# Patient Record
Sex: FEMALE | Race: WHITE | NOT HISPANIC OR LATINO | Employment: OTHER | URBAN - METROPOLITAN AREA
[De-identification: names, ages, dates, MRNs, and addresses within clinical notes are randomized per-mention and may not be internally consistent; named-entity substitution may affect disease eponyms.]

---

## 2023-09-28 LAB
ALBUMIN SERPL-MCNC: 4.1 GM/DL (ref 3.5–5)
ALBUMIN/GLOB SERPL: 1.5 RATIO (ref 1.5–3)
ALP BLD-CCNC: 72 U/L (ref 35–125)
ALT SERPL-CCNC: 9 U/L (ref 5–40)
ANION GAP SERPL CALCULATED.3IONS-SCNC: 8 MMOL/L (ref 0–19)
AST SERPL-CCNC: 12 U/L (ref 5–40)
BASOPHILS # BLD AUTO: 0.03 K/UL (ref 0–0.22)
BASOPHILS NFR BLD AUTO: 0.4 % (ref 0–1)
BILIRUB SERPL-MCNC: 0.2 MG/DL (ref 0.1–1.2)
BUN SERPL-MCNC: 12 MG/DL (ref 8–25)
BUN/CREAT SERPL: 15 RATIO (ref 8–21)
CALCIUM SERPL-MCNC: 9.3 MG/DL (ref 8.5–10.4)
CHLORIDE SERPL-SCNC: 101 MMOL/L (ref 97–107)
CO2 SERPL-SCNC: 31 MMOL/L (ref 24–31)
CREAT SERPL-MCNC: 0.8 MG/DL (ref 0.4–1.6)
DEPRECATED RDW RBC AUTO: 49.6 FL (ref 37–54)
DIFFERENTIAL METHOD BLD: ABNORMAL
EOSINOPHIL # BLD AUTO: 0.12 K/UL (ref 0–0.45)
EOSINOPHIL NFR BLD: 1.8 % (ref 0–3)
ERYTHROCYTE [DISTWIDTH] IN BLOOD BY AUTOMATED COUNT: 14.2 % (ref 11.7–15)
GFR SERPL CREATININE-BSD FRML MDRD: 73 ML/MIN/1.73 M2
GLOBULIN SER-MCNC: 2.7 G/DL (ref 1.9–3.7)
GLUCOSE SERPL-MCNC: 98 MG/DL (ref 65–99)
HCT VFR BLD AUTO: 40.5 % (ref 36–44)
HGB BLD-MCNC: 13.6 GM/DL (ref 12–15)
HS TROPONIN T DELTA: 1 (ref 0–4)
HS TROPONIN T DELTA: NORMAL (ref 0–4)
IMM GRANULOCYTES # BLD AUTO: 0.03 K/UL (ref 0–0.1)
LYMPHOCYTES # BLD AUTO: 1.65 K/UL (ref 1.2–3.2)
LYMPHOCYTES NFR BLD MANUAL: 24.6 % (ref 20–40)
MCH RBC QN AUTO: 31.7 PG (ref 26–34)
MCHC RBC AUTO-ENTMCNC: 33.6 % (ref 31–37)
MCV RBC AUTO: 94.4 FL (ref 80–100)
MONOCYTES # BLD AUTO: 0.57 K/UL (ref 0–0.8)
MONOCYTES NFR BLD MANUAL: 8.5 % (ref 0–8)
NEUTROPHILS # BLD AUTO: 4.32 K/UL
NEUTROPHILS # BLD AUTO: 4.32 K/UL (ref 1.8–7.7)
NEUTROPHILS.IMMATURE NFR BLD: 0.4 % (ref 0–1)
NEUTS SEG NFR BLD: 64.3 % (ref 50–70)
NRBC BLD-RTO: 0 /100 WBC
NT-PROBNP SERPL-MCNC: 1067 PG/ML (ref 0–624)
PLATELET # BLD AUTO: 271 K/UL (ref 150–450)
PMV BLD AUTO: 10.1 CU (ref 7–12.6)
POTASSIUM SERPL-SCNC: 3.8 MMOL/L (ref 3.4–5.1)
PROT SERPL-MCNC: 6.8 G/DL (ref 5.9–7.9)
RBC # BLD AUTO: 4.29 M/UL (ref 4–4.9)
SODIUM SERPL-SCNC: 140 MMOL/L (ref 133–145)
TROPONIN T SERPL-MCNC: 12 NG/L
TROPONIN T SERPL-MCNC: 13 NG/L
WBC # BLD AUTO: 6.7 K/UL (ref 4.5–11)

## 2023-09-28 PROCEDURE — 96376 TX/PRO/DX INJ SAME DRUG ADON: CPT

## 2023-09-28 PROCEDURE — 85025 COMPLETE CBC W/AUTO DIFF WBC: CPT

## 2023-09-28 PROCEDURE — 84484 ASSAY OF TROPONIN QUANT: CPT

## 2023-09-28 PROCEDURE — 9990 CHARGE CONVERSION: Mod: ME

## 2023-09-28 PROCEDURE — 96375 TX/PRO/DX INJ NEW DRUG ADDON: CPT

## 2023-09-28 PROCEDURE — 96372 THER/PROPH/DIAG INJ SC/IM: CPT | Mod: XU

## 2023-09-28 PROCEDURE — 83880 ASSAY OF NATRIURETIC PEPTIDE: CPT

## 2023-09-28 PROCEDURE — 96366 THER/PROPH/DIAG IV INF ADDON: CPT

## 2023-09-28 PROCEDURE — 71275 CT ANGIOGRAPHY CHEST: CPT | Mod: ME

## 2023-09-28 PROCEDURE — 96365 THER/PROPH/DIAG IV INF INIT: CPT

## 2023-09-28 PROCEDURE — G1004 CDSM NDSC: HCPCS

## 2023-09-28 PROCEDURE — 36415 COLL VENOUS BLD VENIPUNCTURE: CPT

## 2023-09-28 PROCEDURE — 80053 COMPREHEN METABOLIC PANEL: CPT

## 2023-09-28 PROCEDURE — 99285 EMERGENCY DEPT VISIT HI MDM: CPT | Mod: 25

## 2023-09-28 PROCEDURE — 93005 ELECTROCARDIOGRAM TRACING: CPT

## 2023-09-29 ENCOUNTER — HOSPITAL ENCOUNTER (OUTPATIENT)
Dept: DATA CONVERSION | Facility: HOSPITAL | Age: 83
Setting detail: OBSERVATION
Discharge: HOME | DRG: 291 | End: 2023-10-02
Attending: INTERNAL MEDICINE | Admitting: INTERNAL MEDICINE
Payer: MEDICARE

## 2023-09-29 DIAGNOSIS — G89.4 CHRONIC PAIN SYNDROME: ICD-10-CM

## 2023-09-29 DIAGNOSIS — J90 PLEURAL EFFUSION: Primary | ICD-10-CM

## 2023-09-29 LAB
ALBUMIN FLD-MCNC: NORMAL G/DL
ANION GAP SERPL CALCULATED.3IONS-SCNC: 13 MMOL/L (ref 0–19)
ANTICOAGULANT: NORMAL
APPEARANCE FLD: NORMAL
APTT PPP: 28 SEC (ref 22–32.5)
BACTERIA SPEC CULT: NORMAL
BASOPHILS # BLD AUTO: 0.04 K/UL (ref 0–0.22)
BASOPHILS NFR BLD AUTO: 0.6 % (ref 0–1)
BASOPHILS NFR FLD MANUAL: 0 %
BODY FLD TYPE: NORMAL
BUN SERPL-MCNC: 13 MG/DL (ref 8–25)
BUN/CREAT SERPL: 16.3 RATIO (ref 8–21)
CALCIUM SERPL-MCNC: 9.2 MG/DL (ref 8.5–10.4)
CHLORIDE SERPL-SCNC: 103 MMOL/L (ref 97–107)
CO2 SERPL-SCNC: 25 MMOL/L (ref 24–31)
CREAT SERPL-MCNC: 0.8 MG/DL (ref 0.4–1.6)
DEPRECATED RDW RBC AUTO: 47.9 FL (ref 37–54)
DIFFERENTIAL METHOD BLD: ABNORMAL
EOSINOPHIL # BLD AUTO: 0.13 K/UL (ref 0–0.45)
EOSINOPHIL NFR BLD: 1.9 % (ref 0–3)
EOSINOPHIL NFR FLD MANUAL: 0 %
ERYTHROCYTE [DISTWIDTH] IN BLOOD BY AUTOMATED COUNT: 14.1 % (ref 11.7–15)
GFR SERPL CREATININE-BSD FRML MDRD: 73 ML/MIN/1.73 M2
GLUCOSE SERPL-MCNC: 98 MG/DL (ref 65–99)
GRAM STN SPEC: NORMAL
HCT VFR BLD AUTO: 36.8 % (ref 36–44)
HGB BLD-MCNC: 12.1 GM/DL (ref 12–15)
IMM GRANULOCYTES # BLD AUTO: 0.03 K/UL (ref 0–0.1)
INR PPP: 1 (ref 0.86–1.16)
LDH FLD-CCNC: 165 U/L
LYMPHOCYTES # BLD AUTO: 1.78 K/UL (ref 1.2–3.2)
LYMPHOCYTES NFR BLD MANUAL: 25.4 % (ref 20–40)
LYMPHOCYTES NFR FLD MANUAL: 53 %
Lab: NORMAL
MCH RBC QN AUTO: 30.9 PG (ref 26–34)
MCHC RBC AUTO-ENTMCNC: 32.9 % (ref 31–37)
MCV RBC AUTO: 94.1 FL (ref 80–100)
MONOCYTES # BLD AUTO: 0.57 K/UL (ref 0–0.8)
MONOCYTES NFR BLD MANUAL: 8.1 % (ref 0–8)
MONOCYTES NFR FLD MANUAL: 4 %
NEUTROPHILS # BLD AUTO: 4.46 K/UL
NEUTROPHILS # BLD AUTO: 4.46 K/UL (ref 1.8–7.7)
NEUTROPHILS NFR FLD MANUAL: 15 %
NEUTROPHILS.IMMATURE NFR BLD: 0.4 % (ref 0–1)
NEUTS SEG NFR BLD: 63.6 % (ref 50–70)
NRBC BLD-RTO: 0 /100 WBC
PH FLD: 6.8 UNITS
PLATELET # BLD AUTO: 225 K/UL (ref 150–450)
PLATELET BLD QL SMEAR: ABNORMAL
PMV BLD AUTO: 11.8 CU (ref 7–12.6)
POTASSIUM SERPL-SCNC: 4.1 MMOL/L (ref 3.4–5.1)
PROTHROMBIN TIME: 10.3 SEC (ref 9.3–12.7)
RBC # BLD AUTO: 3.91 M/UL (ref 4–4.9)
RBC # FLD: NORMAL /UL
RBC MORPH BLD: ABNORMAL
REPORT STATUS -LH SQ DATA CONVERSION: NORMAL
SERVICE CMNT-IMP: NORMAL
SODIUM SERPL-SCNC: 141 MMOL/L (ref 133–145)
SPECIMEN SOURCE: NORMAL
WBC # BLD AUTO: 7 K/UL (ref 4.5–11)
WBC # FLD: 3034 /UL
WBC MORPH BLD: ABNORMAL
WBC OTHER NFR FLD MANUAL: NORMAL %

## 2023-09-29 PROCEDURE — 32555 ASPIRATE PLEURA W/ IMAGING: CPT

## 2023-09-29 PROCEDURE — REL CHARGE CONVERSION

## 2023-09-29 PROCEDURE — 87070 CULTURE OTHR SPECIMN AEROBIC: CPT

## 2023-09-29 PROCEDURE — 97166 OT EVAL MOD COMPLEX 45 MIN: CPT | Mod: GO

## 2023-09-29 PROCEDURE — 36415 COLL VENOUS BLD VENIPUNCTURE: CPT

## 2023-09-29 PROCEDURE — 85730 THROMBOPLASTIN TIME PARTIAL: CPT

## 2023-09-29 PROCEDURE — 71045 X-RAY EXAM CHEST 1 VIEW: CPT

## 2023-09-29 PROCEDURE — 88305 TISSUE EXAM BY PATHOLOGIST: CPT

## 2023-09-29 PROCEDURE — 93307 TTE W/O DOPPLER COMPLETE: CPT

## 2023-09-29 PROCEDURE — 83986 ASSAY PH BODY FLUID NOS: CPT

## 2023-09-29 PROCEDURE — 83615 LACTATE (LD) (LDH) ENZYME: CPT

## 2023-09-29 PROCEDURE — 82040 ASSAY OF SERUM ALBUMIN: CPT

## 2023-09-29 PROCEDURE — 88112 CYTOPATH CELL ENHANCE TECH: CPT

## 2023-09-29 PROCEDURE — 85610 PROTHROMBIN TIME: CPT

## 2023-09-29 PROCEDURE — 9990 CHARGE CONVERSION

## 2023-09-29 PROCEDURE — HOLD CHARGE CONVERSION

## 2023-09-29 PROCEDURE — 94664 DEMO&/EVAL PT USE INHALER: CPT

## 2023-09-29 PROCEDURE — 94799 UNLISTED PULMONARY SVC/PX: CPT

## 2023-09-29 PROCEDURE — 97161 PT EVAL LOW COMPLEX 20 MIN: CPT | Mod: GP

## 2023-09-29 PROCEDURE — 80053 COMPREHEN METABOLIC PANEL: CPT

## 2023-09-29 PROCEDURE — 94640 AIRWAY INHALATION TREATMENT: CPT | Mod: 76

## 2023-09-29 PROCEDURE — 80048 BASIC METABOLIC PNL TOTAL CA: CPT

## 2023-09-29 PROCEDURE — 87205 SMEAR GRAM STAIN: CPT

## 2023-09-29 PROCEDURE — 89051 BODY FLUID CELL COUNT: CPT

## 2023-09-29 PROCEDURE — 83880 ASSAY OF NATRIURETIC PEPTIDE: CPT

## 2023-09-29 PROCEDURE — 87075 CULTR BACTERIA EXCEPT BLOOD: CPT

## 2023-09-29 PROCEDURE — 84484 ASSAY OF TROPONIN QUANT: CPT

## 2023-09-29 PROCEDURE — 85025 COMPLETE CBC W/AUTO DIFF WBC: CPT

## 2023-09-29 RX ORDER — POLYETHYLENE GLYCOL 3350 17 G/17G
17 POWDER, FOR SOLUTION ORAL DAILY PRN
Status: DISCONTINUED | OUTPATIENT
Start: 2023-09-30 | End: 2023-10-02 | Stop reason: HOSPADM

## 2023-09-29 RX ORDER — DEXTROSE 50 % IN WATER (D50W) INTRAVENOUS SYRINGE
25
Status: DISCONTINUED | OUTPATIENT
Start: 2023-09-30 | End: 2023-10-02 | Stop reason: HOSPADM

## 2023-09-29 RX ORDER — ACETAMINOPHEN 325 MG/1
650 TABLET ORAL EVERY 6 HOURS PRN
Status: DISCONTINUED | OUTPATIENT
Start: 2023-09-30 | End: 2023-10-02 | Stop reason: HOSPADM

## 2023-09-29 RX ORDER — ALUMINUM HYDROXIDE, MAGNESIUM HYDROXIDE, AND SIMETHICONE 1200; 120; 1200 MG/30ML; MG/30ML; MG/30ML
30 SUSPENSION ORAL EVERY 4 HOURS PRN
Status: DISCONTINUED | OUTPATIENT
Start: 2023-09-30 | End: 2023-10-02 | Stop reason: HOSPADM

## 2023-09-29 RX ORDER — DEXTROSE 50 % IN WATER (D50W) INTRAVENOUS SYRINGE
12.5
Status: DISCONTINUED | OUTPATIENT
Start: 2023-09-30 | End: 2023-10-02 | Stop reason: HOSPADM

## 2023-09-29 RX ORDER — HEPARIN SODIUM 5000 [USP'U]/ML
5000 INJECTION, SOLUTION INTRAVENOUS; SUBCUTANEOUS EVERY 12 HOURS
Status: DISCONTINUED | OUTPATIENT
Start: 2023-09-30 | End: 2023-10-02 | Stop reason: HOSPADM

## 2023-09-29 RX ORDER — NAPROXEN SODIUM 220 MG/1
81 TABLET, FILM COATED ORAL DAILY
Status: DISCONTINUED | OUTPATIENT
Start: 2023-09-30 | End: 2023-10-02 | Stop reason: HOSPADM

## 2023-09-29 RX ORDER — SIMVASTATIN 40 MG/1
80 TABLET, FILM COATED ORAL NIGHTLY
Status: DISCONTINUED | OUTPATIENT
Start: 2023-09-30 | End: 2023-10-02 | Stop reason: HOSPADM

## 2023-09-29 RX ORDER — BUDESONIDE 0.5 MG/2ML
0.5 INHALANT ORAL
Status: DISCONTINUED | OUTPATIENT
Start: 2023-09-30 | End: 2023-10-02 | Stop reason: HOSPADM

## 2023-09-29 RX ORDER — CITALOPRAM 20 MG/1
20 TABLET, FILM COATED ORAL DAILY
Status: DISCONTINUED | OUTPATIENT
Start: 2023-09-30 | End: 2023-10-02 | Stop reason: HOSPADM

## 2023-09-29 RX ORDER — GUAIFENESIN 100 MG/5ML
100 SOLUTION ORAL EVERY 4 HOURS PRN
Status: DISCONTINUED | OUTPATIENT
Start: 2023-09-30 | End: 2023-10-02 | Stop reason: HOSPADM

## 2023-09-29 RX ORDER — ALBUTEROL SULFATE 0.83 MG/ML
2.5 SOLUTION RESPIRATORY (INHALATION)
Status: DISCONTINUED | OUTPATIENT
Start: 2023-09-30 | End: 2023-09-30

## 2023-09-29 RX ORDER — PRIMIDONE 50 MG/1
50 TABLET ORAL 3 TIMES DAILY
Status: DISCONTINUED | OUTPATIENT
Start: 2023-09-30 | End: 2023-10-02 | Stop reason: HOSPADM

## 2023-09-29 RX ORDER — OXYBUTYNIN CHLORIDE 5 MG/1
2.5 TABLET ORAL 2 TIMES DAILY
Status: DISCONTINUED | OUTPATIENT
Start: 2023-09-30 | End: 2023-10-02 | Stop reason: HOSPADM

## 2023-09-29 RX ORDER — HYDRALAZINE HYDROCHLORIDE 20 MG/ML
5 INJECTION INTRAMUSCULAR; INTRAVENOUS EVERY 4 HOURS PRN
Status: DISCONTINUED | OUTPATIENT
Start: 2023-09-30 | End: 2023-10-02 | Stop reason: HOSPADM

## 2023-09-29 RX ORDER — ALBUTEROL SULFATE 0.83 MG/ML
2.5 SOLUTION RESPIRATORY (INHALATION) EVERY 2 HOUR PRN
Status: DISCONTINUED | OUTPATIENT
Start: 2023-09-30 | End: 2023-10-02 | Stop reason: HOSPADM

## 2023-09-29 RX ORDER — ONDANSETRON HYDROCHLORIDE 2 MG/ML
4 INJECTION, SOLUTION INTRAVENOUS EVERY 4 HOURS PRN
Status: DISCONTINUED | OUTPATIENT
Start: 2023-09-30 | End: 2023-09-30

## 2023-09-29 RX ORDER — GABAPENTIN 600 MG/1
300 TABLET ORAL 3 TIMES DAILY
Status: DISCONTINUED | OUTPATIENT
Start: 2023-09-30 | End: 2023-10-02 | Stop reason: HOSPADM

## 2023-09-29 RX ORDER — TORSEMIDE 20 MG/1
20 TABLET ORAL DAILY
Status: DISCONTINUED | OUTPATIENT
Start: 2023-09-30 | End: 2023-10-02 | Stop reason: HOSPADM

## 2023-09-29 RX ORDER — CALCIUM CARBONATE 200(500)MG
500 TABLET,CHEWABLE ORAL EVERY 6 HOURS PRN
Status: DISCONTINUED | OUTPATIENT
Start: 2023-09-30 | End: 2023-10-02 | Stop reason: HOSPADM

## 2023-09-29 RX ORDER — LISINOPRIL 20 MG/1
20 TABLET ORAL DAILY
Status: DISCONTINUED | OUTPATIENT
Start: 2023-09-30 | End: 2023-10-02 | Stop reason: HOSPADM

## 2023-09-29 RX ORDER — PANTOPRAZOLE SODIUM 40 MG/1
40 TABLET, DELAYED RELEASE ORAL DAILY
Status: DISCONTINUED | OUTPATIENT
Start: 2023-09-30 | End: 2023-10-02 | Stop reason: HOSPADM

## 2023-09-29 RX ORDER — DEXTROSE MONOHYDRATE 100 MG/ML
0.3 INJECTION, SOLUTION INTRAVENOUS ONCE AS NEEDED
Status: DISCONTINUED | OUTPATIENT
Start: 2023-09-30 | End: 2023-10-02 | Stop reason: HOSPADM

## 2023-09-29 RX ORDER — TEMAZEPAM 15 MG/1
15 CAPSULE ORAL NIGHTLY PRN
Status: DISCONTINUED | OUTPATIENT
Start: 2023-09-30 | End: 2023-10-02 | Stop reason: HOSPADM

## 2023-09-30 PROBLEM — R91.8 LUNG MASS: Status: ACTIVE | Noted: 2023-09-30

## 2023-09-30 PROBLEM — G89.29 CHRONIC PAIN: Status: ACTIVE | Noted: 2023-09-30

## 2023-09-30 PROBLEM — J90 PLEURAL EFFUSION: Status: ACTIVE | Noted: 2023-09-30

## 2023-09-30 LAB
ALBUMIN SERPL-MCNC: 3.9 G/DL (ref 3.5–5)
ALP BLD-CCNC: 59 U/L (ref 35–125)
ALT SERPL-CCNC: 6 U/L (ref 5–40)
ANION GAP SERPL CALC-SCNC: 9 MMOL/L
AST SERPL-CCNC: 10 U/L (ref 5–40)
BILIRUB SERPL-MCNC: 0.2 MG/DL (ref 0.1–1.2)
BUN SERPL-MCNC: 16 MG/DL (ref 8–25)
CALCIUM SERPL-MCNC: 9.2 MG/DL (ref 8.5–10.4)
CHLORIDE SERPL-SCNC: 102 MMOL/L (ref 97–107)
CO2 SERPL-SCNC: 32 MMOL/L (ref 24–31)
CREAT SERPL-MCNC: 1 MG/DL (ref 0.4–1.6)
GFR SERPL CREATININE-BSD FRML MDRD: 56 ML/MIN/1.73M*2
GLUCOSE SERPL-MCNC: 82 MG/DL (ref 65–99)
MAGNESIUM SERPL-MCNC: 1.8 MG/DL (ref 1.6–3.1)
PHOSPHATE SERPL-MCNC: 4.9 MG/DL (ref 2.5–4.5)
POTASSIUM SERPL-SCNC: 4.3 MMOL/L (ref 3.4–5.1)
PROT SERPL-MCNC: 6.3 G/DL (ref 5.9–7.9)
SODIUM SERPL-SCNC: 143 MMOL/L (ref 133–145)

## 2023-09-30 PROCEDURE — 83615 LACTATE (LD) (LDH) ENZYME: CPT

## 2023-09-30 PROCEDURE — 80053 COMPREHEN METABOLIC PANEL: CPT | Performed by: INTERNAL MEDICINE

## 2023-09-30 PROCEDURE — 94640 AIRWAY INHALATION TREATMENT: CPT

## 2023-09-30 PROCEDURE — 83735 ASSAY OF MAGNESIUM: CPT | Performed by: INTERNAL MEDICINE

## 2023-09-30 PROCEDURE — 83986 ASSAY PH BODY FLUID NOS: CPT

## 2023-09-30 PROCEDURE — G0378 HOSPITAL OBSERVATION PER HR: HCPCS

## 2023-09-30 PROCEDURE — 85610 PROTHROMBIN TIME: CPT

## 2023-09-30 PROCEDURE — 9990 CHARGE CONVERSION

## 2023-09-30 PROCEDURE — 2500000001 HC RX 250 WO HCPCS SELF ADMINISTERED DRUGS (ALT 637 FOR MEDICARE OP): Performed by: INTERNAL MEDICINE

## 2023-09-30 PROCEDURE — 2500000004 HC RX 250 GENERAL PHARMACY W/ HCPCS (ALT 636 FOR OP/ED): Performed by: INTERNAL MEDICINE

## 2023-09-30 PROCEDURE — 1100000001 HC PRIVATE ROOM DAILY

## 2023-09-30 PROCEDURE — 80048 BASIC METABOLIC PNL TOTAL CA: CPT

## 2023-09-30 PROCEDURE — 85730 THROMBOPLASTIN TIME PARTIAL: CPT

## 2023-09-30 PROCEDURE — HOLD CHARGE CONVERSION

## 2023-09-30 PROCEDURE — 89051 BODY FLUID CELL COUNT: CPT

## 2023-09-30 PROCEDURE — 2500000002 HC RX 250 W HCPCS SELF ADMINISTERED DRUGS (ALT 637 FOR MEDICARE OP, ALT 636 FOR OP/ED): Performed by: INTERNAL MEDICINE

## 2023-09-30 PROCEDURE — 84100 ASSAY OF PHOSPHORUS: CPT | Performed by: INTERNAL MEDICINE

## 2023-09-30 PROCEDURE — 36415 COLL VENOUS BLD VENIPUNCTURE: CPT | Performed by: INTERNAL MEDICINE

## 2023-09-30 PROCEDURE — 85025 COMPLETE CBC W/AUTO DIFF WBC: CPT

## 2023-09-30 RX ORDER — ALBUTEROL SULFATE 0.83 MG/ML
2.5 SOLUTION RESPIRATORY (INHALATION)
Status: DISCONTINUED | OUTPATIENT
Start: 2023-09-30 | End: 2023-10-02 | Stop reason: HOSPADM

## 2023-09-30 RX ORDER — OXYCODONE HYDROCHLORIDE 5 MG/1
5 TABLET ORAL EVERY 6 HOURS PRN
Status: DISCONTINUED | OUTPATIENT
Start: 2023-09-30 | End: 2023-10-02 | Stop reason: HOSPADM

## 2023-09-30 RX ORDER — ALBUTEROL SULFATE 0.83 MG/ML
SOLUTION RESPIRATORY (INHALATION)
Status: DISPENSED
Start: 2023-09-30 | End: 2023-10-01

## 2023-09-30 RX ORDER — ONDANSETRON HYDROCHLORIDE 2 MG/ML
4 INJECTION, SOLUTION INTRAVENOUS EVERY 6 HOURS PRN
Status: DISCONTINUED | OUTPATIENT
Start: 2023-09-30 | End: 2023-10-02 | Stop reason: HOSPADM

## 2023-09-30 RX ADMIN — HEPARIN SODIUM 5000 UNITS: 5000 INJECTION, SOLUTION INTRAVENOUS; SUBCUTANEOUS at 10:57

## 2023-09-30 RX ADMIN — CITALOPRAM HYDROBROMIDE 20 MG: 20 TABLET ORAL at 10:57

## 2023-09-30 RX ADMIN — PANTOPRAZOLE SODIUM 40 MG: 40 TABLET, DELAYED RELEASE ORAL at 10:57

## 2023-09-30 RX ADMIN — TORSEMIDE 20 MG: 20 TABLET ORAL at 10:57

## 2023-09-30 RX ADMIN — OXYBUTYNIN CHLORIDE 2.5 MG: 5 TABLET ORAL at 20:45

## 2023-09-30 RX ADMIN — HEPARIN SODIUM 5000 UNITS: 5000 INJECTION, SOLUTION INTRAVENOUS; SUBCUTANEOUS at 20:44

## 2023-09-30 RX ADMIN — ALBUTEROL SULFATE 2.5 MG: 2.5 SOLUTION RESPIRATORY (INHALATION) at 19:00

## 2023-09-30 RX ADMIN — OXYBUTYNIN CHLORIDE 2.5 MG: 5 TABLET ORAL at 10:57

## 2023-09-30 RX ADMIN — GABAPENTIN 300 MG: 600 TABLET, FILM COATED ORAL at 11:04

## 2023-09-30 RX ADMIN — ASPIRIN 81 MG CHEWABLE TABLET 81 MG: 81 TABLET CHEWABLE at 10:56

## 2023-09-30 RX ADMIN — ACETAMINOPHEN 650 MG: 325 TABLET, FILM COATED ORAL at 15:44

## 2023-09-30 RX ADMIN — ALBUTEROL SULFATE 2.5 MG: 2.5 SOLUTION RESPIRATORY (INHALATION) at 13:20

## 2023-09-30 RX ADMIN — PRIMIDONE 50 MG: 50 TABLET ORAL at 20:45

## 2023-09-30 RX ADMIN — GABAPENTIN 300 MG: 600 TABLET, FILM COATED ORAL at 15:39

## 2023-09-30 RX ADMIN — PRIMIDONE 50 MG: 50 TABLET ORAL at 15:39

## 2023-09-30 RX ADMIN — SIMVASTATIN 80 MG: 40 TABLET, FILM COATED ORAL at 20:45

## 2023-09-30 RX ADMIN — PRIMIDONE 50 MG: 50 TABLET ORAL at 10:57

## 2023-09-30 RX ADMIN — GABAPENTIN 300 MG: 600 TABLET, FILM COATED ORAL at 20:45

## 2023-09-30 RX ADMIN — BUDESONIDE 0.5 MG: 0.5 INHALANT RESPIRATORY (INHALATION) at 08:41

## 2023-09-30 RX ADMIN — BUDESONIDE 0.5 MG: 0.5 INHALANT RESPIRATORY (INHALATION) at 19:42

## 2023-09-30 RX ADMIN — OXYCODONE HYDROCHLORIDE 5 MG: 5 TABLET ORAL at 18:00

## 2023-09-30 RX ADMIN — OXYCODONE HYDROCHLORIDE 5 MG: 5 TABLET ORAL at 10:53

## 2023-09-30 RX ADMIN — TEMAZEPAM 15 MG: 15 CAPSULE ORAL at 20:49

## 2023-09-30 ASSESSMENT — PAIN - FUNCTIONAL ASSESSMENT
PAIN_FUNCTIONAL_ASSESSMENT: 0-10

## 2023-09-30 ASSESSMENT — PAIN SCALES - GENERAL
PAINLEVEL_OUTOF10: 8
PAINLEVEL_OUTOF10: 7
PAINLEVEL_OUTOF10: 7
PAINLEVEL_OUTOF10: 0 - NO PAIN
PAINLEVEL_OUTOF10: 6

## 2023-09-30 NOTE — CONSULTS
"Consults  History Of Present Illness:    Juli Del Castillo is a 82 y.o. female presenting with history significant for history of right lung cancer for which she underwent right upper lobe lobectomy many years ago does not usually follow with oncologist, history of hypertension for which she follows with me, history of chronic low back pain for which she sees pain management Dr. Emilio Jones and has been receiving intraspinal injections once every 6 months now presents with symptoms of retrosternal chest discomfort pain shooting down towards her intrascapular area.  She was quite concerned she did call her daughter who is a nurse and then turned patient was advised to go to the emergency room.  Patient admits to me she is feeling much better.  She was noted to have bilateral pleural effusions and underwent left-sided thoracentesis where 200 cc of hemorrhagic fluid was removed yesterday by interventional radiology.  Patient admits to me that she is feeling much better.  She denies any symptoms of chest discomfort at time of my evaluation.  She has history of smoking and quit smoking few years ago.  She lives in [place] with her daughter..     Last Recorded Vitals:  Vitals:    09/29/23 1013 09/30/23 0717 09/30/23 1114 09/30/23 1531   BP:  (!) 117/49 126/83 127/56   BP Location:  Right arm Right arm Right arm   Patient Position:  Lying Sitting Lying   Pulse:  77 106 69   Resp:  17 17 18   Temp:  36.7 °C (98.1 °F) 36.9 °C (98.4 °F) 36.9 °C (98.4 °F)   TempSrc:  Oral Oral Oral   SpO2:  100% 98% 99%   Weight: 77 kg (169 lb 12.1 oz)      Height: 1.65 m (5' 4.96\")          Last Labs:  CBC - 9/28/2023:  7:47 PM  6.7 13.6 271    40.5      CMP - 9/28/2023:  7:47 PM  9.3 6.8 12 --- 0.2   _ 4.1 9 72      PTT - No results in last year.  _   _ _     Hemoglobin A1C   Date/Time Value Ref Range Status   06/01/2022 06:01 AM 6.0 4.0 - 6.0 % Final     Comment:     Hemoglobin A1C levels are related to mean blood glucose during the   " "preceding 2-3 months. The relationship table below may be used as a   general guide. Each 1% increase in HGB A1C is a reflection of an   increase in mean glucose of approximately 30 mg/dl.   Reference: [employer], volume 29, supplement 1 Jan. 2006                        HGB A1C ................. Approx. Mean Glucose   _______________________________________________   6%   ...............................  120 mg/dl   7%   ...............................  150 mg/dl   8%   ...............................  180 mg/dl   9%   ...............................  210 mg/dl   10%  ...............................  240 mg/dl  Performed at [address] [address]       LDL Calculated   Date/Time Value Ref Range Status   04/13/2023 11:58  65 - 130 MG/DL Final   06/02/2022 06:05 AM 96 65 - 130 MG/DL Final   03/07/2022 11:32  65 - 130 MG/DL Final      Last I/O:  No intake/output data recorded.    Past Cardiology Tests (Last 3 Years):  EKG: Sinus rhythm with no acute ST segment changes.  Echo:  No results found for this or any previous visit from the past 1095 days.    Ejection Fractions:  No results found for: \"EF\"  Cath:  No results found for this or any previous visit from the past 1095 days.    Stress Test:  No results found for this or any previous visit from the past 1095 days.    Cardiac Imaging:  No results found for this or any previous visit from the past 1095 days.      Past Medical History:  History of smoking, history of right upper lobe lobectomy secondary to violent neoplasm of the right lung, history of essential tremors for which she sees Dr. Zee Del Cid, history of gait imbalance for which she follows with Dr. Zee Del Cid, history of COPD for which she follows with Dr. Kobi Walker, history of depression for which he remains on Celexa follows with Mrs. Sukhjinder Reed, low back pain for which she sees pain management Dr. Ludwig Stinson and hypertension and chest discomfort " for which she she follows with our office in [place] once every 6 months    Past Surgical History:  She has a past surgical history that includes MR angio neck wo IV contrast (10/31/2015); MR angio head wo IV contrast (10/31/2015); and MR angio head wo IV contrast (2/15/2019).      Social History:  She has no history on file for tobacco use, alcohol use, and drug use.    Family History:  No family history on file.     Allergies:  Cortisone, Other, and Codeine    Inpatient Medications:  Scheduled medications   Medication Dose Route Frequency    albuterol        albuterol  2.5 mg nebulization q6h while awake    aspirin  81 mg oral Daily    budesonide  0.5 mg nebulization BID    citalopram  20 mg oral Daily    gabapentin  300 mg oral TID    heparin (porcine)  5,000 Units subcutaneous q12h    lisinopril  20 mg oral Daily    oxybutynin  2.5 mg oral BID    pantoprazole  40 mg oral Daily    primidone  50 mg oral TID    simvastatin  80 mg oral Nightly    torsemide  20 mg oral Daily     PRN medications   Medication    albuterol    acetaminophen    albuterol    alum-mag hydroxide-simeth    calcium carbonate    dextrose    dextrose    dextrose    dextrose    glucagon    guaiFENesin    hydrALAZINE    ondansetron    oxyCODONE    polyethylene glycol    temazepam     Continuous Medications   Medication Dose Last Rate     Outpatient Medications:  No current outpatient medications    Physical Exam:  HEENT PRL neck is supple lungs decreased air entry in the left lower base.  Bronchial breath sounds in the right upper lobe.  Heart S1-S2 present with no murmurs abdomen soft and nontender extremity shows no evidence of pedal edema neuro is grossly nonfocal she ambulates with a walker     Assessment/Plan   1.  Left-sided pleural effusion for which patient underwent diagnostic and therapeutic thoracentesis by interventional etiology and 200 cc of hemorrhagic fluid was removed.  This fluid was sent for cytology which evaluated in the  next 2 to 4 days.  Patient notes she is feeling much better.  We will observe her and repeat a chest x-ray prior to discharge.  2.  Chest pain.  There is no evidence of acute coronary syndrome and troponins were normal.  EKG shows no acute rhythm changes.  I have reassured the patient and counseled that I will see her in follow-up in 2 weeks post discharge  3.  Low back pain for which she follows with Dr. Ludwig Stinson  4.  She will tremors for which she remains on primidone under supervision of Dr. Zee Del Cid and she does have gait imbalance and walks help of walker.  5.  No other active cardiac issues I will see her only on a as needed basis but should he have any questions please give me a call we will be happy to see her    Peripheral IV 09/29/23 20 G Proximal;Right;Anterior Forearm (Active)   Site Assessment Clean;Dry;Intact 09/30/23 1555   Dressing Status Clean;Dry 09/30/23 1555   Number of days: 1       Code Status:  No Order    I spent 35 minutes in the professional and overall care of this patient.        Glenn Jacobson MD

## 2023-09-30 NOTE — PROGRESS NOTES
"Subjective   Patient seen in follow-up for pleural effusion, acute on chronic respiratory failure.  On 3 L nasal cannula oxygen; O2 sats 98%.  No respiratory complaints.  Endorses nonproductive cough with pleuritic pain.  Afebrile.       Objective     Physical Exam  Constitutional:       Appearance: Normal appearance.   HENT:      Head: Normocephalic.      Nose: Nose normal.   Eyes:      Pupils: Pupils are equal, round, and reactive to light.   Cardiovascular:      Rate and Rhythm: Normal rate and regular rhythm.      Pulses: Normal pulses.      Heart sounds: Normal heart sounds.   Pulmonary:      Effort: Pulmonary effort is normal.      Comments: Lungs diminished but clear.  Abdominal:      General: Abdomen is flat. Bowel sounds are normal.      Palpations: Abdomen is soft.   Musculoskeletal:      Cervical back: Normal range of motion.   Skin:     General: Skin is warm and dry.      Capillary Refill: Capillary refill takes less than 2 seconds.   Neurological:      General: No focal deficit present.      Mental Status: She is alert and oriented to person, place, and time.   Psychiatric:         Mood and Affect: Mood normal.         Behavior: Behavior normal.         Last Recorded Vitals  Blood pressure 126/83, pulse 106, temperature 36.9 °C (98.4 °F), temperature source Oral, resp. rate 17, height 1.65 m (5' 4.96\"), weight 77 kg (169 lb 12.1 oz), SpO2 98 %.  Intake/Output last 3 Shifts:  No intake/output data recorded.    Relevant Results              Current Facility-Administered Medications:     acetaminophen (Tylenol) tablet 650 mg, 650 mg, oral, q6h PRN, Seth Monge MD    albuterol 2.5 mg /3 mL (0.083 %) nebulizer solution 2.5 mg, 2.5 mg, nebulization, q2h PRN, Seth Monge MD    albuterol 2.5 mg /3 mL (0.083 %) nebulizer solution 2.5 mg, 2.5 mg, nebulization, q6h while awake, Rafael Bullard DO, 2.5 mg at 09/30/23 1320    alum-mag hydroxide-simeth (Mylanta) 200-200-20 mg/5 mL oral suspension 30 mL, " 30 mL, oral, q4h PRN, Seth Monge MD    aspirin chewable tablet 81 mg, 81 mg, oral, Daily, Seth Monge MD, 81 mg at 09/30/23 1056    budesonide (Pulmicort) 0.5 mg/2 mL nebulizer solution 0.5 mg, 0.5 mg, nebulization, BID, Seth Monge MD, 0.5 mg at 09/30/23 0841    calcium carbonate (Tums) chewable tablet 500 mg, 500 mg, oral, q6h PRN, Seth Monge MD    citalopram (CeleXA) tablet 20 mg, 20 mg, oral, Daily, Seth Monge MD, 20 mg at 09/30/23 1057    dextrose 10 % infusion, 0.3 g/kg/hr, intravenous, Once PRN, Seth Monge MD    dextrose 50 % injection 12.5 g, 12.5 g, intravenous, q15 min PRN, Seth Monge MD    dextrose 50 % injection 25 g, 25 g, intravenous, q15 min PRN, Seth Monge MD    dextrose 50 % injection 25 g, 25 g, intravenous, q15 min PRN, Seth Monge MD    gabapentin (Neurontin) tablet 300 mg, 300 mg, oral, TID, Seth Monge MD, 300 mg at 09/30/23 1104    glucagon (Glucagen) injection 1 mg, 1 mg, intramuscular, q15 min PRN, Seth Monge MD    guaiFENesin (Robitussin) 100 mg/5 mL syrup 100 mg, 100 mg, oral, q4h PRN, Seth Monge MD    heparin (porcine) injection 5,000 Units, 5,000 Units, subcutaneous, q12h, Seth Monge MD, 5,000 Units at 09/30/23 1057    hydrALAZINE (Apresoline) injection 5 mg, 5 mg, intravenous, q4h PRN, Seth Monge MD    lisinopril tablet 20 mg, 20 mg, oral, Daily, Seth Monge MD    ondansetron (Zofran) injection 4 mg, 4 mg, intravenous, q6h PRN, Homero Menolasino, DO    oxybutynin (Ditropan) tablet 2.5 mg, 2.5 mg, oral, BID, Seth Monge MD, 2.5 mg at 09/30/23 1057    oxyCODONE (Roxicodone) immediate release tablet 5 mg, 5 mg, oral, q6h PRN, Delonte Velasco MD, 5 mg at 09/30/23 1053    pantoprazole (ProtoNix) EC tablet 40 mg, 40 mg, oral, Daily, Seth Monge MD, 40 mg at 09/30/23 1057    polyethylene glycol (Glycolax, Miralax) packet 17 g, 17 g, oral, Daily PRN, Seth Monge MD    primidone (Mysoline) tablet 50 mg, 50 mg,  oral, TID, Seth Monge MD, 50 mg at 09/30/23 1057    simvastatin (Zocor) tablet 80 mg, 80 mg, oral, Nightly, Seth Monge MD    temazepam (Restoril) capsule 15 mg, 15 mg, oral, Nightly PRN, Seth Monge MD    torsemide (Demadex) tablet 20 mg, 20 mg, oral, Daily, Seth Monge MD, 20 mg at 09/30/23 1057            No results found for this or any previous visit (from the past 24 hour(s)).     Echocardiogram    Result Date: 9/29/2023  PROCEDURE:         ECHO 2-D WO CONTRAST - IEC  0010 REASON FOR EXAM: mitral regurg RESULT: [facility] [address], [place] [id number] Phone [phone] TRANSTHORACIC ECHOCARDIOGRAM REPORT Patient Name:     JOSH Renae Physician:   [id number] Jacobson MD Study Date:       9/29/2023           Referring Physician: [id number] Bullard DO MRN/PID:          [id number]            PCP: Accession/Order#: [id number]          Department Location: YOB: 1940          Fellow: Gender:           F                   Nurse: Admit Date:       9/29/2023           Sonographer:         Colleen MEEHAN Admission Status: Inpatient - Routine Additional Staff: Height:           165.10 cm           CC Report to:        [id number] Jacobson MD Weight:           77.00 kg            Study Type:          ECHO 2D WITH CONTRAST BSA:              1.84 m2 Blood Pressure: 119 /41 mmHg Diagnosis/ICD: I34.0-Nonrheumatic mitral (valve) insufficiency Indication:    Mitral Regurg Procedure/CPT: Echo Complete w Full Doppler-[id number] Patient History: Pertinent History: Chronic Obstructive Pulmonary Disease Pulmonary HTN Obstructive sleep apnea Peripheral Vascular Diasese Cerebrovascular accident chronic neck pain CAD CHF with preserved EF [place] Cancer Anxiety Carotid Stenosis Migraine Drepession Hyperlipidema Gastroesophageal reflux disease HTn Lung Cancer. Study Detail: The following Echo studies were performed: 2D, M-Mode, Doppler, color flow and  Strain. PHYSICIAN INTERPRETATION: Left Ventricle: Left ventricular systolic function is normal, with an estimated ejection fraction of 55-60%. There are no regional wall motion abnormalities. The left ventricular cavity size is normal. Left Ventricular Global Longitudinal Strain - -11.6 %. Spectral Doppler shows a normal pattern of left ventricular diastolic filling. Left Atrium: The left atrium is upper limits of normal in size. Right Ventricle: The right ventricle is normal in size. There is normal right ventricular global systolic function. Right Atrium: The right atrium is normal in size. Aortic Valve: The aortic valve is trileaflet. There is mild aortic valve regurgitation. The peak instantaneous gradient of the aortic valve is 16.2 mmHg. Mitral Valve: The mitral valve is normal in structure. There is trace to mild mitral valve regurgitation. Tricuspid Valve: The tricuspid valve is structurally normal. There is trace tricuspid regurgitation. The Doppler estimated RVSP is within normal limits at 27.4 mmHg. Pulmonic Valve: The pulmonic valve is not well visualized. There is no indication of pulmonic valve regurgitation. Pericardium: There is no pericardial effusion noted. Aorta: The aortic root is normal. CONCLUSIONS: 1. Left ventricular systolic function is normal with a 55-60% estimated ejection fraction. 2. Trace to mild mitral valve regurgitation. 3. RVSP within normal limits. 4. Trace tricuspid regurgitation is visualized. 5. Mild aortic valve regurgitation. QUANTITATIVE DATA SUMMARY: 2D MEASUREMENTS: Normal Ranges: LAs:           3.50 cm   (2.7-4.0cm) IVSd:          0.55 cm   (0.6-1.1cm) LVPWd:         0.83 cm   (0.6-1.1cm) LVIDd:         3.39 cm   (3.9-5.9cm) LVIDs:         2.38 cm LV Mass Index: 31.7 g/m2 LV % FS        29.8 % LA VOLUME: Normal Ranges: LA Vol A2C:        59.2 ml LA Vol Index A2C:  32.1 ml/m2 LA Area A2C:       19.9 cm2 LA Major Axis A2C: 5.7 cm LA Volume Index:   31.8 ml/m2 LA Vol A2C:         57.2 ml RA VOLUME BY A/L METHOD: Normal Ranges: RA Vol A4C:        25.9 ml    (8.3-19.5ml) RA Vol Index A4C:  14.0 ml/m2 RA Area A4C:       11.8 cm2 RA Major Axis A4C: 4.6 cm M-MODE MEASUREMENTS: Normal Ranges: Ao Root: 2.30 cm (2.0-3.7cm) AoV Exc: 1.00 cm (1.5-2.5cm) LAs:     3.10 cm (2.7-4.0cm) AORTA MEASUREMENTS: Normal Ranges: AoV Exc: 1.00 cm (1.5-2.5cm) LV SYSTOLIC FUNCTION BY 2D PLANIMETRY (MOD): Normal Ranges: EF-A4C View:                      57.9 %  (>=55%) EF-A2C View:                      59.9 % EF-Biplane:                       58.1 % Global Longitudinal Strain (GLS): -11.6 % LV DIASTOLIC FUNCTION: Normal Ranges: MV Peak E:      1.44 m/s   (0.7-1.2 m/s) MV Peak A:      0.83 m/s   (0.42-0.7 m/s) E/A Ratio:      1.73       (1.0-2.2) MV e'           0.06 m/s   (>8.0) MV lateral e'   0.06 m/s MV medial e'    0.05 m/s E/e' Ratio:     24.74      (<8.0) PulmV Sys Ulises:  63.60 cm/s PulmV Pittman Ulises: 56.60 cm/s PulmV S/D Ulises:  1.10 MITRAL VALVE: Normal Ranges: MV DT: 215 msec (150-240msec) AORTIC VALVE: Normal Ranges: AoV Vmax:      2.01 m/s  (<=1.7m/s) AoV Peak P.2 mmHg (<20mmHg) LVOT Max Ulises:  1.25 m/s  (<=1.1m/s) LVOT VTI:      32.80 cm LVOT Diameter: 1.50 cm   (1.8-2.4cm) AoV Area,Vmax: 1.10 cm2  (2.5-4.5cm2) AORTIC INSUFFICIENCY: AI Vmax:       3.01 m/s AI Half-time:  490 msec AI Decel Rate: 227.00 cm/s2 RIGHT VENTRICLE: RV Basal 2.95 cm RV Mid   2.53 cm RV Major 5.5 cm TAPSE:   14.7 mm RV s'    0.10 m/s TRICUSPID VALVE/RVSP: Normal Ranges: Peak TR Velocity: 2.47 m/s RV Syst Pressure: 27.4 mmHg (< 30mmHg) PULMONIC VALVE: Normal Ranges: PV Accel Time: 69 msec  (>120ms) PV Max Ulises:    0.9 m/s  (0.6-0.9m/s) PV Max PG:     3.5 mmHg Pulmonary Veins: PulmV Pittman Ulises: 56.60 cm/s PulmV S/D Ulises:  1.10 PulmV Sys Ulises:  63.60 cm/s [id number] Karime Jacobson MD Electronically signed on 2023 at 7:26:23 PM  Final  Original Interpreting Physician:   KARIME JACOBSON MD Original Transcribed by/Date: [facility] Sep 29  2023 12:44P Original Electronically Signed by/Date: KARIME ARANDA MD Sep 29 2023 7:26P Addendum Interpreting Physician: Addendum Transcribed by/Date: NO ADDENDUM Addendum Electronically Signed by/Date:     XR chest 1 view    Result Date: 9/29/2023  PROCEDURE:         CHEST 1 VIEW - IXR  0019 REASON FOR EXAM: Post left thoracentesis; 200 mL removed RESULT: MRN: [id number] Patient Name: JOSH PALMER STUDY: CHEST 1 VIEW; 9/29/2023 3:28 pm INDICATION: Post left thoracentesis; 200 mL removed. COMPARISON: CT angiogram of the chest dated September 28, 2023 ACCESSION NUMBER(S): [id number] ORDERING CLINICIAN: CYNDEE PICKETT FINDINGS: RESULT: Midline sternotomy wires and mediastinal surgical clips are noted. The cardiac silhouette is within normal limits for size. The aorta is tortuous and calcified. There is minimal blunting of the right costophrenic angle. No evidence for pneumothorax is identified following left-sided thoracentesis. The osseous structures are unremarkable. IMPRESSION: No evidence for pneumothorax following left-sided thoracentesis Dictation workstation:   [id number] Original Interpreting Physician:   HELEN SANCHEZ MD Original Transcribed by/Date: MMODAL Sep 29 2023  3:09P Original Electronically Signed by/Date: HELEN SANCHEZ MD Sep 29 2023 3:38P Addendum Interpreting Physician: Addendum Transcribed by/Date: NO ADDENDUM Addendum Electronically Signed by/Date:     US thoracentesis    Result Date: 9/29/2023  PROCEDURE:         THORACENTESIS - IUS  2517 REASON FOR EXAM: PLEURAL EFFUSION RESULT: MRN: [id number] Patient Name: JOSH PALMER STUDY: THORACENTESIS 9/29/2023 3:09 pm INDICATION: PLEURAL EFFUSION left-sided pleural effusion COMPARISON: CTA chest 09/28/2023 ACCESSION NUMBER(S): [id number] ORDERING CLINICIAN: Dr. Bullard TECHNIQUE: Ultrasound-guided diagnostic and therapeutic left thoracentesis. FINDINGS: Limited grayscale ultrasound imaging of the left hemithorax demonstrated a small amount of  pleural fluid. A suitable target in the deepest pocket in the left posterior mid scapular line was selected for thoracentesis, taking special precaution to avoid any intercostal vessels. PROCEDURE: Site: Left posterior chest. The procedure, risks, complications, and alternatives were discussed with the patient. Verbal and written consent were obtained. A Time-Out was observed to confirm the correct patient, correct procedure, and correct site. The skin was cleaned and draped in the usual sterile fashion. Subcutaneous tissues were anesthetized with Lidocaine 1%. Catheter: Under direct ultrasound guidance, a 5 [place] OurVinyl catheter was advanced into the largest fluid pocket in the left posterior mid scapular line. Aspirated fluid: 200 mL of red colored fluid. Complications: None. The patient tolerated the procedure well without immediate complication. Procedure performed by: Dr. Pickett Findings of the procedure: Small left pleural effusion Any estimated blood loss (mL): Minimal Any specimen(s) removed: 200 mL of pleural fluid The postoperative diagnosis: Same. IMPRESSION: Successful ultrasound guided diagnostic and therapeutic left sided thoracentesis, yielding 200 mL of fluid. Samples were sent to the lab for analysis, per the ordering clinician's request. MACRO: None. Dictation workstation:   [id number] Original Interpreting Physician:   CYNDEE PICKETT MD Original Transcribed by/Date: MMODAL Sep 29 2023  9:04A Original Electronically Signed by/Date: CYNDEE PICKETT MD Sep 29 2023  3:12P Addendum Interpreting Physician: Addendum Transcribed by/Date: NO ADDENDUM Addendum Electronically Signed by/Date:     CT angio chest w and wo IV contrast    Result Date: 9/28/2023  PROCEDURE:         ANGIO CHEST - ICT  3081 REASON FOR EXAM: Aortic dissection suspected RESULT: MRN: [id number] Patient Name: JOSH PALMER STUDY: ANGIO CHEST; 9/28/2023 9:02 pm INDICATION: Aortic dissection suspected. /sharp chest pain/shortness of  breath/history of colon cancer COMPARISON: Multiple/latest 06/01/2022 ACCESSION NUMBER(S): [id number] ORDERING CLINICIAN: KELLY TAFOYA TECHNIQUE: Contiguous axial images of the thorax were obtained from the level of the thoracic inlet through the lung bases. Bolus timing was optimized for the aorta. 3-D MIPS were created, processed and reviewed on a separate workstation. 75 ml of Omnipaque 350 was utilized. PATIENT RADIATION EXPOSURE DATA: CTDI: DLP: FINDINGS: Thyroid gland appears enlarged. Multiple tiny subcentimeter thyroid nodules can be seen. Cardiac chambers are enlarged. CABG changes are noted. No pericardial effusion is identified. Ectasia of the ascending aorta can be seen measuring 4.0 cm. The caliber of the arch and diaphragmatic hiatus descending thoracic aorta measures 2.7 and 2.2 cm respectively. Calcific atherosclerosis of the thoracic aorta can be seen. No intimal flap can be identified to suggest dissection. No intramural hematoma seen. The pulmonary artery is enlarged. Retropharyngeal courses of the ICAs can be partially seen. The visualized great vessels are well opacified. However, there is chronic severe stenosis calcific atherosclerosis of the left subclavian artery. The left subclavian artery is well opacified and is likely supplied retrograde from the ipsilateral left vertebral artery. There are no pathologically enlarged mediastinal, hilar, or axillary lymph nodes are seen. The trachea and mainstem bronchi are patent. Spiculated 7 mm lung nodule seen on axial image 126 with the superior segment of the left upper lobe. Moderate left pleural effusion is noted. Linear scarring seen in the lung bases. Centrilobular emphysematous changes are identified. Compressive atelectasis is seen within the left lower lobe. Trace right pleural effusion is noted. The visualized osseous structures are intact. Diffuse idiopathic skeletal hyperostosis is noted. Chronic compression deformity is suggested at  L1. Upper thoracic laminectomy changes are seen. Limited images through the upper abdomen reveal severe pancreatic atrophy. 2.4 cm cyst is seen at the midpole left kidney. Small hiatal hernia is noted. Esophageal dysmotility is identified as there are air-fluid levels seen within the esophagus. IMPRESSION: 1. 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 No intimal flap to suggest aortic dissection or intramural hematoma. 2. 7 mm spiculated nodule within the superior segment left lower lobe. Follow-up CT thorax in 3 months is suggested. This would be concerning for metastatic disease in a patient with history of malignancy. 3. Moderate left pleural effusion associated compressive atelectasis. 4. Mild hiatal hernia with evidence of esophageal dysmotility. 5. Chronic severe left subclavian artery stenosis at the origin. Correlate for left subclavian steal syndrome/phenomena. 6. Emphysema. 7. Trace right pleural effusion is noted. 8. Pulmonary hypertension. Dictation workstation:   [id number] This exam is available in DICOM format to non-affiliated healthcare facilities on a secure media free searchable basis with prior patient authorization.  The patient exposure is reported to a radiation dose index registry.  All CT examinations are performed with one or more of the following dose reduction techniques: Automated Exposure Control, Adjustment of mA and/or KV according to patient size, or use of iterative reconstruction techniques. Original Interpreting Physician:   LYLE VALENZUELA MD Original Transcribed by/Date: ESTRADA Sep 28 2023  8:00P Original Electronically Signed by/Date: LYLE VALENZUELA MD Sep 28 2023  9:54P Addendum Interpreting Physician: Addendum Transcribed by/Date: NO ADDENDUM Addendum Electronically Signed by/Date:     Assessment/Plan   Active Problems:    Lung mass    Pleural effusion    Chronic pain  Oxygen at baseline  Continue IBD/ICS  Incentive spirometry/pulmonary hygiene  Analgesia  Follow-up  pleural fluid cultures  Torsemide  PT/OT/out of bed       I spent 15 minutes in the professional and overall care of this patient.      Glory Ballesteros, BRYANNA-CNP

## 2023-09-30 NOTE — PROGRESS NOTES
Juli Del Castillo is a 82 y.o. female on day 1 of admission presenting with No Principal Problem: There is no principal problem currently on the Problem List. Please update the Problem List and refresh..    1) Chest Pain,   Present On Admit Yes,   Clinical Status Stable,   Plan Patient has a known history of coronary artery disease mitral regurgitation has been stable on statin and aspirin therapy cardiology consultation and repeat echocardiogram. Clinically her chest pain is 100% reproducible and is not coronary related. Initiate low-dose torsemide therapy for mild volume overload;   2) Moderate Left Pleural Effusion/Compressive Atelectasis,   Present On Admit Yes,   Clinical Status New,   Plan . 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 Initiate DuoNebs and steroids and continue home oxygen at 3 L she is not an ideal surgical candidate for primary resection given her advanced age and comorbidities. she is status post right upper lobe resection in 2006 which was positive for cance;   3) Solitary Nodule of Lung,   Present On Admit Yes,   Clinical Status New,   Plan  LLL Spiculated 7 mm Nodule   Patient has history of colon cancer and lung cancer  Arrange PET/CT in the outpatient setting will obtain hematology oncology consultation after clinical and pathologic diagnosis of cancer is actually made but we will arrange for follow-up;  4) Acute Congestive Heart Failure,   Present On Admit Yes,   Clinical Status Improving,   Plan Diastolic heart dysfunction on echocardiogram with a well-preserved ejection fraction and mitral regurgitation requiring initiation of diuretic therapy and repeat echocardiogram;   5) Chronic Obstructive Lung Disease,   Present On Admit Yes,   Clinical Status Stable,   Plan Bronchodilators and inhaled corticosteroids;   6) Chronic Respiratory Failure,   Present On Admit  Yes,   Clinical Status No Change,   Plan Chronic hypoxemic respiratory failure necessitating 3 L nasal cannula continuously;   7) Depression,   Present On Admit Yes,   Clinical Status Stable,   Plan Continue home SSRI;   8) Obstructive Sleep Apnea Syndrome,   Present On Admit Yes,   Clinical Status Stable,   Plan Will confirm patient wears CPAP nightly;   9) Urge Urinary Incontinence,   Present On Admit Yes,   Clinical Status Stable,   Plan Continue home oxybutynin dose;   10) Essential Tremor,   Present On Admit Yes,   Clinical Status Stable,   Plan Continue home primidone dose.      Subjective   C/O chronic back pain.  Usually take spercocet  L Thoracentisis yesterday       Objective     Last Recorded Vitals  BP (!) 117/49 (BP Location: Right arm, Patient Position: Lying)   Pulse 77   Temp 36.7 °C (98.1 °F) (Oral)   Resp 17   Wt 77 kg (169 lb 12.1 oz)   SpO2 100%   Intake/Output last 3 Shifts:  No intake or output data in the 24 hours ending 09/30/23 0901    Admission Weight  Weight: 77 kg (169 lb 12.1 oz) (09/29/23 1013)    Daily Weight  09/29/23 : 77 kg (169 lb 12.1 oz)    Image Results    No results found for this or any previous visit (from the past 24 hour(s)).      Assessment/Plan        Await Pulmonary Recommendations      Patient Active Problem List   Diagnosis    Lung mass    Pleural effusion    Chronic pain           LLL Mass  L pleural effusion  Chronic back pain              Delonte Velasco MD

## 2023-10-01 PROCEDURE — 87070 CULTURE OTHR SPECIMN AEROBIC: CPT

## 2023-10-01 PROCEDURE — REL CHARGE CONVERSION

## 2023-10-01 PROCEDURE — 1100000001 HC PRIVATE ROOM DAILY

## 2023-10-01 PROCEDURE — 2500000004 HC RX 250 GENERAL PHARMACY W/ HCPCS (ALT 636 FOR OP/ED): Performed by: INTERNAL MEDICINE

## 2023-10-01 PROCEDURE — 96374 THER/PROPH/DIAG INJ IV PUSH: CPT

## 2023-10-01 PROCEDURE — 2500000002 HC RX 250 W HCPCS SELF ADMINISTERED DRUGS (ALT 637 FOR MEDICARE OP, ALT 636 FOR OP/ED): Performed by: INTERNAL MEDICINE

## 2023-10-01 PROCEDURE — 2500000001 HC RX 250 WO HCPCS SELF ADMINISTERED DRUGS (ALT 637 FOR MEDICARE OP): Performed by: INTERNAL MEDICINE

## 2023-10-01 PROCEDURE — 87205 SMEAR GRAM STAIN: CPT

## 2023-10-01 PROCEDURE — 9990 CHARGE CONVERSION

## 2023-10-01 PROCEDURE — G0378 HOSPITAL OBSERVATION PER HR: HCPCS

## 2023-10-01 PROCEDURE — 94640 AIRWAY INHALATION TREATMENT: CPT

## 2023-10-01 PROCEDURE — 94760 N-INVAS EAR/PLS OXIMETRY 1: CPT

## 2023-10-01 PROCEDURE — 87075 CULTR BACTERIA EXCEPT BLOOD: CPT

## 2023-10-01 PROCEDURE — 82040 ASSAY OF SERUM ALBUMIN: CPT

## 2023-10-01 RX ORDER — LORAZEPAM 2 MG/ML
0.5 INJECTION INTRAMUSCULAR ONCE
Status: COMPLETED | OUTPATIENT
Start: 2023-10-01 | End: 2023-10-01

## 2023-10-01 RX ADMIN — ALBUTEROL SULFATE 2.5 MG: 2.5 SOLUTION RESPIRATORY (INHALATION) at 08:15

## 2023-10-01 RX ADMIN — CITALOPRAM HYDROBROMIDE 20 MG: 20 TABLET ORAL at 09:52

## 2023-10-01 RX ADMIN — BUDESONIDE 0.5 MG: 0.5 INHALANT RESPIRATORY (INHALATION) at 20:00

## 2023-10-01 RX ADMIN — ALBUTEROL SULFATE 2.5 MG: 2.5 SOLUTION RESPIRATORY (INHALATION) at 13:15

## 2023-10-01 RX ADMIN — HEPARIN SODIUM 5000 UNITS: 5000 INJECTION, SOLUTION INTRAVENOUS; SUBCUTANEOUS at 21:01

## 2023-10-01 RX ADMIN — HEPARIN SODIUM 5000 UNITS: 5000 INJECTION, SOLUTION INTRAVENOUS; SUBCUTANEOUS at 09:52

## 2023-10-01 RX ADMIN — LORAZEPAM 0.5 MG: 2 INJECTION INTRAMUSCULAR; INTRAVENOUS at 16:58

## 2023-10-01 RX ADMIN — OXYBUTYNIN CHLORIDE 2.5 MG: 5 TABLET ORAL at 09:51

## 2023-10-01 RX ADMIN — OXYBUTYNIN CHLORIDE 2.5 MG: 5 TABLET ORAL at 21:00

## 2023-10-01 RX ADMIN — ALBUTEROL SULFATE 2.5 MG: 2.5 SOLUTION RESPIRATORY (INHALATION) at 19:00

## 2023-10-01 RX ADMIN — TORSEMIDE 20 MG: 20 TABLET ORAL at 09:51

## 2023-10-01 RX ADMIN — OXYCODONE HYDROCHLORIDE 5 MG: 5 TABLET ORAL at 16:59

## 2023-10-01 RX ADMIN — BUDESONIDE 0.5 MG: 0.5 INHALANT RESPIRATORY (INHALATION) at 08:16

## 2023-10-01 RX ADMIN — SIMVASTATIN 80 MG: 40 TABLET, FILM COATED ORAL at 21:00

## 2023-10-01 RX ADMIN — PANTOPRAZOLE SODIUM 40 MG: 40 TABLET, DELAYED RELEASE ORAL at 09:51

## 2023-10-01 RX ADMIN — OXYCODONE HYDROCHLORIDE 5 MG: 5 TABLET ORAL at 02:01

## 2023-10-01 RX ADMIN — ACETAMINOPHEN 650 MG: 325 TABLET, FILM COATED ORAL at 11:08

## 2023-10-01 RX ADMIN — PRIMIDONE 50 MG: 50 TABLET ORAL at 21:00

## 2023-10-01 RX ADMIN — OXYCODONE HYDROCHLORIDE 5 MG: 5 TABLET ORAL at 09:54

## 2023-10-01 RX ADMIN — PRIMIDONE 50 MG: 50 TABLET ORAL at 14:55

## 2023-10-01 RX ADMIN — LISINOPRIL 20 MG: 20 TABLET ORAL at 09:51

## 2023-10-01 RX ADMIN — GABAPENTIN 300 MG: 600 TABLET, FILM COATED ORAL at 21:00

## 2023-10-01 RX ADMIN — GABAPENTIN 300 MG: 600 TABLET, FILM COATED ORAL at 14:54

## 2023-10-01 RX ADMIN — GABAPENTIN 300 MG: 600 TABLET, FILM COATED ORAL at 09:51

## 2023-10-01 RX ADMIN — PRIMIDONE 50 MG: 50 TABLET ORAL at 09:51

## 2023-10-01 RX ADMIN — ASPIRIN 81 MG CHEWABLE TABLET 81 MG: 81 TABLET CHEWABLE at 09:50

## 2023-10-01 RX ADMIN — TEMAZEPAM 15 MG: 15 CAPSULE ORAL at 20:58

## 2023-10-01 ASSESSMENT — PAIN - FUNCTIONAL ASSESSMENT
PAIN_FUNCTIONAL_ASSESSMENT: 0-10

## 2023-10-01 ASSESSMENT — PAIN SCALES - GENERAL
PAINLEVEL_OUTOF10: 8
PAINLEVEL_OUTOF10: 6
PAINLEVEL_OUTOF10: 0 - NO PAIN
PAINLEVEL_OUTOF10: 4
PAINLEVEL_OUTOF10: 8
PAINLEVEL_OUTOF10: 8

## 2023-10-01 ASSESSMENT — COGNITIVE AND FUNCTIONAL STATUS - GENERAL
WALKING IN HOSPITAL ROOM: A LITTLE
CLIMB 3 TO 5 STEPS WITH RAILING: A LITTLE
DRESSING REGULAR LOWER BODY CLOTHING: A LITTLE
DRESSING REGULAR UPPER BODY CLOTHING: A LITTLE
MOBILITY SCORE: 22
DAILY ACTIVITIY SCORE: 22

## 2023-10-01 NOTE — PROGRESS NOTES
Juli Del Castillo is a 82 y.o. female on day 2 of admission     1) Chest Pain,   Present On Admit Yes,   Clinical Status Stable,   Plan Patient has a known history of coronary artery disease mitral regurgitation has been stable on statin and aspirin therapy cardiology consultation and repeat echocardiogram. Clinically her chest pain is 100% reproducible and is not coronary related. Initiate low-dose torsemide therapy for mild volume overload;   2) Moderate Left Pleural Effusion/Compressive Atelectasis,   Present On Admit Yes,   Clinical Status New,   Plan . 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 Initiate DuoNebs and steroids and continue home oxygen at 3 L she is not an ideal surgical candidate for primary resection given her advanced age and comorbidities. she is status post right upper lobe resection in 2006 which was positive for cance;   3) Solitary Nodule of Lung,   Present On Admit Yes,   Clinical Status New,   Plan  LLL Spiculated 7 mm Nodule   Patient has history of colon cancer and lung cancer  Arrange PET/CT in the outpatient setting will obtain hematology oncology consultation after clinical and pathologic diagnosis of cancer is actually made but we will arrange for follow-up;  4) Acute Congestive Heart Failure,   Present On Admit Yes,   Clinical Status Improving,   Plan Diastolic heart dysfunction on echocardiogram with a well-preserved ejection fraction and mitral regurgitation requiring initiation of diuretic therapy and repeat echocardiogram;   5) Chronic Obstructive Lung Disease,   Present On Admit Yes,   Clinical Status Stable,   Plan Bronchodilators and inhaled corticosteroids;   6) Chronic Respiratory Failure,   Present On Admit Yes,   Clinical Status No Change,   Plan Chronic hypoxemic respiratory failure necessitating 3 L nasal cannula continuously;   7) Depression,    Present On Admit Yes,   Clinical Status Stable,   Plan Continue home SSRI;   8) Obstructive Sleep Apnea Syndrome,   Present On Admit Yes,   Clinical Status Stable,   Plan Will confirm patient wears CPAP nightly;   9) Urge Urinary Incontinence,   Present On Admit Yes,   Clinical Status Stable,   Plan Continue home oxybutynin dose;   10) Essential Tremor,   Present On Admit Yes,   Clinical Status Stable,   Plan Continue home primidone dose.      Subjective     More comfortable  Slept well       Objective     Last Recorded Vitals  BP (!) 137/48 (BP Location: Right arm, Patient Position: Lying)   Pulse 61   Temp 36.5 °C (97.7 °F) (Oral)   Resp 20   Wt 77 kg (169 lb 12.1 oz)   SpO2 99%   Intake/Output last 3 Shifts:    Intake/Output Summary (Last 24 hours) at 10/1/2023 0828  Last data filed at 9/30/2023 1700  Gross per 24 hour   Intake 740 ml   Output 500 ml   Net 240 ml       Admission Weight  Weight: 77 kg (169 lb 12.1 oz) (09/29/23 1013)    Daily Weight  09/29/23 : 77 kg (169 lb 12.1 oz)    Image Results    Results for orders placed or performed during the hospital encounter of 09/29/23 (from the past 24 hour(s))   Magnesium   Result Value Ref Range    Magnesium 1.80 1.60 - 3.10 mg/dL   Phosphorus   Result Value Ref Range    Phosphorus 4.9 (H) 2.5 - 4.5 mg/dL         Assessment/Plan        Await Pulmonary Recommendations      Patient Active Problem List   Diagnosis    Lung mass    Pleural effusion    Chronic pain         LLL Mass  L pleural effusion  Chronic back pain       10/1  Await thoracentisi results  Await Pulmonary Recommendations  Oxy po       Delonte Velasco MD

## 2023-10-01 NOTE — CARE PLAN
Problem: Pain  Goal: Takes deep breaths with improved pain control throughout the shift  Outcome: Progressing  Goal: Turns in bed with improved pain control throughout the shift  Outcome: Progressing  Goal: Walks with improved pain control throughout the shift  Outcome: Progressing  Goal: Performs ADL's with improved pain control throughout shift  Outcome: Progressing  Goal: Participates in PT with improved pain control throughout the shift  Outcome: Progressing  Goal: Free from opioid side effects throughout the shift  Outcome: Progressing  Goal: Free from acute confusion related to pain meds throughout the shift  Outcome: Progressing

## 2023-10-01 NOTE — NURSING NOTE
Patient was complaining of rt arm shaking a lot and had some chest and lung pain. Got our pca to do an EKG. Let dr. Velasco know who was in the hallway and he came to assess patient and put in orders for ativan. Gave to patient and seems be stopping and patient feels better

## 2023-10-01 NOTE — CARE PLAN
Physical Therapy Treatment    Patient Name: Juli Del Castillo  MRN: [id number]  Today's Date: 10/1/2023      Plan of Care Transcription    Assessment/Plan         PT Plan  Treatment/Interventions: Gait training, Stair training, Balance training, Strengthening, Endurance training, Range of motion, Therapeutic activity, Therapeutic exercise, Home exercise program (education)  PT Plan: Skilled PT  PT Frequency: 3 times per week  PT Discharge Recommendations: Low intensity level of continued care  PT - OK to Discharge: Yes      Encounter Problems       Encounter Problems (Active)       Balance       Standing Balance-Transcribed from [facility] (Progressing)       Start:  10/01/23    Expected End:  10/15/23       Improve standing balance to normal for improved safety during ambulation           Education-Transcribed from [facility] (Progressing)       Start:  10/01/23    Expected End:  10/15/23       Will verbalize and/or demonstrate understanding of teaching with independent assist for safe mobility              Mobility       Ambulation-Transcribed from [facility] (Progressing)       Start:  10/01/23    Expected End:  10/15/23       Ambulate 300' with modified independent assist and RW to facilitate safe mobility.           Stair Negotiation-Transcribed from [facility] (Progressing)       Start:  10/01/23    Expected End:  10/15/23       Negotiate 1 steps with 0 rails and Distant Supervision assist using Least Restrictive device to enter home.              Transfers       Supine to Sit-Transcribed from [facility] (Progressing)       Start:  10/01/23    Expected End:  10/15/23       Transfer Supine to Sit with modified independent assist to facilitate mobility           Sit to Stand-Transcribed from [facility] (Progressing)       Start:  10/01/23    Expected End:  10/15/23       Transfer Sit to Stand with modified independent assist to facilitate mobility

## 2023-10-02 ENCOUNTER — PHARMACY VISIT (OUTPATIENT)
Dept: PHARMACY | Facility: CLINIC | Age: 83
End: 2023-10-02
Payer: COMMERCIAL

## 2023-10-02 VITALS
SYSTOLIC BLOOD PRESSURE: 138 MMHG | TEMPERATURE: 97.9 F | DIASTOLIC BLOOD PRESSURE: 46 MMHG | WEIGHT: 169.75 LBS | HEIGHT: 65 IN | RESPIRATION RATE: 18 BRPM | OXYGEN SATURATION: 100 % | BODY MASS INDEX: 28.28 KG/M2 | HEART RATE: 72 BPM

## 2023-10-02 PROBLEM — F32.9 MAJOR DEPRESSIVE DISORDER, SINGLE EPISODE, UNSPECIFIED: Status: ACTIVE | Noted: 2019-02-18

## 2023-10-02 PROBLEM — Z95.1 HISTORY OF CORONARY ARTERY BYPASS GRAFT: Status: ACTIVE | Noted: 2023-10-02

## 2023-10-02 PROBLEM — Z86.73 HISTORY OF CEREBROVASCULAR ACCIDENT: Status: ACTIVE | Noted: 2023-10-02

## 2023-10-02 PROBLEM — I50.40 UNSPECIFIED COMBINED SYSTOLIC (CONGESTIVE) AND DIASTOLIC (CONGESTIVE) HEART FAILURE (MULTI): Status: ACTIVE | Noted: 2019-02-18

## 2023-10-02 PROBLEM — E78.5 HYPERLIPIDEMIA, UNSPECIFIED: Status: ACTIVE | Noted: 2019-02-18

## 2023-10-02 PROCEDURE — RXMED WILLOW AMBULATORY MEDICATION CHARGE

## 2023-10-02 PROCEDURE — 2500000001 HC RX 250 WO HCPCS SELF ADMINISTERED DRUGS (ALT 637 FOR MEDICARE OP): Performed by: INTERNAL MEDICINE

## 2023-10-02 PROCEDURE — 2500000004 HC RX 250 GENERAL PHARMACY W/ HCPCS (ALT 636 FOR OP/ED): Performed by: INTERNAL MEDICINE

## 2023-10-02 PROCEDURE — 2500000002 HC RX 250 W HCPCS SELF ADMINISTERED DRUGS (ALT 637 FOR MEDICARE OP, ALT 636 FOR OP/ED): Performed by: INTERNAL MEDICINE

## 2023-10-02 PROCEDURE — 94640 AIRWAY INHALATION TREATMENT: CPT

## 2023-10-02 PROCEDURE — G0378 HOSPITAL OBSERVATION PER HR: HCPCS

## 2023-10-02 RX ORDER — ALBUTEROL SULFATE 0.83 MG/ML
2.5 SOLUTION RESPIRATORY (INHALATION)
Qty: 180 ML | Refills: 2 | Status: SHIPPED | OUTPATIENT
Start: 2023-10-02

## 2023-10-02 RX ORDER — TORSEMIDE 20 MG/1
20 TABLET ORAL DAILY
Qty: 30 TABLET | Refills: 3 | Status: SHIPPED | OUTPATIENT
Start: 2023-10-02 | End: 2024-01-15 | Stop reason: HOSPADM

## 2023-10-02 RX ORDER — PANTOPRAZOLE SODIUM 40 MG/1
40 TABLET, DELAYED RELEASE ORAL DAILY
Qty: 30 TABLET | Refills: 2 | Status: SHIPPED | OUTPATIENT
Start: 2023-10-02 | End: 2023-12-12 | Stop reason: WASHOUT

## 2023-10-02 RX ORDER — NEBULIZER AND COMPRESSOR
EACH MISCELLANEOUS
Qty: 1 EACH | Refills: 0 | OUTPATIENT
Start: 2023-10-02

## 2023-10-02 RX ORDER — GABAPENTIN 600 MG/1
300 TABLET ORAL 3 TIMES DAILY
Qty: 90 TABLET | Refills: 3 | Status: SHIPPED | OUTPATIENT
Start: 2023-10-02 | End: 2023-12-12 | Stop reason: WASHOUT

## 2023-10-02 RX ORDER — CITALOPRAM 20 MG/1
20 TABLET, FILM COATED ORAL DAILY
Qty: 30 TABLET | Refills: 3 | Status: SHIPPED | OUTPATIENT
Start: 2023-10-02 | End: 2024-04-03

## 2023-10-02 RX ORDER — GUAIFENESIN 100 MG/5ML
100 SOLUTION ORAL EVERY 4 HOURS PRN
Qty: 120 ML | Refills: 0 | Status: SHIPPED | OUTPATIENT
Start: 2023-10-02 | End: 2023-12-12 | Stop reason: WASHOUT

## 2023-10-02 RX ORDER — ACETAMINOPHEN 325 MG/1
650 TABLET ORAL EVERY 6 HOURS PRN
Qty: 30 TABLET | Refills: 0 | Status: SHIPPED | OUTPATIENT
Start: 2023-10-02 | End: 2024-01-15 | Stop reason: HOSPADM

## 2023-10-02 RX ORDER — LISINOPRIL 20 MG/1
20 TABLET ORAL DAILY
Qty: 30 TABLET | Refills: 3 | Status: SHIPPED | OUTPATIENT
Start: 2023-10-02 | End: 2023-12-17 | Stop reason: HOSPADM

## 2023-10-02 RX ORDER — PRIMIDONE 50 MG/1
50 TABLET ORAL 3 TIMES DAILY
Qty: 90 TABLET | Refills: 3 | Status: ON HOLD | OUTPATIENT
Start: 2023-10-02 | End: 2023-12-17 | Stop reason: SDUPTHER

## 2023-10-02 RX ORDER — BUDESONIDE 0.5 MG/2ML
0.5 INHALANT ORAL
Qty: 120 ML | Refills: 3 | Status: SHIPPED | OUTPATIENT
Start: 2023-10-02

## 2023-10-02 RX ORDER — SIMVASTATIN 80 MG/1
80 TABLET, FILM COATED ORAL NIGHTLY
Qty: 30 TABLET | Refills: 3 | Status: SHIPPED | OUTPATIENT
Start: 2023-10-02

## 2023-10-02 RX ORDER — NAPROXEN SODIUM 220 MG/1
81 TABLET, FILM COATED ORAL DAILY
Qty: 30 TABLET | Refills: 3 | Status: SHIPPED | OUTPATIENT
Start: 2023-10-02

## 2023-10-02 RX ADMIN — PANTOPRAZOLE SODIUM 40 MG: 40 TABLET, DELAYED RELEASE ORAL at 10:22

## 2023-10-02 RX ADMIN — OXYCODONE HYDROCHLORIDE 5 MG: 5 TABLET ORAL at 06:43

## 2023-10-02 RX ADMIN — GABAPENTIN 300 MG: 600 TABLET, FILM COATED ORAL at 10:22

## 2023-10-02 RX ADMIN — CITALOPRAM HYDROBROMIDE 20 MG: 20 TABLET ORAL at 10:22

## 2023-10-02 RX ADMIN — PRIMIDONE 50 MG: 50 TABLET ORAL at 15:43

## 2023-10-02 RX ADMIN — ALBUTEROL SULFATE 2.5 MG: 2.5 SOLUTION RESPIRATORY (INHALATION) at 08:48

## 2023-10-02 RX ADMIN — TORSEMIDE 20 MG: 20 TABLET ORAL at 10:22

## 2023-10-02 RX ADMIN — PRIMIDONE 50 MG: 50 TABLET ORAL at 10:22

## 2023-10-02 RX ADMIN — OXYCODONE HYDROCHLORIDE 5 MG: 5 TABLET ORAL at 13:51

## 2023-10-02 RX ADMIN — GABAPENTIN 300 MG: 600 TABLET, FILM COATED ORAL at 15:43

## 2023-10-02 RX ADMIN — ALBUTEROL SULFATE 2.5 MG: 2.5 SOLUTION RESPIRATORY (INHALATION) at 12:02

## 2023-10-02 RX ADMIN — ASPIRIN 81 MG CHEWABLE TABLET 81 MG: 81 TABLET CHEWABLE at 10:22

## 2023-10-02 RX ADMIN — LISINOPRIL 20 MG: 20 TABLET ORAL at 10:22

## 2023-10-02 RX ADMIN — OXYBUTYNIN CHLORIDE 2.5 MG: 5 TABLET ORAL at 10:23

## 2023-10-02 RX ADMIN — HEPARIN SODIUM 5000 UNITS: 5000 INJECTION, SOLUTION INTRAVENOUS; SUBCUTANEOUS at 10:22

## 2023-10-02 RX ADMIN — BUDESONIDE 0.5 MG: 0.5 INHALANT RESPIRATORY (INHALATION) at 08:46

## 2023-10-02 ASSESSMENT — PAIN - FUNCTIONAL ASSESSMENT
PAIN_FUNCTIONAL_ASSESSMENT: 0-10
PAIN_FUNCTIONAL_ASSESSMENT: 0-10

## 2023-10-02 ASSESSMENT — COGNITIVE AND FUNCTIONAL STATUS - GENERAL
MOBILITY SCORE: 22
WALKING IN HOSPITAL ROOM: A LITTLE
CLIMB 3 TO 5 STEPS WITH RAILING: A LITTLE
CLIMB 3 TO 5 STEPS WITH RAILING: A LITTLE
WALKING IN HOSPITAL ROOM: A LITTLE
MOBILITY SCORE: 22
CLIMB 3 TO 5 STEPS WITH RAILING: A LITTLE
DAILY ACTIVITIY SCORE: 24
MOBILITY SCORE: 22
WALKING IN HOSPITAL ROOM: A LITTLE

## 2023-10-02 ASSESSMENT — ACTIVITIES OF DAILY LIVING (ADL)
HEARING - RIGHT EAR: FUNCTIONAL
HEARING - LEFT EAR: FUNCTIONAL
TOILETING: INDEPENDENT
JUDGMENT_ADEQUATE_SAFELY_COMPLETE_DAILY_ACTIVITIES: YES
ADEQUATE_TO_COMPLETE_ADL: YES
HEARING - LEFT EAR: FUNCTIONAL
HEARING - RIGHT EAR: FUNCTIONAL
FEEDING YOURSELF: INDEPENDENT
ADEQUATE_TO_COMPLETE_ADL: YES
BATHING: INDEPENDENT
BATHING: INDEPENDENT
ASSISTIVE_DEVICE: NONE;WALKER
PATIENT'S MEMORY ADEQUATE TO SAFELY COMPLETE DAILY ACTIVITIES?: YES
FEEDING YOURSELF: INDEPENDENT
WALKS IN HOME: INDEPENDENT
TOILETING: INDEPENDENT
JUDGMENT_ADEQUATE_SAFELY_COMPLETE_DAILY_ACTIVITIES: YES
PATIENT'S MEMORY ADEQUATE TO SAFELY COMPLETE DAILY ACTIVITIES?: YES
GROOMING: INDEPENDENT
GROOMING: INDEPENDENT
ASSISTIVE_DEVICE: WALKER
DRESSING YOURSELF: INDEPENDENT

## 2023-10-02 ASSESSMENT — PAIN SCALES - GENERAL
PAINLEVEL_OUTOF10: 7
PAINLEVEL_OUTOF10: 7
PAINLEVEL_OUTOF10: 4
PAINLEVEL_OUTOF10: 0 - NO PAIN

## 2023-10-02 NOTE — PROGRESS NOTES
"Juli Del Castillo is a 82 y.o. female on day 3 of admission presenting with Pleural effusion.    Subjective      TCSW engaged with pt this morning to discuss HHC post discharge. Pt is aware of PTOT recommendation of low intensity. [facility] is agreeable to [facility]   TCSW submitted a referral via [facility]. TCSW has not received any correspondence with [facility]. TCSW sent a new referral to [facility], awaiting a response.     Objective     Physical Exam    Last Recorded Vitals  Blood pressure 149/56, pulse 81, temperature 36.8 °C (98.2 °F), temperature source Oral, resp. rate 18, height 1.65 m (5' 4.96\"), weight 77 kg (169 lb 12.1 oz), SpO2 98 %.  Intake/Output last 3 Shifts:  No intake/output data recorded.    Relevant Results                      Assessment/Plan   Principal Problem:    Pleural effusion  Active Problems:    Lung mass    Chronic pain             Ying James, OWENW      "

## 2023-10-02 NOTE — CARE PLAN
The patient's goals for the shift include      The clinical goals for the shift include pain free    Over the shift, the patient did not make progress toward the following goals. Barriers to progression include . Recommendations to address these barriers include .

## 2023-10-02 NOTE — CARE PLAN
The patient's goals for the shift include      The clinical goals for the shift include no pain    Over the shift, the patient did not make progress toward the following goals. Barriers to progression include . Recommendations to address these barriers include .

## 2023-10-02 NOTE — NURSING NOTE
Assumed care of patient. BSSR. Patient in bed resting comfortably. No signs of distress. Call light and belongings within reach.

## 2023-10-02 NOTE — PROGRESS NOTES
Juli Del Castillo is a 82 y.o. female on day 3 of admission presenting with Pleural effusion.      Subjective   Seen and examined at baseline 3 L nasal cannula oxygen feeling well awaiting results of thoracentesis and cytology which will be available later this week the patient is ambulating in the hallway greater than 30 feet and is medically stable for discharge       Objective     Last Recorded Vitals  BP (!) 122/47 (BP Location: Right arm, Patient Position: Lying)   Pulse 62   Temp 36.5 °C (97.7 °F) (Oral)   Resp 19   Wt 77 kg (169 lb 12.1 oz)   SpO2 99%   Intake/Output last 3 Shifts:  No intake or output data in the 24 hours ending 10/02/23 0951    Admission Weight  Weight: 77 kg (169 lb 12.1 oz) (09/29/23 1013)    Daily Weight  09/29/23 : 77 kg (169 lb 12.1 oz)    Image Results      Physical Exam  Vitals and nursing note reviewed.   Constitutional:       Appearance: Normal appearance. She is normal weight.   HENT:      Head: Normocephalic and atraumatic.      Mouth/Throat:      Mouth: Mucous membranes are moist.   Eyes:      Extraocular Movements: Extraocular movements intact.      Pupils: Pupils are equal, round, and reactive to light.   Cardiovascular:      Rate and Rhythm: Normal rate.   Pulmonary:      Effort: Pulmonary effort is normal.      Breath sounds: Normal breath sounds.   Abdominal:      General: Abdomen is flat. Bowel sounds are normal.   Musculoskeletal:         General: Normal range of motion.      Cervical back: Normal range of motion and neck supple.   Skin:     General: Skin is warm and dry.      Capillary Refill: Capillary refill takes less than 2 seconds.   Neurological:      General: No focal deficit present.      Mental Status: She is alert and oriented to person, place, and time. Mental status is at baseline.   Psychiatric:         Mood and Affect: Mood normal.           Relevant Results    Scheduled medications  albuterol, 2.5 mg, nebulization, q6h while awake  aspirin, 81 mg, oral,  Daily  budesonide, 0.5 mg, nebulization, BID  citalopram, 20 mg, oral, Daily  gabapentin, 300 mg, oral, TID  heparin (porcine), 5,000 Units, subcutaneous, q12h  lisinopril, 20 mg, oral, Daily  oxybutynin, 2.5 mg, oral, BID  pantoprazole, 40 mg, oral, Daily  primidone, 50 mg, oral, TID  simvastatin, 80 mg, oral, Nightly  torsemide, 20 mg, oral, Daily      Continuous medications     PRN medications  PRN medications: acetaminophen, albuterol, alum-mag hydroxide-simeth, calcium carbonate, dextrose, dextrose, dextrose, dextrose, glucagon, guaiFENesin, hydrALAZINE, ondansetron, oxyCODONE, polyethylene glycol, temazepam                Assessment/Plan      Pleural effusion    Spiculated left lower lung mass    Chronic obstructive pulmonary disease oxygen dependent 3 L    Chronic hypoxemic respiratory failure-stable    Medically stable for discharge patient follow-up pulmonary and hematology oncology              Principal Problem:    Pleural effusion  Active Problems:    Lung mass    Chronic pain                  Rafael Bullard DO

## 2023-10-02 NOTE — DISCHARGE SUMMARY
Discharge Diagnosis  Pleural effusion    Issues Requiring Follow-Up  Results of thoracentesis cytology and fluid analysis to be reviewed at your outpatient appointment    Discharge Meds     Your medication list      as of September 29, 2023  2:03 AM     You have not been prescribed any medications.         Test Results Pending At Discharge  Pending Labs       Order Current Status    Extra Tubes In process    Extra Tubes In process    Green Top In process    Lavender Top In process            Hospital Course   82-year-old  female presents with left pleural effusion increased work of breathing and increasing oxygen requirements she was found to have also evidence of a spiculated mass in the left lower lobe concerning for possible malignancy with subsequent malignant effusion she underwent thoracentesis which she tolerated well her O2 requirements returned to baseline 3 L nasal cannula she was medically stable for discharge she was evaluated by pulmonary and hematology oncology results of thoracentesis analysis are still pending but the patient is medically stable for discharge    Pertinent Physical Exam At Time of Discharge  Physical Exam  Vitals reviewed.   Constitutional:       Appearance: Normal appearance.   HENT:      Mouth/Throat:      Mouth: Mucous membranes are moist.   Eyes:      Extraocular Movements: Extraocular movements intact.      Pupils: Pupils are equal, round, and reactive to light.   Cardiovascular:      Rate and Rhythm: Normal rate.   Pulmonary:      Effort: Pulmonary effort is normal.      Breath sounds: Normal breath sounds.   Abdominal:      General: Bowel sounds are normal.   Musculoskeletal:      Cervical back: Normal range of motion and neck supple.   Skin:     General: Skin is warm and dry.   Neurological:      General: No focal deficit present.      Mental Status: She is alert and oriented to person, place, and time. Mental status is at baseline.   Psychiatric:         Mood and  Affect: Mood normal.         Outpatient Follow-Up  Outpatient follow-up pulmonary medicine Dr. Walker 1 week  Outpatient follow-up allergy Dr. Rosen 2 weeks    Rafael Bullard DO

## 2023-10-03 PROCEDURE — 9990 CHARGE CONVERSION: Mod: XU

## 2023-10-03 PROCEDURE — 88112 CYTOPATH CELL ENHANCE TECH: CPT

## 2023-10-03 PROCEDURE — 96365 THER/PROPH/DIAG IV INF INIT: CPT

## 2023-10-03 PROCEDURE — 96376 TX/PRO/DX INJ SAME DRUG ADON: CPT

## 2023-10-03 PROCEDURE — 96366 THER/PROPH/DIAG IV INF ADDON: CPT

## 2023-10-03 PROCEDURE — 96375 TX/PRO/DX INJ NEW DRUG ADDON: CPT

## 2023-10-03 PROCEDURE — 96372 THER/PROPH/DIAG INJ SC/IM: CPT | Mod: XU

## 2023-10-03 PROCEDURE — 99285 EMERGENCY DEPT VISIT HI MDM: CPT | Mod: 25

## 2023-10-04 ENCOUNTER — TELEPHONE (OUTPATIENT)
Dept: HEMATOLOGY/ONCOLOGY | Facility: CLINIC | Age: 83
End: 2023-10-04
Payer: MEDICARE

## 2023-10-04 PROCEDURE — 9990 CHARGE CONVERSION

## 2023-10-04 PROCEDURE — 88305 TISSUE EXAM BY PATHOLOGIST: CPT

## 2023-10-06 NOTE — DOCUMENTATION CLARIFICATION NOTE
"    PATIENT:               [facility] PALMER  [id number]  MRN:                       [id number]  :                       1940  ADMIT DATE:       2023 8:41 AM  DISCH DATE:        10/2/2023 6:31 PM  RESPONDING PROVIDER #:        [id number]          PROVIDER RESPONSE TEXT:    Acute on Chronic Hypoxic Respiratory Failure    CDI QUERY TEXT:    [facility]_Conflicting Documentation        Instruction:    Based on your assessment of the patient and the clinical information, please provide the requested documentation by clicking on the appropriate radio button and enter any additional information if prompted.    Question: Based on your medical judgment, can you please clarify which of these conditions is the most clinically supported    When answering this query, please exercise your independent professional judgment. The fact that a question is being asked, does not imply that any particular answer is desired or expected.    The patient's clinical indicators include:  Clinical Information: There is conflicting documentation in the medical record which requires clarification.    -The diagnosis of Acute on Chronic Hypoxic Respiratory Failure was documented on  PN CNP Lesli    -The diagnosis of Chronic Hypoxic Respiratory Failure was documented on  PN Dr. Martel    Clinical Indicators: 82 Yr F arrives to the hospital with c/o retrosternal chest discomfort pain shooting down towards her intrascapular area.     PN Dr. Martel states, \" Moderate Left Pleural Effusion/Compressive Atelectasis; solitary nodule of lung; acute CHF plan on diastolic dysfunction Echo and initiation of diuretic.; COPD; chronic hypoxic respiratory failure; HARPREET; essential tremor.\"     PN CNP Lesli states, \"pleural effusion, acute on chronic respiratory failure. Oxygen at baseline.\"    10/2 Discharge summary Dr. Bullard states, \"female presents with left pleural effusion increased work of breathing and increasing oxygen " "requirements she was found to have also evidence of a spiculated mass in the left lower lobe concerning for possible malignancy with subsequent malignant effusion she underwent thoracentesis which she tolerated well her O2 requirements returned to baseline 3 L nasal cannula.\"    Treatment: Frequent vital signs and monitoring with baseline 3L O2 via NC supplementation.    Risk Factors: COPD on chronic home oxygen of 3L; acute diastolic CHF; h/o lung cancer with new lung nodule and pleural effusion  Options provided:  -- Acute on Chronic Hypoxic Respiratory Failure  -- Chronic Hypoxic Respiratory Failure  -- Other - I will add my own diagnosis  -- Refer to Clinical Documentation Reviewer    Query created by: Ruth Berrios on 10/3/2023 2:16 PM      Electronically signed by:  HELEN LUKE DO 10/4/2023 9:02 AM          "

## 2023-10-06 NOTE — DOCUMENTATION CLARIFICATION NOTE
"    PATIENT:               [facility] PAL[id number]  MRN:                       [id number]  :                       1940  ADMIT DATE:       2023 8:41 AM  DISCH DATE:        10/2/2023 6:31 PM  RESPONDING PROVIDER #:        [id number]          PROVIDER RESPONSE TEXT:    Pleural Effusion related to Acute Diastolic CHF    CDI QUERY TEXT:    UH_Etiology Linkage        Instruction:    Based on your assessment of the patient and the clinical information, please provide the requested documentation by clicking on the appropriate radio button and enter any additional information if prompted.    Question: Please clarify if a relationship exists between    When answering this query, please exercise your independent professional judgment. The fact that a question is being asked, does not imply that any particular answer is desired or expected.    The patient's clinical indicators include:  Clinical Information: 82 Yr F arrives to the hospital with c/o retrosternal chest discomfort pain shooting down towards her intrascapular area.    Clinical Indicators:   CT Chest: Moderate left pleural effusion associated compressive atelectasis.   Thoracentesis: Successful ultrasound guided diagnostic and therapeutic left sided  thoracentesis, yielding 200 mL of fluid.   Cytology results: Still pending with Gram Stain with no growth x's 3 days, no organisms seen and no WBC seen.   PN Dr. Velasco states,\" Moderate Left Pleural Effusion/Compressive Atelectasis; solitary nodule of lung; acute CHF plan on diastolic dysfunction Echo and initiation of diuretic.\"  10/1 PN Dr. Velasco states, \"CT shows a spiculated mass in the left lower lobe and a moderate size left-sided pleural effusion worrisome for malignant pleural effusion with recurrent lung cancer.\"    Treatment: CBC/BMP, cytology of fluid retrieved from thoracentesis, thoracentesis of left lung, PO demadex 20 mg tablet.    Risk Factors: Personal " history of lung cancer; COPD on chronic oxygen supplementation  Options provided:  -- Pleural Effusion related to Acute Diastolic CHF  -- Pleural Effusion related to possible malignancy  -- Other - I will add my own diagnosis  -- Refer to Clinical Documentation Reviewer    Query created by: Ruth Berrios on 10/3/2023 2:32 PM      Electronically signed by:  HELEN LUKE DO 10/4/2023 9:02 AM

## 2023-10-17 ENCOUNTER — ANCILLARY PROCEDURE (OUTPATIENT)
Dept: RADIOLOGY | Facility: CLINIC | Age: 83
End: 2023-10-17
Payer: COMMERCIAL

## 2023-10-17 DIAGNOSIS — J90 PLEURAL EFFUSION, NOT ELSEWHERE CLASSIFIED: ICD-10-CM

## 2023-10-17 DIAGNOSIS — J44.9 CHRONIC OBSTRUCTIVE PULMONARY DISEASE, UNSPECIFIED (MULTI): ICD-10-CM

## 2023-10-17 PROCEDURE — 71046 X-RAY EXAM CHEST 2 VIEWS: CPT

## 2023-10-23 ENCOUNTER — PHARMACY VISIT (OUTPATIENT)
Dept: PHARMACY | Facility: CLINIC | Age: 83
End: 2023-10-23
Payer: COMMERCIAL

## 2023-10-23 DIAGNOSIS — R91.8 LUNG MASS: ICD-10-CM

## 2023-10-23 RX ORDER — TRAMADOL HYDROCHLORIDE 50 MG/1
50 TABLET ORAL EVERY 6 HOURS PRN
Qty: 120 TABLET | Refills: 0 | Status: SHIPPED | OUTPATIENT
Start: 2023-10-23 | End: 2023-10-25 | Stop reason: SDUPTHER

## 2023-10-25 ENCOUNTER — TELEPHONE (OUTPATIENT)
Dept: PRIMARY CARE | Facility: CLINIC | Age: 83
End: 2023-10-25
Payer: MEDICARE

## 2023-10-25 DIAGNOSIS — R91.8 LUNG MASS: ICD-10-CM

## 2023-10-25 RX ORDER — TRAMADOL HYDROCHLORIDE 50 MG/1
50 TABLET ORAL EVERY 6 HOURS PRN
Qty: 120 TABLET | Refills: 0 | Status: SHIPPED | OUTPATIENT
Start: 2023-10-25 | End: 2023-11-24

## 2023-10-25 NOTE — TELEPHONE ENCOUNTER
Amanda called to request Tramadol 50 mg every 6 hours as needed to go to [facility] on [address]. Shows in med list but that there was an issue.

## 2023-11-22 PROBLEM — E55.9 VITAMIN D DEFICIENCY: Status: ACTIVE | Noted: 2023-11-22

## 2023-11-22 PROBLEM — R53.1 WEAKNESS: Status: ACTIVE | Noted: 2019-02-19

## 2023-11-22 PROBLEM — I50.32 CHRONIC DIASTOLIC HEART FAILURE (MULTI): Status: ACTIVE | Noted: 2023-11-22

## 2023-11-22 PROBLEM — Z86.79 HISTORY OF HEART FAILURE: Status: ACTIVE | Noted: 2023-11-22

## 2023-11-22 PROBLEM — R32 URINARY INCONTINENCE: Status: ACTIVE | Noted: 2023-11-22

## 2023-11-22 PROBLEM — S32.000A COMPRESSION FRACTURE OF LUMBAR VERTEBRA (MULTI): Status: ACTIVE | Noted: 2023-11-22

## 2023-11-22 PROBLEM — I21.3 ACUTE ST SEGMENT ELEVATION MYOCARDIAL INFARCTION (MULTI): Status: ACTIVE | Noted: 2023-11-22

## 2023-11-22 PROBLEM — S09.90XA INJURY OF HEAD: Status: ACTIVE | Noted: 2023-11-22

## 2023-11-22 PROBLEM — R42 DIZZINESS AND GIDDINESS: Status: ACTIVE | Noted: 2019-02-18

## 2023-11-22 PROBLEM — G25.0 ESSENTIAL TREMOR: Status: ACTIVE | Noted: 2023-11-22

## 2023-11-22 PROBLEM — M47.812 CERVICAL SPONDYLOSIS WITHOUT MYELOPATHY: Status: ACTIVE | Noted: 2023-11-22

## 2023-11-22 PROBLEM — M54.50 LOW BACK PAIN: Status: ACTIVE | Noted: 2023-11-22

## 2023-11-22 PROBLEM — Z78.9 MEDICAL HOME PATIENT: Status: ACTIVE | Noted: 2023-11-22

## 2023-11-22 PROBLEM — I65.29 OCCLUSION AND STENOSIS OF UNSPECIFIED CAROTID ARTERY: Status: ACTIVE | Noted: 2023-11-22

## 2023-11-22 PROBLEM — K59.00 CONSTIPATION: Status: ACTIVE | Noted: 2023-11-22

## 2023-11-22 PROBLEM — I50.9 ACUTE CONGESTIVE HEART FAILURE (MULTI): Status: ACTIVE | Noted: 2019-02-18

## 2023-11-22 PROBLEM — Z95.5 HISTORY OF CORONARY ARTERY STENT PLACEMENT: Status: ACTIVE | Noted: 2023-10-02

## 2023-11-22 PROBLEM — J44.1 ACUTE EXACERBATION OF CHRONIC OBSTRUCTIVE AIRWAYS DISEASE (MULTI): Status: ACTIVE | Noted: 2019-02-18

## 2023-11-22 PROBLEM — M48.061 SPINAL STENOSIS OF LUMBAR REGION: Status: ACTIVE | Noted: 2017-04-12

## 2023-11-22 PROBLEM — Z85.118 HISTORY OF LUNG CANCER: Status: ACTIVE | Noted: 2023-11-22

## 2023-11-22 PROBLEM — R09.02 HYPOXIA: Status: ACTIVE | Noted: 2023-11-22

## 2023-11-22 PROBLEM — H81.399 OTHER PERIPHERAL VERTIGO, UNSPECIFIED EAR: Status: ACTIVE | Noted: 2019-02-18

## 2023-11-22 PROBLEM — G47.00 INSOMNIA: Status: ACTIVE | Noted: 2019-02-21

## 2023-11-22 PROBLEM — F41.9 ANXIETY DISORDER, UNSPECIFIED: Status: ACTIVE | Noted: 2020-01-10

## 2023-11-22 PROBLEM — M70.61 TROCHANTERIC BURSITIS OF RIGHT HIP: Status: ACTIVE | Noted: 2023-03-23

## 2023-11-22 PROBLEM — M85.80 OSTEOPENIA: Status: ACTIVE | Noted: 2023-11-22

## 2023-11-22 PROBLEM — M46.1 INFLAMMATION OF SACROILIAC JOINT (CMS-HCC): Status: ACTIVE | Noted: 2023-11-22

## 2023-11-22 PROBLEM — M79.7 FIBROMYALGIA: Status: ACTIVE | Noted: 2023-09-30

## 2023-11-22 PROBLEM — S80.00XA CONTUSION OF KNEE: Status: ACTIVE | Noted: 2023-11-22

## 2023-11-22 PROBLEM — I69.998 OTHER SEQUELAE FOLLOWING UNSPECIFIED CEREBROVASCULAR DISEASE: Status: ACTIVE | Noted: 2019-02-18

## 2023-11-22 PROBLEM — R26.2 DIFFICULTY WALKING: Status: ACTIVE | Noted: 2019-02-18

## 2023-11-22 PROBLEM — M43.10 DEGENERATIVE SPONDYLOLISTHESIS: Status: ACTIVE | Noted: 2017-04-12

## 2023-11-22 PROBLEM — F17.210 CIGARETTE SMOKER: Status: ACTIVE | Noted: 2023-11-22

## 2023-11-22 PROBLEM — I10 DIASTOLIC HYPERTENSION: Status: ACTIVE | Noted: 2020-01-10

## 2023-11-22 PROBLEM — M25.559 HIP PAIN: Status: ACTIVE | Noted: 2023-11-22

## 2023-11-22 PROBLEM — I25.10 CORONARY ARTERY DISEASE: Status: ACTIVE | Noted: 2020-01-10

## 2023-11-22 PROBLEM — C18.9 CARCINOMA OF COLON (MULTI): Status: ACTIVE | Noted: 2023-11-22

## 2023-11-22 PROBLEM — G44.86 CERVICOGENIC HEADACHE: Status: ACTIVE | Noted: 2023-11-22

## 2023-11-22 PROBLEM — I77.9 CAROTID ARTERY DISEASE (CMS-HCC): Status: ACTIVE | Noted: 2023-11-22

## 2023-11-22 PROBLEM — N32.81 OVERACTIVE BLADDER: Status: ACTIVE | Noted: 2023-11-22

## 2023-11-22 PROBLEM — Z91.81 AT MODERATE RISK FOR FALL: Status: ACTIVE | Noted: 2023-11-22

## 2023-11-22 PROBLEM — K52.9 GASTROENTERITIS: Status: ACTIVE | Noted: 2023-11-22

## 2023-11-22 PROBLEM — Z85.038 HISTORY OF COLON CANCER: Status: ACTIVE | Noted: 2023-11-22

## 2023-11-22 PROBLEM — I21.9 MYOCARDIAL INFARCTION (MULTI): Status: ACTIVE | Noted: 2023-11-22

## 2023-11-22 PROBLEM — G57.00 PIRIFORMIS SYNDROME: Status: ACTIVE | Noted: 2023-11-22

## 2023-11-22 PROBLEM — K83.8 COMMON BILE DUCT DILATATION: Status: ACTIVE | Noted: 2023-11-22

## 2023-11-22 PROBLEM — S50.00XA CONTUSION OF ELBOW: Status: ACTIVE | Noted: 2023-11-22

## 2023-11-22 PROBLEM — J96.10 CHRONIC RESPIRATORY FAILURE (MULTI): Status: ACTIVE | Noted: 2023-11-22

## 2023-11-22 PROBLEM — G45.3 AMAUROSIS FUGAX: Status: ACTIVE | Noted: 2023-11-22
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
## 2023-11-22 RX ORDER — DOXYCYCLINE HYCLATE 100 MG
TABLET ORAL
COMMUNITY
End: 2023-12-12 | Stop reason: WASHOUT

## 2023-11-22 RX ORDER — METOPROLOL SUCCINATE 25 MG/1
25 TABLET, EXTENDED RELEASE ORAL
COMMUNITY
End: 2023-12-12 | Stop reason: WASHOUT

## 2023-11-22 RX ORDER — FUROSEMIDE 20 MG/1
20 TABLET ORAL
COMMUNITY
End: 2023-12-12 | Stop reason: WASHOUT

## 2023-11-22 RX ORDER — ALBUTEROL SULFATE 90 UG/1
AEROSOL, METERED RESPIRATORY (INHALATION)
COMMUNITY
Start: 2023-10-10 | End: 2024-01-15 | Stop reason: HOSPADM

## 2023-11-22 RX ORDER — FLUTICASONE FUROATE AND VILANTEROL 200; 25 UG/1; UG/1
POWDER RESPIRATORY (INHALATION)
COMMUNITY
End: 2024-01-15 | Stop reason: HOSPADM

## 2023-11-22 RX ORDER — NITROGLYCERIN 0.4 MG/1
TABLET SUBLINGUAL
COMMUNITY
End: 2024-01-15 | Stop reason: HOSPADM

## 2023-11-22 RX ORDER — FUROSEMIDE 40 MG/1
40 TABLET ORAL DAILY
COMMUNITY
Start: 2023-04-18 | End: 2023-07-17 | Stop reason: WASHOUT

## 2023-11-22 RX ORDER — ALPRAZOLAM 0.25 MG/1
TABLET ORAL
Status: ON HOLD | COMMUNITY
Start: 2023-02-27 | End: 2023-12-17 | Stop reason: SDUPTHER

## 2023-11-22 RX ORDER — FUROSEMIDE 20 MG/1
1 TABLET ORAL DAILY
COMMUNITY
Start: 2015-03-06 | End: 2023-12-12 | Stop reason: WASHOUT

## 2023-11-22 RX ORDER — OMEPRAZOLE 40 MG/1
1 CAPSULE, DELAYED RELEASE ORAL DAILY
COMMUNITY
End: 2024-02-22

## 2023-11-22 RX ORDER — GABAPENTIN 400 MG/1
400 CAPSULE ORAL 3 TIMES DAILY
COMMUNITY
Start: 2023-10-11 | End: 2024-01-15 | Stop reason: HOSPADM

## 2023-11-22 RX ORDER — HYDROCODONE BITARTRATE AND ACETAMINOPHEN 5; 325 MG/1; MG/1
1 TABLET ORAL EVERY 6 HOURS PRN
COMMUNITY
End: 2023-12-12 | Stop reason: WASHOUT

## 2023-11-22 RX ORDER — DULOXETIN HYDROCHLORIDE 30 MG/1
1 CAPSULE, DELAYED RELEASE ORAL DAILY
COMMUNITY
Start: 2022-06-24 | End: 2023-12-12 | Stop reason: SDUPTHER

## 2023-11-22 RX ORDER — OMEPRAZOLE 40 MG/1
40 CAPSULE, DELAYED RELEASE ORAL
COMMUNITY
End: 2023-12-12 | Stop reason: WASHOUT

## 2023-11-22 RX ORDER — PREDNISONE 20 MG/1
1 TABLET ORAL DAILY
COMMUNITY
End: 2023-12-12 | Stop reason: WASHOUT

## 2023-11-22 RX ORDER — ESCITALOPRAM OXALATE 10 MG/1
10 TABLET ORAL
COMMUNITY
End: 2023-12-12 | Stop reason: WASHOUT

## 2023-11-22 RX ORDER — MECLIZINE HCL 12.5 MG 12.5 MG/1
25 TABLET ORAL 2 TIMES DAILY
COMMUNITY
End: 2023-12-12 | Stop reason: WASHOUT

## 2023-11-22 RX ORDER — OXYBUTYNIN CHLORIDE 5 MG/1
5 TABLET, EXTENDED RELEASE ORAL DAILY
COMMUNITY
End: 2023-12-12 | Stop reason: SDUPTHER

## 2023-12-11 ENCOUNTER — TELEPHONE (OUTPATIENT)
Dept: PRIMARY CARE | Facility: CLINIC | Age: 83
End: 2023-12-11
Payer: MEDICARE

## 2023-12-11 DIAGNOSIS — M48.061 SPINAL STENOSIS OF LUMBAR REGION, UNSPECIFIED WHETHER NEUROGENIC CLAUDICATION PRESENT: Primary | ICD-10-CM

## 2023-12-11 RX ORDER — TRAMADOL HYDROCHLORIDE 50 MG/1
50 TABLET ORAL EVERY 6 HOURS PRN
Qty: 120 TABLET | Refills: 0 | Status: SHIPPED | OUTPATIENT
Start: 2023-12-11 | End: 2023-12-12 | Stop reason: SDUPTHER

## 2023-12-11 NOTE — TELEPHONE ENCOUNTER
Did you ever prescribe this for her before? I went to [facility] and it wasn't anywhere on the reconciled list

## 2023-12-11 NOTE — TELEPHONE ENCOUNTER
REQUEST FOR TRAMADOL 50 MG PLEASE SEND TO [facility] ON [address] [facility] 12/12  10/23

## 2023-12-12 ENCOUNTER — LAB (OUTPATIENT)
Dept: LAB | Facility: LAB | Age: 83
End: 2023-12-12
Payer: MEDICARE

## 2023-12-12 ENCOUNTER — OFFICE VISIT (OUTPATIENT)
Dept: PRIMARY CARE | Facility: CLINIC | Age: 83
End: 2023-12-12
Payer: MEDICARE

## 2023-12-12 ENCOUNTER — HOSPITAL ENCOUNTER (EMERGENCY)
Facility: HOSPITAL | Age: 83
Discharge: HOME | End: 2023-12-12
Payer: MEDICARE

## 2023-12-12 ENCOUNTER — APPOINTMENT (OUTPATIENT)
Dept: RADIOLOGY | Facility: HOSPITAL | Age: 83
DRG: 605 | End: 2023-12-12
Payer: MEDICARE

## 2023-12-12 ENCOUNTER — HOSPITAL ENCOUNTER (INPATIENT)
Facility: HOSPITAL | Age: 83
LOS: 4 days | Discharge: NURSING, REHAB OR LONG-TERM CARE | DRG: 605 | End: 2023-12-17
Attending: STUDENT IN AN ORGANIZED HEALTH CARE EDUCATION/TRAINING PROGRAM | Admitting: INTERNAL MEDICINE
Payer: MEDICARE

## 2023-12-12 VITALS
DIASTOLIC BLOOD PRESSURE: 62 MMHG | BODY MASS INDEX: 28.32 KG/M2 | SYSTOLIC BLOOD PRESSURE: 124 MMHG | HEART RATE: 68 BPM | WEIGHT: 170 LBS

## 2023-12-12 DIAGNOSIS — K21.9 GASTROESOPHAGEAL REFLUX DISEASE WITHOUT ESOPHAGITIS: ICD-10-CM

## 2023-12-12 DIAGNOSIS — M48.061 SPINAL STENOSIS OF LUMBAR REGION, UNSPECIFIED WHETHER NEUROGENIC CLAUDICATION PRESENT: ICD-10-CM

## 2023-12-12 DIAGNOSIS — N75.0 BARTHOLIN'S CYST: ICD-10-CM

## 2023-12-12 DIAGNOSIS — G25.0 ESSENTIAL TREMOR: ICD-10-CM

## 2023-12-12 DIAGNOSIS — J43.9 PULMONARY EMPHYSEMA, UNSPECIFIED EMPHYSEMA TYPE (MULTI): ICD-10-CM

## 2023-12-12 DIAGNOSIS — J90 PLEURAL EFFUSION: ICD-10-CM

## 2023-12-12 DIAGNOSIS — G47.00 INSOMNIA, UNSPECIFIED: ICD-10-CM

## 2023-12-12 DIAGNOSIS — R73.9 HYPERGLYCEMIA: ICD-10-CM

## 2023-12-12 DIAGNOSIS — E55.9 VITAMIN D DEFICIENCY: ICD-10-CM

## 2023-12-12 DIAGNOSIS — I10 PRIMARY HYPERTENSION: ICD-10-CM

## 2023-12-12 DIAGNOSIS — N32.81 OAB (OVERACTIVE BLADDER): ICD-10-CM

## 2023-12-12 DIAGNOSIS — Z01.89 ENCOUNTER FOR ROUTINE LABORATORY TESTING: ICD-10-CM

## 2023-12-12 DIAGNOSIS — E78.2 MIXED HYPERLIPIDEMIA: ICD-10-CM

## 2023-12-12 DIAGNOSIS — N39.0 URINARY TRACT INFECTION WITHOUT HEMATURIA, SITE UNSPECIFIED: ICD-10-CM

## 2023-12-12 DIAGNOSIS — F41.1 GENERALIZED ANXIETY DISORDER: ICD-10-CM

## 2023-12-12 DIAGNOSIS — F34.1 PERSISTENT DEPRESSIVE DISORDER: ICD-10-CM

## 2023-12-12 DIAGNOSIS — N17.9 ACUTE RENAL FAILURE, UNSPECIFIED ACUTE RENAL FAILURE TYPE (CMS-HCC): ICD-10-CM

## 2023-12-12 DIAGNOSIS — Z86.2 HISTORY OF ANEMIA: ICD-10-CM

## 2023-12-12 DIAGNOSIS — N18.32 STAGE 3B CHRONIC KIDNEY DISEASE (MULTI): Primary | ICD-10-CM

## 2023-12-12 DIAGNOSIS — Z23 ENCOUNTER FOR IMMUNIZATION: ICD-10-CM

## 2023-12-12 DIAGNOSIS — M25.551 PAIN OF RIGHT HIP: ICD-10-CM

## 2023-12-12 DIAGNOSIS — R26.2 INABILITY TO AMBULATE DUE TO HIP: ICD-10-CM

## 2023-12-12 DIAGNOSIS — M79.7 FIBROMYALGIA: ICD-10-CM

## 2023-12-12 DIAGNOSIS — Z00.00 ENCOUNTER FOR ANNUAL WELLNESS EXAM IN MEDICARE PATIENT: Primary | ICD-10-CM
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
## 2023-12-12 LAB
25(OH)D3 SERPL-MCNC: 29 NG/ML (ref 31–100)
ALBUMIN SERPL-MCNC: 4.3 G/DL (ref 3.5–5)
ALP BLD-CCNC: 80 U/L (ref 35–125)
ALT SERPL-CCNC: 6 U/L (ref 5–40)
ANION GAP SERPL CALC-SCNC: 12 MMOL/L
AST SERPL-CCNC: 9 U/L (ref 5–40)
BASOPHILS # BLD AUTO: 0.04 X10*3/UL (ref 0–0.1)
BASOPHILS NFR BLD AUTO: 0.5 %
BILIRUB SERPL-MCNC: <0.2 MG/DL (ref 0.1–1.2)
BUN SERPL-MCNC: 27 MG/DL (ref 8–25)
CALCIUM SERPL-MCNC: 9.5 MG/DL (ref 8.5–10.4)
CHLORIDE SERPL-SCNC: 101 MMOL/L (ref 97–107)
CHOLEST SERPL-MCNC: 197 MG/DL (ref 133–200)
CHOLEST/HDLC SERPL: 2.8 {RATIO}
CO2 SERPL-SCNC: 26 MMOL/L (ref 24–31)
CREAT SERPL-MCNC: 1.5 MG/DL (ref 0.4–1.6)
EOSINOPHIL # BLD AUTO: 0.15 X10*3/UL (ref 0–0.4)
EOSINOPHIL NFR BLD AUTO: 1.7 %
ERYTHROCYTE [DISTWIDTH] IN BLOOD BY AUTOMATED COUNT: 13.8 % (ref 11.5–14.5)
EST. AVERAGE GLUCOSE BLD GHB EST-MCNC: 105 MG/DL
GFR SERPL CREATININE-BSD FRML MDRD: 34 ML/MIN/1.73M*2
GLUCOSE SERPL-MCNC: 84 MG/DL (ref 65–99)
HBA1C MFR BLD: 5.3 %
HCT VFR BLD AUTO: 40.8 % (ref 36–46)
HDLC SERPL-MCNC: 70 MG/DL
HGB BLD-MCNC: 12.2 G/DL (ref 12–16)
IMM GRANULOCYTES # BLD AUTO: 0.04 X10*3/UL (ref 0–0.5)
IMM GRANULOCYTES NFR BLD AUTO: 0.5 % (ref 0–0.9)
LDLC SERPL CALC-MCNC: 106 MG/DL (ref 65–130)
LYMPHOCYTES # BLD AUTO: 1.06 X10*3/UL (ref 0.8–3)
LYMPHOCYTES NFR BLD AUTO: 12.1 %
MCH RBC QN AUTO: 30.9 PG (ref 26–34)
MCHC RBC AUTO-ENTMCNC: 29.9 G/DL (ref 32–36)
MCV RBC AUTO: 103 FL (ref 80–100)
MONOCYTES # BLD AUTO: 0.76 X10*3/UL (ref 0.05–0.8)
MONOCYTES NFR BLD AUTO: 8.7 %
NEUTROPHILS # BLD AUTO: 6.73 X10*3/UL (ref 1.6–5.5)
NEUTROPHILS NFR BLD AUTO: 76.5 %
NRBC BLD-RTO: 0 /100 WBCS (ref 0–0)
PLATELET # BLD AUTO: 325 X10*3/UL (ref 150–450)
POTASSIUM SERPL-SCNC: 5 MMOL/L (ref 3.4–5.1)
PROT SERPL-MCNC: 7.3 G/DL (ref 5.9–7.9)
RBC # BLD AUTO: 3.95 X10*6/UL (ref 4–5.2)
SODIUM SERPL-SCNC: 139 MMOL/L (ref 133–145)
TRIGL SERPL-MCNC: 104 MG/DL (ref 40–150)
WBC # BLD AUTO: 8.8 X10*3/UL (ref 4.4–11.3)

## 2023-12-12 PROCEDURE — 85025 COMPLETE CBC W/AUTO DIFF WBC: CPT

## 2023-12-12 PROCEDURE — 1036F TOBACCO NON-USER: CPT | Performed by: NURSE PRACTITIONER

## 2023-12-12 PROCEDURE — 71045 X-RAY EXAM CHEST 1 VIEW: CPT

## 2023-12-12 PROCEDURE — 4500999001 HC ED NO CHARGE

## 2023-12-12 PROCEDURE — 73502 X-RAY EXAM HIP UNI 2-3 VIEWS: CPT | Mod: RT

## 2023-12-12 PROCEDURE — 90662 IIV NO PRSV INCREASED AG IM: CPT | Performed by: NURSE PRACTITIONER

## 2023-12-12 PROCEDURE — 3078F DIAST BP <80 MM HG: CPT | Performed by: NURSE PRACTITIONER

## 2023-12-12 PROCEDURE — 82306 VITAMIN D 25 HYDROXY: CPT

## 2023-12-12 PROCEDURE — 70450 CT HEAD/BRAIN W/O DYE: CPT

## 2023-12-12 PROCEDURE — 1159F MED LIST DOCD IN RCRD: CPT | Performed by: NURSE PRACTITIONER

## 2023-12-12 PROCEDURE — 83036 HEMOGLOBIN GLYCOSYLATED A1C: CPT

## 2023-12-12 PROCEDURE — 99285 EMERGENCY DEPT VISIT HI MDM: CPT | Performed by: STUDENT IN AN ORGANIZED HEALTH CARE EDUCATION/TRAINING PROGRAM

## 2023-12-12 PROCEDURE — 36415 COLL VENOUS BLD VENIPUNCTURE: CPT

## 2023-12-12 PROCEDURE — G0439 PPPS, SUBSEQ VISIT: HCPCS | Performed by: NURSE PRACTITIONER

## 2023-12-12 PROCEDURE — 73030 X-RAY EXAM OF SHOULDER: CPT | Mod: RT

## 2023-12-12 PROCEDURE — 72125 CT NECK SPINE W/O DYE: CPT

## 2023-12-12 PROCEDURE — 3074F SYST BP LT 130 MM HG: CPT | Performed by: NURSE PRACTITIONER

## 2023-12-12 PROCEDURE — 73080 X-RAY EXAM OF ELBOW: CPT | Mod: RT

## 2023-12-12 PROCEDURE — 1126F AMNT PAIN NOTED NONE PRSNT: CPT | Performed by: NURSE PRACTITIONER

## 2023-12-12 PROCEDURE — 99283 EMERGENCY DEPT VISIT LOW MDM: CPT

## 2023-12-12 PROCEDURE — 80061 LIPID PANEL: CPT

## 2023-12-12 PROCEDURE — G0008 ADMIN INFLUENZA VIRUS VAC: HCPCS | Performed by: NURSE PRACTITIONER

## 2023-12-12 PROCEDURE — 80053 COMPREHEN METABOLIC PANEL: CPT

## 2023-12-12 RX ORDER — DULOXETIN HYDROCHLORIDE 30 MG/1
30 CAPSULE, DELAYED RELEASE ORAL DAILY
Qty: 90 CAPSULE | Refills: 3 | Status: SHIPPED | OUTPATIENT
Start: 2023-12-12 | End: 2024-12-11

## 2023-12-12 RX ORDER — OXYBUTYNIN CHLORIDE 5 MG/1
5 TABLET, EXTENDED RELEASE ORAL DAILY
Qty: 90 TABLET | Refills: 3 | Status: SHIPPED | OUTPATIENT
Start: 2023-12-12 | End: 2024-12-11

## 2023-12-12 RX ORDER — TRAMADOL HYDROCHLORIDE 50 MG/1
50 TABLET ORAL EVERY 6 HOURS PRN
Qty: 120 TABLET | Refills: 0 | Status: ON HOLD | OUTPATIENT
Start: 2023-12-12 | End: 2023-12-17 | Stop reason: SDUPTHER

## 2023-12-12 RX ORDER — ACETAMINOPHEN 325 MG/1
650 TABLET ORAL ONCE
Status: COMPLETED | OUTPATIENT
Start: 2023-12-12 | End: 2023-12-12

## 2023-12-12 RX ADMIN — ACETAMINOPHEN 650 MG: 325 TABLET ORAL at 23:10

## 2023-12-12 ASSESSMENT — ENCOUNTER SYMPTOMS
COUGH: 0
WOUND: 0
NECK PAIN: 0
PALPITATIONS: 0
AGITATION: 0
VOMITING: 0
FACIAL ASYMMETRY: 0
SPEECH DIFFICULTY: 0
ABDOMINAL PAIN: 0
CHEST TIGHTNESS: 0
ADENOPATHY: 0
DIAPHORESIS: 0
APPETITE CHANGE: 0
DYSURIA: 0
FEVER: 0
TREMORS: 1
BLOOD IN STOOL: 0
FLANK PAIN: 0
DIZZINESS: 0
DEPRESSION: 0
WHEEZING: 0
OCCASIONAL FEELINGS OF UNSTEADINESS: 1
FATIGUE: 0
CHILLS: 0
LOSS OF SENSATION IN FEET: 0
BRUISES/BLEEDS EASILY: 0
SHORTNESS OF BREATH: 1
HEADACHES: 0
SEIZURES: 0
HEMATURIA: 0
POLYPHAGIA: 0
CONFUSION: 0
POLYDIPSIA: 0
NAUSEA: 0
BACK PAIN: 0

## 2023-12-12 ASSESSMENT — COLUMBIA-SUICIDE SEVERITY RATING SCALE - C-SSRS
1. IN THE PAST MONTH, HAVE YOU WISHED YOU WERE DEAD OR WISHED YOU COULD GO TO SLEEP AND NOT WAKE UP?: NO
6. HAVE YOU EVER DONE ANYTHING, STARTED TO DO ANYTHING, OR PREPARED TO DO ANYTHING TO END YOUR LIFE?: NO
2. HAVE YOU ACTUALLY HAD ANY THOUGHTS OF KILLING YOURSELF?: NO

## 2023-12-12 ASSESSMENT — PAIN - FUNCTIONAL ASSESSMENT: PAIN_FUNCTIONAL_ASSESSMENT: 0-10

## 2023-12-12 ASSESSMENT — PATIENT HEALTH QUESTIONNAIRE - PHQ9
1. LITTLE INTEREST OR PLEASURE IN DOING THINGS: NOT AT ALL
2. FEELING DOWN, DEPRESSED OR HOPELESS: NOT AT ALL
SUM OF ALL RESPONSES TO PHQ9 QUESTIONS 1 AND 2: 0

## 2023-12-12 ASSESSMENT — PAIN SCALES - GENERAL
PAINLEVEL_OUTOF10: 5 - MODERATE PAIN
PAINLEVEL_OUTOF10: 5 - MODERATE PAIN
PAINLEVEL: 0-NO PAIN

## 2023-12-12 NOTE — RESULT ENCOUNTER NOTE
Please call patient and provide her with contact information for Nephrologist, Dr. Abdi. I placed the referral for CKD. Thank you.

## 2023-12-12 NOTE — PROGRESS NOTES
[facility]: [facility]  MEDICARE WELLNESS EXAM      Juli Del Castillo is a 83 y.o. female that is presenting today for Annual Exam.  She takes Temazepam prn for insomnia. Tramadol effective for chronic pain.  Primidone for Essential Tremors.     Assessment/Plan    Diagnoses and all orders for this visit:  Encounter for immunization  -     Flu vaccine, quadrivalent, high-dose, preservative free, age 65y+ (FLUZONE)  Mixed hyperlipidemia  -     Tolerating statin  -     Comprehensive Metabolic Panel; Future  -     Lipid Panel; Future  Essential tremor         -     Stable on Primidone  Generalized anxiety disorder         -     Stable on Celexa and Alprazolam  Primary hypertension  -     Good control on current medication regimen  -     CBC and Auto Differential; Future  -     Comprehensive Metabolic Panel; Future  Fibromyalgia  -     DULoxetine (Cymbalta) 30 mg DR capsule; Take 1 capsule (30 mg) by mouth once daily.  Gastroesophageal reflux disease without esophagitis        -      Stable on PPI  Pulmonary emphysema, unspecified emphysema type (CMS/HCC)        -       Continue established follow up with Pulmonology, Dr. Walker  Encounter for routine laboratory testing  -     CBC and Auto Differential; Future  -     Comprehensive Metabolic Panel; Future  -     Lipid Panel; Future  -     Hemoglobin A1C; Future  -     Vitamin D 25-Hydroxy,Total (for eval of Vitamin D levels); Future  Hyperglycemia  -     Hemoglobin A1C; Future  Vitamin D deficiency  -     Vitamin D 25-Hydroxy,Total (for eval of Vitamin D levels); Future  Encounter for annual wellness exam in Medicare patient  Bartholin's Cyst        -     Patient instructed to take Sitz baths several times daily for 3-4 days to help cyst rupture and drain on own. Follow up if symptoms persist or worsens.  Spinal stenosis of lumbar region, unspecified whether neurogenic claudication present  -     traMADol (Ultram) 50 mg tablet; Take 1 tablet (50 mg) by  mouth every 6 hours if needed for severe pain (7 - 10).  OAB (overactive bladder)  -     oxybutynin XL (Ditropan-XL) 5 mg 24 hr tablet; Take 1 tablet (5 mg) by mouth once daily.    ADVANCED CARE PLANNING  Advanced Care Planning was discussed with patient:  The patient has an active advanced care plan on file. The patient has an active surrogate decision-maker on file.  Encouraged the patient to confirm that Living Will and Healthcare Power of  (HCPoA) are accurate and up to date.  Encouraged the patient to confirm that our office be provided a copy of any documentation in the event that anything changes.    ACTIVITIES OF DAILY LIVING  Basic ADLs:  Bathing: Independent, Dressing: Independent, Toileting: Independent, Transferring: Independent, Continence: Independent, Feeding: Independent.    Instrumental ADLs:  Ability to use phone: Independent, Shopping: Requires Assistance, Cooking: Independent, House-keeping: Requires Assistance, Laundry: Requires Assistance, Transportation: Completely Dependent, Medication Management: Requires Assistance, Finance Management: Independent.    Subjective   Subjective  Juli BARLOW Magdalene is here for follow-up of her hypertension.  Home blood pressure readings: not doing. Salt intake and diet: salt not added to cooking.  Associated signs and symptoms: dyspnea and orthopnea. Patient denies: blurred vision, headache, neck aches, palpitations, and peripheral edema. Use of agents associated with hypertension: none. Medication compliance: taking as prescribed.    Objective  [unfilled]    Lab Review   not applicable    Assessment/Plan  Hypertension, normal blood pressure.     Medication: no change.  Dietary sodium restriction.  Regular aerobic exercise.  Follow up: 6 months and as needed.    Subjective  Juli Lópezey is here for follow up of dyslipidemia. Compliance with treatment has been good. The patient exercises never. Patient denies muscle pain associated with her  medications.    [unfilled]    Objective  [unfilled]    Lab Review  Lab Results       Component                Value               Date                       CHOL                     211 (H)             04/13/2023                 CHOL                     182                 06/02/2022                 CHOL                     191                 03/07/2022                 TRIG                     127                 04/13/2023                 TRIG                     155 (H)             06/02/2022                 TRIG                     90                  03/07/2022                 HDL                      73                  04/13/2023                 HDL                      55                  06/02/2022                 HDL                      68                  03/07/2022               Assessment/Plan  Dyslipidemia under good control.     1. Continue dietary measures.   2. Continue regular exercise.   3. Lipid-lowering medications: Simvastatin 80 mg daily    Luana Del Castillo is an 83 y.o. female who presents for evaluation of heartburn.  She denies cough, dysphagia, heartburn, and hoarseness. She denies dysphagia. She has not lost weight. She denies melena, hematochezia, hematemesis, and coffee ground emesis.    Objective  [unfilled]     Assessment/Plan  Gastroesophageal Reflux Disease, Stable  Nonpharmacologic treatments were discussed including: eating smaller meals, elevation of the head of bed at night, avoidance of caffeine, chocolate, nicotine and peppermint, and avoiding tight fitting clothing.  Will continue Omeprazole 40mg daily  Follow up in 6 months or sooner as needed.    Luana Del Castillo is a 83 y.o. female with known COPD who presents without exacerbation. She is followed by Pulmonology, Dr. Walker. Current symptoms include: none. Patient uses 1 pillows at night but uses a hospital bed which she does raise during sleep. Patient currently is on oxygen at 3L/min per nasal cannula  all day and night    Objective  [unfilled]    Assessment/Plan  Emphysema.  Discussed diagnosis, its evaluation, treatment and usual course.  Follow up with PCP in 6 months or as needed.    Subjective  Juli Del Castillo is a 83 y.o. female who presents for follow up of depression. Current symptoms include none. Symptoms have been stable. Patient denies depressed mood, difficulty concentrating, hopelessness, recurrent thoughts of death, suicidal attempt, suicidal thoughts with specific plan, and suicidal thoughts without plan. She complains of the following side effects from the treatment: none.    Objective  /62 (BP Location: Right arm, Patient Position: Sitting, BP Cuff Size: Adult)   Pulse 68   Wt 77.1 kg (170 lb)   BMI 28.32 kg/m²    General:  alert and oriented, in no acute distress  Affect & Behavior:  full facial expressions, good grooming, good insight, normal perception, normal reasoning, normal speech pattern and content, and normal thought patterns     Assessment/Plan  Depression, stable.  Medications: Alprazolam, Citalopram  Instructed patient to contact office or on-call physician promptly should condition worsen or any new symptoms appear and provided on-call telephone numbers. IF THE PATIENT HAS ANY SUICIDAL OR HOMICIDAL IDEATIONS, CALL THE OFFICE, DISCUSS WITH A SUPPORT MEMBER, OR GO TO THE ER IMMEDIATELY. Patient was agreeable with this plan.  Follow up: 6 month.    Patient reports lump to left side of labia, tender.  She reports dry vaginal cavity, denies vaginal discharge or bleeding.  Lump first noticed 2 weeks ago, no change in size.              Review of Systems   Constitutional:  Negative for appetite change, chills, diaphoresis, fatigue and fever.   HENT:  Negative for hearing loss and mouth sores.    Eyes:  Negative for visual disturbance.   Respiratory:  Positive for shortness of breath (using supplemental O2 at 3L nc). Negative for cough, chest tightness and wheezing.          Supplemental O2 3L nc   Cardiovascular:  Negative for chest pain, palpitations and leg swelling.   Gastrointestinal:  Negative for abdominal pain, blood in stool, nausea and vomiting.   Endocrine: Negative for cold intolerance, heat intolerance, polydipsia, polyphagia and polyuria.   Genitourinary:  Negative for dysuria, flank pain, hematuria, vaginal bleeding, vaginal discharge and vaginal pain.   Musculoskeletal:  Negative for back pain and neck pain.   Skin:  Negative for rash and wound.   Allergic/Immunologic: Negative for food allergies and immunocompromised state.   Neurological:  Positive for tremors. Negative for dizziness, seizures, syncope, facial asymmetry, speech difficulty and headaches.   Hematological:  Negative for adenopathy. Does not bruise/bleed easily.   Psychiatric/Behavioral:  Negative for agitation, behavioral problems and confusion.      Objective   Vitals:    12/12/23 0932   BP: 124/62   Pulse: 68      Body mass index is 28.32 kg/m².  Physical Exam  Vitals and nursing note reviewed.   Constitutional:       General: She is not in acute distress.     Appearance: Normal appearance. She is not ill-appearing.   HENT:      Head: Normocephalic and atraumatic.      Right Ear: Tympanic membrane, ear canal and external ear normal. There is no impacted cerumen.      Left Ear: Tympanic membrane, ear canal and external ear normal. There is no impacted cerumen.      Nose: Nose normal.      Mouth/Throat:      Mouth: Mucous membranes are moist.      Pharynx: Oropharynx is clear. No oropharyngeal exudate or posterior oropharyngeal erythema.   Eyes:      General: No scleral icterus.        Right eye: No discharge.         Left eye: No discharge.      Extraocular Movements: Extraocular movements intact.      Conjunctiva/sclera: Conjunctivae normal.      Pupils: Pupils are equal, round, and reactive to light.   Neck:      Vascular: No carotid bruit.   Cardiovascular:      Rate and Rhythm: Normal rate and  regular rhythm.      Pulses: Normal pulses.      Heart sounds: Normal heart sounds. No murmur heard.     No friction rub. No gallop.   Pulmonary:      Effort: Pulmonary effort is normal. No respiratory distress.      Comments: Diminished breath sounds  Abdominal:      General: Abdomen is flat. Bowel sounds are normal. There is no distension.      Palpations: Abdomen is soft.      Tenderness: There is no abdominal tenderness.      Hernia: No hernia is present. There is no hernia in the left inguinal area or right inguinal area.   Genitourinary:     Pubic Area: No rash.       Labia:         Right: No rash, tenderness, lesion or injury.         Left: Tenderness and lesion present. No rash or injury.           Comments: Bartholin's cyst noted to left vaginal opening, size of BB, tender, no erythema or signs of bacterial infection  Musculoskeletal:         General: No swelling or tenderness. Normal range of motion.   Lymphadenopathy:      Cervical: No cervical adenopathy.      Lower Body: No right inguinal adenopathy. No left inguinal adenopathy.   Skin:     General: Skin is warm and dry.      Capillary Refill: Capillary refill takes less than 2 seconds.      Coloration: Skin is not jaundiced.      Findings: No rash.   Neurological:      General: No focal deficit present.      Mental Status: She is alert and oriented to person, place, and time. Mental status is at baseline.   Psychiatric:         Mood and Affect: Mood normal.         Behavior: Behavior normal.       Diagnostic Results   Lab Results   Component Value Date    GLUCOSE 82 09/30/2023    CALCIUM 9.2 09/30/2023     09/30/2023    K 4.3 09/30/2023    CO2 32 (H) 09/30/2023     09/30/2023    BUN 16 09/30/2023    CREATININE 1.00 09/30/2023     Lab Results   Component Value Date    ALT 6 09/30/2023    AST 10 09/30/2023    ALKPHOS 59 09/30/2023    BILITOT 0.2 09/30/2023     Lab Results   Component Value Date    WBC 7.0 09/29/2023    HGB 12.1 09/29/2023     "HCT 36.8 09/29/2023    MCV 94.1 09/29/2023     09/29/2023     Lab Results   Component Value Date    CHOL 211 (H) 04/13/2023    CHOL 182 06/02/2022    CHOL 191 03/07/2022     Lab Results   Component Value Date    HDL 73 04/13/2023    HDL 55 06/02/2022    HDL 68 03/07/2022     Lab Results   Component Value Date    LDLCALC 113 04/13/2023    LDLCALC 96 06/02/2022    LDLCALC 105 03/07/2022     Lab Results   Component Value Date    TRIG 127 04/13/2023    TRIG 155 (H) 06/02/2022    TRIG 90 03/07/2022     No components found for: \"CHOLHDL\"  Lab Results   Component Value Date    HGBA1C 6.0 06/01/2022     Other labs not included in the list above reviewed either before or during this encounter.    History   History reviewed. No pertinent past medical history.  Past Surgical History:   Procedure Laterality Date    MR HEAD ANGIO WO IV CONTRAST  10/31/2015    MR HEAD ANGIO WO IV CONTRAST [facility]    MR HEAD ANGIO WO IV CONTRAST  2/15/2019    MR HEAD ANGIO WO IV CONTRAST [facility]    MR NECK ANGIO WO IV CONTRAST  10/31/2015    MR NECK ANGIO WO IV CONTRAST [facility]     No family history on file.  Social History     Socioeconomic History    Marital status:      Spouse name: Not on file    Number of children: Not on file    Years of education: Not on file    Highest education level: Not on file   Occupational History    Not on file   Tobacco Use    Smoking status: Former     Types: Cigarettes    Smokeless tobacco: Never   Vaping Use    Vaping Use: Never used   Substance and Sexual Activity    Alcohol use: Never    Drug use: Never    Sexual activity: Not on file   Other Topics Concern    Not on file   Social History Narrative    Not on file     Social Determinants of Health     Financial Resource Strain: Not on file   Food Insecurity: Not on file   Transportation Needs: Not on file   Physical Activity: Not on file   Stress: Not on file   Social Connections: Not on file   Intimate " Partner Violence: Not on file   Housing Stability: Not on file     Allergies   Allergen Reactions    Cortisone Swelling and Rash    Other Swelling and Rash     ACTH    Acth Hives    Haemoph B Poly Conj-Tet Tox-Pf Other    Codeine Nausea/vomiting, Nausea Only and Rash    Hydrochlorothiazide Rash and Swelling     Current Outpatient Medications on File Prior to Visit   Medication Sig Dispense Refill    acetaminophen (Tylenol) 325 mg tablet Take 2 tablets (650 mg) by mouth every 6 hours if needed for moderate pain (4 - 6). 30 tablet 0    albuterol 2.5 mg /3 mL (0.083 %) nebulizer solution Take 3 mL (2.5 mg) by nebulization every 6 hours while awake. 180 mL 2    albuterol 90 mcg/actuation inhaler INHALE 2 PUFFS INTO THE LUNGS EVERY 4 HOURS AS NEEDED FOR 30 DAYS      ALPRAZolam (Xanax) 0.25 mg tablet TAKE 1 TABLET BY MOUTH EVERY DAY AS NEEDED FOR 30 DAYS      aspirin 81 mg chewable tablet Chew 1 tablet (81 mg) once daily. 30 tablet 3    budesonide (Pulmicort) 0.5 mg/2 mL nebulizer solution Take 2 mL (0.5 mg) by nebulization 2 times a day. Rinse mouth with water after use to reduce aftertaste and incidence of candidiasis. Do not swallow. 120 mL 3    cholecalciferol, vitamin D3, (VITAMIN D3 ORAL) once every 24 hours.      citalopram (CeleXA) 20 mg tablet Take 1 tablet (20 mg) by mouth once daily. 30 tablet 3    DULoxetine (Cymbalta) 30 mg DR capsule Take 1 capsule (30 mg) by mouth once daily.      fluticasone furoate-vilanteroL (Breo Ellipta) 200-25 mcg/dose inhaler TAKE 1 PUFF ONCE A DAY FOR 30 DAYS      gabapentin (Neurontin) 400 mg capsule Take 1 capsule (400 mg) by mouth 3 times a day.      lisinopril 20 mg tablet Take 1 tablet (20 mg) by mouth once daily. 30 tablet 3    nebulizer and compressor device use as directed with nebulizer treatments 1 each 0    nitroglycerin (Nitrostat) 0.4 mg SL tablet AS DIRECTED SUBLINGUAL AS NEEDED 90 DAYS      omeprazole (PriLOSEC) 40 mg DR capsule Take 1 capsule (40 mg) by mouth once  daily.      oxybutynin XL (Ditropan-XL) 5 mg 24 hr tablet Take 1 tablet (5 mg) by mouth once daily.      oxygen (O2) gas therapy 3 l at night; 4 throughout the day nasal cannula Continuous      primidone (Mysoline) 50 mg tablet Take 1 tablet (50 mg) by mouth 3 times a day. 90 tablet 3    simvastatin (Zocor) 80 mg tablet Take 1 tablet (80 mg) by mouth once daily at bedtime. 30 tablet 3    temazepam (Restoril) 15 mg capsule TAKE 1 CAPSULE BY MOUTH AT BEDTIME ONLY AS NEEDED ONCE A DAY. NOT ROUTINE USE. MAX 5 OUT OF 7 DAYS 30 20 capsule 2    torsemide (Demadex) 20 mg tablet Take 1 tablet (20 mg) by mouth once daily. 30 tablet 3    traMADol (Ultram) 50 mg tablet Take 1 tablet (50 mg) by mouth every 6 hours if needed for severe pain (7 - 10). 120 tablet 0    albuterol sulfate (Proair Digihaler) 90 mcg/actuation aero powdr breath act w/sensor inhaler 2 puffs.      doxycycline (Vibra-Tabs) 100 mg tablet       escitalopram (Lexapro) 10 mg tablet Take 1 tablet (10 mg) by mouth once daily.      furosemide (Lasix) 20 mg tablet Take 1 tablet (20 mg) by mouth once daily.      furosemide (Lasix) 20 mg tablet Take 1 tablet (20 mg) by mouth once daily.      gabapentin (Neurontin) 600 mg tablet Take 0.5 tablets (300 mg) by mouth 3 times a day. (Patient not taking: Reported on 12/12/2023) 90 tablet 3    guaiFENesin (Robitussin) 100 mg/5 mL syrup Take 5 mL (100 mg) by mouth every 4 hours if needed for cough. (Patient not taking: Reported on 12/12/2023) 120 mL 0    HYDROcodone-acetaminophen (Norco) 5-325 mg tablet Take 1 tablet by mouth every 6 hours if needed.      meclizine (Antivert) 12.5 mg tablet Take 2 tablets (25 mg) by mouth twice a day.      metoprolol succinate XL (Toprol-XL) 25 mg 24 hr tablet Take 1 tablet (25 mg) by mouth once daily.      omeprazole (PriLOSEC) 40 mg DR capsule Take 1 capsule (40 mg) by mouth once daily.      pantoprazole (ProtoNix) 40 mg EC tablet Take 1 tablet (40 mg) by mouth once daily. Do not crush,  chew, or split. (Patient not taking: Reported on 12/12/2023) 30 tablet 2    predniSONE (Deltasone) 20 mg tablet Take 1 tablet (20 mg) by mouth once daily.       No current facility-administered medications on file prior to visit.     Immunization History   Administered Date(s) Administered    Flu vaccine, quadrivalent, high-dose, preservative free, age 65y+ (FLUZONE) 11/06/2020, 11/06/2020, 12/01/2022    Influenza, High Dose Seasonal, Preservative Free 10/12/2017, 10/02/2018, 12/03/2019    Influenza, seasonal, injectable 11/13/2010, 10/03/2011, 10/02/2012, 10/01/2013, 10/01/2014, 10/01/2015    Moderna COVID-19 vaccine, bivalent, blue cap/gray label *Check age/dose* 12/01/2022    Moderna SARS-CoV-2 Vaccination 01/28/2021, 02/25/2021, 10/29/2021    PPD Test 01/11/2019, 01/11/2020, 01/18/2020    Pneumococcal conjugate vaccine, 13-valent (PREVNAR 13) 12/21/2015    Pneumococcal polysaccharide vaccine, 23-valent, age 2 years and older (PNEUMOVAX 23) 10/13/2006, 10/22/2018    Td vaccine, age 7 years and older (TDVAX) 07/13/2001    Tdap vaccine, age 7 year and older (BOOSTRIX) 08/25/2015     Patient's medical history was reviewed and updated either before or during this encounter.     Jennifer Reed, APRN-CNP

## 2023-12-13 ENCOUNTER — APPOINTMENT (OUTPATIENT)
Dept: CARDIOLOGY | Facility: HOSPITAL | Age: 83
DRG: 605 | End: 2023-12-13
Payer: MEDICARE

## 2023-12-13 ENCOUNTER — APPOINTMENT (OUTPATIENT)
Dept: RADIOLOGY | Facility: HOSPITAL | Age: 83
DRG: 605 | End: 2023-12-13
Payer: MEDICARE
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
## 2023-12-13 LAB
ANION GAP SERPL CALC-SCNC: 12 MMOL/L
APPEARANCE UR: CLEAR
BASOPHILS # BLD AUTO: 0.02 X10*3/UL (ref 0–0.1)
BASOPHILS NFR BLD AUTO: 0.2 %
BILIRUB UR STRIP.AUTO-MCNC: NEGATIVE MG/DL
BUN SERPL-MCNC: 32 MG/DL (ref 8–25)
CALCIUM SERPL-MCNC: 9.2 MG/DL (ref 8.5–10.4)
CHLORIDE SERPL-SCNC: 100 MMOL/L (ref 97–107)
CO2 SERPL-SCNC: 26 MMOL/L (ref 24–31)
COLOR UR: COLORLESS
CREAT SERPL-MCNC: 1.4 MG/DL (ref 0.4–1.6)
EOSINOPHIL # BLD AUTO: 0.24 X10*3/UL (ref 0–0.4)
EOSINOPHIL NFR BLD AUTO: 2.6 %
ERYTHROCYTE [DISTWIDTH] IN BLOOD BY AUTOMATED COUNT: 13.8 % (ref 11.5–14.5)
GFR SERPL CREATININE-BSD FRML MDRD: 37 ML/MIN/1.73M*2
GLUCOSE SERPL-MCNC: 99 MG/DL (ref 65–99)
GLUCOSE UR STRIP.AUTO-MCNC: NORMAL MG/DL
HCT VFR BLD AUTO: 37.5 % (ref 36–46)
HGB BLD-MCNC: 12.2 G/DL (ref 12–16)
IMM GRANULOCYTES # BLD AUTO: 0.02 X10*3/UL (ref 0–0.5)
IMM GRANULOCYTES NFR BLD AUTO: 0.2 % (ref 0–0.9)
KETONES UR STRIP.AUTO-MCNC: NEGATIVE MG/DL
LEUKOCYTE ESTERASE UR QL STRIP.AUTO: ABNORMAL
LYMPHOCYTES # BLD AUTO: 1.24 X10*3/UL (ref 0.8–3)
LYMPHOCYTES NFR BLD AUTO: 13.3 %
MCH RBC QN AUTO: 31.5 PG (ref 26–34)
MCHC RBC AUTO-ENTMCNC: 32.5 G/DL (ref 32–36)
MCV RBC AUTO: 97 FL (ref 80–100)
MONOCYTES # BLD AUTO: 0.78 X10*3/UL (ref 0.05–0.8)
MONOCYTES NFR BLD AUTO: 8.4 %
NEUTROPHILS # BLD AUTO: 6.99 X10*3/UL (ref 1.6–5.5)
NEUTROPHILS NFR BLD AUTO: 75.3 %
NITRITE UR QL STRIP.AUTO: ABNORMAL
NRBC BLD-RTO: ABNORMAL /100{WBCS}
PH UR STRIP.AUTO: 5.5 [PH]
PLATELET # BLD AUTO: 286 X10*3/UL (ref 150–450)
POTASSIUM SERPL-SCNC: 4.5 MMOL/L (ref 3.4–5.1)
PROT UR STRIP.AUTO-MCNC: NEGATIVE MG/DL
RBC # BLD AUTO: 3.87 X10*6/UL (ref 4–5.2)
RBC # UR STRIP.AUTO: ABNORMAL /UL
RBC #/AREA URNS AUTO: ABNORMAL /HPF
SODIUM SERPL-SCNC: 138 MMOL/L (ref 133–145)
SP GR UR STRIP.AUTO: 1.01
UROBILINOGEN UR STRIP.AUTO-MCNC: NORMAL MG/DL
WBC # BLD AUTO: 9.3 X10*3/UL (ref 4.4–11.3)
WBC #/AREA URNS AUTO: ABNORMAL /HPF

## 2023-12-13 PROCEDURE — 87186 SC STD MICRODIL/AGAR DIL: CPT | Mod: TRILAB | Performed by: STUDENT IN AN ORGANIZED HEALTH CARE EDUCATION/TRAINING PROGRAM

## 2023-12-13 PROCEDURE — 2500000002 HC RX 250 W HCPCS SELF ADMINISTERED DRUGS (ALT 637 FOR MEDICARE OP, ALT 636 FOR OP/ED): Performed by: INTERNAL MEDICINE

## 2023-12-13 PROCEDURE — 9420000001 HC RT PATIENT EDUCATION 5 MIN

## 2023-12-13 PROCEDURE — 94760 N-INVAS EAR/PLS OXIMETRY 1: CPT

## 2023-12-13 PROCEDURE — 2500000001 HC RX 250 WO HCPCS SELF ADMINISTERED DRUGS (ALT 637 FOR MEDICARE OP): Performed by: INTERNAL MEDICINE

## 2023-12-13 PROCEDURE — 2500000004 HC RX 250 GENERAL PHARMACY W/ HCPCS (ALT 636 FOR OP/ED): Performed by: NURSE PRACTITIONER

## 2023-12-13 PROCEDURE — 2500000004 HC RX 250 GENERAL PHARMACY W/ HCPCS (ALT 636 FOR OP/ED): Performed by: STUDENT IN AN ORGANIZED HEALTH CARE EDUCATION/TRAINING PROGRAM

## 2023-12-13 PROCEDURE — 94640 AIRWAY INHALATION TREATMENT: CPT

## 2023-12-13 PROCEDURE — 94664 DEMO&/EVAL PT USE INHALER: CPT

## 2023-12-13 PROCEDURE — 73700 CT LOWER EXTREMITY W/O DYE: CPT | Mod: RT

## 2023-12-13 PROCEDURE — 81003 URINALYSIS AUTO W/O SCOPE: CPT | Performed by: STUDENT IN AN ORGANIZED HEALTH CARE EDUCATION/TRAINING PROGRAM

## 2023-12-13 PROCEDURE — 87086 URINE CULTURE/COLONY COUNT: CPT | Mod: TRILAB | Performed by: STUDENT IN AN ORGANIZED HEALTH CARE EDUCATION/TRAINING PROGRAM

## 2023-12-13 PROCEDURE — 2500000004 HC RX 250 GENERAL PHARMACY W/ HCPCS (ALT 636 FOR OP/ED): Performed by: INTERNAL MEDICINE

## 2023-12-13 PROCEDURE — 81001 URINALYSIS AUTO W/SCOPE: CPT | Performed by: STUDENT IN AN ORGANIZED HEALTH CARE EDUCATION/TRAINING PROGRAM

## 2023-12-13 PROCEDURE — 36415 COLL VENOUS BLD VENIPUNCTURE: CPT | Performed by: STUDENT IN AN ORGANIZED HEALTH CARE EDUCATION/TRAINING PROGRAM

## 2023-12-13 PROCEDURE — 80048 BASIC METABOLIC PNL TOTAL CA: CPT | Performed by: STUDENT IN AN ORGANIZED HEALTH CARE EDUCATION/TRAINING PROGRAM

## 2023-12-13 PROCEDURE — 93005 ELECTROCARDIOGRAM TRACING: CPT

## 2023-12-13 PROCEDURE — G0378 HOSPITAL OBSERVATION PER HR: HCPCS

## 2023-12-13 PROCEDURE — 1100000001 HC PRIVATE ROOM DAILY

## 2023-12-13 PROCEDURE — 85025 COMPLETE CBC W/AUTO DIFF WBC: CPT | Performed by: STUDENT IN AN ORGANIZED HEALTH CARE EDUCATION/TRAINING PROGRAM

## 2023-12-13 PROCEDURE — 96372 THER/PROPH/DIAG INJ SC/IM: CPT | Performed by: INTERNAL MEDICINE

## 2023-12-13 RX ORDER — ALPRAZOLAM 0.25 MG/1
0.25 TABLET ORAL 4 TIMES DAILY PRN
Status: DISCONTINUED | OUTPATIENT
Start: 2023-12-13 | End: 2023-12-17 | Stop reason: HOSPADM

## 2023-12-13 RX ORDER — GABAPENTIN 400 MG/1
400 CAPSULE ORAL 3 TIMES DAILY
Status: DISCONTINUED | OUTPATIENT
Start: 2023-12-13 | End: 2023-12-17 | Stop reason: HOSPADM

## 2023-12-13 RX ORDER — TORSEMIDE 20 MG/1
20 TABLET ORAL DAILY
Status: DISCONTINUED | OUTPATIENT
Start: 2023-12-13 | End: 2023-12-16

## 2023-12-13 RX ORDER — ALBUTEROL SULFATE 0.83 MG/ML
2.5 SOLUTION RESPIRATORY (INHALATION)
Status: DISCONTINUED | OUTPATIENT
Start: 2023-12-13 | End: 2023-12-13

## 2023-12-13 RX ORDER — ALBUTEROL SULFATE 0.83 MG/ML
2.5 SOLUTION RESPIRATORY (INHALATION)
Status: DISCONTINUED | OUTPATIENT
Start: 2023-12-13 | End: 2023-12-17 | Stop reason: HOSPADM

## 2023-12-13 RX ORDER — TEMAZEPAM 15 MG/1
15 CAPSULE ORAL NIGHTLY PRN
Status: DISCONTINUED | OUTPATIENT
Start: 2023-12-13 | End: 2023-12-17 | Stop reason: HOSPADM

## 2023-12-13 RX ORDER — PANTOPRAZOLE SODIUM 40 MG/1
40 TABLET, DELAYED RELEASE ORAL
Status: DISCONTINUED | OUTPATIENT
Start: 2023-12-14 | End: 2023-12-17 | Stop reason: HOSPADM

## 2023-12-13 RX ORDER — PRIMIDONE 50 MG/1
50 TABLET ORAL 3 TIMES DAILY
Status: DISCONTINUED | OUTPATIENT
Start: 2023-12-13 | End: 2023-12-17 | Stop reason: HOSPADM

## 2023-12-13 RX ORDER — NAPROXEN SODIUM 220 MG/1
81 TABLET, FILM COATED ORAL DAILY
Status: DISCONTINUED | OUTPATIENT
Start: 2023-12-13 | End: 2023-12-17 | Stop reason: HOSPADM

## 2023-12-13 RX ORDER — LISINOPRIL 20 MG/1
20 TABLET ORAL DAILY
Status: DISCONTINUED | OUTPATIENT
Start: 2023-12-13 | End: 2023-12-17 | Stop reason: HOSPADM

## 2023-12-13 RX ORDER — OXYCODONE HYDROCHLORIDE 5 MG/1
10 TABLET ORAL EVERY 6 HOURS PRN
Status: DISCONTINUED | OUTPATIENT
Start: 2023-12-13 | End: 2023-12-17 | Stop reason: HOSPADM

## 2023-12-13 RX ORDER — ALBUTEROL SULFATE 0.83 MG/ML
2.5 SOLUTION RESPIRATORY (INHALATION) 3 TIMES DAILY
Status: DISCONTINUED | OUTPATIENT
Start: 2023-12-13 | End: 2023-12-13

## 2023-12-13 RX ORDER — CITALOPRAM 20 MG/1
20 TABLET, FILM COATED ORAL DAILY
Status: DISCONTINUED | OUTPATIENT
Start: 2023-12-13 | End: 2023-12-17 | Stop reason: HOSPADM

## 2023-12-13 RX ORDER — CEFTRIAXONE 1 G/50ML
1 INJECTION, SOLUTION INTRAVENOUS EVERY 24 HOURS
Status: DISCONTINUED | OUTPATIENT
Start: 2023-12-13 | End: 2023-12-17 | Stop reason: HOSPADM

## 2023-12-13 RX ORDER — DULOXETIN HYDROCHLORIDE 30 MG/1
30 CAPSULE, DELAYED RELEASE ORAL DAILY
Status: DISCONTINUED | OUTPATIENT
Start: 2023-12-13 | End: 2023-12-17 | Stop reason: HOSPADM

## 2023-12-13 RX ORDER — OXYCODONE HYDROCHLORIDE 5 MG/1
5 TABLET ORAL EVERY 6 HOURS PRN
Status: DISCONTINUED | OUTPATIENT
Start: 2023-12-13 | End: 2023-12-17 | Stop reason: HOSPADM

## 2023-12-13 RX ORDER — SIMVASTATIN 40 MG/1
80 TABLET, FILM COATED ORAL NIGHTLY
Status: DISCONTINUED | OUTPATIENT
Start: 2023-12-13 | End: 2023-12-17 | Stop reason: HOSPADM

## 2023-12-13 RX ORDER — HEPARIN SODIUM 5000 [USP'U]/ML
5000 INJECTION, SOLUTION INTRAVENOUS; SUBCUTANEOUS EVERY 12 HOURS
Status: DISCONTINUED | OUTPATIENT
Start: 2023-12-13 | End: 2023-12-17 | Stop reason: HOSPADM

## 2023-12-13 RX ORDER — BUDESONIDE 0.5 MG/2ML
0.5 INHALANT ORAL
Status: DISCONTINUED | OUTPATIENT
Start: 2023-12-13 | End: 2023-12-17 | Stop reason: HOSPADM

## 2023-12-13 RX ORDER — ACETAMINOPHEN 325 MG/1
650 TABLET ORAL EVERY 6 HOURS PRN
Status: DISCONTINUED | OUTPATIENT
Start: 2023-12-13 | End: 2023-12-17 | Stop reason: HOSPADM

## 2023-12-13 RX ADMIN — BUDESONIDE INHALATION 0.5 MG: 0.5 SUSPENSION RESPIRATORY (INHALATION) at 19:13

## 2023-12-13 RX ADMIN — BUDESONIDE INHALATION 0.5 MG: 0.5 SUSPENSION RESPIRATORY (INHALATION) at 13:23

## 2023-12-13 RX ADMIN — TORSEMIDE 20 MG: 20 TABLET ORAL at 13:02

## 2023-12-13 RX ADMIN — TEMAZEPAM 15 MG: 15 CAPSULE ORAL at 22:48

## 2023-12-13 RX ADMIN — CITALOPRAM HYDROBROMIDE 20 MG: 20 TABLET ORAL at 13:02

## 2023-12-13 RX ADMIN — GABAPENTIN 400 MG: 400 CAPSULE ORAL at 13:07

## 2023-12-13 RX ADMIN — ALBUTEROL SULFATE 2.5 MG: 2.5 SOLUTION RESPIRATORY (INHALATION) at 19:15

## 2023-12-13 RX ADMIN — ALBUTEROL SULFATE 2.5 MG: 2.5 SOLUTION RESPIRATORY (INHALATION) at 13:23

## 2023-12-13 RX ADMIN — DULOXETINE HYDROCHLORIDE 30 MG: 30 CAPSULE, DELAYED RELEASE ORAL at 13:01

## 2023-12-13 RX ADMIN — CEFTRIAXONE SODIUM 1 G: 1 INJECTION, SOLUTION INTRAVENOUS at 13:57

## 2023-12-13 RX ADMIN — SODIUM CHLORIDE 500 ML: 9 INJECTION, SOLUTION INTRAVENOUS at 01:25

## 2023-12-13 RX ADMIN — LISINOPRIL 20 MG: 20 TABLET ORAL at 13:11

## 2023-12-13 RX ADMIN — HEPARIN SODIUM 5000 UNITS: 5000 INJECTION, SOLUTION INTRAVENOUS; SUBCUTANEOUS at 17:49

## 2023-12-13 RX ADMIN — HEPARIN SODIUM 5000 UNITS: 5000 INJECTION, SOLUTION INTRAVENOUS; SUBCUTANEOUS at 06:15

## 2023-12-13 RX ADMIN — OXYCODONE HYDROCHLORIDE 5 MG: 5 TABLET ORAL at 07:57

## 2023-12-13 RX ADMIN — ASPIRIN 81 MG CHEWABLE TABLET 81 MG: 81 TABLET CHEWABLE at 13:07

## 2023-12-13 RX ADMIN — PRIMIDONE 50 MG: 50 TABLET ORAL at 13:03

## 2023-12-13 RX ADMIN — SIMVASTATIN 80 MG: 40 TABLET, FILM COATED ORAL at 22:48

## 2023-12-13 RX ADMIN — GABAPENTIN 400 MG: 400 CAPSULE ORAL at 22:48

## 2023-12-13 RX ADMIN — PRIMIDONE 50 MG: 50 TABLET ORAL at 22:47

## 2023-12-13 RX ADMIN — OXYCODONE 10 MG: 5 TABLET ORAL at 17:49

## 2023-12-13 SDOH — ECONOMIC STABILITY: HOUSING INSECURITY: IN THE LAST 12 MONTHS, WAS THERE A TIME WHEN YOU WERE NOT ABLE TO PAY THE MORTGAGE OR RENT ON TIME?: NO

## 2023-12-13 SDOH — SOCIAL STABILITY: SOCIAL INSECURITY: WITHIN THE LAST YEAR, HAVE YOU BEEN AFRAID OF YOUR PARTNER OR EX-PARTNER?: NO

## 2023-12-13 SDOH — SOCIAL STABILITY: SOCIAL INSECURITY: DOES ANYONE TRY TO KEEP YOU FROM HAVING/CONTACTING OTHER FRIENDS OR DOING THINGS OUTSIDE YOUR HOME?: NO

## 2023-12-13 SDOH — ECONOMIC STABILITY: TRANSPORTATION INSECURITY: IN THE PAST 12 MONTHS, HAS LACK OF TRANSPORTATION KEPT YOU FROM MEDICAL APPOINTMENTS OR FROM GETTING MEDICATIONS?: NO

## 2023-12-13 SDOH — ECONOMIC STABILITY: INCOME INSECURITY: IN THE LAST 12 MONTHS, WAS THERE A TIME WHEN YOU WERE NOT ABLE TO PAY THE MORTGAGE OR RENT ON TIME?: NO

## 2023-12-13 SDOH — HEALTH STABILITY: MENTAL HEALTH: EXPERIENCED ANY OF THE FOLLOWING LIFE EVENTS: DEATH OF FAMILY/FRIEND

## 2023-12-13 SDOH — ECONOMIC STABILITY: FOOD INSECURITY: WITHIN THE PAST 12 MONTHS, YOU WORRIED THAT YOUR FOOD WOULD RUN OUT BEFORE YOU GOT MONEY TO BUY MORE.: NEVER TRUE

## 2023-12-13 SDOH — SOCIAL STABILITY: SOCIAL INSECURITY
WITHIN THE LAST YEAR, HAVE YOU BEEN RAPED OR FORCED TO HAVE ANY KIND OF SEXUAL ACTIVITY BY YOUR PARTNER OR EX-PARTNER?: NO

## 2023-12-13 SDOH — ECONOMIC STABILITY: FOOD INSECURITY: WITHIN THE PAST 12 MONTHS, YOU WORRIED THAT YOUR FOOD WOULD RUN OUT BEFORE YOU GOT THE MONEY TO BUY MORE.: NEVER TRUE

## 2023-12-13 SDOH — SOCIAL STABILITY: SOCIAL INSECURITY
WITHIN THE LAST YEAR, HAVE YOU BEEN KICKED, HIT, SLAPPED, OR OTHERWISE PHYSICALLY HURT BY YOUR PARTNER OR EX-PARTNER?: NO

## 2023-12-13 SDOH — ECONOMIC STABILITY: FOOD INSECURITY: WITHIN THE PAST 12 MONTHS, THE FOOD YOU BOUGHT JUST DIDN'T LAST AND YOU DIDN'T HAVE MONEY TO GET MORE.: NEVER TRUE

## 2023-12-13 SDOH — ECONOMIC STABILITY: INCOME INSECURITY: IN THE PAST 12 MONTHS HAS THE ELECTRIC, GAS, OIL, OR WATER COMPANY THREATENED TO SHUT OFF SERVICES IN YOUR HOME?: NO

## 2023-12-13 SDOH — SOCIAL STABILITY: SOCIAL INSECURITY: WITHIN THE LAST YEAR, HAVE YOU BEEN HUMILIATED OR EMOTIONALLY ABUSED IN OTHER WAYS BY YOUR PARTNER OR EX-PARTNER?: NO

## 2023-12-13 SDOH — SOCIAL STABILITY: SOCIAL INSECURITY: WERE YOU ABLE TO COMPLETE ALL THE BEHAVIORAL HEALTH SCREENINGS?: YES

## 2023-12-13 SDOH — SOCIAL STABILITY: SOCIAL INSECURITY
WITHIN THE LAST YEAR, HAVE TO BEEN RAPED OR FORCED TO HAVE ANY KIND OF SEXUAL ACTIVITY BY YOUR PARTNER OR EX-PARTNER?: NO

## 2023-12-13 SDOH — SOCIAL STABILITY: SOCIAL INSECURITY: ARE YOU OR HAVE YOU BEEN THREATENED OR ABUSED PHYSICALLY, EMOTIONALLY, OR SEXUALLY BY ANYONE?: NO

## 2023-12-13 SDOH — ECONOMIC STABILITY: INCOME INSECURITY: IN THE PAST 12 MONTHS, HAS THE ELECTRIC, GAS, OIL, OR WATER COMPANY THREATENED TO SHUT OFF SERVICE IN YOUR HOME?: NO

## 2023-12-13 SDOH — SOCIAL STABILITY: SOCIAL INSECURITY: ABUSE: ADULT

## 2023-12-13 SDOH — ECONOMIC STABILITY: HOUSING INSECURITY
IN THE LAST 12 MONTHS, WAS THERE A TIME WHEN YOU DID NOT HAVE A STEADY PLACE TO SLEEP OR SLEPT IN A SHELTER (INCLUDING NOW)?: NO

## 2023-12-13 SDOH — SOCIAL STABILITY: SOCIAL INSECURITY: DO YOU FEEL UNSAFE GOING BACK TO THE PLACE WHERE YOU ARE LIVING?: NO

## 2023-12-13 SDOH — SOCIAL STABILITY: SOCIAL INSECURITY: HAS ANYONE EVER THREATENED TO HURT YOUR FAMILY OR YOUR PETS?: NO

## 2023-12-13 SDOH — SOCIAL STABILITY: SOCIAL INSECURITY: HAVE YOU HAD THOUGHTS OF HARMING ANYONE ELSE?: NO

## 2023-12-13 SDOH — SOCIAL STABILITY: SOCIAL INSECURITY: ARE THERE ANY APPARENT SIGNS OF INJURIES/BEHAVIORS THAT COULD BE RELATED TO ABUSE/NEGLECT?: NO

## 2023-12-13 SDOH — SOCIAL STABILITY: SOCIAL INSECURITY: DO YOU FEEL ANYONE HAS EXPLOITED OR TAKEN ADVANTAGE OF YOU FINANCIALLY OR OF YOUR PERSONAL PROPERTY?: NO

## 2023-12-13 SDOH — ECONOMIC STABILITY: TRANSPORTATION INSECURITY
IN THE PAST 12 MONTHS, HAS THE LACK OF TRANSPORTATION KEPT YOU FROM MEDICAL APPOINTMENTS OR FROM GETTING MEDICATIONS?: NO

## 2023-12-13 SDOH — ECONOMIC STABILITY: TRANSPORTATION INSECURITY
IN THE PAST 12 MONTHS, HAS LACK OF TRANSPORTATION KEPT YOU FROM MEETINGS, WORK, OR FROM GETTING THINGS NEEDED FOR DAILY LIVING?: NO

## 2023-12-13 ASSESSMENT — ACTIVITIES OF DAILY LIVING (ADL)
JUDGMENT_ADEQUATE_SAFELY_COMPLETE_DAILY_ACTIVITIES: YES
FEEDING YOURSELF: INDEPENDENT
BATHING: NEEDS ASSISTANCE
GROOMING: NEEDS ASSISTANCE
TOILETING: NEEDS ASSISTANCE
ASSISTIVE_DEVICE: WALKER
HEARING - RIGHT EAR: FUNCTIONAL
LACK_OF_TRANSPORTATION: NO
HEARING - LEFT EAR: FUNCTIONAL
LACK_OF_TRANSPORTATION: NO
ADEQUATE_TO_COMPLETE_ADL: YES
DRESSING YOURSELF: DEPENDENT
WALKS IN HOME: INDEPENDENT
PATIENT'S MEMORY ADEQUATE TO SAFELY COMPLETE DAILY ACTIVITIES?: YES

## 2023-12-13 ASSESSMENT — LIFESTYLE VARIABLES
SUBSTANCE_ABUSE_PAST_12_MONTHS: NO
AUDIT-C TOTAL SCORE: 0
HOW MANY STANDARD DRINKS CONTAINING ALCOHOL DO YOU HAVE ON A TYPICAL DAY: PATIENT DOES NOT DRINK
AUDIT-C TOTAL SCORE: 0
HOW OFTEN DO YOU HAVE 6 OR MORE DRINKS ON ONE OCCASION: NEVER
HOW OFTEN DO YOU HAVE A DRINK CONTAINING ALCOHOL: NEVER
SKIP TO QUESTIONS 9-10: 1
PRESCIPTION_ABUSE_PAST_12_MONTHS: NO

## 2023-12-13 ASSESSMENT — PAIN - FUNCTIONAL ASSESSMENT: PAIN_FUNCTIONAL_ASSESSMENT: 0-10

## 2023-12-13 ASSESSMENT — PAIN SCALES - GENERAL
PAINLEVEL_OUTOF10: 6
PAINLEVEL_OUTOF10: 5 - MODERATE PAIN

## 2023-12-13 ASSESSMENT — PATIENT HEALTH QUESTIONNAIRE - PHQ9
1. LITTLE INTEREST OR PLEASURE IN DOING THINGS: NOT AT ALL
2. FEELING DOWN, DEPRESSED OR HOPELESS: NOT AT ALL
SUM OF ALL RESPONSES TO PHQ9 QUESTIONS 1 & 2: 0

## 2023-12-13 NOTE — PROGRESS NOTES
Juli Del Castillo is a 83 y.o. female on day 0 of admission presenting with Fall, initial encounter.      Subjective   Patient seen and examined. Resting in bed. States she still has significant R hip/thigh pain, unable to bear weight currently. Denies any increased SOB from her baseline. Afebrile.        Objective     Last Recorded Vitals  /60 (BP Location: Left arm, Patient Position: Sitting)   Pulse 70   Temp 36.8 °C (98.2 °F) (Oral)   Resp 18   Wt 77.1 kg (170 lb)   SpO2 100%   Intake/Output last 3 Shifts:    Intake/Output Summary (Last 24 hours) at 12/13/2023 1248  Last data filed at 12/13/2023 0628  Gross per 24 hour   Intake --   Output 600 ml   Net -600 ml       Admission Weight  Weight: 77.1 kg (170 lb) (12/12/23 2240)    Daily Weight  12/12/23 : 77.1 kg (170 lb)    Image Results    All imaging reviewed.     Physical Exam    General: Alert and oriented x3, pleasant.   Cardiac: Regular rate and rhythm, S1/S2 , no murmur.   Pulmonary: Clear to auscultation on 3L NC.   Abdomen: Soft, round, nontender. BS +x4.   Extremities: No edema.  Skin: No rashes or lesions. No ecchymosis seen.   : Emerson with clear, yellow urine.     Relevant Results    Scheduled medications  albuterol, 2.5 mg, nebulization, q6h while awake  aspirin, 81 mg, oral, Daily  budesonide, 0.5 mg, nebulization, BID  cefTRIAXone, 1 g, intravenous, q24h  citalopram, 20 mg, oral, Daily  DULoxetine, 30 mg, oral, Daily  gabapentin, 400 mg, oral, TID  heparin, 5,000 Units, subcutaneous, q12h  lisinopril, 20 mg, oral, Daily  [START ON 12/14/2023] pantoprazole, 40 mg, oral, Daily before breakfast  primidone, 50 mg, oral, TID  simvastatin, 80 mg, oral, Nightly  torsemide, 20 mg, oral, Daily      Continuous medications     PRN medications  PRN medications: acetaminophen, ALPRAZolam, oxyCODONE, oxyCODONE, temazepam     Results for orders placed or performed during the hospital encounter of 12/12/23 (from the past 24 hour(s))   CBC and Auto  Differential   Result Value Ref Range    WBC 9.3 4.4 - 11.3 x10*3/uL    nRBC      RBC 3.87 (L) 4.00 - 5.20 x10*6/uL    Hemoglobin 12.2 12.0 - 16.0 g/dL    Hematocrit 37.5 36.0 - 46.0 %    MCV 97 80 - 100 fL    MCH 31.5 26.0 - 34.0 pg    MCHC 32.5 32.0 - 36.0 g/dL    RDW 13.8 11.5 - 14.5 %    Platelets 286 150 - 450 x10*3/uL    Neutrophils % 75.3 40.0 - 80.0 %    Immature Granulocytes %, Automated 0.2 0.0 - 0.9 %    Lymphocytes % 13.3 13.0 - 44.0 %    Monocytes % 8.4 2.0 - 10.0 %    Eosinophils % 2.6 0.0 - 6.0 %    Basophils % 0.2 0.0 - 2.0 %    Neutrophils Absolute 6.99 (H) 1.60 - 5.50 x10*3/uL    Immature Granulocytes Absolute, Automated 0.02 0.00 - 0.50 x10*3/uL    Lymphocytes Absolute 1.24 0.80 - 3.00 x10*3/uL    Monocytes Absolute 0.78 0.05 - 0.80 x10*3/uL    Eosinophils Absolute 0.24 0.00 - 0.40 x10*3/uL    Basophils Absolute 0.02 0.00 - 0.10 x10*3/uL   Basic metabolic panel   Result Value Ref Range    Glucose 99 65 - 99 mg/dL    Sodium 138 133 - 145 mmol/L    Potassium 4.5 3.4 - 5.1 mmol/L    Chloride 100 97 - 107 mmol/L    Bicarbonate 26 24 - 31 mmol/L    Urea Nitrogen 32 (H) 8 - 25 mg/dL    Creatinine 1.40 0.40 - 1.60 mg/dL    eGFR 37 (L) >60 mL/min/1.73m*2    Calcium 9.2 8.5 - 10.4 mg/dL    Anion Gap 12 <=19 mmol/L   Urinalysis with Reflex Microscopic and Culture   Result Value Ref Range    Color, Urine Colorless (N) Light-Yellow, Yellow, Dark-Yellow    Appearance, Urine Clear Clear    Specific Gravity, Urine 1.007 1.005 - 1.035    pH, Urine 5.5 5.0, 5.5, 6.0, 6.5, 7.0, 7.5, 8.0    Protein, Urine NEGATIVE NEGATIVE, 10 (TRACE), 20 (TRACE) mg/dL    Glucose, Urine Normal Normal mg/dL    Blood, Urine 0.03 (TRACE) (A) NEGATIVE    Ketones, Urine NEGATIVE NEGATIVE mg/dL    Bilirubin, Urine NEGATIVE NEGATIVE    Urobilinogen, Urine Normal Normal mg/dL    Nitrite, Urine 2+ (A) NEGATIVE    Leukocyte Esterase, Urine 75 Jacques/µL (A) NEGATIVE   Microscopic Only, Urine   Result Value Ref Range    WBC, Urine 6-10 (A) 1-5,  NONE /HPF    RBC, Urine 1-2 NONE, 1-2, 3-5 /HPF   ECG 12 lead   Result Value Ref Range    Ventricular Rate 70 BPM    Atrial Rate 70 BPM    ID Interval 138 ms    QRS Duration 92 ms    QT Interval 438 ms    QTC Calculation(Bazett) 473 ms    P Axis 118 degrees    R Axis 69 degrees    T Axis 84 degrees    QRS Count 12 beats    Q Onset 212 ms    P Onset 143 ms    P Offset 174 ms    T Offset 431 ms    QTC Fredericia 461 ms           Assessment/Plan      Mechanical Fall with Right Hip Pain / Inability to Ambulate  -Accidental fall trying to reach for her slippers, landing on her R side.   -Has significant R hip and thigh pain, was unable to bear weight or ambulate.   -Hx of R ZBIGNIEW over 20 years ago.   -XR imaging of the hip shows no acute findings, no fracture or dislocation.   -CT cervical spine, head negative. XR imaging of the R shoulder and elbow negative.   -She states the pain actually feels a little worse this morning, will obtain CT R hip to ensure no occult fracture.   -Ortho to see.   -Will order PT/OT if CT negative. May need SNF.     Possible UTI  -UA with positive nit and leuks.   -Will start her on ceftriaxone for now.   -Urine culture pending.     COPD and Chronic Hypoxic Respiratory Failure  -Continuous 3L NC at baseline. Currently on her baseline O2.   -Denies any increased SOB from baseline.   -IBD/ICS.     HTN  -Continue home meds.   -BP stable at this time.     HLD  -Continue statin.     Essential Tremor  -Continue primidone.     Fibromyalgia  -Continue home meds.     DVT Risk  -Heparin subcutaneous, SCDs.      Plan  Ortho to see.   Will obtain CT R hip as her pain is significant and feels worse this morning, rule out any occult fracture.   PT/OT if CT negative.   Pain control.   Add ceftriaxone for possible UTI. Awaiting culture.   May need SNF, SS consult.         Camilla Null, BRYANNA-CNP

## 2023-12-13 NOTE — ED NOTES
Spoke to patient daughter in law about home medications. Patient does take the home medications pharmacy asked about.     Iliana Nova RN  12/13/23 2690

## 2023-12-13 NOTE — H&P
History Of Present Illness  Juli Del Castillo is a 83 y.o. female presenting with fall.  She tripped and fell at home and she is now having significant pain in the area of her right thigh and her right hip.  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  The pain is preventing her from walking.  X-rays been done and do not show any obvious fracture.     Past Medical History  Hyperlipidemia hypertension COPD GERD fibromyalgia anxiety  Surgical History  She has a past surgical history that includes MR angio neck wo IV contrast (10/31/2015); MR angio head wo IV contrast (10/31/2015); and MR angio head wo IV contrast (2/15/2019).     Social History  She reports that she has quit smoking. Her smoking use included cigarettes. She has never used smokeless tobacco. She reports that she does not drink alcohol and does not use drugs.    Family History  Negative for premature heart disease     Allergies  Cortisone, Other, Acth, Haemoph b poly conj-tet tox-pf, Codeine, and Hydrochlorothiazide    Review of Systems-see HPI for pertinent positives and negatives.     Physical Exam  She is alert oriented undistressed  Heart regular rate and rhythm  Lungs clear to auscultation  Abdomen soft nontender nondistended  Extremities normal range of motion shoulders elbows left hip left knee.  Right leg she does not want to bend it or lift it up off the bed due to pain mostly in the hip and the thigh.  With passive range of motion attempted by me she grimaces in pain and does not want me moving that right leg     Last Recorded Vitals  /56   Pulse 68   Temp 36.6 °C (97.9 °F) (Oral)   Resp 15   Wt 77.1 kg (170 lb)   SpO2 99%     Relevant Results    X-rays of right hip show a metal prosthetic hip that seems to be in proper position without any acute fracture     Assessment/Plan   Fall and now has debilitating pain in her right hip and thigh where she has a hip prosthesis.  X-rays were reviewed by myself and the ER doctor.  They have not been  read by radiologist yet.  I am consulting orthopedic surgery to have a look at her given the fact that she has a prosthetic hip and that that is the source of her pain.  I am ordering her subcu heparin for DVT prophylaxis oxycodone for pain.  No physical therapy until x-rays are cleared by radiology and perhaps until she is seen by orthopedics.    Oliver Decker MD

## 2023-12-13 NOTE — ACP (ADVANCE CARE PLANNING)
Confirming Previous Code Status: Yes    Discussion had with patient, she very clearly tells me she wishes for no chest compression, defibrillation or intubation. Changed code status to DNR CCA, DNI.

## 2023-12-13 NOTE — ED PROVIDER NOTES
HPI   Chief Complaint   Patient presents with    Fall     Patient had a mechanical fall that happened roughly an hour ago. Patient stated she went to grab something and fell and hit her head, right elbow with small abrasion, right hip and back pain. Patient is a&ox4 and nad.       HPI  See [facility]                  Shantel Coma Scale Score: 15                  Patient History   No past medical history on file.  Past Surgical History:   Procedure Laterality Date    MR HEAD ANGIO WO IV CONTRAST  10/31/2015    MR HEAD ANGIO WO IV CONTRAST [facility]    MR HEAD ANGIO WO IV CONTRAST  2/15/2019    MR HEAD ANGIO WO IV CONTRAST [facility]    MR NECK ANGIO WO IV CONTRAST  10/31/2015    MR NECK ANGIO WO IV CONTRAST [facility]     No family history on file.  Social History     Tobacco Use    Smoking status: Former     Types: Cigarettes    Smokeless tobacco: Never   Vaping Use    Vaping Use: Never used   Substance Use Topics    Alcohol use: Never    Drug use: Never       Physical Exam   ED Triage Vitals [12/12/23 2240]   Temp Heart Rate Resp BP   36.6 °C (97.9 °F) 68 15 144/56      SpO2 Temp Source Heart Rate Source Patient Position   99 % Oral -- --      BP Location FiO2 (%)     -- --       Physical Exam  See [facility]  ED Course & [facility]   ED Course as of 12/13/23 0252   Wed Dec 13, 2023   0123 EKG presented to me at 1:23 AM     EKG as interpreted by me shows normal sinus rhythm at a rate of 70 bpm, no ST changes to suggest ischemia, Q waves noted in the inferior leads, normal axis, normal intervals.   [DH]      ED Course User Index  [DH] Oliver Romero MD         Diagnoses as of 12/13/23 0252   Fall, initial encounter   Inability to ambulate due to hip       Medical Decision Making  83-year-old female with past medical history of of hypertension and hyperlipidemia presenting after fall.  Patient usually ambulates with a walker and was without her walker this evening and was reaching for something and fell onto  her right side.  Patient notes an abrasion to her right elbow as well as pain to her right hip.  Patient has not walked since and does note hitting her head and losing possibly consciousness.  Patient otherwise has been in good health recently with no complaints of fevers, chills, nausea, vomiting, chest pain, shortness of breath, abdominal pain, diarrhea, constipation.    Vital signs reviewed: Afebrile, 68 bpm, normotensive, 99% on room air    Exam     Constitutional: No acute distress. Resting comfortably.   Head: Normocephalic, atraumatic.   Eyes: Pupils equal bilaterally, EOM grossly intact, conjunctiva normal.  Mouth/Throat: Oropharynx is clear, moist mucus membranes.   Neck: Supple. No lymphadenopathy.  Cardiovascular: Regular rate and regular rhythm. Extremities are well-perfused.   Pulmonary/Chest: No respiratory distress, breathing comfortably on room air.    Abdominal: Soft, non-tender, non-distended. No rebound or guarding.   Musculoskeletal: No lower extremity edema.  Abrasion noted over the right elbow, full range of motion of right elbow and right shoulder.  No tenderness to palpation over the anterior or posterior chest wall.  Decreased range of motion of right lower extremity due to pain but full flexion and extension of right knee.  Intact distal pulses with appropriate sensation distally.  Skin: Warm, dry, and intact.   Neurological: Patient is oriented to person, place, time, and situation. Face symmetric, hearing intact to voice, speech normal. Moves all extremities.   Psych: Mood, affect, thought content, and judgment normal.    Differential includes but is not limited to:  Intracerebral versus cervical spine injury versus hip fracture versus dislocation    Amount and/or Complexity of Data Reviewed  External Data Reviewed: notes.      Labs: Considered but not indicated.    Radiology: ordered and independent interpretation performed.  CT head is interpreted by me shows no large intracranial  hemorrhage at this time    ECG/medicine tests: ordered and independent interpretation performed.  EKG as interpreted by me as detailed in ED course.    Lab work as interpreted by me shows no significant CBC abnormality such as anemia or leukocytosis, basic metabolic panel shows no significant electrolyte disturbance.  Imaging at this time is unremarkable including CT head and CT cervical spine.  Patient with intact hardware in the right hip with no obvious dislocation or fracture at this time but continues to have issues with ambulation despite pain medication and assistance.  Patient otherwise lives at home alone and will require admission at this time for placement to rehab facility for ultimately strengthening of her injured and suspected bruised muscles.    Discussion of management or test interpretation with external provider(s):  Patient presentation discussed with admitting physician at this time and will admit at this time to the hospital for further treatment and evaluation.  Procedure  Procedures     Oliver Romero MD  12/13/23 0254

## 2023-12-13 NOTE — PROGRESS NOTES
"   12/13/23 0929   Housing Stability   In the last 12 months, was there a time when you were not able to pay the mortgage or rent on time? N   In the last 12 months, was there a time when you did not have a steady place to sleep or slept in a shelter (including now)? N   Transportation Needs   In the past 12 months, has lack of transportation kept you from medical appointments or from getting medications? no   In the past 12 months, has lack of transportation kept you from meetings, work, or from getting things needed for daily living? No   Food Insecurity   Within the past 12 months, you worried that your food would run out before you got the money to buy more. Never true   Within the past 12 months, the food you bought just didn't last and you didn't have money to get more. Never true   Intimate Partner Violence   Within the last year, have you been afraid of your partner or ex-partner? No   Within the last year, have you been humiliated or emotionally abused in other ways by your partner or ex-partner? No   Within the last year, have you been kicked, hit, slapped, or otherwise physically hurt by your partner or ex-partner? No   Within the last year, have you been raped or forced to have any kind of sexual activity by your partner or ex-partner? No   Utilities   In the past 12 months has the electric, gas, oil, or water company threatened to shut off services in your home? No         Juli Del Castillo is a 83 y.o. female on day 0 of admission presenting with Fall, initial encounter.    Subjective          Objective     Physical Exam    Last Recorded Vitals  Blood pressure 138/54, pulse 67, temperature 36.8 °C (98.2 °F), temperature source Oral, resp. rate 16, height 1.626 m (5' 4\"), weight 77.1 kg (170 lb), SpO2 100 %.  Intake/Output last 3 Shifts:  I/O last 3 completed shifts:  In: - (0 mL/kg)   Out: 600 (7.8 mL/kg) [Urine:600 (0.2 mL/kg/hr)]  Weight: 77.1 kg     Relevant Results                       "       Assessment/Plan   Principal Problem:    Fall, initial encounter               SHANE Zapata

## 2023-12-13 NOTE — PROGRESS NOTES
TCSW met [facility] with pt.  Pt reports that she resides home alone and utilizes a walker.  Pt states she is independent with her ADL's and her son assists her with her IADL's.  TCSW discussed with pt tentative post discharge recommendations.  Pt states that she would consider SNF if recommended.  TCSW provided pt with SNF list for review.  TC will continue to follow.  Pt pending PT/OT eval.

## 2023-12-13 NOTE — ED NOTES
Attempting to give patient her home medications, but was never completed by previous nurse. Pharmacy is unsure if patient takes some of these home medications and will not verify until confirmed.      Iliana Nova RN  12/13/23 1355

## 2023-12-13 NOTE — PROGRESS NOTES
"   12/13/23 0933   Discharge Planning   Living Arrangements Alone   Support Systems Children   Type of Residence Private residence   Number of Stairs to Enter Residence 2   Number of Stairs Within Residence 0   Patient expects to be discharged to: TBD   Housing Stability   In the last 12 months, was there a time when you were not able to pay the mortgage or rent on time? N   In the last 12 months, was there a time when you did not have a steady place to sleep or slept in a shelter (including now)? N         Juli Del Castillo is a 83 y.o. female on day 0 of admission presenting with Fall, initial encounter.    Subjective          Objective     Physical Exam    Last Recorded Vitals  Blood pressure 138/54, pulse 67, temperature 36.8 °C (98.2 °F), temperature source Oral, resp. rate 16, height 1.626 m (5' 4\"), weight 77.1 kg (170 lb), SpO2 100 %.  Intake/Output last 3 Shifts:  I/O last 3 completed shifts:  In: - (0 mL/kg)   Out: 600 (7.8 mL/kg) [Urine:600 (0.2 mL/kg/hr)]  Weight: 77.1 kg     Relevant Results                             Assessment/Plan   Principal Problem:    Fall, initial encounter               SHANE Zapata      "

## 2023-12-14 ENCOUNTER — APPOINTMENT (OUTPATIENT)
Dept: RADIOLOGY | Facility: HOSPITAL | Age: 83
DRG: 605 | End: 2023-12-14
Payer: MEDICARE

## 2023-12-14 LAB
ANION GAP SERPL CALC-SCNC: 11 MMOL/L
BUN SERPL-MCNC: 32 MG/DL (ref 8–25)
CALCIUM SERPL-MCNC: 8.5 MG/DL (ref 8.5–10.4)
CHLORIDE SERPL-SCNC: 102 MMOL/L (ref 97–107)
CK SERPL-CCNC: 40 U/L (ref 24–195)
CO2 SERPL-SCNC: 24 MMOL/L (ref 24–31)
CREAT SERPL-MCNC: 1.9 MG/DL (ref 0.4–1.6)
ERYTHROCYTE [DISTWIDTH] IN BLOOD BY AUTOMATED COUNT: 13.9 % (ref 11.5–14.5)
GFR SERPL CREATININE-BSD FRML MDRD: 26 ML/MIN/1.73M*2
GLUCOSE SERPL-MCNC: 98 MG/DL (ref 65–99)
HCT VFR BLD AUTO: 33.9 % (ref 36–46)
HGB BLD-MCNC: 10.5 G/DL (ref 12–16)
MCH RBC QN AUTO: 31 PG (ref 26–34)
MCHC RBC AUTO-ENTMCNC: 31 G/DL (ref 32–36)
MCV RBC AUTO: 100 FL (ref 80–100)
NRBC BLD-RTO: 0 /100 WBCS (ref 0–0)
PLATELET # BLD AUTO: 246 X10*3/UL (ref 150–450)
POTASSIUM SERPL-SCNC: 4.4 MMOL/L (ref 3.4–5.1)
RBC # BLD AUTO: 3.39 X10*6/UL (ref 4–5.2)
SODIUM SERPL-SCNC: 137 MMOL/L (ref 133–145)
WBC # BLD AUTO: 6.9 X10*3/UL (ref 4.4–11.3)

## 2023-12-14 PROCEDURE — 2500000004 HC RX 250 GENERAL PHARMACY W/ HCPCS (ALT 636 FOR OP/ED): Performed by: INTERNAL MEDICINE

## 2023-12-14 PROCEDURE — 76770 US EXAM ABDO BACK WALL COMP: CPT

## 2023-12-14 PROCEDURE — 94760 N-INVAS EAR/PLS OXIMETRY 1: CPT

## 2023-12-14 PROCEDURE — 97530 THERAPEUTIC ACTIVITIES: CPT | Mod: GO

## 2023-12-14 PROCEDURE — 80048 BASIC METABOLIC PNL TOTAL CA: CPT | Performed by: NURSE PRACTITIONER

## 2023-12-14 PROCEDURE — 2500000001 HC RX 250 WO HCPCS SELF ADMINISTERED DRUGS (ALT 637 FOR MEDICARE OP): Performed by: INTERNAL MEDICINE

## 2023-12-14 PROCEDURE — G0378 HOSPITAL OBSERVATION PER HR: HCPCS

## 2023-12-14 PROCEDURE — 82550 ASSAY OF CK (CPK): CPT | Performed by: NURSE PRACTITIONER

## 2023-12-14 PROCEDURE — 82553 CREATINE MB FRACTION: CPT | Mod: TRILAB | Performed by: NURSE PRACTITIONER

## 2023-12-14 PROCEDURE — 9420000001 HC RT PATIENT EDUCATION 5 MIN

## 2023-12-14 PROCEDURE — 97162 PT EVAL MOD COMPLEX 30 MIN: CPT | Mod: GP

## 2023-12-14 PROCEDURE — 2500000002 HC RX 250 W HCPCS SELF ADMINISTERED DRUGS (ALT 637 FOR MEDICARE OP, ALT 636 FOR OP/ED): Performed by: INTERNAL MEDICINE

## 2023-12-14 PROCEDURE — 96372 THER/PROPH/DIAG INJ SC/IM: CPT | Performed by: INTERNAL MEDICINE

## 2023-12-14 PROCEDURE — 97166 OT EVAL MOD COMPLEX 45 MIN: CPT | Mod: GO

## 2023-12-14 PROCEDURE — 36415 COLL VENOUS BLD VENIPUNCTURE: CPT | Performed by: NURSE PRACTITIONER

## 2023-12-14 PROCEDURE — 85027 COMPLETE CBC AUTOMATED: CPT | Performed by: NURSE PRACTITIONER

## 2023-12-14 PROCEDURE — 2500000004 HC RX 250 GENERAL PHARMACY W/ HCPCS (ALT 636 FOR OP/ED): Performed by: NURSE PRACTITIONER

## 2023-12-14 PROCEDURE — 94640 AIRWAY INHALATION TREATMENT: CPT

## 2023-12-14 PROCEDURE — 97116 GAIT TRAINING THERAPY: CPT | Mod: GP

## 2023-12-14 PROCEDURE — 1100000001 HC PRIVATE ROOM DAILY

## 2023-12-14 RX ORDER — DEXTROSE MONOHYDRATE AND SODIUM CHLORIDE 5; .9 G/100ML; G/100ML
70 INJECTION, SOLUTION INTRAVENOUS CONTINUOUS
Status: DISCONTINUED | OUTPATIENT
Start: 2023-12-14 | End: 2023-12-15

## 2023-12-14 RX ORDER — DEXTROSE MONOHYDRATE AND SODIUM CHLORIDE 5; .9 G/100ML; G/100ML
70 INJECTION, SOLUTION INTRAVENOUS CONTINUOUS
Status: DISCONTINUED | OUTPATIENT
Start: 2023-12-14 | End: 2023-12-14

## 2023-12-14 RX ADMIN — GABAPENTIN 400 MG: 400 CAPSULE ORAL at 08:53

## 2023-12-14 RX ADMIN — SIMVASTATIN 80 MG: 40 TABLET, FILM COATED ORAL at 21:16

## 2023-12-14 RX ADMIN — PANTOPRAZOLE SODIUM 40 MG: 40 TABLET, DELAYED RELEASE ORAL at 06:13

## 2023-12-14 RX ADMIN — OXYCODONE 10 MG: 5 TABLET ORAL at 23:23

## 2023-12-14 RX ADMIN — ACETAMINOPHEN 650 MG: 325 TABLET ORAL at 08:53

## 2023-12-14 RX ADMIN — GABAPENTIN 400 MG: 400 CAPSULE ORAL at 21:16

## 2023-12-14 RX ADMIN — HEPARIN SODIUM 5000 UNITS: 5000 INJECTION, SOLUTION INTRAVENOUS; SUBCUTANEOUS at 06:13

## 2023-12-14 RX ADMIN — ALPRAZOLAM 0.25 MG: 0.25 TABLET ORAL at 17:44

## 2023-12-14 RX ADMIN — PRIMIDONE 50 MG: 50 TABLET ORAL at 21:16

## 2023-12-14 RX ADMIN — ASPIRIN 81 MG CHEWABLE TABLET 81 MG: 81 TABLET CHEWABLE at 08:54

## 2023-12-14 RX ADMIN — BUDESONIDE INHALATION 0.5 MG: 0.5 SUSPENSION RESPIRATORY (INHALATION) at 07:09

## 2023-12-14 RX ADMIN — TEMAZEPAM 15 MG: 15 CAPSULE ORAL at 21:18

## 2023-12-14 RX ADMIN — OXYCODONE 10 MG: 5 TABLET ORAL at 10:36

## 2023-12-14 RX ADMIN — ALBUTEROL SULFATE 2.5 MG: 2.5 SOLUTION RESPIRATORY (INHALATION) at 20:23

## 2023-12-14 RX ADMIN — PRIMIDONE 50 MG: 50 TABLET ORAL at 16:48

## 2023-12-14 RX ADMIN — PRIMIDONE 50 MG: 50 TABLET ORAL at 08:54

## 2023-12-14 RX ADMIN — BUDESONIDE INHALATION 0.5 MG: 0.5 SUSPENSION RESPIRATORY (INHALATION) at 20:22

## 2023-12-14 RX ADMIN — DEXTROSE MONOHYDRATE AND SODIUM CHLORIDE 70 ML/HR: 5; .9 INJECTION, SOLUTION INTRAVENOUS at 16:50

## 2023-12-14 RX ADMIN — GABAPENTIN 400 MG: 400 CAPSULE ORAL at 16:48

## 2023-12-14 RX ADMIN — ALBUTEROL SULFATE 2.5 MG: 2.5 SOLUTION RESPIRATORY (INHALATION) at 07:09

## 2023-12-14 RX ADMIN — CITALOPRAM HYDROBROMIDE 20 MG: 20 TABLET ORAL at 08:53

## 2023-12-14 RX ADMIN — CEFTRIAXONE SODIUM 1 G: 1 INJECTION, SOLUTION INTRAVENOUS at 11:56

## 2023-12-14 RX ADMIN — OXYCODONE HYDROCHLORIDE 5 MG: 5 TABLET ORAL at 00:14

## 2023-12-14 RX ADMIN — OXYCODONE 10 MG: 5 TABLET ORAL at 16:48

## 2023-12-14 RX ADMIN — DULOXETINE HYDROCHLORIDE 30 MG: 30 CAPSULE, DELAYED RELEASE ORAL at 08:53

## 2023-12-14 ASSESSMENT — COGNITIVE AND FUNCTIONAL STATUS - GENERAL
MOBILITY SCORE: 8
DAILY ACTIVITIY SCORE: 18
DRESSING REGULAR LOWER BODY CLOTHING: A LITTLE
MOVING TO AND FROM BED TO CHAIR: TOTAL
CLIMB 3 TO 5 STEPS WITH RAILING: TOTAL
MOBILITY SCORE: 8
TOILETING: A LOT
STANDING UP FROM CHAIR USING ARMS: TOTAL
DRESSING REGULAR UPPER BODY CLOTHING: A LITTLE
STANDING UP FROM CHAIR USING ARMS: A LITTLE
PERSONAL GROOMING: A LITTLE
DRESSING REGULAR UPPER BODY CLOTHING: A LITTLE
WALKING IN HOSPITAL ROOM: TOTAL
TURNING FROM BACK TO SIDE WHILE IN FLAT BAD: A LOT
MOVING TO AND FROM BED TO CHAIR: TOTAL
DAILY ACTIVITIY SCORE: 16
MOVING TO AND FROM BED TO CHAIR: A LITTLE
STANDING UP FROM CHAIR USING ARMS: TOTAL
WALKING IN HOSPITAL ROOM: TOTAL
TURNING FROM BACK TO SIDE WHILE IN FLAT BAD: A LOT
MOBILITY SCORE: 8
MOVING FROM LYING ON BACK TO SITTING ON SIDE OF FLAT BED WITH BEDRAILS: A LOT
HELP NEEDED FOR BATHING: A LITTLE
TOILETING: A LOT
DRESSING REGULAR LOWER BODY CLOTHING: A LITTLE
HELP NEEDED FOR BATHING: A LOT
WALKING IN HOSPITAL ROOM: TOTAL
CLIMB 3 TO 5 STEPS WITH RAILING: TOTAL
MOBILITY SCORE: 12
TOILETING: A LOT
CLIMB 3 TO 5 STEPS WITH RAILING: TOTAL
TOILETING: A LOT
PATIENT BASELINE BEDBOUND: NO
STANDING UP FROM CHAIR USING ARMS: TOTAL
HELP NEEDED FOR BATHING: A LOT
WALKING IN HOSPITAL ROOM: TOTAL
TURNING FROM BACK TO SIDE WHILE IN FLAT BAD: A LOT
MOVING FROM LYING ON BACK TO SITTING ON SIDE OF FLAT BED WITH BEDRAILS: A LOT
MOVING FROM LYING ON BACK TO SITTING ON SIDE OF FLAT BED WITH BEDRAILS: A LOT
HELP NEEDED FOR BATHING: A LOT
MOVING TO AND FROM BED TO CHAIR: TOTAL
DRESSING REGULAR LOWER BODY CLOTHING: A LOT
EATING MEALS: A LITTLE
DRESSING REGULAR UPPER BODY CLOTHING: A LITTLE
DRESSING REGULAR UPPER BODY CLOTHING: A LITTLE
MOVING FROM LYING ON BACK TO SITTING ON SIDE OF FLAT BED WITH BEDRAILS: A LOT
TURNING FROM BACK TO SIDE WHILE IN FLAT BAD: A LOT
CLIMB 3 TO 5 STEPS WITH RAILING: TOTAL
DRESSING REGULAR LOWER BODY CLOTHING: A LITTLE

## 2023-12-14 ASSESSMENT — PAIN SCALES - WONG BAKER: WONGBAKER_NUMERICALRESPONSE: HURTS LITTLE BIT

## 2023-12-14 ASSESSMENT — PAIN SCALES - GENERAL
PAINLEVEL_OUTOF10: 5 - MODERATE PAIN
PAINLEVEL_OUTOF10: 3
PAINLEVEL_OUTOF10: 9
PAINLEVEL_OUTOF10: 4
PAINLEVEL_OUTOF10: 3
PAINLEVEL_OUTOF10: 8
PAINLEVEL_OUTOF10: 9
PAINLEVEL_OUTOF10: 9
PAINLEVEL_OUTOF10: 8

## 2023-12-14 ASSESSMENT — PAIN - FUNCTIONAL ASSESSMENT
PAIN_FUNCTIONAL_ASSESSMENT: 0-10

## 2023-12-14 ASSESSMENT — PAIN DESCRIPTION - ORIENTATION: ORIENTATION: RIGHT

## 2023-12-14 ASSESSMENT — PAIN DESCRIPTION - LOCATION
LOCATION: LEG
LOCATION: HIP
LOCATION: HIP

## 2023-12-14 ASSESSMENT — ACTIVITIES OF DAILY LIVING (ADL)
ADL_ASSISTANCE: INDEPENDENT
BATHING_ASSISTANCE: MINIMAL
ADL_ASSISTANCE: INDEPENDENT

## 2023-12-14 NOTE — PROGRESS NOTES
Occupational Therapy    Evaluation/Treatment    Patient Name: Juli Del Castillo  MRN: [id number]  : 1940  Today's Date: 23  Time Calculation  Start Time: 1003  Stop Time: 1026  Time Calculation (min): 23 min       Assessment:  OT Assessment: Patient is an 83 year old female admitted s/p fall. She is presenting with a decline in strength, balance, activity tolerance, and function, resulting in an increased need for assistance with ADL/IADL tasks. Skilled OT to address the above deficits in order to increase patient's safety and independence with daily tasks.  Prognosis: Good  Evaluation/Treatment Tolerance: Patient limited by pain  End of Session Communication: Bedside nurse  End of Session Patient Position: Up in chair  OT Assessment Results: Decreased ADL status, Decreased upper extremity strength, Decreased safe judgment during ADL, Decreased endurance, Decreased sensation, Decreased gross motor control, Decreased functional mobility, Decreased IADLs  Prognosis: Good  Evaluation/Treatment Tolerance: Patient limited by pain  Strengths: Ability to acquire knowledge  Plan:  Treatment Interventions: ADL retraining, Functional transfer training, UE strengthening/ROM, Endurance training, Patient/family training, Neuromuscular reeducation, Compensatory technique education  OT Frequency: 4 times per week  OT Discharge Recommendations: Moderate intensity level of continued care  OT Recommended Transfer Status: Minimal assist, Moderate assist, Assist of 1  OT - OK to Discharge: Yes  Treatment Interventions: ADL retraining, Functional transfer training, UE strengthening/ROM, Endurance training, Patient/family training, Neuromuscular reeducation, Compensatory technique education    Subjective   Current Problem:  1. Fall, initial encounter        2. Inability to ambulate due to hip          General:   OT Received On: 23  General  Reason for Referral: decline in ADLs  Referred By: Dr. Del Castillo  Past Medical History  Relevant to Rehab: Patient is an 83 year old female who presented s/p fall. CT of the R hip negative for fx or loosening ZBIGNIEW, likely contusion. PMH: PE, diastolic HTN, acute CHF  Prior to Session Communication: Bedside nurse  Patient Position Received: Bed, 3 rail up  Preferred Learning Style: verbal  General Comment: Patient cleared by nursing for therapy. Patient in bed upon arrival and agreeable to participate  Precautions:  LE Weight Bearing Status: Weight Bearing as Tolerated  Medical Precautions: Fall precautions  Pain:  Pain Assessment  Pain Assessment: 0-10  Pain Score: 8  Pain Type: Acute pain  Pain Location: Hip  Pain Orientation: Right  Pain Interventions: Repositioned, Ambulation/increased activity    Objective   Cognition:  Overall Cognitive Status: Within Functional Limits     Home Living:  Type of Home: House  Lives With: Alone  Home Adaptive Equipment: Walker rolling or standard, Cane, Reacher, Hospital bed  Home Layout: One level  Home Access: Stairs to enter without rails  Entrance Stairs-Rails: None  Entrance Stairs-Number of Steps: 2  Bathroom Shower/Tub: Walk-in shower  Bathroom Toilet: Standard  Bathroom Equipment: Grab bars in shower, Shower chair with back  Home Living Comments: patient's son and DIL live 2 houses down, her DIL does not work  Prior Function:  Level of [place]: Independent with ADLs and functional transfers, Needs assistance with homemaking  Receives Help From: Family (daughter and ALECIA)  ADL Assistance: Independent  Homemaking Assistance: Needs assistance  Homemaking Assistance Comments: patient reports that she does her own laundry and cooking. her DIL does shopping and cleaning  Ambulatory Assistance: Independent (with rollator)  IADL History:  Homemaking Responsibilities: Yes  IADL Comments: patient does her own laundry and cooking  ADL:  Eating Assistance: Stand by  Eating Deficit: Setup (seated with items within reach)  Grooming Assistance: Minimal  Grooming Deficit:  Steadying, Verbal cueing, Supervision/safety, Increased time to complete, Standing with assistive device, Setup (per clinical judgement)  Bathing Assistance: Minimal  Bathing Deficit: Left lower leg including foot, Right lower leg including foot, Buttocks, Setup, Steadying, Verbal cueing, Increased time to complete , Supervision/safety (per clinical judgement)  UE Dressing Assistance: Stand by  UE Dressing Deficit: Setup, Steadying, Verbal cueing, Supervision/safety, Increased time to complete (per clinical judgement)  LE Dressing Assistance: Minimal  LE Dressing Deficit: Don/doff R sock, Don/doff L sock, Supervision/safety, Requires assistive device for steadying, Steadying (per clinical judgement)  Toileting Assistance with Device: Minimal  Toileting Deficit: Perineal hygiene, Clothing management down, Clothing management up, Increased time to complete, Supervison/safety, Steadying (per clinical judgement)    Activity Tolerance:  Endurance: Decreased tolerance for upright activites    Bed Mobility/Transfers: Bed Mobility  Bed Mobility: Yes  Bed Mobility 1  Bed Mobility 1: Supine to sitting  Level of Assistance 1: Moderate assistance  Bed Mobility Comments 1: head of bed elevated, assist to move R LE and trunk up    Transfers  Transfer: Yes  Transfer 1  Transfer From 1: Bed to  Transfer to 1: Stand  Technique 1: Sit to stand  Transfer Device 1: Walker  Transfer Level of Assistance 1: Minimum assistance  Trials/Comments 1: patient then turns to be seated in the chair with Min A. patient with increased difficulty turning/advancing R LE    Therapy/Activity: Therapeutic Activity  Therapeutic Activity Performed: Yes  Therapeutic Activity 1: patient txfers from supine to EOB wtih Mod A. assistance for R LE and trunk up with head of bed elevated for increased ease. patient sits EOB with Fair+ balance. she completes sit to stand txfer from EOB to 2WW with MIn A. patient then turns with Min A to be seated in the chair. she  demo increased difficulty advancing R LE during functional tasks. patient is educated on safety    Sensation:  Sensation Comment: patient denies any numbness/tingling  Strength:  Strength Comments: B UE 3+/5 grossly  Extremities: RUE   RUE : Within Functional Limits and LUE   LUE: Within Functional Limits    Outcome Measures: [facility] Daily Activity  Putting on and taking off regular lower body clothing: A lot  Bathing (including washing, rinsing, drying): A little  Putting on and taking off regular upper body clothing: A little  Toileting, which includes using toilet, bedpan or urinal: A lot  Taking care of personal grooming such as brushing teeth: A little  Eating Meals: A little  Daily Activity - Total Score: 16    Education Documentation  Body Mechanics, taught by Darby Mccoy OT at 12/14/2023 10:45 AM.  Learner: Patient  Readiness: Acceptance  Method: Explanation, Demonstration  Response: Needs Reinforcement    Precautions, taught by Darby Mccoy OT at 12/14/2023 10:45 AM.  Learner: Patient  Readiness: Acceptance  Method: Explanation, Demonstration  Response: Needs Reinforcement    Education Comments  No comments found.      Goals:  Encounter Problems       Encounter Problems (Active)       OT Goals       ADLs (Progressing)       Start:  12/14/23    Expected End:  12/28/23       Patient will complete ADL tasks with Mod I, using AE as needed, in order to increase patient's safety and independence with self-care tasks.         Functional Transfers (Progressing)       Start:  12/14/23    Expected End:  12/28/23       Patient will complete functional transfers with Mod I in order to increase patient's safety and independence with daily tasks.         B UE Strengthening (Progressing)       Start:  12/14/23    Expected End:  12/28/23       Patient will increase B UE strength to 4+/5 for functional transfers.         Functional Mobility (Progressing)       Start:  12/14/23    Expected End:  12/28/23       Patient  will demonstrate the ability to complete item retrieval and functional mobility with Mod I in order to increase safety and independence with daily tasks.

## 2023-12-14 NOTE — PROGRESS NOTES
Physical Therapy    Physical Therapy Evaluation & Treatment    Patient Name: Juli Del Castillo  MRN: [id number]  Today's Date: 12/14/2023   Time Calculation  Start Time: 1053  Stop Time: 1119  Time Calculation (min): 26 min    Assessment/Plan   PT Assessment  PT Assessment Results: Decreased strength, Decreased range of motion, Decreased endurance, Impaired balance, Decreased mobility, Decreased coordination, Decreased cognition, Decreased safety awareness, Impaired judgement, Pain  Rehab Prognosis: Good  Evaluation/Treatment Tolerance: Patient limited by pain  Medical Staff Made Aware: Yes  End of Session Communication: Bedside nurse  End of Session Patient Position:  (On transport bed being taken off floor to testing)   IP OR SWING BED PT PLAN  Inpatient or Swing Bed: Inpatient  PT Plan  Treatment/Interventions: Bed mobility, Transfer training, Gait training, Strengthening, Endurance training, Range of motion, Therapeutic exercise, Therapeutic activity, Home exercise program  PT Plan: Skilled PT  PT Frequency: 5 times per week  PT Discharge Recommendations: Moderate intensity level of continued care  Equipment Recommended upon Discharge: Wheeled walker  PT Recommended Transfer Status: Assist x1, Assistive device  PT - OK to Discharge: Yes      Subjective     General Visit Information:  General  Reason for Referral: Impaired Mobility  Referred By: Dr. Del Castillo  Past Medical History Relevant to Rehab: Patient is an 83 year old female who presented s/p fall. CT of the R hip negative for fx or loosening ZBIGNIEW, likely contusion. PMH: PE, diastolic HTN, acute CHF  Family/Caregiver Present: Yes  Caregiver Feedback: family x2  Prior to Session Communication: Bedside nurse  Patient Position Received: Up in chair  Preferred Learning Style: verbal  General Comment: Agreeable to PT eval  Home Living:  Home Living  Type of Home: House  Lives With: Alone  Home Adaptive Equipment: Walker rolling or standard, Cane, Reacher, Hospital  bed  Home Layout: One level  Home Access: Stairs to enter without rails  Entrance Stairs-Rails: None  Entrance Stairs-Number of Steps: 2  Bathroom Shower/Tub: Walk-in shower  Bathroom Toilet: Standard  Bathroom Equipment: Grab bars in shower, Shower chair with back  Home Living Comments: Family lives close  Prior Level of Function:  Prior Function Per Pt/Caregiver Report  Level of [place]: Independent with ADLs and functional transfers, Independent with homemaking with ambulation  Receives Help From: Family  ADL Assistance: Independent  Homemaking Assistance: Independent  Ambulatory Assistance: Independent  Precautions:  Precautions  LE Weight Bearing Status: Weight Bearing as Tolerated  Medical Precautions: Fall precautions    Objective   Pain:  Pain Assessment  Pain Assessment: 0-10  Pain Score: 4  Pain Type: Acute pain  Pain Location: Leg  Pain Orientation: Right  Pain Interventions: Ambulation/increased activity  Cognition:  Cognition  Overall Cognitive Status: Within Functional Limits (but anxious and emotional towards situation)    General Assessments:  Activity Tolerance  Endurance: Decreased tolerance for upright activites    Sensation  Light Touch: No apparent deficits  Functional Assessments:  Bed Mobility  Bed Mobility: Yes  Bed Mobility 1  Bed Mobility 1: Sitting to supine  Level of Assistance 1: Maximum assistance  Bed Mobility Comments 1: assist for trunk and BLEs    Transfers  Transfer: Yes  Transfer 1  Transfer From 1: Chair with arms to  Transfer to 1: Stand  Technique 1: Sit to stand  Transfer Device 1: Walker  Transfer Level of Assistance 1: Minimum assistance  Trials/Comments 1: assist for uprighting. Cues on technique    Ambulation/Gait Training  Ambulation/Gait Training Performed: Yes  Ambulation/Gait Training 1  Surface 1: Level tile  Device 1: Rolling walker  Assistance 1: Moderate assistance  Quality of Gait 1:  (slow shuffled, minimally able to lift and advance RLE. Needs assist for  balancing, uprighting, AD advancement, and RLE advancement during both straight walking and turns)  Comments/Distance (ft) 1: 4  Extremity/Trunk Assessments:  RLE   RLE : Exceptions to WFL  Strength RLE  RLE Overall Strength: Deficits  R Hip Flexion: 2/5  R Knee Flexion: 2/5  R Knee Extension: 2/5  LLE   LLE : Exceptions to WFL  Strength LLE  LLE Overall Strength: Deficits  L Hip Flexion: 4-/5  L Knee Flexion: 4-/5  L Knee Extension: 4-/5  Treatments:  Ambulation/Gait Training  Ambulation/Gait Training Performed: Yes  Ambulation/Gait Training 1  Surface 1: Level tile  Device 1: Rolling walker  Assistance 1: Moderate assistance  Quality of Gait 1:  (slow shuffled, minimally able to lift and advance RLE. Needs assist for balancing, uprighting, AD advancement, and RLE advancement during both straight walking and turns)  Comments/Distance (ft) 1: 4  Outcome Measures:  [facility] Basic Mobility  Turning from your back to your side while in a flat bed without using bedrails: A lot  Moving from lying on your back to sitting on the side of a flat bed without using bedrails: A lot  Moving to and from bed to chair (including a wheelchair): A little  Standing up from a chair using your arms (e.g. wheelchair or bedside chair): A little  To walk in hospital room: Total  Climbing 3-5 steps with railing: Total  Basic Mobility - Total Score: 12    Encounter Problems       Encounter Problems (Active)       Balance       Standing Balance (Progressing)       Start:  12/14/23    Expected End:  12/28/23       Pt will demonstrate good static standing balance to promote safe participation with out of bed activity, transfers, and mobility              Mobility       Ambulation (Progressing)       Start:  12/14/23    Expected End:  12/28/23       Pt will ambulate 50' modified independent assist with walker to promote safe home mobility              Safety       Safe Mobility (Progressing)       Start:  12/14/23    Expected End:  12/28/23        Pt will correctly identify and demonstrate safe mobility techniques to reduce their risks for falls during their acute care stay               Transfers       Supine to sit (Progressing)       Start:  12/14/23    Expected End:  12/28/23       Pt will transfer supine to sitting at edge of bed with modified independent assist to promote acute care out of bed activity           Sit to stand (Progressing)       Start:  12/14/23    Expected End:  12/28/23       Pt will transfer sit to standing position with modified independent assist and walker to promote safe out of bed activity                  Education Documentation  Mobility Training, taught by Chris Matos, PT at 12/14/2023 11:36 AM.  Learner: Patient  Readiness: Acceptance  Method: Explanation  Response: Verbalizes Understanding  Comment: safe mobility techniques, AD use, gait deviation correction, fall risk management    Education Comments  No comments found.

## 2023-12-14 NOTE — CONSULTS
"Reason For Consult  Right Hip Pain    History Of Present Illness  Juli Del Castillo is a 83 y.o. female presenting with right hip pain after a mechanical fall.  Patient notes a fall at home yesterday and notes that she had difficulty ambulating afterwards.  Was bought to the hospital for admission.  Notes the hip remains sore today not much improved.  Denies numbness or tingling in the leg.  Has had previous low back issues/spinal stenosis.  Notes previous ZBIGNIEW approx 21 yrs ago.  No issues with the hip since ZBIGNIEW was performed.       Past Medical History  She has no past medical history on file.    Surgical History  R ZBIGNIEW  She has a past surgical history that includes MR angio neck wo IV contrast (10/31/2015); MR angio head wo IV contrast (10/31/2015); and MR angio head wo IV contrast (2/15/2019).     Social History  She reports that she has quit smoking. Her smoking use included cigarettes. She has never used smokeless tobacco. She reports that she does not drink alcohol and does not use drugs.    Family History  No family history on file.     Allergies  Cortisone, Other, Acth, Haemoph b poly conj-tet tox-pf, Codeine, and Hydrochlorothiazide    Review of Systems  Notes R hip pain otherwise no other complaints     Physical Exam  NC/AT.  RRR, Resp non labored. Abdomen soft NT.  R hip no gross deformity.  No ecchymosis.  TTP about the lateral aspect of the hip,  none over the ant hip.  No pain with logroll.  Discomfort with flex/ext to the lateral aspect of the hip.  No calf TTP. +EHL/FHL SILT      Last Recorded Vitals  Blood pressure (!) 106/33, pulse 78, temperature 36.8 °C (98.2 °F), temperature source Temporal, resp. rate 14, height 1.626 m (5' 4\"), weight 77.1 kg (170 lb), SpO2 100 %.    Relevant Results  XR/CT: No fracture, no lucency of the R hip     Assessment/Plan     84 yo female with R hip pain after a fall.  No evidence of fracture/loosening about the ZBIGNIEW.  Likely contusion.  May be WBAT RLE.  Up with therapy. "  Followup as needed after DC          Rafael Madden MD

## 2023-12-14 NOTE — CARE PLAN
Problem: Fall/Injury  Goal: Not fall by end of shift  Outcome: Progressing  Goal: Be free from injury by end of the shift  Outcome: Progressing  Goal: Verbalize understanding of personal risk factors for fall in the hospital  Outcome: Progressing  Goal: Verbalize understanding of risk factor reduction measures to prevent injury from fall in the home  Outcome: Progressing  Goal: Use assistive devices by end of the shift  Outcome: Progressing  Goal: Pace activities to prevent fatigue by end of the shift  Outcome: Progressing     Problem: Skin  Goal: Decreased wound size/increased tissue granulation at next dressing change  Outcome: Progressing  Flowsheets (Taken 12/14/2023 0050)  Decreased wound size/increased tissue granulation at next dressing change: Protective dressings over bony prominences  Goal: Participates in plan/prevention/treatment measures  Outcome: Progressing  Flowsheets (Taken 12/14/2023 0050)  Participates in plan/prevention/treatment measures:   Elevate heels   Increase activity/out of bed for meals  Goal: Prevent/manage excess moisture  Outcome: Progressing  Flowsheets (Taken 12/14/2023 0050)  Prevent/manage excess moisture: Moisturize dry skin  Goal: Prevent/minimize sheer/friction injuries  Outcome: Progressing  Flowsheets (Taken 12/14/2023 0050)  Prevent/minimize sheer/friction injuries:   Use pull sheet   HOB 30 degrees or less  Goal: Promote/optimize nutrition  Outcome: Progressing  Flowsheets (Taken 12/14/2023 0050)  Promote/optimize nutrition:   Monitor/record intake including meals   Consume > 50% meals/supplements  Goal: Promote skin healing  Outcome: Progressing  Flowsheets (Taken 12/14/2023 0050)  Promote skin healing:   Protective dressings over bony prominences   Assess skin/pad under line(s)/device(s)   The patient's goals for the shift include      The clinical goals for the shift include pain control

## 2023-12-14 NOTE — CONSULTS
Reason For Consult  Acute kidney injury    History Of Present Illness  Juli Del Castillo is a 83 y.o. female with a past medical history of hypertension, hyperlipidemia who presented to the hospital after she fell and hurt her right hip/thigh.  The patient believes she was out of it afterwards until she was able to call family.  She reports she has had some nausea otherwise no complaints.  She developed acute kidney injury on presentation with creatinine of 1.5 which is now 1.9.  Has a baseline creatinine of 1.  She denies any NSAID use.  CK was normal.  Says her blood pressure at home typically runs in the 120s systolic.  We are consulted for management of acute kidney injury.     Past Medical History  Hyperlipidemia hypertension COPD GERD fibromyalgia anxiety     Surgical History  She has a past surgical history that includes MR angio neck wo IV contrast (10/31/2015); MR angio head wo IV contrast (10/31/2015); and MR angio head wo IV contrast (2/15/2019).     Social History  She reports that she has quit smoking. Her smoking use included cigarettes. She has never used smokeless tobacco. She reports that she does not drink alcohol and does not use drugs.    Family History  Negative for premature heart disease      Allergies  Cortisone, Other, Acth, Haemoph b poly conj-tet tox-pf, Codeine, and Hydrochlorothiazide    Review of Systems  10 point review of systems was obtained and is negative other than what is indicated above in the HPI     Physical Exam  General: Awake, alert, no acute distress  Head/ears/nose/throat:  Normocephalic, atraumatic, moist mucous membranes  Neck:  No jugular venous distention, neck supple  Respiratory:  Clear to auscultation bilaterally, normal respiratory effort  Cardiovascular:  S1 and S2, no rubs  Gastrointestinal:  Soft, positive bowel sounds, no rebound or guarding  Extremities:  No edema, cyanosis  Neurologic:  Alert, responsive, cooperative with exam  Skin:  No ulcers noted, dry    Psychiatric: normal mood and affect         I&O 24HR    Intake/Output Summary (Last 24 hours) at 12/14/2023 1531  Last data filed at 12/14/2023 1509  Gross per 24 hour   Intake 1200 ml   Output 1725 ml   Net -525 ml       Vitals 24HR  Heart Rate:  [66-80]   Temp:  [36.4 °C (97.5 °F)-36.9 °C (98.4 °F)]   Resp:  [14-18]   BP: (103-117)/(33-59)   SpO2:  [96 %-100 %]       Relevant Results  Results for orders placed or performed during the hospital encounter of 12/12/23 (from the past 24 hour(s))   CBC   Result Value Ref Range    WBC 6.9 4.4 - 11.3 x10*3/uL    nRBC 0.0 0.0 - 0.0 /100 WBCs    RBC 3.39 (L) 4.00 - 5.20 x10*6/uL    Hemoglobin 10.5 (L) 12.0 - 16.0 g/dL    Hematocrit 33.9 (L) 36.0 - 46.0 %     80 - 100 fL    MCH 31.0 26.0 - 34.0 pg    MCHC 31.0 (L) 32.0 - 36.0 g/dL    RDW 13.9 11.5 - 14.5 %    Platelets 246 150 - 450 x10*3/uL   Basic Metabolic Panel   Result Value Ref Range    Glucose 98 65 - 99 mg/dL    Sodium 137 133 - 145 mmol/L    Potassium 4.4 3.4 - 5.1 mmol/L    Chloride 102 97 - 107 mmol/L    Bicarbonate 24 24 - 31 mmol/L    Urea Nitrogen 32 (H) 8 - 25 mg/dL    Creatinine 1.90 (H) 0.40 - 1.60 mg/dL    eGFR 26 (L) >60 mL/min/1.73m*2    Calcium 8.5 8.5 - 10.4 mg/dL    Anion Gap 11 <=19 mmol/L   Creatine Kinase   Result Value Ref Range    Creatine Kinase 40 24 - 195 U/L          Assessment/Plan   1.  Acute kidney injury  -This may be a combination of volume depletion, UTI, ACE inhibitor and diuretic  2.  Fall  3.  UTI    Plan: Pending renal ultrasound result, noted she was started on antibiotics, monitor culture results.  I agree with holding the ACE inhibitor and diuretic.  Will start gentle IV fluids and monitor.  Thank you for your consultation.    Jemma Gamez MD

## 2023-12-14 NOTE — PROGRESS NOTES
Juli Del Castillo is a 83 y.o. female on day 1 of admission presenting with Fall, initial encounter.      Subjective   Patient seen and examined. Resting in bed. States she feels ok, still with pain in the R hip and thigh area. Denies any increased SOB. Remains on her baseline oxygen. Denies any nausea or vomiting or diarrhea. Afebrile.        Objective     Last Recorded Vitals  BP (!) 112/38 (BP Location: Right arm, Patient Position: Lying)   Pulse 71   Temp 36.4 °C (97.5 °F) (Temporal)   Resp 18   Wt 77.1 kg (170 lb)   SpO2 99%   Intake/Output last 3 Shifts:    Intake/Output Summary (Last 24 hours) at 12/14/2023 0826  Last data filed at 12/14/2023 0645  Gross per 24 hour   Intake 500 ml   Output 1200 ml   Net -700 ml       Admission Weight  Weight: 77.1 kg (170 lb) (12/12/23 2240)    Daily Weight  12/12/23 : 77.1 kg (170 lb)    Image Results    CT R hip with no no fracture.     Physical Exam    General: Alert and oriented x3, pleasant.   Cardiac: Regular rate and rhythm, S1/S2 , no murmur.   Pulmonary: Clear/Diminished on 3L NC.   Abdomen: Soft, round, nontender. BS +x4.   Extremities: Trace edema R hip area, no ecchymosis.   Skin: No rashes or lesions.      Relevant Results    Scheduled medications  albuterol, 2.5 mg, nebulization, TID  aspirin, 81 mg, oral, Daily  budesonide, 0.5 mg, nebulization, BID  cefTRIAXone, 1 g, intravenous, q24h  citalopram, 20 mg, oral, Daily  DULoxetine, 30 mg, oral, Daily  gabapentin, 400 mg, oral, TID  heparin, 5,000 Units, subcutaneous, q12h  [Held by provider] lisinopril, 20 mg, oral, Daily  pantoprazole, 40 mg, oral, Daily before breakfast  primidone, 50 mg, oral, TID  simvastatin, 80 mg, oral, Nightly  [Held by provider] torsemide, 20 mg, oral, Daily      Continuous medications     PRN medications  PRN medications: acetaminophen, ALPRAZolam, oxyCODONE, oxyCODONE, temazepam     Results for orders placed or performed during the hospital encounter of 12/12/23 (from the past 24  hour(s))   CBC   Result Value Ref Range    WBC 6.9 4.4 - 11.3 x10*3/uL    nRBC 0.0 0.0 - 0.0 /100 WBCs    RBC 3.39 (L) 4.00 - 5.20 x10*6/uL    Hemoglobin 10.5 (L) 12.0 - 16.0 g/dL    Hematocrit 33.9 (L) 36.0 - 46.0 %     80 - 100 fL    MCH 31.0 26.0 - 34.0 pg    MCHC 31.0 (L) 32.0 - 36.0 g/dL    RDW 13.9 11.5 - 14.5 %    Platelets 246 150 - 450 x10*3/uL   Basic Metabolic Panel   Result Value Ref Range    Glucose 98 65 - 99 mg/dL    Sodium 137 133 - 145 mmol/L    Potassium 4.4 3.4 - 5.1 mmol/L    Chloride 102 97 - 107 mmol/L    Bicarbonate 24 24 - 31 mmol/L    Urea Nitrogen 32 (H) 8 - 25 mg/dL    Creatinine 1.90 (H) 0.40 - 1.60 mg/dL    eGFR 26 (L) >60 mL/min/1.73m*2    Calcium 8.5 8.5 - 10.4 mg/dL    Anion Gap 11 <=19 mmol/L             Assessment/Plan      Mechanical Fall with Right Hip Pain / Inability to Ambulate  -Accidental fall trying to reach for her slippers, landing on her R side.   -Has significant R hip and thigh pain, was unable to bear weight or ambulate.   -Hx of R ZBIGNIEW over 20 years ago.   -XR imaging of the hip shows no acute findings, no fracture or dislocation.   -CT cervical spine, head negative. XR imaging of the R shoulder and elbow negative.   -CT hip with no fracture.   -Ortho saw, feel this is likely contusion, recommend WBAT.   -PT/OT evals today, may need SNF.   -Pain control.     KYLEE  -Renal consult.   -Creat baseline always ~0.9. Currently 1.9.   -Hold torsemide and lisinopril for now.   -Renal US.   -Has carmen catheter, good urine output.   -Check CK level, she denies being down on the ground for long period of time after her fall, less than an hour.     Possible UTI  -UA with positive nit and leuks.   -On ceftriaxone.   -Urine culture pending.      COPD and Chronic Hypoxic Respiratory Failure  -Continuous 3L NC at baseline. Currently on her baseline O2.   -Denies any increased SOB from baseline.   -IBD/ICS.      HTN  -Continue home meds. Now holding lisinopril and torsemide with  her KYLEE.   -BP stable at this time.      HLD  -Continue statin.      Essential Tremor  -Continue primidone.      Fibromyalgia  -Continue home meds.      DVT Risk  -Heparin subcutaneous, SCDs.       Plan  Ortho saw, feel this is a contusion, no fractures or acute abnormalities seen on imaging.   PT/OT evals today. She is WBAT.   Pain control.   Now with increase in her creat. Consult renal. Holding lisinopril and torsemide. Renal US to be done. Check CK level.   Continue ceftriaxone for now, awaiting urine culture.    Anticipate she will need SNF.         Camilla Null, BRYANNA-CNP

## 2023-12-14 NOTE — CARE PLAN
The patient's goals for the shift include  management and therapy     The clinical goals for the shift include pain management & therapy

## 2023-12-15 LAB
ANION GAP SERPL CALC-SCNC: 7 MMOL/L
BACTERIA UR CULT: ABNORMAL
BUN SERPL-MCNC: 33 MG/DL (ref 8–25)
CALCIUM SERPL-MCNC: 8.7 MG/DL (ref 8.5–10.4)
CHLORIDE SERPL-SCNC: 106 MMOL/L (ref 97–107)
CK MB CFR SERPL CALC: NORMAL %
CK MB SERPL-CCNC: <1 NG/ML
CO2 SERPL-SCNC: 28 MMOL/L (ref 24–31)
CREAT SERPL-MCNC: 1.5 MG/DL (ref 0.4–1.6)
ERYTHROCYTE [DISTWIDTH] IN BLOOD BY AUTOMATED COUNT: 13.7 % (ref 11.5–14.5)
FERRITIN SERPL-MCNC: 100 NG/ML (ref 13–150)
GFR SERPL CREATININE-BSD FRML MDRD: 34 ML/MIN/1.73M*2
GLUCOSE SERPL-MCNC: 102 MG/DL (ref 65–99)
HCT VFR BLD AUTO: 31.5 % (ref 36–46)
HGB BLD-MCNC: 10.2 G/DL (ref 12–16)
IRON SATN MFR SERPL: 33 % (ref 12–50)
IRON SERPL-MCNC: 60 UG/DL (ref 30–160)
MCH RBC QN AUTO: 31.6 PG (ref 26–34)
MCHC RBC AUTO-ENTMCNC: 32.4 G/DL (ref 32–36)
MCV RBC AUTO: 98 FL (ref 80–100)
NRBC BLD-RTO: 0 /100 WBCS (ref 0–0)
PLATELET # BLD AUTO: 255 X10*3/UL (ref 150–450)
POTASSIUM SERPL-SCNC: 4.7 MMOL/L (ref 3.4–5.1)
RBC # BLD AUTO: 3.23 X10*6/UL (ref 4–5.2)
SODIUM SERPL-SCNC: 141 MMOL/L (ref 133–145)
TIBC SERPL-MCNC: 183 UG/DL (ref 228–428)
UIBC SERPL-MCNC: 123 UG/DL (ref 110–370)
WBC # BLD AUTO: 5.9 X10*3/UL (ref 4.4–11.3)

## 2023-12-15 PROCEDURE — 97110 THERAPEUTIC EXERCISES: CPT | Mod: GP,CQ

## 2023-12-15 PROCEDURE — G0378 HOSPITAL OBSERVATION PER HR: HCPCS

## 2023-12-15 PROCEDURE — 80048 BASIC METABOLIC PNL TOTAL CA: CPT | Performed by: NURSE PRACTITIONER

## 2023-12-15 PROCEDURE — 97535 SELF CARE MNGMENT TRAINING: CPT | Mod: GO,CO

## 2023-12-15 PROCEDURE — 2500000004 HC RX 250 GENERAL PHARMACY W/ HCPCS (ALT 636 FOR OP/ED): Performed by: INTERNAL MEDICINE

## 2023-12-15 PROCEDURE — 85027 COMPLETE CBC AUTOMATED: CPT | Performed by: NURSE PRACTITIONER

## 2023-12-15 PROCEDURE — 96372 THER/PROPH/DIAG INJ SC/IM: CPT | Performed by: INTERNAL MEDICINE

## 2023-12-15 PROCEDURE — 2500000004 HC RX 250 GENERAL PHARMACY W/ HCPCS (ALT 636 FOR OP/ED): Performed by: NURSE PRACTITIONER

## 2023-12-15 PROCEDURE — 36415 COLL VENOUS BLD VENIPUNCTURE: CPT | Performed by: NURSE PRACTITIONER

## 2023-12-15 PROCEDURE — 1100000001 HC PRIVATE ROOM DAILY

## 2023-12-15 PROCEDURE — 83540 ASSAY OF IRON: CPT | Performed by: NURSE PRACTITIONER

## 2023-12-15 PROCEDURE — 2500000001 HC RX 250 WO HCPCS SELF ADMINISTERED DRUGS (ALT 637 FOR MEDICARE OP): Performed by: INTERNAL MEDICINE

## 2023-12-15 PROCEDURE — 97116 GAIT TRAINING THERAPY: CPT | Mod: GP,CQ

## 2023-12-15 PROCEDURE — 82728 ASSAY OF FERRITIN: CPT | Performed by: NURSE PRACTITIONER

## 2023-12-15 RX ADMIN — OXYCODONE 10 MG: 5 TABLET ORAL at 14:06

## 2023-12-15 RX ADMIN — TEMAZEPAM 15 MG: 15 CAPSULE ORAL at 21:12

## 2023-12-15 RX ADMIN — DULOXETINE HYDROCHLORIDE 30 MG: 30 CAPSULE, DELAYED RELEASE ORAL at 09:01

## 2023-12-15 RX ADMIN — CEFTRIAXONE SODIUM 1 G: 1 INJECTION, SOLUTION INTRAVENOUS at 14:07

## 2023-12-15 RX ADMIN — OXYCODONE 10 MG: 5 TABLET ORAL at 07:30

## 2023-12-15 RX ADMIN — GABAPENTIN 400 MG: 400 CAPSULE ORAL at 09:01

## 2023-12-15 RX ADMIN — PRIMIDONE 50 MG: 50 TABLET ORAL at 14:06

## 2023-12-15 RX ADMIN — HEPARIN SODIUM 5000 UNITS: 5000 INJECTION, SOLUTION INTRAVENOUS; SUBCUTANEOUS at 20:12

## 2023-12-15 RX ADMIN — OXYCODONE 10 MG: 5 TABLET ORAL at 20:11

## 2023-12-15 RX ADMIN — ALPRAZOLAM 0.25 MG: 0.25 TABLET ORAL at 07:30

## 2023-12-15 RX ADMIN — PANTOPRAZOLE SODIUM 40 MG: 40 TABLET, DELAYED RELEASE ORAL at 06:44

## 2023-12-15 RX ADMIN — PRIMIDONE 50 MG: 50 TABLET ORAL at 09:01

## 2023-12-15 RX ADMIN — HEPARIN SODIUM 5000 UNITS: 5000 INJECTION, SOLUTION INTRAVENOUS; SUBCUTANEOUS at 06:44

## 2023-12-15 RX ADMIN — GABAPENTIN 400 MG: 400 CAPSULE ORAL at 20:10

## 2023-12-15 RX ADMIN — CITALOPRAM HYDROBROMIDE 20 MG: 20 TABLET ORAL at 09:01

## 2023-12-15 RX ADMIN — ALPRAZOLAM 0.25 MG: 0.25 TABLET ORAL at 20:11

## 2023-12-15 RX ADMIN — ALPRAZOLAM 0.25 MG: 0.25 TABLET ORAL at 14:06

## 2023-12-15 RX ADMIN — SIMVASTATIN 80 MG: 40 TABLET, FILM COATED ORAL at 20:10

## 2023-12-15 RX ADMIN — ASPIRIN 81 MG CHEWABLE TABLET 81 MG: 81 TABLET CHEWABLE at 09:01

## 2023-12-15 RX ADMIN — PRIMIDONE 50 MG: 50 TABLET ORAL at 20:10

## 2023-12-15 RX ADMIN — GABAPENTIN 400 MG: 400 CAPSULE ORAL at 15:00

## 2023-12-15 ASSESSMENT — COGNITIVE AND FUNCTIONAL STATUS - GENERAL
TURNING FROM BACK TO SIDE WHILE IN FLAT BAD: A LOT
WALKING IN HOSPITAL ROOM: TOTAL
TOILETING: A LOT
HELP NEEDED FOR BATHING: A LITTLE
PERSONAL GROOMING: A LITTLE
DRESSING REGULAR UPPER BODY CLOTHING: A LITTLE
DRESSING REGULAR LOWER BODY CLOTHING: A LOT
CLIMB 3 TO 5 STEPS WITH RAILING: TOTAL
MOBILITY SCORE: 10
STANDING UP FROM CHAIR USING ARMS: A LITTLE
WALKING IN HOSPITAL ROOM: TOTAL
EATING MEALS: A LITTLE
HELP NEEDED FOR BATHING: A LITTLE
HELP NEEDED FOR BATHING: A LOT
DRESSING REGULAR LOWER BODY CLOTHING: A LOT
DAILY ACTIVITIY SCORE: 16
DRESSING REGULAR LOWER BODY CLOTHING: A LOT
CLIMB 3 TO 5 STEPS WITH RAILING: TOTAL
MOBILITY SCORE: 12
DAILY ACTIVITIY SCORE: 18
STANDING UP FROM CHAIR USING ARMS: A LOT
TOILETING: A LOT
PERSONAL GROOMING: A LITTLE
DAILY ACTIVITIY SCORE: 16
TOILETING: A LOT
MOVING TO AND FROM BED TO CHAIR: A LITTLE
MOVING FROM LYING ON BACK TO SITTING ON SIDE OF FLAT BED WITH BEDRAILS: A LOT
MOVING TO AND FROM BED TO CHAIR: A LOT
DRESSING REGULAR UPPER BODY CLOTHING: A LITTLE
EATING MEALS: A LITTLE
MOVING FROM LYING ON BACK TO SITTING ON SIDE OF FLAT BED WITH BEDRAILS: A LOT
TURNING FROM BACK TO SIDE WHILE IN FLAT BAD: A LOT

## 2023-12-15 ASSESSMENT — PAIN - FUNCTIONAL ASSESSMENT
PAIN_FUNCTIONAL_ASSESSMENT: 0-10

## 2023-12-15 ASSESSMENT — PAIN SCALES - GENERAL
PAINLEVEL_OUTOF10: 9
PAINLEVEL_OUTOF10: 3
PAINLEVEL_OUTOF10: 8
PAINLEVEL_OUTOF10: 8
PAINLEVEL_OUTOF10: 7
PAINLEVEL_OUTOF10: 9

## 2023-12-15 ASSESSMENT — PAIN DESCRIPTION - ORIENTATION: ORIENTATION: RIGHT

## 2023-12-15 ASSESSMENT — PAIN DESCRIPTION - LOCATION: LOCATION: HIP

## 2023-12-15 ASSESSMENT — ACTIVITIES OF DAILY LIVING (ADL): BATHING_LEVEL_OF_ASSISTANCE: MAXIMUM ASSISTANCE

## 2023-12-15 NOTE — PROGRESS NOTES
Occupational Therapy    OT Treatment    Patient Name: Juli Del Castillo  MRN: [id number]  Today's Date: 12/15/2023  Time Calculation  Start Time: 0720  Stop Time: 0803  Time Calculation (min): 43 min         Assessment:  End of Session Patient Position: Up in chair (All needs in reach)  OT Assessment Results: Decreased ADL status, Decreased endurance, Decreased functional mobility  Plan:  Treatment Interventions: ADL retraining, Functional transfer training  OT Frequency: 4 times per week  OT Discharge Recommendations: Moderate intensity level of continued care  Equipment Recommended upon Discharge: Wheeled walker  OT Recommended Transfer Status: Moderate assist  OT - OK to Discharge: Yes  Treatment Interventions: ADL retraining, Functional transfer training    Subjective   Previous Visit Info:  OT Last Visit  OT Received On: 12/15/23  General:  General  Reason for Referral: Impaired Mobility  Referred By: Dr. Del Castillo  Past Medical History Relevant to Rehab: Patient is an 83 year old female who presented s/p fall. CT of the R hip negative for fx or loosening ZBIGNIEW, likely contusion. PMH: PE, diastolic HTN, acute CHF  Prior to Session Communication: Bedside nurse  Patient Position Received: Bed, 3 rail up  General Comment: Agreeable to treatment  Pain:  Pain Assessment  Pain Assessment: 0-10  Pain Score: 9  Pain Location: Leg  Pain Orientation: Right  Pain Interventions: Cold pack  Response to Interventions:  (Nurse medicated during session)    Objective    Activities of Daily Living: Grooming  Grooming Level of Assistance: Setup  Grooming Where Assessed: Chair  Grooming Comments: oral, hair care    UE Bathing  UE Bathing Level of Assistance: Distant supervision, Setup  UE Bathing Where Assessed:  (chair)  UE Bathing Comments: sponge bathing    LE Bathing  LE Bathing Level of Assistance: Maximum assistance  LE Bathing Where Assessed:  (chair)  LE Bathing Comments: pt sponge bathed proximal LE, assist for distal LE, britni area,  in stance  Functional Standing Tolerance:  Time: 1 minute  Activity: self care  Functional Standing Tolerance Comments: fair balance, limited with pain  Bed Mobility/Transfers: Bed Mobility 1  Bed Mobility 1: Supine to sitting  Level of Assistance 1: Moderate assistance  Bed Mobility Comments 1: cues for use of bedrail, required additional time, effort to transition.    Transfer 1  Transfer From 1: Bed to  Transfer to 1: Chair with arms  Technique 1: Sit to stand, Stand to sit  Transfer Device 1: Walker  Transfer Level of Assistance 1: Moderate assistance  Trials/Comments 1: Pt completes functional mobiity ~ 10 steps from bed>chair, assist to advance walker, cues for step sequencing and direction, safe hand placement. Pt demonstrates slow, antaligic mobility.    Sitting Balance:  Static Sitting Balance  Static Sitting-Balance Support: Bilateral upper extremity supported  Static Sitting-Level of Assistance: Close supervision  Static Sitting-Comment/Number of Minutes: 3 minutes, fair sitting balance    Outcome Measures:[facility] Daily Activity  Putting on and taking off regular lower body clothing: A lot  Bathing (including washing, rinsing, drying): A lot  Putting on and taking off regular upper body clothing: None  Toileting, which includes using toilet, bedpan or urinal: A lot  Taking care of personal grooming such as brushing teeth: None  Eating Meals: None  Daily Activity - Total Score: 18        Education Documentation  Body Mechanics, taught by DESTINEE Granda at 12/15/2023 10:32 AM.  Learner: Patient  Readiness: Acceptance  Method: Explanation  Response: Demonstrated Understanding, Needs Reinforcement    Precautions, taught by DESTINEE Granda at 12/15/2023 10:32 AM.  Learner: Patient  Readiness: Acceptance  Method: Explanation  Response: Demonstrated Understanding, Needs Reinforcement    ADL Training, taught by DESTINEE Granda at 12/15/2023 10:32 AM.  Learner: Patient  Readiness: Acceptance  Method:  Explanation  Response: Demonstrated Understanding, Needs Reinforcement    Education Comments  No comments found.      Goals:  Encounter Problems       Encounter Problems (Active)           OT Goals       ADLs (Progressing)       Start:  12/14/23    Expected End:  12/28/23       Patient will complete ADL tasks with Mod I, using AE as needed, in order to increase patient's safety and independence with self-care tasks.         Functional Transfers (Progressing)       Start:  12/14/23    Expected End:  12/28/23       Patient will complete functional transfers with Mod I in order to increase patient's safety and independence with daily tasks.         B UE Strengthening (Progressing)       Start:  12/14/23    Expected End:  12/28/23       Patient will increase B UE strength to 4+/5 for functional transfers.         Functional Mobility (Progressing)       Start:  12/14/23    Expected End:  12/28/23       Patient will demonstrate the ability to complete item retrieval and functional mobility with Mod I in order to increase safety and independence with daily tasks.            Safety       Safe Mobility (Progressing)       Start:  12/14/23    Expected End:  12/28/23       Pt will correctly identify and demonstrate safe mobility techniques to reduce their risks for falls during their acute care stay               Transfers       Supine to sit (Progressing)       Start:  12/14/23    Expected End:  12/28/23       Pt will transfer supine to sitting at edge of bed with modified independent assist to promote acute care out of bed activity           Sit to stand (Progressing)       Start:  12/14/23    Expected End:  12/28/23       Pt will transfer sit to standing position with modified independent assist and walker to promote safe out of bed activity

## 2023-12-15 NOTE — CARE PLAN
The patient's goals for the shift include pain control and sleep    The clinical goals for the shift include pain control and sleep

## 2023-12-15 NOTE — PROGRESS NOTES
Physical Therapy    Physical Therapy Treatment    Patient Name: Juli Del Castillo  MRN: [id number]  Today's Date: 12/15/2023  Time Calculation  Start Time: 1001  Stop Time: 1036  Time Calculation (min): 35 min       Assessment/Plan   PT Assessment  Rehab Prognosis: Good  End of Session Patient Position: Bed, 3 rail up  PT Plan  Inpatient/Swing Bed or Outpatient: Inpatient  PT Plan  Treatment/Interventions: Bed mobility, Transfer training, Gait training, Therapeutic exercise  PT Plan: Skilled PT  PT Frequency: 5 times per week  PT Discharge Recommendations: Moderate intensity level of continued care  Equipment Recommended upon Discharge: Wheeled walker  PT Recommended Transfer Status: Assist x1  PT - OK to Discharge: Yes      General Visit Information:   PT  Visit  PT Received On: 12/15/23  General  Prior to Session Communication: Bedside nurse  Patient Position Received: Up in chair  General Comment: Pt agreeable to therapy, ready to get back to bed.    Subjective   Precautions:  Precautions  LE Weight Bearing Status: Weight Bearing as Tolerated  Medical Precautions: Fall precautions  Vital Signs:       Objective   Pain:  Pain Assessment  Pain Assessment: 0-10  Pain Score: 8  Pain Location: Hip  Pain Orientation: Right  Cognition:     Postural Control:     Extremity/Trunk Assessments:    Activity Tolerance:     Treatments:  Therapeutic Exercise  Therapeutic Exercise Performed: Yes  Therapeutic Exercise Activity 1: Seated bilat LE ankle pump/heel raises, glute sets, LAQ, micaela hip adduction, resisted hip abduction x15 reps each.    Bed Mobility  Bed Mobility: Yes  Bed Mobility 1  Bed Mobility 1: Sitting to supine  Level of Assistance 1: Moderate assistance  Bed Mobility Comments 1: Pt required assist with trunk down and bilat LE's onto bed sit to supine.    Ambulation/Gait Training  Ambulation/Gait Training Performed: Yes  Ambulation/Gait Training 1  Surface 1: Level tile  Device 1: Rolling walker  Assistance 1: Minimum  assistance, Minimal verbal cues  Comments/Distance (ft) 1: Pt ambulated with RW ~3' forward min assist of 1 and close chair follow. Pt also used RW to take ~3-4 small turning steps to bedside min assist. Pt having difficulty advancing right LE, reports high pain level.  Transfers  Transfer: Yes  Transfer 1  Transfer From 1: Chair with arms to  Transfer to 1: Stand  Technique 1: Sit to stand  Transfer Device 1: Walker  Transfer Level of Assistance 1: Moderate assistance  Transfers 2  Transfer From 2: Stand to  Transfer to 2: Bed  Technique 2: Stand to sit  Transfer Level of Assistance 2: Minimum assistance    Outcome Measures:  [facility] Basic Mobility  Turning from your back to your side while in a flat bed without using bedrails: A lot  Moving from lying on your back to sitting on the side of a flat bed without using bedrails: A lot  Moving to and from bed to chair (including a wheelchair): A lot  Standing up from a chair using your arms (e.g. wheelchair or bedside chair): A lot  To walk in hospital room: Total  Climbing 3-5 steps with railing: Total  Basic Mobility - Total Score: 10    Education Documentation  Handouts, taught by Edilma Huang PTA at 12/15/2023 11:58 AM.  Learner: Patient  Readiness: Acceptance  Method: Explanation  Response: Verbalizes Understanding    Precautions, taught by Edilma Huang PTA at 12/15/2023 11:58 AM.  Learner: Patient  Readiness: Acceptance  Method: Explanation  Response: Verbalizes Understanding    Body Mechanics, taught by Edilma Huang PTA at 12/15/2023 11:58 AM.  Learner: Patient  Readiness: Acceptance  Method: Explanation  Response: Verbalizes Understanding    Home Exercise Program, taught by Edilma Huang PTA at 12/15/2023 11:58 AM.  Learner: Patient  Readiness: Acceptance  Method: Explanation  Response: Verbalizes Understanding    Mobility Training, taught by Edilma Huang PTA at 12/15/2023 11:58 AM.  Learner: Patient  Readiness: Acceptance  Method: Explanation  Response:  Verbalizes Understanding    Education Comments  No comments found.        OP EDUCATION:       Encounter Problems       Encounter Problems (Active)       Balance       Standing Balance (Progressing)       Start:  12/14/23    Expected End:  12/28/23       Pt will demonstrate good static standing balance to promote safe participation with out of bed activity, transfers, and mobility              Mobility       Ambulation (Progressing)       Start:  12/14/23    Expected End:  12/28/23       Pt will ambulate 50' modified independent assist with walker to promote safe home mobility              Safety       Safe Mobility (Progressing)       Start:  12/14/23    Expected End:  12/28/23       Pt will correctly identify and demonstrate safe mobility techniques to reduce their risks for falls during their acute care stay               Transfers       Supine to sit (Progressing)       Start:  12/14/23    Expected End:  12/28/23       Pt will transfer supine to sitting at edge of bed with modified independent assist to promote acute care out of bed activity           Sit to stand (Progressing)       Start:  12/14/23    Expected End:  12/28/23       Pt will transfer sit to standing position with modified independent assist and walker to promote safe out of bed activity

## 2023-12-15 NOTE — PROGRESS NOTES
Juli Del Castillo is a 83 y.o. female on day 2 of admission presenting with Fall, initial encounter.      Subjective   Patient seen and examined. Resting in bed. States she feels ok, she is feeling upset as she found out her closest friend passed away yesterday. Reassurance offered. Pain medication helping with her pain. She was able to do a little more with therapy today. Afebrile.        Objective     Last Recorded Vitals  /51 (BP Location: Right arm, Patient Position: Lying)   Pulse 70   Temp 36.6 °C (97.9 °F) (Temporal)   Resp 15   Wt 77.1 kg (170 lb)   SpO2 100%   Intake/Output last 3 Shifts:    Intake/Output Summary (Last 24 hours) at 12/15/2023 1514  Last data filed at 12/15/2023 1322  Gross per 24 hour   Intake 1820 ml   Output 2075 ml   Net -255 ml       Admission Weight  Weight: 77.1 kg (170 lb) (12/12/23 2240)    Daily Weight  12/12/23 : 77.1 kg (170 lb)    Image Results  US renal complete  Narrative: Interpreted By:  Ed Cano,   STUDY:  US RENAL COMPLETE; 12/14/2023 11:29 am      INDICATION:  Signs/Symptoms:KYLEE.      COMPARISON:  None.      ACCESSION NUMBER(S):  [id number]      ORDERING CLINICIAN:  TAZ IGLESIAS      TECHNIQUE:  Grayscale and color Doppler imaging of the kidneys.      FINDINGS:  The right kidney measures 9.6 cm x 4.6 cm x 5.2 cm.  The left kidney measures 9.4 cm x 5.4 cm x 5.6 cm. There is a lesion  consistent with a cyst in the upper-mid peripelvic location measuring  2.3 x 2.3 x 2.5 cm. There is no shadowing calculus, hydronephrosis,  or solid mass identified. The renal cortical thickness and  echogenicity is normal.      The urinary bladder is minimally distended and otherwise unremarkable.      Impression: No calculi or hydronephrosis bilaterally.      2.5 cm cyst in the left peripelvic location, otherwise unremarkable  examination.      MACRO:  None.      Signed by: Ed Cano 12/14/2023 3:47 PM  Dictation workstation:   [id number]      Physical  Exam    General: Alert and oriented x3, pleasant.   Cardiac: Regular rate and rhythm, S1/S2 , no murmur.   Pulmonary: Clear/Diminished on 3L NC.   Abdomen: Soft, round, nontender. BS +x4.   Extremities: Trace edema R hip area, no ecchymosis.   Skin: No rashes or lesions.    Relevant Results    Scheduled medications  albuterol, 2.5 mg, nebulization, TID  aspirin, 81 mg, oral, Daily  budesonide, 0.5 mg, nebulization, BID  cefTRIAXone, 1 g, intravenous, q24h  citalopram, 20 mg, oral, Daily  DULoxetine, 30 mg, oral, Daily  gabapentin, 400 mg, oral, TID  heparin, 5,000 Units, subcutaneous, q12h  [Held by provider] lisinopril, 20 mg, oral, Daily  pantoprazole, 40 mg, oral, Daily before breakfast  primidone, 50 mg, oral, TID  simvastatin, 80 mg, oral, Nightly  [Held by provider] torsemide, 20 mg, oral, Daily      Continuous medications     PRN medications  PRN medications: acetaminophen, ALPRAZolam, oxyCODONE, oxyCODONE, temazepam     Results for orders placed or performed during the hospital encounter of 12/12/23 (from the past 24 hour(s))   CBC   Result Value Ref Range    WBC 5.9 4.4 - 11.3 x10*3/uL    nRBC 0.0 0.0 - 0.0 /100 WBCs    RBC 3.23 (L) 4.00 - 5.20 x10*6/uL    Hemoglobin 10.2 (L) 12.0 - 16.0 g/dL    Hematocrit 31.5 (L) 36.0 - 46.0 %    MCV 98 80 - 100 fL    MCH 31.6 26.0 - 34.0 pg    MCHC 32.4 32.0 - 36.0 g/dL    RDW 13.7 11.5 - 14.5 %    Platelets 255 150 - 450 x10*3/uL   Basic Metabolic Panel   Result Value Ref Range    Glucose 102 (H) 65 - 99 mg/dL    Sodium 141 133 - 145 mmol/L    Potassium 4.7 3.4 - 5.1 mmol/L    Chloride 106 97 - 107 mmol/L    Bicarbonate 28 24 - 31 mmol/L    Urea Nitrogen 33 (H) 8 - 25 mg/dL    Creatinine 1.50 0.40 - 1.60 mg/dL    eGFR 34 (L) >60 mL/min/1.73m*2    Calcium 8.7 8.5 - 10.4 mg/dL    Anion Gap 7 <=19 mmol/L   Iron and TIBC   Result Value Ref Range    Iron 60 30 - 160 ug/dL    UIBC 123 110 - 370 ug/dL    TIBC 183 (L) 228 - 428 ug/dL    % Saturation 33 12 - 50 %   Ferritin    Result Value Ref Range    Ferritin 100 13 - 150 ng/mL           Assessment/Plan      Mechanical Fall with Right Hip Pain / Inability to Ambulate  -Accidental fall trying to reach for her slippers, landing on her R side.   -Has significant R hip and thigh pain, was unable to bear weight or ambulate.   -Hx of R ZBIGNIEW over 20 years ago.   -XR imaging of the hip shows no acute findings, no fracture or dislocation.   -CT cervical spine, head negative. XR imaging of the R shoulder and elbow negative.   -CT hip with no fracture.   -Ortho saw, feel this is likely contusion, recommend WBAT.   -PT/OT recommends moderate intensity therapy. Plan for SNF.   -Pain control. Seems to be a little better today.      KYLEE  -Renal follows.   -Creat baseline always ~0.9. Down 1.5 today.   -Holding torsemide and lisinopril for now.   -Renal US.   -Has carmen catheter, good urine output.   -CK level normal.   -Gentle IVF hydration per renal.      Possible UTI  -UA with positive nit and leuks.   -On ceftriaxone.   -Urine culture pending with pseudomonas.      COPD and Chronic Hypoxic Respiratory Failure  -Continuous 3L NC at baseline. Currently on her baseline O2.   -Denies any increased SOB from baseline.   -IBD/ICS.      HTN  -Continue home meds. Now holding lisinopril and torsemide with her KYLEE.   -BP stable at this time.      HLD  -Continue statin.      Essential Tremor  -Continue primidone.      Fibromyalgia  -Continue home meds.      DVT Risk  -Heparin subcutaneous, SCDs.       Plan  Ortho saw, feel this is a contusion, no fractures or acute abnormalities seen on imaging.   PT/OT, OOB as able.   Pain control.   Creat improving, s/p IVF hydration and holding renal toxic meds. Monitor. Renal follows.   Continue ceftriaxone, urine culture pending with pseudomonas.    Plan for SNF- [facility]. Anticipate DC tomorrow if renal function continues to improve.         Camilla Null, BRYANNA-CNP

## 2023-12-15 NOTE — PROGRESS NOTES
Juli Del Castillo is a 83 y.o. female on day 2 of admission presenting with Fall, initial encounter.      Subjective   Patient with no symptoms this morning, her creatinine is trending down to 1.5.       Objective          Vitals 24HR  Heart Rate:  [66-71]   Temp:  [36.3 °C (97.3 °F)-36.7 °C (98.1 °F)]   Resp:  [15-17]   BP: (116-141)/(40-51)   SpO2:  [98 %-100 %]       Intake/Output last 3 Shifts:    Intake/Output Summary (Last 24 hours) at 12/15/2023 1157  Last data filed at 12/15/2023 0700  Gross per 24 hour   Intake 1170 ml   Output 2125 ml   Net -955 ml       Physical Exam  General: Awake, alert, no acute distress  Respiratory:  Clear to auscultation bilaterally, normal respiratory effort  Cardiovascular:  S1 and S2, no rubs  Gastrointestinal:  Soft, positive bowel sounds, no rebound or guarding  Extremities:  No edema, cyanosis    Relevant Results  Results for orders placed or performed during the hospital encounter of 12/12/23 (from the past 24 hour(s))   CBC   Result Value Ref Range    WBC 5.9 4.4 - 11.3 x10*3/uL    nRBC 0.0 0.0 - 0.0 /100 WBCs    RBC 3.23 (L) 4.00 - 5.20 x10*6/uL    Hemoglobin 10.2 (L) 12.0 - 16.0 g/dL    Hematocrit 31.5 (L) 36.0 - 46.0 %    MCV 98 80 - 100 fL    MCH 31.6 26.0 - 34.0 pg    MCHC 32.4 32.0 - 36.0 g/dL    RDW 13.7 11.5 - 14.5 %    Platelets 255 150 - 450 x10*3/uL   Basic Metabolic Panel   Result Value Ref Range    Glucose 102 (H) 65 - 99 mg/dL    Sodium 141 133 - 145 mmol/L    Potassium 4.7 3.4 - 5.1 mmol/L    Chloride 106 97 - 107 mmol/L    Bicarbonate 28 24 - 31 mmol/L    Urea Nitrogen 33 (H) 8 - 25 mg/dL    Creatinine 1.50 0.40 - 1.60 mg/dL    eGFR 34 (L) >60 mL/min/1.73m*2    Calcium 8.7 8.5 - 10.4 mg/dL    Anion Gap 7 <=19 mmol/L   Iron and TIBC   Result Value Ref Range    Iron 60 30 - 160 ug/dL    UIBC 123 110 - 370 ug/dL    TIBC 183 (L) 228 - 428 ug/dL    % Saturation 33 12 - 50 %   Ferritin   Result Value Ref Range    Ferritin 100 13 - 150 ng/mL            Assessment/Plan    1.  Acute kidney injury  -Likely a combination of volume depletion, UTI, ACE inhibitor and diuretic  2.  Fall  3.  UTI     Plan: IV fluids were discontinued.   Being treated with antibiotics, monitor culture results.  I agree with holding the ACE inhibitor and diuretic.  Monitor renal function which is improving.    Jemma Gamez MD

## 2023-12-16 LAB
ALBUMIN SERPL-MCNC: 3.2 G/DL (ref 3.5–5)
ANION GAP SERPL CALC-SCNC: 8 MMOL/L
ANION GAP SERPL CALC-SCNC: 9 MMOL/L
BUN SERPL-MCNC: 35 MG/DL (ref 8–25)
BUN SERPL-MCNC: 35 MG/DL (ref 8–25)
CALCIUM SERPL-MCNC: 9 MG/DL (ref 8.5–10.4)
CALCIUM SERPL-MCNC: 9.3 MG/DL (ref 8.5–10.4)
CHLORIDE SERPL-SCNC: 102 MMOL/L (ref 97–107)
CHLORIDE SERPL-SCNC: 99 MMOL/L (ref 97–107)
CO2 SERPL-SCNC: 27 MMOL/L (ref 24–31)
CO2 SERPL-SCNC: 29 MMOL/L (ref 24–31)
CREAT SERPL-MCNC: 1.5 MG/DL (ref 0.4–1.6)
CREAT SERPL-MCNC: 1.6 MG/DL (ref 0.4–1.6)
ERYTHROCYTE [DISTWIDTH] IN BLOOD BY AUTOMATED COUNT: 13.6 % (ref 11.5–14.5)
GFR SERPL CREATININE-BSD FRML MDRD: 32 ML/MIN/1.73M*2
GFR SERPL CREATININE-BSD FRML MDRD: 34 ML/MIN/1.73M*2
GLUCOSE SERPL-MCNC: 115 MG/DL (ref 65–99)
GLUCOSE SERPL-MCNC: 81 MG/DL (ref 65–99)
HCT VFR BLD AUTO: 32.6 % (ref 36–46)
HGB BLD-MCNC: 10.2 G/DL (ref 12–16)
MCH RBC QN AUTO: 30.7 PG (ref 26–34)
MCHC RBC AUTO-ENTMCNC: 31.3 G/DL (ref 32–36)
MCV RBC AUTO: 98 FL (ref 80–100)
NRBC BLD-RTO: 0 /100 WBCS (ref 0–0)
PHOSPHATE SERPL-MCNC: 4.8 MG/DL (ref 2.5–4.5)
PLATELET # BLD AUTO: 250 X10*3/UL (ref 150–450)
POTASSIUM SERPL-SCNC: 5.1 MMOL/L (ref 3.4–5.1)
POTASSIUM SERPL-SCNC: 5.3 MMOL/L (ref 3.4–5.1)
RBC # BLD AUTO: 3.32 X10*6/UL (ref 4–5.2)
SODIUM SERPL-SCNC: 137 MMOL/L (ref 133–145)
SODIUM SERPL-SCNC: 137 MMOL/L (ref 133–145)
WBC # BLD AUTO: 7.2 X10*3/UL (ref 4.4–11.3)

## 2023-12-16 PROCEDURE — 2500000002 HC RX 250 W HCPCS SELF ADMINISTERED DRUGS (ALT 637 FOR MEDICARE OP, ALT 636 FOR OP/ED): Performed by: INTERNAL MEDICINE

## 2023-12-16 PROCEDURE — 94640 AIRWAY INHALATION TREATMENT: CPT

## 2023-12-16 PROCEDURE — 85027 COMPLETE CBC AUTOMATED: CPT | Performed by: INTERNAL MEDICINE

## 2023-12-16 PROCEDURE — 2500000001 HC RX 250 WO HCPCS SELF ADMINISTERED DRUGS (ALT 637 FOR MEDICARE OP): Performed by: INTERNAL MEDICINE

## 2023-12-16 PROCEDURE — 1100000001 HC PRIVATE ROOM DAILY

## 2023-12-16 PROCEDURE — 82374 ASSAY BLOOD CARBON DIOXIDE: CPT | Performed by: NURSE PRACTITIONER

## 2023-12-16 PROCEDURE — 36415 COLL VENOUS BLD VENIPUNCTURE: CPT | Performed by: NURSE PRACTITIONER

## 2023-12-16 PROCEDURE — 97110 THERAPEUTIC EXERCISES: CPT | Mod: GP

## 2023-12-16 PROCEDURE — 82435 ASSAY OF BLOOD CHLORIDE: CPT | Performed by: NURSE PRACTITIONER

## 2023-12-16 PROCEDURE — 97530 THERAPEUTIC ACTIVITIES: CPT | Mod: GP

## 2023-12-16 PROCEDURE — 82565 ASSAY OF CREATININE: CPT | Performed by: INTERNAL MEDICINE

## 2023-12-16 PROCEDURE — 2500000004 HC RX 250 GENERAL PHARMACY W/ HCPCS (ALT 636 FOR OP/ED): Performed by: INTERNAL MEDICINE

## 2023-12-16 PROCEDURE — 94760 N-INVAS EAR/PLS OXIMETRY 1: CPT

## 2023-12-16 PROCEDURE — 2500000004 HC RX 250 GENERAL PHARMACY W/ HCPCS (ALT 636 FOR OP/ED): Performed by: NURSE PRACTITIONER

## 2023-12-16 PROCEDURE — 9420000001 HC RT PATIENT EDUCATION 5 MIN

## 2023-12-16 PROCEDURE — 36415 COLL VENOUS BLD VENIPUNCTURE: CPT | Performed by: INTERNAL MEDICINE

## 2023-12-16 PROCEDURE — 96372 THER/PROPH/DIAG INJ SC/IM: CPT | Performed by: INTERNAL MEDICINE

## 2023-12-16 PROCEDURE — G0378 HOSPITAL OBSERVATION PER HR: HCPCS

## 2023-12-16 RX ORDER — TORSEMIDE 20 MG/1
20 TABLET ORAL ONCE
Status: COMPLETED | OUTPATIENT
Start: 2023-12-16 | End: 2023-12-16

## 2023-12-16 RX ORDER — ONDANSETRON HYDROCHLORIDE 2 MG/ML
4 INJECTION, SOLUTION INTRAVENOUS EVERY 6 HOURS PRN
Status: DISCONTINUED | OUTPATIENT
Start: 2023-12-16 | End: 2023-12-17 | Stop reason: HOSPADM

## 2023-12-16 RX ADMIN — ONDANSETRON HYDROCHLORIDE 4 MG: 2 INJECTION INTRAMUSCULAR; INTRAVENOUS at 13:42

## 2023-12-16 RX ADMIN — PRIMIDONE 50 MG: 50 TABLET ORAL at 20:54

## 2023-12-16 RX ADMIN — ALPRAZOLAM 0.25 MG: 0.25 TABLET ORAL at 09:10

## 2023-12-16 RX ADMIN — OXYCODONE 10 MG: 5 TABLET ORAL at 09:09

## 2023-12-16 RX ADMIN — TORSEMIDE 20 MG: 20 TABLET ORAL at 10:39

## 2023-12-16 RX ADMIN — ALPRAZOLAM 0.25 MG: 0.25 TABLET ORAL at 23:03

## 2023-12-16 RX ADMIN — CITALOPRAM HYDROBROMIDE 20 MG: 20 TABLET ORAL at 09:09

## 2023-12-16 RX ADMIN — SIMVASTATIN 80 MG: 40 TABLET, FILM COATED ORAL at 20:54

## 2023-12-16 RX ADMIN — OXYCODONE 10 MG: 5 TABLET ORAL at 17:22

## 2023-12-16 RX ADMIN — ALBUTEROL SULFATE 2.5 MG: 2.5 SOLUTION RESPIRATORY (INHALATION) at 18:53

## 2023-12-16 RX ADMIN — DULOXETINE HYDROCHLORIDE 30 MG: 30 CAPSULE, DELAYED RELEASE ORAL at 09:09

## 2023-12-16 RX ADMIN — BUDESONIDE INHALATION 0.5 MG: 0.5 SUSPENSION RESPIRATORY (INHALATION) at 18:53

## 2023-12-16 RX ADMIN — CEFTRIAXONE SODIUM 1 G: 1 INJECTION, SOLUTION INTRAVENOUS at 13:28

## 2023-12-16 RX ADMIN — GABAPENTIN 400 MG: 400 CAPSULE ORAL at 09:10

## 2023-12-16 RX ADMIN — OXYCODONE 10 MG: 5 TABLET ORAL at 03:01

## 2023-12-16 RX ADMIN — HEPARIN SODIUM 5000 UNITS: 5000 INJECTION, SOLUTION INTRAVENOUS; SUBCUTANEOUS at 06:10

## 2023-12-16 RX ADMIN — HEPARIN SODIUM 5000 UNITS: 5000 INJECTION, SOLUTION INTRAVENOUS; SUBCUTANEOUS at 18:39

## 2023-12-16 RX ADMIN — ALPRAZOLAM 0.25 MG: 0.25 TABLET ORAL at 17:22

## 2023-12-16 RX ADMIN — ACETAMINOPHEN 650 MG: 325 TABLET ORAL at 03:01

## 2023-12-16 RX ADMIN — OXYCODONE 10 MG: 5 TABLET ORAL at 23:03

## 2023-12-16 RX ADMIN — GABAPENTIN 400 MG: 400 CAPSULE ORAL at 20:54

## 2023-12-16 RX ADMIN — PANTOPRAZOLE SODIUM 40 MG: 40 TABLET, DELAYED RELEASE ORAL at 06:10

## 2023-12-16 RX ADMIN — ASPIRIN 81 MG CHEWABLE TABLET 81 MG: 81 TABLET CHEWABLE at 09:10

## 2023-12-16 RX ADMIN — TEMAZEPAM 15 MG: 15 CAPSULE ORAL at 20:53

## 2023-12-16 RX ADMIN — PRIMIDONE 50 MG: 50 TABLET ORAL at 09:10

## 2023-12-16 ASSESSMENT — COGNITIVE AND FUNCTIONAL STATUS - GENERAL
CLIMB 3 TO 5 STEPS WITH RAILING: TOTAL
MOVING TO AND FROM BED TO CHAIR: A LOT
HELP NEEDED FOR BATHING: A LOT
WALKING IN HOSPITAL ROOM: TOTAL
MOVING FROM LYING ON BACK TO SITTING ON SIDE OF FLAT BED WITH BEDRAILS: A LOT
MOBILITY SCORE: 10
DRESSING REGULAR UPPER BODY CLOTHING: A LOT
TOILETING: A LOT
STANDING UP FROM CHAIR USING ARMS: A LOT
STANDING UP FROM CHAIR USING ARMS: A LOT
CLIMB 3 TO 5 STEPS WITH RAILING: TOTAL
DAILY ACTIVITIY SCORE: 16
TURNING FROM BACK TO SIDE WHILE IN FLAT BAD: A LOT
MOVING FROM LYING ON BACK TO SITTING ON SIDE OF FLAT BED WITH BEDRAILS: A LOT
TURNING FROM BACK TO SIDE WHILE IN FLAT BAD: A LOT
MOBILITY SCORE: 10
WALKING IN HOSPITAL ROOM: TOTAL
MOVING TO AND FROM BED TO CHAIR: A LOT
MOVING FROM LYING ON BACK TO SITTING ON SIDE OF FLAT BED WITH BEDRAILS: A LOT
DRESSING REGULAR LOWER BODY CLOTHING: A LOT
TOILETING: A LOT
TURNING FROM BACK TO SIDE WHILE IN FLAT BAD: A LOT

## 2023-12-16 ASSESSMENT — PAIN SCALES - GENERAL
PAINLEVEL_OUTOF10: 9
PAINLEVEL_OUTOF10: 0 - NO PAIN
PAINLEVEL_OUTOF10: 8
PAINLEVEL_OUTOF10: 7
PAINLEVEL_OUTOF10: 5 - MODERATE PAIN
PAINLEVEL_OUTOF10: 6
PAINLEVEL_OUTOF10: 9
PAINLEVEL_OUTOF10: 8

## 2023-12-16 ASSESSMENT — PAIN SCALES - WONG BAKER
WONGBAKER_NUMERICALRESPONSE: NO HURT
WONGBAKER_NUMERICALRESPONSE: HURTS WHOLE LOT

## 2023-12-16 ASSESSMENT — PAIN - FUNCTIONAL ASSESSMENT
PAIN_FUNCTIONAL_ASSESSMENT: 0-10

## 2023-12-16 ASSESSMENT — PAIN DESCRIPTION - LOCATION
LOCATION: HIP
LOCATION: HIP

## 2023-12-16 ASSESSMENT — PAIN DESCRIPTION - ORIENTATION
ORIENTATION: RIGHT
ORIENTATION: RIGHT

## 2023-12-16 NOTE — NURSING NOTE
Patient has had no changes from previous assessment. Call light within reach nursing will continue to monitor.

## 2023-12-16 NOTE — CARE PLAN
The patient's goals for the shift include  safety, pain management     The clinical goals for the shift include safety, pain management

## 2023-12-16 NOTE — PROGRESS NOTES
Juli Del Castillo is a 83 y.o. female on day 3 of admission presenting with Fall, initial encounter.      Subjective   Patient seen and examined. Resting in bed. States pain a little better today. Slept well.        Objective     Last Recorded Vitals  /50 (BP Location: Right arm, Patient Position: Lying)   Pulse 67   Temp 36.3 °C (97.3 °F) (Temporal)   Resp 16   Wt 77.1 kg (170 lb)   SpO2 96%   Intake/Output last 3 Shifts:    Intake/Output Summary (Last 24 hours) at 12/16/2023 1055  Last data filed at 12/16/2023 0930  Gross per 24 hour   Intake 825 ml   Output 1800 ml   Net -975 ml       Admission Weight  Weight: 77.1 kg (170 lb) (12/12/23 2240)    Daily Weight  12/12/23 : 77.1 kg (170 lb)    Image Results  US renal complete  Narrative: Interpreted By:  Ed Cano,   STUDY:  US RENAL COMPLETE; 12/14/2023 11:29 am      INDICATION:  Signs/Symptoms:KYLEE.      COMPARISON:  None.      ACCESSION NUMBER(S):  [id number]      ORDERING CLINICIAN:  TAZ IGLESIAS      TECHNIQUE:  Grayscale and color Doppler imaging of the kidneys.      FINDINGS:  The right kidney measures 9.6 cm x 4.6 cm x 5.2 cm.  The left kidney measures 9.4 cm x 5.4 cm x 5.6 cm. There is a lesion  consistent with a cyst in the upper-mid peripelvic location measuring  2.3 x 2.3 x 2.5 cm. There is no shadowing calculus, hydronephrosis,  or solid mass identified. The renal cortical thickness and  echogenicity is normal.      The urinary bladder is minimally distended and otherwise unremarkable.      Impression: No calculi or hydronephrosis bilaterally.      2.5 cm cyst in the left peripelvic location, otherwise unremarkable  examination.      MACRO:  None.      Signed by: Ed Cano 12/14/2023 3:47 PM  Dictation workstation:   [id number]      Physical Exam    General: Alert and oriented x3, pleasant.   Cardiac: Regular rate and rhythm, S1/S2 , no murmur.   Pulmonary: Clear to auscultation on 3L NC.  Abdomen: Soft, round, nontender. BS  +x4.   Extremities: Trace edema R hip.  Skin: No rashes or lesions.  No ecchymosis seen.     Relevant Results    Scheduled medications  albuterol, 2.5 mg, nebulization, TID  aspirin, 81 mg, oral, Daily  budesonide, 0.5 mg, nebulization, BID  cefTRIAXone, 1 g, intravenous, q24h  citalopram, 20 mg, oral, Daily  DULoxetine, 30 mg, oral, Daily  gabapentin, 400 mg, oral, TID  heparin, 5,000 Units, subcutaneous, q12h  [Held by provider] lisinopril, 20 mg, oral, Daily  pantoprazole, 40 mg, oral, Daily before breakfast  primidone, 50 mg, oral, TID  simvastatin, 80 mg, oral, Nightly      Continuous medications     PRN medications  PRN medications: acetaminophen, ALPRAZolam, oxyCODONE, oxyCODONE, temazepam     Results for orders placed or performed during the hospital encounter of 12/12/23 (from the past 24 hour(s))   CBC   Result Value Ref Range    WBC 7.2 4.4 - 11.3 x10*3/uL    nRBC 0.0 0.0 - 0.0 /100 WBCs    RBC 3.32 (L) 4.00 - 5.20 x10*6/uL    Hemoglobin 10.2 (L) 12.0 - 16.0 g/dL    Hematocrit 32.6 (L) 36.0 - 46.0 %    MCV 98 80 - 100 fL    MCH 30.7 26.0 - 34.0 pg    MCHC 31.3 (L) 32.0 - 36.0 g/dL    RDW 13.6 11.5 - 14.5 %    Platelets 250 150 - 450 x10*3/uL   Renal Function Panel   Result Value Ref Range    Glucose 115 (H) 65 - 99 mg/dL    Sodium 137 133 - 145 mmol/L    Potassium 5.3 (H) 3.4 - 5.1 mmol/L    Chloride 102 97 - 107 mmol/L    Bicarbonate 27 24 - 31 mmol/L    Urea Nitrogen 35 (H) 8 - 25 mg/dL    Creatinine 1.60 0.40 - 1.60 mg/dL    eGFR 32 (L) >60 mL/min/1.73m*2    Calcium 9.0 8.5 - 10.4 mg/dL    Phosphorus 4.8 (H) 2.5 - 4.5 mg/dL    Albumin 3.2 (L) 3.5 - 5.0 g/dL    Anion Gap 8 <=19 mmol/L             Assessment/Plan      Mechanical Fall with Right Hip Pain / Inability to Ambulate  -Accidental fall trying to reach for her slippers, landing on her R side.   -Had significant R hip and thigh pain, was unable to bear weight or ambulate.   -Hx of R ZBIGNIEW over 20 years ago.   -XR imaging of the hip shows no acute  findings, no fracture or dislocation.   -CT cervical spine, head negative. XR imaging of the R shoulder and elbow negative.   -CT hip with no fracture.   -Ortho saw, feel this is likely contusion, recommend WBAT and therapy.    -PT/OT recommends moderate intensity therapy. Plan for SNF.   -Pain control. Improved some today.      KYLEE  -Renal follows.   -Creat baseline always ~0.9. 1.6 today. K 5.3 this morning.   -Torsemide and lisinopril were on hold, renal gave dose of torsemide this AM.   -Renal US unremarkable.    -Has carmen catheter, good urine output.   -CK level normal.   -Low potassium diet.   -Will repeat BMP this afternoon.      Possible UTI  -UA with positive nit and leuks.   -On ceftriaxone.   -Urine culture final with pseudomonas- pansensitive.      COPD and Chronic Hypoxic Respiratory Failure  -Continuous 3L NC at baseline. Currently on her baseline O2.   -Denies any increased SOB from baseline.   -IBD/ICS.      HTN  -Continue home meds. Holding Lisinopril for now.   -BP stable at this time.      HLD  -Continue statin.      Essential Tremor  -Continue primidone.      Fibromyalgia  -Continue home meds.      DVT Risk  -Heparin subcutaneous, SCDs.       Plan  Ortho saw, feel this is a contusion, no fractures or acute abnormalities seen on imaging.   PT/OT, OOB as able.   Pain control.   Renal giving dose of torsemide this morning. K was 5.3, changed to low potassium diet.   Repeat BMP this afternoon.   Continue ceftriaxone, plan for PO abx at DC.   Plan for SNF- [facility]. Possible transfer this afternoon pending repeat labs.         BRYANNA Aparicio-CNP

## 2023-12-16 NOTE — CARE PLAN
The patient's goals for the shift include pain management     The clinical goals for the shift include pain management

## 2023-12-16 NOTE — CARE PLAN
Problem: Balance  Goal: Standing Balance  Description: Pt will demonstrate good static standing balance to promote safe participation with out of bed activity, transfers, and mobility    Outcome: Progressing     Problem: Mobility  Goal: Ambulation  Description: Pt will ambulate 50' modified independent assist with walker to promote safe home mobility    Outcome: Progressing     Problem: Safety  Goal: Safe Mobility  Description: Pt will correctly identify and demonstrate safe mobility techniques to reduce their risks for falls during their acute care stay     Outcome: Progressing     Problem: Transfers  Goal: Supine to sit  Description: Pt will transfer supine to sitting at edge of bed with modified independent assist to promote acute care out of bed activity    Outcome: Progressing  Goal: Sit to stand  Description: Pt will transfer sit to standing position with modified independent assist and walker to promote safe out of bed activity    Outcome: Progressing

## 2023-12-16 NOTE — PROGRESS NOTES
Juli Del Castillo is a 83 y.o. female on day 3 of admission presenting with fall.  Was found to have elevated creatinine related to volume depletion and possible UTI.      Subjective   Patient did improve with IV fluid and holding torsemide and ACE inhibitor's.  Serum creatinine close to the baseline 1.5-1.6.  Patient has no edema however potassium is slightly up to 5.3 mg/L.  Used to be on torsemide 20 mg daily.  She denies any shortness of breath.  No nausea or vomiting.  She is able to eat and drink.       Objective          Vitals 24HR  Heart Rate:  [63-67]   Temp:  [36.3 °C (97.3 °F)-37.2 °C (99 °F)]   Resp:  [14-16]   BP: (116-133)/(41-50)   SpO2:  [96 %-100 %]         Intake/Output last 3 Shifts:    Intake/Output Summary (Last 24 hours) at 12/16/2023 1458  Last data filed at 12/16/2023 1310  Gross per 24 hour   Intake 375 ml   Output 2150 ml   Net -1775 ml       Physical Exam  General: Awake, alert, no acute distress  Respiratory:  Clear to auscultation bilaterally, normal respiratory effort  Cardiovascular:  S1 and S2, no rubs  Gastrointestinal:  Soft, positive bowel sounds, no rebound or guarding  Extremities:  No edema, cyanosis    Relevant Results  Results for orders placed or performed during the hospital encounter of 12/12/23 (from the past 24 hour(s))   CBC   Result Value Ref Range    WBC 7.2 4.4 - 11.3 x10*3/uL    nRBC 0.0 0.0 - 0.0 /100 WBCs    RBC 3.32 (L) 4.00 - 5.20 x10*6/uL    Hemoglobin 10.2 (L) 12.0 - 16.0 g/dL    Hematocrit 32.6 (L) 36.0 - 46.0 %    MCV 98 80 - 100 fL    MCH 30.7 26.0 - 34.0 pg    MCHC 31.3 (L) 32.0 - 36.0 g/dL    RDW 13.6 11.5 - 14.5 %    Platelets 250 150 - 450 x10*3/uL   Renal Function Panel   Result Value Ref Range    Glucose 115 (H) 65 - 99 mg/dL    Sodium 137 133 - 145 mmol/L    Potassium 5.3 (H) 3.4 - 5.1 mmol/L    Chloride 102 97 - 107 mmol/L    Bicarbonate 27 24 - 31 mmol/L    Urea Nitrogen 35 (H) 8 - 25 mg/dL    Creatinine 1.60 0.40 - 1.60 mg/dL    eGFR 32 (L) >60  mL/min/1.73m*2    Calcium 9.0 8.5 - 10.4 mg/dL    Phosphorus 4.8 (H) 2.5 - 4.5 mg/dL    Albumin 3.2 (L) 3.5 - 5.0 g/dL    Anion Gap 8 <=19 mmol/L   Basic metabolic panel   Result Value Ref Range    Glucose 81 65 - 99 mg/dL    Sodium 137 133 - 145 mmol/L    Potassium 5.1 3.4 - 5.1 mmol/L    Chloride 99 97 - 107 mmol/L    Bicarbonate 29 24 - 31 mmol/L    Urea Nitrogen 35 (H) 8 - 25 mg/dL    Creatinine 1.50 0.40 - 1.60 mg/dL    eGFR 34 (L) >60 mL/min/1.73m*2    Calcium 9.3 8.5 - 10.4 mg/dL    Anion Gap 9 <=19 mmol/L               Assessment/Plan      1.  Acute kidney injury:  Likely a combination of volume depletion, UTI, ACE inhibitor and diuretic  2.  Hyperkalemia: Potassium slightly up to 5.3 mill equivalent per liter in the setting of mild CKD.  Patient was started on low potassium diet.  Will add torsemide 20 mg back again x 1 and then will restart torsemide daily once systolic blood pressure stable more than 120s to avoid recurrent hyperkalemia.  Lisinopril still on hold and possibly she cannot tolerate it at home if potassium continues to be more than 5 mmol/L.  Recheck renal panel   2.  Fall  3.  UTI: Patient on ceftriaxone.          Otf Lamar MD

## 2023-12-16 NOTE — PROGRESS NOTES
Physical Therapy    Physical Therapy Treatment    Patient Name: Juli Del Castillo  MRN: [id number]  Today's Date: 12/16/2023  Time Calculation  Start Time: 1115  Stop Time: 1145  Time Calculation (min): 30 min       Assessment/Plan   PT Assessment  PT Assessment Results: Decreased strength, Decreased range of motion, Decreased endurance, Impaired balance, Decreased mobility, Decreased coordination, Decreased cognition, Decreased safety awareness, Impaired judgement, Pain  Evaluation/Treatment Tolerance: Patient limited by pain  Medical Staff Made Aware: Yes  Strengths: Ability to acquire knowledge  End of Session Communication: Bedside nurse  End of Session Patient Position: Up in chair (all need in reach)  PT Plan  Inpatient/Swing Bed or Outpatient: Inpatient  PT Plan  Treatment/Interventions: Bed mobility, Transfer training, Stair training, Gait training, Balance training, Strengthening  PT Plan: Skilled PT  PT Frequency: 5 times per week  PT Discharge Recommendations: Moderate intensity level of continued care  Equipment Recommended upon Discharge: Wheeled walker  PT Recommended Transfer Status: Assist x1  PT - OK to Discharge: Yes      General Visit Information:   PT  Visit  PT Received On: 12/16/23  General  Family/Caregiver Present: No  Prior to Session Communication: Bedside nurse  Patient Position Received: Bed, 2 rail up  Preferred Learning Style: verbal  General Comment: pt was agreeable to PT tx session    Subjective   Precautions:  Precautions  LE Weight Bearing Status: Weight Bearing as Tolerated  Medical Precautions: Fall precautions    Objective   Pain:  Pain Assessment  Pain Assessment: 0-10  Pain Score: 8  Pain Type: Acute pain  Pain Location: Hip  Pain Orientation: Right  Clinical Progression: Gradually improving  Pain Interventions: Medication (See MAR)  Cognition:  Cognition  Overall Cognitive Status: Within Functional Limits    Activity Tolerance:  Activity Tolerance  Endurance: Decreased tolerance  for upright activites  Treatments:  Therapeutic Exercise  Therapeutic Exercise Performed: Yes  Therapeutic Exercise Activity 1: Seated bilat LE ankle pump/heel raises, glute sets, LAQ, micaela hip adduction, resisted hip abduction x12 reps each for 2 trials each.    Therapeutic Activity  Therapeutic Activity Performed: Yes  Therapeutic Activity 1: pt was able to perform supine to sitting at EOB with MOD A x 1 for trunk up and assist with RT LE due to pain. she tolerated sitting on EOB for 5-6 mins without reports of dizzness or LOB. pt was able to perform sit to stand transfers from bed and bed side chair with MOD A x 1 for trunk up and down and assist with balance and instructions on proper hand placement for sequencing and safety. Once standing she was able to transfers with use of RW from bed to bed side chair about 4-5 steps one each leg. she reports she can not  RT LE due to pain and tends to slide it on the floor.      Ambulation/Gait Training  Ambulation/Gait Training Performed: Yes  Ambulation/Gait Training 1  Surface 1: Level tile  Device 1: Rolling walker  Gait Support Devices: Gait belt  Assistance 1: Moderate assistance  Quality of Gait 1: Decreased step length, Shuffling gait, Antalgic (slides RT LE on the floor)  Comments/Distance (ft) 1: increase pain 9/10 with ambulating  Transfers  Transfer: Yes  Transfer 1  Transfer From 1: Bed to  Transfer to 1: Stand  Technique 1: Sit pivot  Transfer Device 1: Walker  Transfer Level of Assistance 1: Moderate assistance  Trials/Comments 1: instructions for her safety for proper hand placement and sequencing  Transfers 2  Transfer From 2: Chair with arms to  Transfer to 2: Stand, Sit  Technique 2: Sit to stand  Transfer Level of Assistance 2: Moderate assistance  Trials/Comments 2: instructions for her safety for proper hand placement and sequencing    Outcome Measures:  [facility] Basic Mobility  Turning from your back to your side while in a flat bed without using  bedrails: A lot  Moving from lying on your back to sitting on the side of a flat bed without using bedrails: A lot  Moving to and from bed to chair (including a wheelchair): A lot  Standing up from a chair using your arms (e.g. wheelchair or bedside chair): A lot  To walk in hospital room: Total  Climbing 3-5 steps with railing: Total  Basic Mobility - Total Score: 10    Education Documentation  Home Exercise Program, taught by Judi Conley PT at 12/16/2023 12:34 PM.  Learner: Patient  Readiness: Acceptance  Method: Explanation, Demonstration  Response: Verbalizes Understanding    Mobility Training, taught by Judi Conley PT at 12/16/2023 12:34 PM.  Learner: Patient  Readiness: Acceptance  Method: Explanation, Demonstration  Response: Verbalizes Understanding    Education Comments  No comments found.        OP EDUCATION:       Encounter Problems       Encounter Problems (Active)       Balance       Standing Balance (Progressing)       Start:  12/14/23    Expected End:  12/28/23       Pt will demonstrate good static standing balance to promote safe participation with out of bed activity, transfers, and mobility              Mobility       Ambulation (Progressing)       Start:  12/14/23    Expected End:  12/28/23       Pt will ambulate 50' modified independent assist with walker to promote safe home mobility              Safety       Safe Mobility (Progressing)       Start:  12/14/23    Expected End:  12/28/23       Pt will correctly identify and demonstrate safe mobility techniques to reduce their risks for falls during their acute care stay               Transfers       Supine to sit (Progressing)       Start:  12/14/23    Expected End:  12/28/23       Pt will transfer supine to sitting at edge of bed with modified independent assist to promote acute care out of bed activity           Sit to stand (Progressing)       Start:  12/14/23    Expected End:  12/28/23       Pt will transfer sit to standing  position with modified independent assist and walker to promote safe out of bed activity

## 2023-12-17 ENCOUNTER — DOCUMENTATION (OUTPATIENT)
Dept: RESPIRATORY THERAPY | Facility: HOSPITAL | Age: 83
End: 2023-12-17
Payer: MEDICARE

## 2023-12-17 VITALS
RESPIRATION RATE: 14 BRPM | TEMPERATURE: 97.9 F | BODY MASS INDEX: 29.02 KG/M2 | HEIGHT: 64 IN | SYSTOLIC BLOOD PRESSURE: 128 MMHG | DIASTOLIC BLOOD PRESSURE: 39 MMHG | HEART RATE: 69 BPM | OXYGEN SATURATION: 99 % | WEIGHT: 170 LBS

## 2023-12-17 LAB
ANION GAP SERPL CALC-SCNC: 6 MMOL/L
BUN SERPL-MCNC: 41 MG/DL (ref 8–25)
CALCIUM SERPL-MCNC: 8.6 MG/DL (ref 8.5–10.4)
CHLORIDE SERPL-SCNC: 98 MMOL/L (ref 97–107)
CO2 SERPL-SCNC: 28 MMOL/L (ref 24–31)
CREAT SERPL-MCNC: 1.7 MG/DL (ref 0.4–1.6)
GFR SERPL CREATININE-BSD FRML MDRD: 29 ML/MIN/1.73M*2
GLUCOSE SERPL-MCNC: 98 MG/DL (ref 65–99)
POTASSIUM SERPL-SCNC: 5.2 MMOL/L (ref 3.4–5.1)
SODIUM SERPL-SCNC: 132 MMOL/L (ref 133–145)

## 2023-12-17 PROCEDURE — 2500000001 HC RX 250 WO HCPCS SELF ADMINISTERED DRUGS (ALT 637 FOR MEDICARE OP): Performed by: INTERNAL MEDICINE

## 2023-12-17 PROCEDURE — 36415 COLL VENOUS BLD VENIPUNCTURE: CPT | Performed by: NURSE PRACTITIONER

## 2023-12-17 PROCEDURE — 94640 AIRWAY INHALATION TREATMENT: CPT

## 2023-12-17 PROCEDURE — G0378 HOSPITAL OBSERVATION PER HR: HCPCS

## 2023-12-17 PROCEDURE — 9420000001 HC RT PATIENT EDUCATION 5 MIN

## 2023-12-17 PROCEDURE — 2500000002 HC RX 250 W HCPCS SELF ADMINISTERED DRUGS (ALT 637 FOR MEDICARE OP, ALT 636 FOR OP/ED): Performed by: INTERNAL MEDICINE

## 2023-12-17 PROCEDURE — 2500000004 HC RX 250 GENERAL PHARMACY W/ HCPCS (ALT 636 FOR OP/ED): Performed by: INTERNAL MEDICINE

## 2023-12-17 PROCEDURE — 96372 THER/PROPH/DIAG INJ SC/IM: CPT | Performed by: INTERNAL MEDICINE

## 2023-12-17 PROCEDURE — 2500000004 HC RX 250 GENERAL PHARMACY W/ HCPCS (ALT 636 FOR OP/ED): Performed by: NURSE PRACTITIONER

## 2023-12-17 PROCEDURE — 80048 BASIC METABOLIC PNL TOTAL CA: CPT | Performed by: NURSE PRACTITIONER

## 2023-12-17 RX ORDER — TRAMADOL HYDROCHLORIDE 50 MG/1
50 TABLET ORAL EVERY 6 HOURS PRN
Qty: 8 TABLET | Refills: 0 | Status: SHIPPED | OUTPATIENT
Start: 2023-12-17 | End: 2024-01-15 | Stop reason: HOSPADM

## 2023-12-17 RX ORDER — CEPHALEXIN 500 MG/1
500 CAPSULE ORAL 2 TIMES DAILY
Qty: 6 CAPSULE | Refills: 0
Start: 2023-12-17 | End: 2023-12-20

## 2023-12-17 RX ORDER — ALPRAZOLAM 0.25 MG/1
0.25 TABLET ORAL DAILY PRN
Qty: 2 TABLET | Refills: 0 | Status: SHIPPED | OUTPATIENT
Start: 2023-12-17 | End: 2024-01-15 | Stop reason: HOSPADM

## 2023-12-17 RX ORDER — PRIMIDONE 50 MG/1
100 TABLET ORAL 3 TIMES DAILY
Start: 2023-12-17

## 2023-12-17 RX ORDER — OXYCODONE HYDROCHLORIDE 5 MG/1
5 TABLET ORAL EVERY 6 HOURS PRN
Qty: 8 TABLET | Refills: 0 | Status: SHIPPED | OUTPATIENT
Start: 2023-12-17 | End: 2024-01-15 | Stop reason: HOSPADM

## 2023-12-17 RX ORDER — TEMAZEPAM 15 MG/1
15 CAPSULE ORAL NIGHTLY PRN
Qty: 2 CAPSULE | Refills: 0 | Status: SHIPPED | OUTPATIENT
Start: 2023-12-17 | End: 2024-01-15 | Stop reason: HOSPADM

## 2023-12-17 RX ORDER — TORSEMIDE 20 MG/1
20 TABLET ORAL DAILY
Status: DISCONTINUED | OUTPATIENT
Start: 2023-12-17 | End: 2023-12-17 | Stop reason: HOSPADM

## 2023-12-17 RX ADMIN — PRIMIDONE 50 MG: 50 TABLET ORAL at 08:32

## 2023-12-17 RX ADMIN — DULOXETINE HYDROCHLORIDE 30 MG: 30 CAPSULE, DELAYED RELEASE ORAL at 08:32

## 2023-12-17 RX ADMIN — CEFTRIAXONE SODIUM 1 G: 1 INJECTION, SOLUTION INTRAVENOUS at 12:50

## 2023-12-17 RX ADMIN — TORSEMIDE 20 MG: 20 TABLET ORAL at 11:36

## 2023-12-17 RX ADMIN — ALBUTEROL SULFATE 2.5 MG: 2.5 SOLUTION RESPIRATORY (INHALATION) at 12:18

## 2023-12-17 RX ADMIN — OXYCODONE 10 MG: 5 TABLET ORAL at 11:35

## 2023-12-17 RX ADMIN — ALPRAZOLAM 0.25 MG: 0.25 TABLET ORAL at 08:32

## 2023-12-17 RX ADMIN — GABAPENTIN 400 MG: 400 CAPSULE ORAL at 08:32

## 2023-12-17 RX ADMIN — HEPARIN SODIUM 5000 UNITS: 5000 INJECTION, SOLUTION INTRAVENOUS; SUBCUTANEOUS at 06:02

## 2023-12-17 RX ADMIN — ASPIRIN 81 MG CHEWABLE TABLET 81 MG: 81 TABLET CHEWABLE at 08:32

## 2023-12-17 RX ADMIN — ALBUTEROL SULFATE 2.5 MG: 2.5 SOLUTION RESPIRATORY (INHALATION) at 08:43

## 2023-12-17 RX ADMIN — CITALOPRAM HYDROBROMIDE 20 MG: 20 TABLET ORAL at 08:32

## 2023-12-17 RX ADMIN — PANTOPRAZOLE SODIUM 40 MG: 40 TABLET, DELAYED RELEASE ORAL at 06:02

## 2023-12-17 RX ADMIN — OXYCODONE 10 MG: 5 TABLET ORAL at 06:04

## 2023-12-17 RX ADMIN — BUDESONIDE INHALATION 0.5 MG: 0.5 SUSPENSION RESPIRATORY (INHALATION) at 08:43

## 2023-12-17 ASSESSMENT — COGNITIVE AND FUNCTIONAL STATUS - GENERAL
DRESSING REGULAR UPPER BODY CLOTHING: A LOT
MOVING FROM LYING ON BACK TO SITTING ON SIDE OF FLAT BED WITH BEDRAILS: A LOT
MOBILITY SCORE: 10
MOVING TO AND FROM BED TO CHAIR: A LOT
DRESSING REGULAR LOWER BODY CLOTHING: A LOT
HELP NEEDED FOR BATHING: A LOT
CLIMB 3 TO 5 STEPS WITH RAILING: TOTAL
STANDING UP FROM CHAIR USING ARMS: A LOT
TOILETING: A LOT
TURNING FROM BACK TO SIDE WHILE IN FLAT BAD: A LOT
WALKING IN HOSPITAL ROOM: TOTAL
DAILY ACTIVITIY SCORE: 16

## 2023-12-17 ASSESSMENT — PAIN - FUNCTIONAL ASSESSMENT
PAIN_FUNCTIONAL_ASSESSMENT: 0-10
PAIN_FUNCTIONAL_ASSESSMENT: FLACC (FACE, LEGS, ACTIVITY, CRY, CONSOLABILITY)
PAIN_FUNCTIONAL_ASSESSMENT: FLACC (FACE, LEGS, ACTIVITY, CRY, CONSOLABILITY)

## 2023-12-17 ASSESSMENT — PAIN SCALES - GENERAL
PAINLEVEL_OUTOF10: 4
PAINLEVEL_OUTOF10: 3
PAINLEVEL_OUTOF10: 0 - NO PAIN
PAINLEVEL_OUTOF10: 0 - NO PAIN
PAINLEVEL_OUTOF10: 7

## 2023-12-17 ASSESSMENT — PAIN DESCRIPTION - LOCATION: LOCATION: HIP

## 2023-12-17 NOTE — PROGRESS NOTES
"Juli Del Castillo is a 83 y.o. female on day 4 of admission presenting with Fall, initial encounter.    Subjective   Patient had 1 dose of torsemide yesterday because of potassium was up to 5.3 mEq/L.  Potassium did improve down to 5.2.  Creatinine still higher than baseline 1.7 mg/dL.  Patient has recurrent CHF.  Lisinopril still on hold at the blood pressure was low.  She does not have shortness of breath at this point however she stated that she gets recurrent CHF easily off torsemide.  She is compliant with low sodium and potassium diet.      Objective     Physical Exam  General: Awake, alert, no acute distress  Respiratory:  Clear to auscultation bilaterally, normal respiratory effort  Cardiovascular:  S1 and S2, no rubs  Gastrointestinal:  Soft, positive bowel sounds, no rebound or guarding  Extremities:  No edema, cyanosis    Last Recorded Vitals  Blood pressure (!) 128/39, pulse 69, temperature 36.6 °C (97.9 °F), temperature source Temporal, resp. rate 14, height 1.626 m (5' 4\"), weight 77.1 kg (170 lb), SpO2 99 %.  Intake/Output last 3 Shifts:  I/O last 3 completed shifts:  In: 1075 (13.9 mL/kg) [P.O.:1075]  Out: 3400 (44.1 mL/kg) [Urine:3400 (1.2 mL/kg/hr)]  Weight: 77.1 kg     Relevant Results  Results for orders placed or performed during the hospital encounter of 12/12/23 (from the past 24 hour(s))   Basic Metabolic Panel   Result Value Ref Range    Glucose 98 65 - 99 mg/dL    Sodium 132 (L) 133 - 145 mmol/L    Potassium 5.2 (H) 3.4 - 5.1 mmol/L    Chloride 98 97 - 107 mmol/L    Bicarbonate 28 24 - 31 mmol/L    Urea Nitrogen 41 (H) 8 - 25 mg/dL    Creatinine 1.70 (H) 0.40 - 1.60 mg/dL    eGFR 29 (L) >60 mL/min/1.73m*2    Calcium 8.6 8.5 - 10.4 mg/dL    Anion Gap 6 <=19 mmol/L                  Assessment/Plan   1.  Acute kidney injury:  Likely a combination of volume depletion, UTI, ACE inhibitor and diuretic  2.  Hyperkalemia: Potassium did improve with low potassium diet however still on the high range of " movement.  Will add torsemide 20 mg daily for hyperkalemia and avoid recurrent CHF.    3.  CHF: Currently compensated.  Continue low-salt diet.  Torsemide will be added.      Otf Lamar MD

## 2023-12-17 NOTE — DISCHARGE SUMMARY
Discharge Diagnosis  Fall, initial encounter, R Hip Pain, UTI, KYLEE.    Issues Requiring Follow-Up    Discharge Meds     Your medication list        START taking these medications        Instructions Last Dose Given Next Dose Due   cephalexin 500 mg capsule  Commonly known as: Keflex      Take 1 capsule (500 mg) by mouth 2 times a day for 3 days.       oxyCODONE 5 mg immediate release tablet  Commonly known as: Roxicodone      Take 1 tablet (5 mg) by mouth every 6 hours if needed for severe pain (7 - 10).              CHANGE how you take these medications        Instructions Last Dose Given Next Dose Due   ALPRAZolam 0.25 mg tablet  Commonly known as: Xanax  What changed: See the new instructions.      Take 1 tablet (0.25 mg) by mouth once daily as needed for anxiety.       temazepam 15 mg capsule  Commonly known as: Restoril  What changed: See the new instructions.      Take 1 capsule (15 mg) by mouth as needed at bedtime for sleep.              CONTINUE taking these medications        Instructions Last Dose Given Next Dose Due   acetaminophen 325 mg tablet  Commonly known as: Tylenol      Take 2 tablets (650 mg) by mouth every 6 hours if needed for moderate pain (4 - 6).       albuterol 2.5 mg /3 mL (0.083 %) nebulizer solution      Take 3 mL (2.5 mg) by nebulization every 6 hours while awake.       albuterol 90 mcg/actuation inhaler           aspirin 81 mg chewable tablet      Chew 1 tablet (81 mg) once daily.       budesonide 0.5 mg/2 mL nebulizer solution  Commonly known as: Pulmicort      Take 2 mL (0.5 mg) by nebulization 2 times a day. Rinse mouth with water after use to reduce aftertaste and incidence of candidiasis. Do not swallow.       citalopram 20 mg tablet  Commonly known as: CeleXA      Take 1 tablet (20 mg) by mouth once daily.       DULoxetine 30 mg DR capsule  Commonly known as: Cymbalta      Take 1 capsule (30 mg) by mouth once daily.       fluticasone furoate-vilanteroL 200-25 mcg/dose  inhaler  Commonly known as: Breo Ellipta           gabapentin 400 mg capsule  Commonly known as: Neurontin           Home Nebulizer Plus Sidestream device  Generic drug: nebulizer and compressor      use as directed with nebulizer treatments       nitroglycerin 0.4 mg SL tablet  Commonly known as: Nitrostat           omeprazole 40 mg DR capsule  Commonly known as: PriLOSEC           oxybutynin XL 5 mg 24 hr tablet  Commonly known as: Ditropan-XL      Take 1 tablet (5 mg) by mouth once daily.       oxygen gas therapy  Commonly known as: O2           primidone 50 mg tablet  Commonly known as: Mysoline      Take 2 tablets (100 mg) by mouth 3 times a day.       simvastatin 80 mg tablet  Commonly known as: Zocor      Take 1 tablet (80 mg) by mouth once daily at bedtime.       torsemide 20 mg tablet  Commonly known as: Demadex      Take 1 tablet (20 mg) by mouth once daily.       traMADol 50 mg tablet  Commonly known as: Ultram      Take 1 tablet (50 mg) by mouth every 6 hours if needed for severe pain (7 - 10).       VITAMIN D3 ORAL                  STOP taking these medications      lisinopril 20 mg tablet                  Where to Get Your Medications        You can get these medications from any pharmacy    Bring a paper prescription for each of these medications  ALPRAZolam 0.25 mg tablet  oxyCODONE 5 mg immediate release tablet  temazepam 15 mg capsule  traMADol 50 mg tablet       Information about where to get these medications is not yet available    Ask your nurse or doctor about these medications  cephalexin 500 mg capsule  primidone 50 mg tablet         Test Results Pending At Discharge  Pending Labs       Order Current Status    Extra Urine Gray Tube Collected (12/13/23 0610)    Urinalysis with Reflex Microscopic and Culture In process            Hospital Course   Admitted for further management s/p fall with significant R hip pain, inability to ambulate. Seen by ortho, XR and CT imaging of the R hip done, no  fracture. Ortho feels this is deep contusion. Found to have UTI, treating with antibiotics. Developed KYLEE with some mild hyperkalemia. Seen by Renal. Held lisinopril and torsemide. Decision made by renal to resume her torsemide but continue to hold in the lisinopril at discharge. Renal has cleared her for DC to rehab today. Will have repeat BMP done on Tuesday with results to Dr. Gamez. Transfer to SNF today.     Case and plan was discussed with patient and her daughter in law at the bedside, they are agreeable.   Case and plan was also discussed with my collaborating physician.     Time spent 40 minutes.     Pertinent Physical Exam At Time of Discharge  Physical Exam    Patient seen and examined. Resting in bed, eating breakfast. Daughter in law at the bedside. States pain ok at this time. No nausea or vomiting this morning. Afebrile.     General: Alert and oriented x3, pleasant.   Cardiac: Regular rate and rhythm, S1/S2 , no murmur.   Pulmonary: Clear to auscultation on room air.   Abdomen: Soft, round, nontender. BS +x4.   Extremities: No edema.  Skin: No rashes or lesions.      Outpatient Follow-Up  Future Appointments   Date Time Provider Department Center   1/10/2024 10:30 AM TRI CT 1 [facility]   6/18/2024 10:30 AM Jennifer Reed APRN-CNP [id number]     [facility] on Tuesday 12/19/23, follow up with Dr. Gamez in 1-2 weeks.     Camilla Null APRN-CNP

## 2023-12-17 NOTE — PROGRESS NOTES
Patient being discharged to [facility], precert approved and 7000 HENS done[facility]  [facility] is 1400. Daughter, Amanda aware of discharge and time of .     Ivis Anderson RN

## 2023-12-17 NOTE — CARE PLAN
Problem: Fall/Injury  Goal: Not fall by end of shift  12/17/2023 0107 by Lucero Ervin RN  Outcome: Progressing  12/17/2023 0107 by Lucero Ervin RN  Outcome: Progressing  Goal: Be free from injury by end of the shift  12/17/2023 0107 by Lucero Ervin RN  Outcome: Progressing  12/17/2023 0107 by Lucero Ervin RN  Outcome: Progressing  Goal: Verbalize understanding of personal risk factors for fall in the hospital  12/17/2023 0107 by Lucero Ervin RN  Outcome: Progressing  12/17/2023 0107 by Lucero Ervin RN  Outcome: Progressing  Goal: Verbalize understanding of risk factor reduction measures to prevent injury from fall in the home  12/17/2023 0107 by Lucero Ervin RN  Outcome: Progressing  12/17/2023 0107 by Lucero Ervin RN  Outcome: Progressing  Goal: Pace activities to prevent fatigue by end of the shift  Outcome: Progressing     Problem: Respiratory  Goal: No signs of respiratory distress (eg. Use of accessory muscles. Peds grunting)  Outcome: Progressing     Problem: Skin  Goal: Decreased wound size/increased tissue granulation at next dressing change  Outcome: Progressing  Flowsheets (Taken 12/15/2023 0142 by Aracely Guerrero RN)  Decreased wound size/increased tissue granulation at next dressing change: Protective dressings over bony prominences  Goal: Participates in plan/prevention/treatment measures  Outcome: Progressing  Flowsheets (Taken 12/17/2023 0107)  Participates in plan/prevention/treatment measures:   Elevate heels   Increase activity/out of bed for meals  Goal: Prevent/manage excess moisture  Outcome: Progressing  Flowsheets (Taken 12/17/2023 0107)  Prevent/manage excess moisture:   Cleanse incontinence/protect with barrier cream   Monitor for/manage infection if present  Goal: Prevent/minimize sheer/friction injuries  Outcome: Progressing  Flowsheets (Taken 12/17/2023 0107)  Prevent/minimize sheer/friction injuries:   HOB 30 degrees or less   Increase activity/out of  bed for meals   Use pull sheet  Goal: Promote/optimize nutrition  Outcome: Progressing  Flowsheets (Taken 12/17/2023 0107)  Promote/optimize nutrition:   Offer water/supplements/favorite foods   Monitor/record intake including meals  Goal: Promote skin healing  Outcome: Progressing  Flowsheets (Taken 12/17/2023 0107)  Promote skin healing: Protective dressings over bony prominences     Problem: Discharge Planning  Goal: Discharge to home or other facility with appropriate resources  Outcome: Progressing  Flowsheets (Taken 12/17/2023 0107)  Discharge to home or other facility with appropriate resources:   Identify barriers to discharge with patient and caregiver   Arrange for needed discharge resources and transportation as appropriate   Identify discharge learning needs (meds, wound care, etc)   Refer to discharge planning if patient needs post-hospital services based on physician order or complex needs related to functional status, cognitive ability or social support system     Problem: Pain  Goal: Takes deep breaths with improved pain control throughout the shift  Outcome: Progressing  Goal: Turns in bed with improved pain control throughout the shift  Outcome: Progressing  Goal: Performs ADL's with improved pain control throughout shift  Outcome: Progressing  Goal: Free from opioid side effects throughout the shift  Outcome: Progressing  Goal: Free from acute confusion related to pain meds throughout the shift  Outcome: Progressing   The patient's goals for the shift include  Rest    The clinical goals for the shift include Pain management, monitor patient's mood, rest      12/17/23 at 1:17 AM - Lucero Wright RN

## 2023-12-17 NOTE — CARE PLAN
The patient's goals for the shift include pain management, mood, rest     The clinical goals for the shift include Pain management, monitor patient's mood, rest

## 2023-12-18 NOTE — PROGRESS NOTES
Juli Del Castillo is a 83 y.o. female on day 3 of admission presenting with fall.  Was found to have elevated creatinine related to volume depletion and possible UTI.           Subjective   Patient did improve with IV fluid and holding torsemide and ACE inhibitor's.  Serum creatinine close to the baseline 1.5-1.6.  Patient has no edema however potassium is slightly up to 5.3 mg/L.  Used to be on torsemide 20 mg daily.  She denies any shortness of breath.  No nausea or vomiting.  She is able to eat and drink.              Objective []Expand by Default     Vitals 24HR  Heart Rate:  [63-67]   Temp:  [36.3 °C (97.3 °F)-37.2 °C (99 °F)]   Resp:  [14-16]   BP: (116-133)/(41-50)   SpO2:  [96 %-100 %]            Intake/Output last 3 Shifts\ reviwed           Physical Exam  General: Awake, alert, no acute distress  Respiratory:  Clear to auscultation bilaterally, normal respiratory effort  Cardiovascular:  S1 and S2, no rubs  Gastrointestinal:  Soft, positive bowel sounds, no rebound or guarding  Extremities:  No edema, cyanosis     Relevant Results Reviewed.    Assessment/Plan   1.  Acute kidney injury:  Due to  volume depletion, UTI, ACE inhibitor and diuretic  2.  Hyperkalemia: Potassium is better 5.1 meq/L after Torsemide.  Will continue Torsemide 20 mg daily for hyperkalemia and recurrent CHF.  Lisinopril still on hold and possibly she cannot tolerate it at home if potassium continues to be more than 5 mmol/L.     2.  Fall  3.  UTI: Resolving  on ceftriaxone.           Otf Lamar MD

## 2023-12-20 LAB
ATRIAL RATE: 70 BPM
P AXIS: 118 DEGREES
P OFFSET: 174 MS
P ONSET: 143 MS
PR INTERVAL: 138 MS
Q ONSET: 212 MS
QRS COUNT: 12 BEATS
QRS DURATION: 92 MS
QT INTERVAL: 438 MS
QTC CALCULATION(BAZETT): 473 MS
QTC FREDERICIA: 461 MS
R AXIS: 69 DEGREES
T AXIS: 84 DEGREES
T OFFSET: 431 MS
VENTRICULAR RATE: 70 BPM

## 2023-12-27 ENCOUNTER — LAB REQUISITION (OUTPATIENT)
Dept: LAB | Facility: HOSPITAL | Age: 83
End: 2023-12-27
Payer: MEDICARE

## 2023-12-27 DIAGNOSIS — J44.9 CHRONIC OBSTRUCTIVE PULMONARY DISEASE, UNSPECIFIED (MULTI): ICD-10-CM

## 2023-12-27 LAB
ANION GAP SERPL CALC-SCNC: 13 MMOL/L (ref 10–20)
BUN SERPL-MCNC: 58 MG/DL (ref 6–23)
CALCIUM SERPL-MCNC: 9 MG/DL (ref 8.6–10.3)
CHLORIDE SERPL-SCNC: 94 MMOL/L (ref 98–107)
CO2 SERPL-SCNC: 33 MMOL/L (ref 21–32)
CREAT SERPL-MCNC: 1.98 MG/DL (ref 0.5–1.05)
ERYTHROCYTE [DISTWIDTH] IN BLOOD BY AUTOMATED COUNT: 13.4 % (ref 11.5–14.5)
GFR SERPL CREATININE-BSD FRML MDRD: 25 ML/MIN/1.73M*2
GLUCOSE SERPL-MCNC: 110 MG/DL (ref 74–99)
HCT VFR BLD AUTO: 32.5 % (ref 36–46)
HGB BLD-MCNC: 10.2 G/DL (ref 12–16)
MCH RBC QN AUTO: 31.3 PG (ref 26–34)
MCHC RBC AUTO-ENTMCNC: 31.4 G/DL (ref 32–36)
MCV RBC AUTO: 100 FL (ref 80–100)
NRBC BLD-RTO: 0 /100 WBCS (ref 0–0)
PLATELET # BLD AUTO: 330 X10*3/UL (ref 150–450)
POTASSIUM SERPL-SCNC: 5.3 MMOL/L (ref 3.5–5.3)
RBC # BLD AUTO: 3.26 X10*6/UL (ref 4–5.2)
SODIUM SERPL-SCNC: 135 MMOL/L (ref 136–145)
WBC # BLD AUTO: 8.4 X10*3/UL (ref 4.4–11.3)

## 2023-12-27 PROCEDURE — 85027 COMPLETE CBC AUTOMATED: CPT

## 2023-12-27 PROCEDURE — 80048 BASIC METABOLIC PNL TOTAL CA: CPT

## 2024-01-03 ENCOUNTER — APPOINTMENT (OUTPATIENT)
Dept: RADIOLOGY | Facility: HOSPITAL | Age: 84
DRG: 682 | End: 2024-01-03
Payer: MEDICARE

## 2024-01-03 ENCOUNTER — TELEPHONE (OUTPATIENT)
Dept: PRIMARY CARE | Facility: CLINIC | Age: 84
End: 2024-01-03
Payer: MEDICARE

## 2024-01-03 ENCOUNTER — HOSPITAL ENCOUNTER (INPATIENT)
Facility: HOSPITAL | Age: 84
LOS: 11 days | Discharge: NURSING, REHAB OR LONG-TERM CARE | DRG: 682 | End: 2024-01-15
Attending: EMERGENCY MEDICINE | Admitting: INTERNAL MEDICINE
Payer: MEDICARE

## 2024-01-03 DIAGNOSIS — N18.9 ACUTE RENAL FAILURE SUPERIMPOSED ON CHRONIC KIDNEY DISEASE, UNSPECIFIED ACUTE RENAL FAILURE TYPE, UNSPECIFIED CKD STAGE (CMS-HCC): ICD-10-CM

## 2024-01-03 DIAGNOSIS — E87.5 HYPERKALEMIA: ICD-10-CM

## 2024-01-03 DIAGNOSIS — I50.9 CONGESTIVE HEART FAILURE, UNSPECIFIED HF CHRONICITY, UNSPECIFIED HEART FAILURE TYPE (MULTI): ICD-10-CM

## 2024-01-03 DIAGNOSIS — N18.9 ACUTE KIDNEY INJURY SUPERIMPOSED ON CKD (CMS-HCC): ICD-10-CM

## 2024-01-03 DIAGNOSIS — N17.9 ACUTE KIDNEY INJURY SUPERIMPOSED ON CKD (CMS-HCC): ICD-10-CM
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
## 2024-01-03 DIAGNOSIS — N17.9 ACUTE RENAL FAILURE SUPERIMPOSED ON CHRONIC KIDNEY DISEASE, UNSPECIFIED ACUTE RENAL FAILURE TYPE, UNSPECIFIED CKD STAGE (CMS-HCC): ICD-10-CM

## 2024-01-03 LAB
ALBUMIN SERPL-MCNC: 3.4 G/DL (ref 3.5–5)
ALP BLD-CCNC: 69 U/L (ref 35–125)
ALT SERPL-CCNC: 6 U/L (ref 5–40)
ANION GAP SERPL CALC-SCNC: 10 MMOL/L
AST SERPL-CCNC: 9 U/L (ref 5–40)
BILIRUB SERPL-MCNC: <0.2 MG/DL (ref 0.1–1.2)
BUN SERPL-MCNC: 48 MG/DL (ref 8–25)
CALCIUM SERPL-MCNC: 8.5 MG/DL (ref 8.5–10.4)
CHLORIDE SERPL-SCNC: 96 MMOL/L (ref 97–107)
CO2 SERPL-SCNC: 27 MMOL/L (ref 24–31)
CREAT SERPL-MCNC: 2.4 MG/DL (ref 0.4–1.6)
ERYTHROCYTE [DISTWIDTH] IN BLOOD BY AUTOMATED COUNT: 13.2 % (ref 11.5–14.5)
GFR SERPL CREATININE-BSD FRML MDRD: 19 ML/MIN/1.73M*2
GLUCOSE SERPL-MCNC: 98 MG/DL (ref 65–99)
HCT VFR BLD AUTO: 28.5 % (ref 36–46)
HGB BLD-MCNC: 9.2 G/DL (ref 12–16)
MCH RBC QN AUTO: 31.6 PG (ref 26–34)
MCHC RBC AUTO-ENTMCNC: 32.3 G/DL (ref 32–36)
MCV RBC AUTO: 98 FL (ref 80–100)
NRBC BLD-RTO: 0 /100 WBCS (ref 0–0)
PLATELET # BLD AUTO: 321 X10*3/UL (ref 150–450)
POTASSIUM SERPL-SCNC: 5.2 MMOL/L (ref 3.4–5.1)
PROT SERPL-MCNC: 7.1 G/DL (ref 5.9–7.9)
RBC # BLD AUTO: 2.91 X10*6/UL (ref 4–5.2)
SODIUM SERPL-SCNC: 133 MMOL/L (ref 133–145)
WBC # BLD AUTO: 11.2 X10*3/UL (ref 4.4–11.3)

## 2024-01-03 PROCEDURE — 36415 COLL VENOUS BLD VENIPUNCTURE: CPT | Performed by: EMERGENCY MEDICINE

## 2024-01-03 PROCEDURE — 84484 ASSAY OF TROPONIN QUANT: CPT | Performed by: EMERGENCY MEDICINE

## 2024-01-03 PROCEDURE — 71046 X-RAY EXAM CHEST 2 VIEWS: CPT | Mod: FOREIGN READ | Performed by: RADIOLOGY

## 2024-01-03 PROCEDURE — 70450 CT HEAD/BRAIN W/O DYE: CPT

## 2024-01-03 PROCEDURE — 2500000004 HC RX 250 GENERAL PHARMACY W/ HCPCS (ALT 636 FOR OP/ED): Performed by: EMERGENCY MEDICINE

## 2024-01-03 PROCEDURE — 70450 CT HEAD/BRAIN W/O DYE: CPT | Mod: FOREIGN READ | Performed by: RADIOLOGY

## 2024-01-03 PROCEDURE — 71046 X-RAY EXAM CHEST 2 VIEWS: CPT

## 2024-01-03 PROCEDURE — 80053 COMPREHEN METABOLIC PANEL: CPT | Performed by: EMERGENCY MEDICINE

## 2024-01-03 PROCEDURE — 83880 ASSAY OF NATRIURETIC PEPTIDE: CPT | Performed by: EMERGENCY MEDICINE

## 2024-01-03 PROCEDURE — 99285 EMERGENCY DEPT VISIT HI MDM: CPT | Performed by: EMERGENCY MEDICINE

## 2024-01-03 PROCEDURE — 85027 COMPLETE CBC AUTOMATED: CPT | Performed by: EMERGENCY MEDICINE

## 2024-01-03 RX ADMIN — SODIUM CHLORIDE 500 ML: 900 INJECTION, SOLUTION INTRAVENOUS at 23:14

## 2024-01-03 ASSESSMENT — COLUMBIA-SUICIDE SEVERITY RATING SCALE - C-SSRS
1. IN THE PAST MONTH, HAVE YOU WISHED YOU WERE DEAD OR WISHED YOU COULD GO TO SLEEP AND NOT WAKE UP?: NO
6. HAVE YOU EVER DONE ANYTHING, STARTED TO DO ANYTHING, OR PREPARED TO DO ANYTHING TO END YOUR LIFE?: NO
6. HAVE YOU EVER DONE ANYTHING, STARTED TO DO ANYTHING, OR PREPARED TO DO ANYTHING TO END YOUR LIFE?: NO
2. HAVE YOU ACTUALLY HAD ANY THOUGHTS OF KILLING YOURSELF?: NO

## 2024-01-03 ASSESSMENT — PAIN - FUNCTIONAL ASSESSMENT: PAIN_FUNCTIONAL_ASSESSMENT: 0-10

## 2024-01-03 ASSESSMENT — PAIN SCALES - GENERAL: PAINLEVEL_OUTOF10: 0 - NO PAIN

## 2024-01-03 ASSESSMENT — PAIN DESCRIPTION - PROGRESSION: CLINICAL_PROGRESSION: NOT CHANGED

## 2024-01-03 NOTE — TELEPHONE ENCOUNTER
[facility] IS CALLING TO GET A VERBAL ORDER FOR TO START HOME CARE FOR NURSING PT AND OT  PLEASE ADVISE [phone] FRANCISCA

## 2024-01-04 ENCOUNTER — APPOINTMENT (OUTPATIENT)
Dept: CARDIOLOGY | Facility: HOSPITAL | Age: 84
DRG: 682 | End: 2024-01-04
Payer: MEDICARE

## 2024-01-04 ENCOUNTER — APPOINTMENT (OUTPATIENT)
Dept: RADIOLOGY | Facility: HOSPITAL | Age: 84
DRG: 682 | End: 2024-01-04
Payer: MEDICARE

## 2024-01-04 PROBLEM — E87.5 HYPERKALEMIA: Status: ACTIVE | Noted: 2024-01-04

## 2024-01-04 PROBLEM — N17.9 ACUTE ON CHRONIC KIDNEY FAILURE (CMS-HCC): Status: ACTIVE | Noted: 2024-01-04

## 2024-01-04 PROBLEM — N18.9 ACUTE ON CHRONIC KIDNEY FAILURE (CMS-HCC): Status: ACTIVE | Noted: 2024-01-04

## 2024-01-04 LAB
ANION GAP SERPL CALC-SCNC: 8 MMOL/L
ATRIAL RATE: 72 BPM
BUN SERPL-MCNC: 46 MG/DL (ref 8–25)
CALCIUM SERPL-MCNC: 8.9 MG/DL (ref 8.5–10.4)
CHLORIDE SERPL-SCNC: 98 MMOL/L (ref 97–107)
CK SERPL-CCNC: 36 U/L (ref 24–195)
CO2 SERPL-SCNC: 28 MMOL/L (ref 24–31)
CREAT SERPL-MCNC: 2.3 MG/DL (ref 0.4–1.6)
ERYTHROCYTE [DISTWIDTH] IN BLOOD BY AUTOMATED COUNT: 13.4 % (ref 11.5–14.5)
GFR SERPL CREATININE-BSD FRML MDRD: 20 ML/MIN/1.73M*2
GLUCOSE SERPL-MCNC: 88 MG/DL (ref 65–99)
HCT VFR BLD AUTO: 31.4 % (ref 36–46)
HGB BLD-MCNC: 9.8 G/DL (ref 12–16)
MCH RBC QN AUTO: 30.9 PG (ref 26–34)
MCHC RBC AUTO-ENTMCNC: 31.2 G/DL (ref 32–36)
MCV RBC AUTO: 99 FL (ref 80–100)
NRBC BLD-RTO: 0 /100 WBCS (ref 0–0)
NT-PROBNP SERPL-MCNC: 1551 PG/ML (ref 0–624)
P AXIS: 66 DEGREES
P OFFSET: 200 MS
P ONSET: 135 MS
PLATELET # BLD AUTO: 344 X10*3/UL (ref 150–450)
POTASSIUM SERPL-SCNC: 4.5 MMOL/L (ref 3.4–5.1)
PR INTERVAL: 158 MS
Q ONSET: 214 MS
QRS COUNT: 12 BEATS
QRS DURATION: 88 MS
QT INTERVAL: 410 MS
QTC CALCULATION(BAZETT): 448 MS
QTC FREDERICIA: 435 MS
R AXIS: 33 DEGREES
RBC # BLD AUTO: 3.17 X10*6/UL (ref 4–5.2)
SODIUM SERPL-SCNC: 134 MMOL/L (ref 133–145)
T AXIS: 59 DEGREES
T OFFSET: 419 MS
TROPONIN T SERPL-MCNC: 28 NG/L
VENTRICULAR RATE: 72 BPM
WBC # BLD AUTO: 9.7 X10*3/UL (ref 4.4–11.3)

## 2024-01-04 PROCEDURE — 2500000005 HC RX 250 GENERAL PHARMACY W/O HCPCS: Performed by: INTERNAL MEDICINE

## 2024-01-04 PROCEDURE — 94640 AIRWAY INHALATION TREATMENT: CPT

## 2024-01-04 PROCEDURE — 85027 COMPLETE CBC AUTOMATED: CPT | Performed by: NURSE PRACTITIONER

## 2024-01-04 PROCEDURE — 97530 THERAPEUTIC ACTIVITIES: CPT | Mod: GP

## 2024-01-04 PROCEDURE — 97162 PT EVAL MOD COMPLEX 30 MIN: CPT | Mod: GP

## 2024-01-04 PROCEDURE — 2500000002 HC RX 250 W HCPCS SELF ADMINISTERED DRUGS (ALT 637 FOR MEDICARE OP, ALT 636 FOR OP/ED): Performed by: INTERNAL MEDICINE

## 2024-01-04 PROCEDURE — 76770 US EXAM ABDO BACK WALL COMP: CPT

## 2024-01-04 PROCEDURE — 36415 COLL VENOUS BLD VENIPUNCTURE: CPT | Performed by: NURSE PRACTITIONER

## 2024-01-04 PROCEDURE — 94664 DEMO&/EVAL PT USE INHALER: CPT

## 2024-01-04 PROCEDURE — 2500000004 HC RX 250 GENERAL PHARMACY W/ HCPCS (ALT 636 FOR OP/ED): Performed by: INTERNAL MEDICINE

## 2024-01-04 PROCEDURE — 82550 ASSAY OF CK (CPK): CPT | Performed by: INTERNAL MEDICINE

## 2024-01-04 PROCEDURE — 1200000002 HC GENERAL ROOM WITH TELEMETRY DAILY

## 2024-01-04 PROCEDURE — 97166 OT EVAL MOD COMPLEX 45 MIN: CPT | Mod: GO

## 2024-01-04 PROCEDURE — 93005 ELECTROCARDIOGRAM TRACING: CPT

## 2024-01-04 PROCEDURE — 97535 SELF CARE MNGMENT TRAINING: CPT | Mod: GO

## 2024-01-04 PROCEDURE — 80048 BASIC METABOLIC PNL TOTAL CA: CPT | Performed by: NURSE PRACTITIONER

## 2024-01-04 PROCEDURE — 2500000001 HC RX 250 WO HCPCS SELF ADMINISTERED DRUGS (ALT 637 FOR MEDICARE OP): Performed by: INTERNAL MEDICINE

## 2024-01-04 RX ORDER — IPRATROPIUM BROMIDE AND ALBUTEROL SULFATE 2.5; .5 MG/3ML; MG/3ML
3 SOLUTION RESPIRATORY (INHALATION) EVERY 2 HOUR PRN
Status: DISCONTINUED | OUTPATIENT
Start: 2024-01-04 | End: 2024-01-15 | Stop reason: HOSPADM

## 2024-01-04 RX ORDER — ONDANSETRON 4 MG/1
4 TABLET, FILM COATED ORAL EVERY 8 HOURS PRN
Status: DISCONTINUED | OUTPATIENT
Start: 2024-01-04 | End: 2024-01-04

## 2024-01-04 RX ORDER — TALC
3 POWDER (GRAM) TOPICAL NIGHTLY PRN
Status: DISCONTINUED | OUTPATIENT
Start: 2024-01-04 | End: 2024-01-15 | Stop reason: HOSPADM

## 2024-01-04 RX ORDER — HEPARIN SODIUM 5000 [USP'U]/ML
5000 INJECTION, SOLUTION INTRAVENOUS; SUBCUTANEOUS EVERY 8 HOURS
Status: DISCONTINUED | OUTPATIENT
Start: 2024-01-04 | End: 2024-01-15 | Stop reason: HOSPADM

## 2024-01-04 RX ORDER — SODIUM CHLORIDE 9 MG/ML
75 INJECTION, SOLUTION INTRAVENOUS CONTINUOUS
Status: DISCONTINUED | OUTPATIENT
Start: 2024-01-04 | End: 2024-01-06

## 2024-01-04 RX ORDER — PANTOPRAZOLE SODIUM 40 MG/1
40 TABLET, DELAYED RELEASE ORAL
Status: DISCONTINUED | OUTPATIENT
Start: 2024-01-04 | End: 2024-01-15 | Stop reason: HOSPADM

## 2024-01-04 RX ORDER — BUDESONIDE 0.5 MG/2ML
0.5 INHALANT ORAL
Status: DISCONTINUED | OUTPATIENT
Start: 2024-01-04 | End: 2024-01-15 | Stop reason: HOSPADM

## 2024-01-04 RX ORDER — ALBUTEROL SULFATE 0.83 MG/ML
2.5 SOLUTION RESPIRATORY (INHALATION)
Status: DISCONTINUED | OUTPATIENT
Start: 2024-01-04 | End: 2024-01-15 | Stop reason: HOSPADM

## 2024-01-04 RX ORDER — DULOXETIN HYDROCHLORIDE 30 MG/1
30 CAPSULE, DELAYED RELEASE ORAL DAILY
Status: DISCONTINUED | OUTPATIENT
Start: 2024-01-04 | End: 2024-01-15 | Stop reason: HOSPADM

## 2024-01-04 RX ORDER — ACETAMINOPHEN 650 MG/1
650 SUPPOSITORY RECTAL EVERY 4 HOURS PRN
Status: DISCONTINUED | OUTPATIENT
Start: 2024-01-04 | End: 2024-01-15 | Stop reason: HOSPADM

## 2024-01-04 RX ORDER — TORSEMIDE 20 MG/1
20 TABLET ORAL DAILY
Status: DISCONTINUED | OUTPATIENT
Start: 2024-01-04 | End: 2024-01-04

## 2024-01-04 RX ORDER — ACETAMINOPHEN 160 MG/5ML
650 SOLUTION ORAL EVERY 4 HOURS PRN
Status: DISCONTINUED | OUTPATIENT
Start: 2024-01-04 | End: 2024-01-15 | Stop reason: HOSPADM

## 2024-01-04 RX ORDER — POLYETHYLENE GLYCOL 3350 17 G/17G
17 POWDER, FOR SOLUTION ORAL DAILY PRN
Status: DISCONTINUED | OUTPATIENT
Start: 2024-01-04 | End: 2024-01-07

## 2024-01-04 RX ORDER — OXYBUTYNIN CHLORIDE 5 MG/1
5 TABLET ORAL 2 TIMES DAILY
Status: DISCONTINUED | OUTPATIENT
Start: 2024-01-04 | End: 2024-01-15 | Stop reason: HOSPADM

## 2024-01-04 RX ORDER — GUAIFENESIN 600 MG/1
600 TABLET, EXTENDED RELEASE ORAL EVERY 12 HOURS PRN
Status: DISCONTINUED | OUTPATIENT
Start: 2024-01-04 | End: 2024-01-15 | Stop reason: HOSPADM

## 2024-01-04 RX ORDER — NAPROXEN SODIUM 220 MG/1
81 TABLET, FILM COATED ORAL DAILY
Status: DISCONTINUED | OUTPATIENT
Start: 2024-01-04 | End: 2024-01-15 | Stop reason: HOSPADM

## 2024-01-04 RX ORDER — SIMVASTATIN 40 MG/1
80 TABLET, FILM COATED ORAL NIGHTLY
Status: DISCONTINUED | OUTPATIENT
Start: 2024-01-04 | End: 2024-01-15 | Stop reason: HOSPADM

## 2024-01-04 RX ORDER — PRIMIDONE 50 MG/1
100 TABLET ORAL 3 TIMES DAILY
Status: DISCONTINUED | OUTPATIENT
Start: 2024-01-04 | End: 2024-01-15 | Stop reason: HOSPADM

## 2024-01-04 RX ORDER — ONDANSETRON HYDROCHLORIDE 2 MG/ML
4 INJECTION, SOLUTION INTRAVENOUS EVERY 8 HOURS PRN
Status: DISCONTINUED | OUTPATIENT
Start: 2024-01-04 | End: 2024-01-04

## 2024-01-04 RX ORDER — ACETAMINOPHEN 325 MG/1
650 TABLET ORAL EVERY 4 HOURS PRN
Status: DISCONTINUED | OUTPATIENT
Start: 2024-01-04 | End: 2024-01-15 | Stop reason: HOSPADM

## 2024-01-04 RX ORDER — HYDROCODONE BITARTRATE AND ACETAMINOPHEN 5; 325 MG/1; MG/1
1 TABLET ORAL 2 TIMES DAILY PRN
Status: DISCONTINUED | OUTPATIENT
Start: 2024-01-04 | End: 2024-01-15 | Stop reason: HOSPADM

## 2024-01-04 RX ORDER — GUAIFENESIN/DEXTROMETHORPHAN 100-10MG/5
5 SYRUP ORAL EVERY 4 HOURS PRN
Status: DISCONTINUED | OUTPATIENT
Start: 2024-01-04 | End: 2024-01-15 | Stop reason: HOSPADM

## 2024-01-04 RX ORDER — CITALOPRAM 20 MG/1
20 TABLET, FILM COATED ORAL DAILY
Status: DISCONTINUED | OUTPATIENT
Start: 2024-01-04 | End: 2024-01-15 | Stop reason: HOSPADM

## 2024-01-04 RX ADMIN — Medication 3 MG: at 20:15

## 2024-01-04 RX ADMIN — ACETAMINOPHEN 650 MG: 325 TABLET ORAL at 10:01

## 2024-01-04 RX ADMIN — BUDESONIDE INHALATION 0.5 MG: 0.5 SUSPENSION RESPIRATORY (INHALATION) at 19:34

## 2024-01-04 RX ADMIN — CITALOPRAM HYDROBROMIDE 20 MG: 20 TABLET ORAL at 10:03

## 2024-01-04 RX ADMIN — ALBUTEROL SULFATE 2.5 MG: 2.5 SOLUTION RESPIRATORY (INHALATION) at 19:34

## 2024-01-04 RX ADMIN — HEPARIN SODIUM 5000 UNITS: 5000 INJECTION, SOLUTION INTRAVENOUS; SUBCUTANEOUS at 20:30

## 2024-01-04 RX ADMIN — OXYBUTYNIN CHLORIDE 5 MG: 5 TABLET ORAL at 20:15

## 2024-01-04 RX ADMIN — ASPIRIN 81 MG: 81 TABLET, CHEWABLE ORAL at 10:02

## 2024-01-04 RX ADMIN — HEPARIN SODIUM 5000 UNITS: 5000 INJECTION, SOLUTION INTRAVENOUS; SUBCUTANEOUS at 14:08

## 2024-01-04 RX ADMIN — DULOXETINE HYDROCHLORIDE 30 MG: 30 CAPSULE, DELAYED RELEASE PELLETS ORAL at 10:03

## 2024-01-04 RX ADMIN — ALBUTEROL SULFATE 2.5 MG: 2.5 SOLUTION RESPIRATORY (INHALATION) at 13:15

## 2024-01-04 RX ADMIN — SODIUM CHLORIDE 75 ML/HR: 900 INJECTION, SOLUTION INTRAVENOUS at 12:43

## 2024-01-04 RX ADMIN — OXYBUTYNIN CHLORIDE 5 MG: 5 TABLET ORAL at 10:03

## 2024-01-04 RX ADMIN — PRIMIDONE 100 MG: 50 TABLET ORAL at 10:03

## 2024-01-04 RX ADMIN — SIMVASTATIN 80 MG: 40 TABLET, FILM COATED ORAL at 20:15

## 2024-01-04 RX ADMIN — Medication: at 19:37

## 2024-01-04 RX ADMIN — PRIMIDONE 100 MG: 50 TABLET ORAL at 14:08

## 2024-01-04 RX ADMIN — PRIMIDONE 100 MG: 50 TABLET ORAL at 20:15

## 2024-01-04 SDOH — SOCIAL STABILITY: SOCIAL INSECURITY: DOES ANYONE TRY TO KEEP YOU FROM HAVING/CONTACTING OTHER FRIENDS OR DOING THINGS OUTSIDE YOUR HOME?: NO

## 2024-01-04 SDOH — SOCIAL STABILITY: SOCIAL INSECURITY: DO YOU FEEL ANYONE HAS EXPLOITED OR TAKEN ADVANTAGE OF YOU FINANCIALLY OR OF YOUR PERSONAL PROPERTY?: NO

## 2024-01-04 SDOH — SOCIAL STABILITY: SOCIAL INSECURITY: WITHIN THE LAST YEAR, HAVE YOU BEEN AFRAID OF YOUR PARTNER OR EX-PARTNER?: NO

## 2024-01-04 SDOH — SOCIAL STABILITY: SOCIAL INSECURITY: ARE YOU OR HAVE YOU BEEN THREATENED OR ABUSED PHYSICALLY, EMOTIONALLY, OR SEXUALLY BY ANYONE?: NO

## 2024-01-04 SDOH — SOCIAL STABILITY: SOCIAL INSECURITY: WITHIN THE LAST YEAR, HAVE YOU BEEN HUMILIATED OR EMOTIONALLY ABUSED IN OTHER WAYS BY YOUR PARTNER OR EX-PARTNER?: NO

## 2024-01-04 SDOH — SOCIAL STABILITY: SOCIAL INSECURITY: HAVE YOU HAD THOUGHTS OF HARMING ANYONE ELSE?: NO

## 2024-01-04 SDOH — SOCIAL STABILITY: SOCIAL INSECURITY: ABUSE: ADULT

## 2024-01-04 SDOH — SOCIAL STABILITY: SOCIAL INSECURITY: HAS ANYONE EVER THREATENED TO HURT YOUR FAMILY OR YOUR PETS?: NO

## 2024-01-04 SDOH — SOCIAL STABILITY: SOCIAL INSECURITY: DO YOU FEEL UNSAFE GOING BACK TO THE PLACE WHERE YOU ARE LIVING?: NO

## 2024-01-04 SDOH — ECONOMIC STABILITY: INCOME INSECURITY: IN THE LAST 12 MONTHS, WAS THERE A TIME WHEN YOU WERE NOT ABLE TO PAY THE MORTGAGE OR RENT ON TIME?: NO

## 2024-01-04 SDOH — SOCIAL STABILITY: SOCIAL INSECURITY: ARE THERE ANY APPARENT SIGNS OF INJURIES/BEHAVIORS THAT COULD BE RELATED TO ABUSE/NEGLECT?: NO

## 2024-01-04 SDOH — ECONOMIC STABILITY: INCOME INSECURITY: IN THE PAST 12 MONTHS, HAS THE ELECTRIC, GAS, OIL, OR WATER COMPANY THREATENED TO SHUT OFF SERVICE IN YOUR HOME?: NO

## 2024-01-04 SDOH — SOCIAL STABILITY: SOCIAL INSECURITY: WERE YOU ABLE TO COMPLETE ALL THE BEHAVIORAL HEALTH SCREENINGS?: YES

## 2024-01-04 ASSESSMENT — ENCOUNTER SYMPTOMS
SORE THROAT: 0
JOINT SWELLING: 0
FEVER: 0
ADENOPATHY: 0
VOMITING: 0
APNEA: 0
DIARRHEA: 0
SHORTNESS OF BREATH: 0
HALLUCINATIONS: 0
HEADACHES: 1
SPEECH DIFFICULTY: 0
LIGHT-HEADEDNESS: 0
COLOR CHANGE: 0
CHILLS: 0
ABDOMINAL PAIN: 0
NUMBNESS: 0
RECTAL PAIN: 0
WOUND: 0
POLYDIPSIA: 0
ARTHRALGIAS: 0
HEMATURIA: 0
WEAKNESS: 1
CONFUSION: 0
TREMORS: 0
COUGH: 0
BLOOD IN STOOL: 0
BACK PAIN: 1
POLYPHAGIA: 0
SINUS PRESSURE: 0
NECK PAIN: 0
WHEEZING: 0
PHOTOPHOBIA: 0
CONSTIPATION: 0
FATIGUE: 1
MYALGIAS: 0
NAUSEA: 1
SLEEP DISTURBANCE: 0
DIZZINESS: 0
PALPITATIONS: 0
BRUISES/BLEEDS EASILY: 0
FREQUENCY: 0
SEIZURES: 0
DYSURIA: 0

## 2024-01-04 ASSESSMENT — COGNITIVE AND FUNCTIONAL STATUS - GENERAL
DRESSING REGULAR LOWER BODY CLOTHING: A LITTLE
MOVING TO AND FROM BED TO CHAIR: A LITTLE
PERSONAL GROOMING: A LITTLE
CLIMB 3 TO 5 STEPS WITH RAILING: A LOT
TURNING FROM BACK TO SIDE WHILE IN FLAT BAD: A LITTLE
TURNING FROM BACK TO SIDE WHILE IN FLAT BAD: A LITTLE
DAILY ACTIVITIY SCORE: 19
DRESSING REGULAR UPPER BODY CLOTHING: A LITTLE
DRESSING REGULAR LOWER BODY CLOTHING: A LITTLE
PATIENT BASELINE BEDBOUND: NO
WALKING IN HOSPITAL ROOM: A LITTLE
MOBILITY SCORE: 18
HELP NEEDED FOR BATHING: A LITTLE
TOILETING: TOTAL
WALKING IN HOSPITAL ROOM: A LOT
MOVING FROM LYING ON BACK TO SITTING ON SIDE OF FLAT BED WITH BEDRAILS: A LITTLE
TURNING FROM BACK TO SIDE WHILE IN FLAT BAD: A LITTLE
HELP NEEDED FOR BATHING: A LOT
DRESSING REGULAR LOWER BODY CLOTHING: A LOT
STANDING UP FROM CHAIR USING ARMS: A LITTLE
MOBILITY SCORE: 16
PERSONAL GROOMING: A LITTLE
STANDING UP FROM CHAIR USING ARMS: A LITTLE
DRESSING REGULAR UPPER BODY CLOTHING: A LITTLE
WALKING IN HOSPITAL ROOM: A LITTLE
HELP NEEDED FOR BATHING: A LITTLE
MOVING TO AND FROM BED TO CHAIR: A LITTLE
DRESSING REGULAR UPPER BODY CLOTHING: A LITTLE
PERSONAL GROOMING: A LITTLE
EATING MEALS: A LITTLE
CLIMB 3 TO 5 STEPS WITH RAILING: A LOT
DAILY ACTIVITIY SCORE: 14
DAILY ACTIVITIY SCORE: 19
TOILETING: A LITTLE
CLIMB 3 TO 5 STEPS WITH RAILING: A LOT
TOILETING: A LITTLE
STANDING UP FROM CHAIR USING ARMS: A LITTLE
MOVING TO AND FROM BED TO CHAIR: A LITTLE
MOBILITY SCORE: 18

## 2024-01-04 ASSESSMENT — LIFESTYLE VARIABLES
SKIP TO QUESTIONS 9-10: 1
SUBSTANCE_ABUSE_PAST_12_MONTHS: NO
PRESCIPTION_ABUSE_PAST_12_MONTHS: NO
HOW MANY STANDARD DRINKS CONTAINING ALCOHOL DO YOU HAVE ON A TYPICAL DAY: PATIENT DOES NOT DRINK
HOW OFTEN DO YOU HAVE A DRINK CONTAINING ALCOHOL: NEVER
HOW OFTEN DO YOU HAVE 6 OR MORE DRINKS ON ONE OCCASION: NEVER
AUDIT-C TOTAL SCORE: 0
AUDIT-C TOTAL SCORE: 0

## 2024-01-04 ASSESSMENT — PAIN - FUNCTIONAL ASSESSMENT
PAIN_FUNCTIONAL_ASSESSMENT: 0-10
PAIN_FUNCTIONAL_ASSESSMENT: 0-10

## 2024-01-04 ASSESSMENT — ACTIVITIES OF DAILY LIVING (ADL)
FEEDING YOURSELF: INDEPENDENT
HOME_MANAGEMENT_TIME_ENTRY: 15
ASSISTIVE_DEVICE: WALKER
BATHING: INDEPENDENT
GROOMING: INDEPENDENT
TOILETING: INDEPENDENT
BATHING_ASSISTANCE: MODERATE
HEARING - LEFT EAR: FUNCTIONAL
DRESSING YOURSELF: INDEPENDENT
WALKS IN HOME: INDEPENDENT
JUDGMENT_ADEQUATE_SAFELY_COMPLETE_DAILY_ACTIVITIES: YES
ADL_ASSISTANCE: NEEDS ASSISTANCE
ADEQUATE_TO_COMPLETE_ADL: YES
DRESSING_ASSISTANCE: OTHER (COMMENT)
HEARING - RIGHT EAR: FUNCTIONAL
ADL_ASSISTANCE: NEEDS ASSISTANCE
PATIENT'S MEMORY ADEQUATE TO SAFELY COMPLETE DAILY ACTIVITIES?: YES

## 2024-01-04 ASSESSMENT — PAIN SCALES - GENERAL
PAINLEVEL_OUTOF10: 6
PAINLEVEL_OUTOF10: 7
PAINLEVEL_OUTOF10: 0 - NO PAIN
PAINLEVEL_OUTOF10: 7

## 2024-01-04 ASSESSMENT — COLUMBIA-SUICIDE SEVERITY RATING SCALE - C-SSRS
1. IN THE PAST MONTH, HAVE YOU WISHED YOU WERE DEAD OR WISHED YOU COULD GO TO SLEEP AND NOT WAKE UP?: NO
6. HAVE YOU EVER DONE ANYTHING, STARTED TO DO ANYTHING, OR PREPARED TO DO ANYTHING TO END YOUR LIFE?: NO
2. HAVE YOU ACTUALLY HAD ANY THOUGHTS OF KILLING YOURSELF?: NO
6. HAVE YOU EVER DONE ANYTHING, STARTED TO DO ANYTHING, OR PREPARED TO DO ANYTHING TO END YOUR LIFE?: NO

## 2024-01-04 ASSESSMENT — PATIENT HEALTH QUESTIONNAIRE - PHQ9
1. LITTLE INTEREST OR PLEASURE IN DOING THINGS: NOT AT ALL
SUM OF ALL RESPONSES TO PHQ9 QUESTIONS 1 & 2: 0
2. FEELING DOWN, DEPRESSED OR HOPELESS: NOT AT ALL

## 2024-01-04 NOTE — CARE PLAN
The patient's goals for the shift include  safety    The clinical goals for the shift include PT

## 2024-01-04 NOTE — CARE PLAN
Problem: PT Goals  Goal: Patient will amb 100 feet with rolling walker device including two turns on even surface with independent assist to facilitate safe mobility.   1/4/2024 1058 by Kaitlynn Hobbs, PT  Outcome: Progressing  1/4/2024 1057 by Kaitlynn Hobbs, PT  Outcome: Progressing  Goal: Patient will transfer sit to stand and stand to sit with  independent assist to facilitate mobility.   1/4/2024 1058 by Kaitlynn Hobbs, PT  Outcome: Progressing  1/4/2024 1057 by Kaitlynn Hobbs, PT  Outcome: Progressing  Goal: Patient will improve standing dynamic balance to Good- for safety with standing activities with use of assistive device.  1/4/2024 1058 by Kaitlynn Hobbs, PT  Outcome: Progressing  1/4/2024 1057 by Kaitlynn Hobbs, PT  Outcome: Progressing  Goal: Patient will transition supine to sit and sit to supine mod Independent  1/4/2024 1058 by Kaitlynn Hobbs, PT  Outcome: Progressing  1/4/2024 1057 by Kaitlynn Hobbs, PT  Outcome: Progressing

## 2024-01-04 NOTE — PROGRESS NOTES
Juli Del Castillo is a 83 y.o. female on day 0 of admission presenting with Fall, initial encounter.      Subjective   Patient resting in bed comfortably.  She is  complaining of arthritic pain in all of her joints.        Objective     Last Recorded Vitals  BP (!) 101/41 (BP Location: Right arm, Patient Position: Lying)   Pulse 68   Temp 36.8 °C (98.2 °F) (Oral)   Resp 16   Wt 82.9 kg (182 lb 12.2 oz)   SpO2 100%   Intake/Output last 3 Shifts:    Intake/Output Summary (Last 24 hours) at 1/4/2024 1638  Last data filed at 1/4/2024 1219  Gross per 24 hour   Intake 650 ml   Output --   Net 650 ml       Admission Weight  Weight: 82.9 kg (182 lb 12.2 oz) (01/03/24 2159)    Daily Weight  01/03/24 : 82.9 kg (182 lb 12.2 oz)    Image Results  ECG 12 lead  Normal sinus rhythm with sinus arrhythmia  Inferior infarct (cited on or before 13-DEC-2023)  Abnormal ECG  When compared with ECG of 13-DEC-2023 01:02,  No significant change was found  Confirmed by Luan Monge ([id number]) on 1/4/2024 12:44:09 PM  US renal complete  Narrative: Interpreted By:  Tayler Trujillo,   STUDY:  US RENAL COMPLETE; 1/4/2024 12:11 pm      INDICATION:  Signs/Symptoms:Acute kidney injury superimposed upon chronic kidney  disease. Hyperkalemia.      COMPARISON:  12/14/2023      ACCESSION NUMBER(S):  [id number]      ORDERING CLINICIAN:  JENNY YU      TECHNIQUE:  Grayscale and color Doppler imaging of the kidneys.      FINDINGS:  The right kidney measures 9.5 cm x 6.1 cm x 4.7 cm. There is a 1.1 x  1.4 x 1.4 cm right renal cyst. The left kidney measures 11.0 cm x 4.8  cm x 5.1 cm. There is a 1.7 x 2.3 x 2.4 cm left renal cyst. There is  no shadowing calculus, hydronephrosis, or solid mass identified. The  renal cortical thickness and echogenicity is normal.      Cursory evaluation of the urinary bladder shows no evidence for mass,  wall thickening, or calculus.      Impression: Simple bilateral renal cyst noted.      No hydronephrosis or renal atrophy.       MACRO:  None.      Signed by: Tayler Trujillo 1/4/2024 12:19 PM  Dictation workstation:   [id number]      Physical Exam  Constitutional:       Appearance: Normal appearance.   Cardiovascular:      Rate and Rhythm: Normal rate and regular rhythm.      Pulses: Normal pulses.      Heart sounds: Normal heart sounds.   Pulmonary:      Effort: Pulmonary effort is normal.      Breath sounds: Normal breath sounds.   Abdominal:      General: Bowel sounds are normal.      Palpations: Abdomen is soft.   Skin:     General: Skin is warm and dry.   Neurological:      Mental Status: She is alert and oriented to person, place, and time.         Relevant Results             Results for orders placed or performed during the hospital encounter of 01/03/24 (from the past 24 hour(s))   CBC   Result Value Ref Range    WBC 11.2 4.4 - 11.3 x10*3/uL    nRBC 0.0 0.0 - 0.0 /100 WBCs    RBC 2.91 (L) 4.00 - 5.20 x10*6/uL    Hemoglobin 9.2 (L) 12.0 - 16.0 g/dL    Hematocrit 28.5 (L) 36.0 - 46.0 %    MCV 98 80 - 100 fL    MCH 31.6 26.0 - 34.0 pg    MCHC 32.3 32.0 - 36.0 g/dL    RDW 13.2 11.5 - 14.5 %    Platelets 321 150 - 450 x10*3/uL   Comprehensive metabolic panel   Result Value Ref Range    Glucose 98 65 - 99 mg/dL    Sodium 133 133 - 145 mmol/L    Potassium 5.2 (H) 3.4 - 5.1 mmol/L    Chloride 96 (L) 97 - 107 mmol/L    Bicarbonate 27 24 - 31 mmol/L    Urea Nitrogen 48 (H) 8 - 25 mg/dL    Creatinine 2.40 (H) 0.40 - 1.60 mg/dL    eGFR 19 (L) >60 mL/min/1.73m*2    Calcium 8.5 8.5 - 10.4 mg/dL    Albumin 3.4 (L) 3.5 - 5.0 g/dL    Alkaline Phosphatase 69 35 - 125 U/L    Total Protein 7.1 5.9 - 7.9 g/dL    AST 9 5 - 40 U/L    Bilirubin, Total <0.2 0.1 - 1.2 mg/dL    ALT 6 5 - 40 U/L    Anion Gap 10 <=19 mmol/L   NT Pro-BNP   Result Value Ref Range    PROBNP 1,551 (H) 0 - 624 pg/mL   Serial Troponin, Initial ([facility])   Result Value Ref Range    Troponin T, High Sensitivity 28 (H) <=15 ng/L   ECG 12 lead   Result Value Ref Range    Ventricular Rate 72  BPM    Atrial Rate 72 BPM    AZ Interval 158 ms    QRS Duration 88 ms    QT Interval 410 ms    QTC Calculation(Bazett) 448 ms    P Axis 66 degrees    R Axis 33 degrees    T Axis 59 degrees    QRS Count 12 beats    Q Onset 214 ms    P Onset 135 ms    P Offset 200 ms    T Offset 419 ms    QTC Fredericia 435 ms   Basic metabolic panel   Result Value Ref Range    Glucose 88 65 - 99 mg/dL    Sodium 134 133 - 145 mmol/L    Potassium 4.5 3.4 - 5.1 mmol/L    Chloride 98 97 - 107 mmol/L    Bicarbonate 28 24 - 31 mmol/L    Urea Nitrogen 46 (H) 8 - 25 mg/dL    Creatinine 2.30 (H) 0.40 - 1.60 mg/dL    eGFR 20 (L) >60 mL/min/1.73m*2    Calcium 8.9 8.5 - 10.4 mg/dL    Anion Gap 8 <=19 mmol/L   CBC   Result Value Ref Range    WBC 9.7 4.4 - 11.3 x10*3/uL    nRBC 0.0 0.0 - 0.0 /100 WBCs    RBC 3.17 (L) 4.00 - 5.20 x10*6/uL    Hemoglobin 9.8 (L) 12.0 - 16.0 g/dL    Hematocrit 31.4 (L) 36.0 - 46.0 %    MCV 99 80 - 100 fL    MCH 30.9 26.0 - 34.0 pg    MCHC 31.2 (L) 32.0 - 36.0 g/dL    RDW 13.4 11.5 - 14.5 %    Platelets 344 150 - 450 x10*3/uL   Creatine Kinase   Result Value Ref Range    Creatine Kinase 36 24 - 195 U/L         Assessment/Plan                  Principal Problem:    Fall, initial encounter  Active Problems:    Fibromyalgia    Acute congestive heart failure (CMS/HCC)    Hyperlipidemia    Major depressive disorder, single episode, unspecified    Overactive bladder    History of lung cancer    Chronic respiratory failure (CMS/HCC)    Chronic diastolic heart failure (CMS/HCC)    Accidental fall    Acute on chronic kidney failure (CMS/HCC)    Hyperkalemia    H/o Multiple Accidental Falls   CT 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 Lamar last few weeks ago, felt that she was volume depleted last hospitalization  Chest x-ray read as congested  Nephrology ordered IVF NS at 75ml/hour  Renal Ultrasound done, no acute change or obstruction        HFpEF  Diuretics on hold     Hyperkalemia  Lisinopril discontinued  Continue with albuterol treatments  Consider low potassium diet     Chronic Respiratory Failure/COPD  Patient is at her baseline oxygen requirement  At home inhaled corticosteroid  Aerosolized bronchodilators    H/o LLL Spiculated 7 mm Nodule     Follows with pulmonary services outpatient  Defer subsequent imaging to PCP    Urge Urinary Incontinence   Can consider urinalysis if necessary  Patient takes Myrbetriq home  Continue formulary oxybutynin here    Essential Tremor   Plan Continue home primidone dose     GI + DVT PPX  Home PPI  Heparin subcu    Plan of Care   Patient will likely need SNF at discharge    Plan of Care discussed with patient and collaborating physician, Dr. Bullard.                  Heydi Devlin, APRN-CNP

## 2024-01-04 NOTE — PROGRESS NOTES
TCSW met FTF with pt.  TCSW introduced self and discussed role.  Pt reports that she resides home alone in a 1 level home that has 2 steps prior to entering.  Pt states she utilizes a wheelchair and states she is independent with her ADL's.  Pt reports her dtr in law completes pt's IADL's besides cooking.  Pt states that she was just released from [facility] 2 days ago(was receiving rehab).  Pt states she is in agreement with going back to rehab if recommended.  Pt provided TCSW with the choices of    1. [facility] 2. [facility] [place].  Pt pending PT/OT eval.  TC will continue to follow.

## 2024-01-04 NOTE — CONSULTS
.Reason For Consult  Acute kidney injury on top of chronic kidney disease and hyperkalemia    History Of Present Illness  Juli Del Castillo is a 83 y.o. female who is known to have history of congestive heart failure COPD, depression, she had a history of acute kidney injury last month and urinary tract infection seen by my partner Dr. Jemma Gamez and her creatinine had improved to as low as 1.5 mg/dL the patient was admitted to the hospital yesterday because of a fall patient had felt generalized weakness her legs gave up she did hit her head and complaining of a headache right now however she denied any prior illness before the fall no nausea vomiting diarrhea she is nauseated today I was asked to see the patient in consultation because of acute kidney injury with much worsening creatinine level as well as hyperkalemia she is awake and responsive daughter by the bedside.  She had a CT scan of the brain which I reviewed was negative for any acute process also she had a chest x-ray without evidence of congestive heart failure or any acute process     Review of Systems  Review of Systems   Constitutional:  Positive for fatigue. Negative for chills and fever.   HENT:  Negative for sinus pressure, sore throat and tinnitus.    Eyes:  Negative for photophobia and visual disturbance.   Respiratory:  Negative for apnea, cough, shortness of breath and wheezing.    Cardiovascular:  Negative for chest pain, palpitations and leg swelling.   Gastrointestinal:  Positive for nausea. Negative for abdominal pain, blood in stool, constipation, diarrhea, rectal pain and vomiting.   Endocrine: Negative for cold intolerance, heat intolerance, polydipsia, polyphagia and polyuria.   Genitourinary:  Negative for decreased urine volume, dysuria, frequency, hematuria and urgency.   Musculoskeletal:  Positive for back pain. Negative for arthralgias, joint swelling, myalgias and neck pain.   Skin:  Negative for color change, pallor, rash and  wound.   Neurological:  Positive for weakness and headaches. Negative for dizziness, tremors, seizures, syncope, speech difficulty, light-headedness and numbness.   Hematological:  Negative for adenopathy. Does not bruise/bleed easily.   Psychiatric/Behavioral:  Negative for confusion, hallucinations, sleep disturbance and suicidal ideas.         Past Medical History  She has a past medical history of Cancer (CMS/[facility]), CHF (congestive heart failure) (CMS/[facility]), COPD (chronic obstructive pulmonary disease) (CMS/[facility]), and Depression.    Surgical History  She has a past surgical history that includes MR angio neck wo IV contrast (10/31/2015); MR angio head wo IV contrast (10/31/2015); and MR angio head wo IV contrast (2/15/2019).     Social History  She reports that she has quit smoking. Her smoking use included cigarettes. She has never used smokeless tobacco. She reports that she does not drink alcohol and does not use drugs.    Family History  No family history on file.     Current Facility-Administered Medications:     acetaminophen (Tylenol) tablet 650 mg, 650 mg, oral, q4h PRN, 650 mg at 01/04/24 1001 **OR** acetaminophen (Tylenol) oral liquid 650 mg, 650 mg, nasogastric tube, q4h PRN **OR** acetaminophen (Tylenol) suppository 650 mg, 650 mg, rectal, q4h PRN, Seth Monge MD    albuterol 2.5 mg /3 mL (0.083 %) nebulizer solution 2.5 mg, 2.5 mg, nebulization, q6h while awake, Seth Monge MD    aspirin chewable tablet 81 mg, 81 mg, oral, Daily, Seth Monge MD, 81 mg at 01/04/24 1002    benzocaine-menthol (Cepastat Sore Throat) 15-3.6 mg lozenge 1 lozenge, 1 lozenge, Mouth/Throat, q2h PRN, Seth Monge MD    budesonide (Pulmicort) 0.5 mg/2 mL nebulizer solution 0.5 mg, 0.5 mg, nebulization, BID, Seth Monge MD    citalopram (CeleXA) tablet 20 mg, 20 mg, oral, Daily, Seth Monge MD, 20 mg at 01/04/24 1003    dextromethorphan-guaifenesin (Robitussin DM)  mg/5 mL oral liquid 5 mL, 5 mL, oral,  q4h PRN, Seth Monge MD    DULoxetine (Cymbalta) DR capsule 30 mg, 30 mg, oral, Daily, Seth Monge MD, 30 mg at 01/04/24 1003    guaiFENesin (Mucinex) 12 hr tablet 600 mg, 600 mg, oral, q12h PRN, Seth Monge MD    heparin (porcine) injection 5,000 Units, 5,000 Units, subcutaneous, q8h, Seth Monge MD    ipratropium-albuteroL (Duo-Neb) 0.5-2.5 mg/3 mL nebulizer solution 3 mL, 3 mL, nebulization, q2h PRN, Seth Monge MD    melatonin tablet 3 mg, 3 mg, oral, Nightly PRN, Seth Monge MD    oxybutynin (Ditropan) tablet 5 mg, 5 mg, oral, BID, Seth Monge MD, 5 mg at 01/04/24 1003    oxygen (O2) therapy, , inhalation, Continuous PRN - O2/gases, Seth Monge MD    pantoprazole (ProtoNix) EC tablet 40 mg, 40 mg, oral, Daily before breakfast, Seth Monge MD    polyethylene glycol (Glycolax, Miralax) packet 17 g, 17 g, oral, Daily PRN, Seth Monge MD    primidone (Mysoline) tablet 100 mg, 100 mg, oral, TID, Seth Monge MD, 100 mg at 01/04/24 1003    simvastatin (Zocor) tablet 80 mg, 80 mg, oral, Nightly, Seth Monge MD    sodium chloride 0.9% infusion, 75 mL/hr, intravenous, Continuous, Cy Gamez MD   Allergies  Cortisone, Other, Acth, Haemoph b poly conj-tet tox-pf, Codeine, and Hydrochlorothiazide         Physical Exam  Physical Exam  Constitutional:       General: She is not in acute distress.     Appearance: She is not toxic-appearing.   HENT:      Head: Normocephalic and atraumatic.   Eyes:      Extraocular Movements: Extraocular movements intact.      Pupils: Pupils are equal, round, and reactive to light.   Neck:      Vascular: No carotid bruit.   Cardiovascular:      Rate and Rhythm: Normal rate and regular rhythm.      Comments: She has 3/6 systolic ejection murmur at the left sternal border S3 gallop  Pulmonary:      Effort: No respiratory distress.      Breath sounds: No stridor. No wheezing, rhonchi or rales.   Chest:      Chest wall: No tenderness.   Abdominal:     "  General: There is no distension.      Palpations: There is no mass.      Tenderness: There is no abdominal tenderness. There is no right CVA tenderness, left CVA tenderness or guarding.      Hernia: No hernia is present.   Musculoskeletal:         General: No swelling or tenderness.      Cervical back: No rigidity.      Right lower leg: No edema.      Left lower leg: No edema.   Lymphadenopathy:      Cervical: No cervical adenopathy.   Skin:     General: Skin is warm and dry.      Coloration: Skin is not jaundiced or pale.      Findings: No bruising or erythema.   Neurological:      General: No focal deficit present.      Mental Status: She is alert and oriented to person, place, and time.   Psychiatric:         Mood and Affect: Mood normal.         Behavior: Behavior normal.              I&O 24HR    Intake/Output Summary (Last 24 hours) at 1/4/2024 1058  Last data filed at 1/4/2024 0900  Gross per 24 hour   Intake 350 ml   Output --   Net 350 ml       Vitals 24HR  Heart Rate:  [67-78]   Temp:  [36.4 °C (97.5 °F)-36.8 °C (98.2 °F)]   Resp:  [16-18]   BP: ()/(36-54)   Height:  [162.6 cm (5' 4\")]   Weight:  [82.9 kg (182 lb 12.2 oz)]   SpO2:  [97 %-100 %]     Relevant Results        Results for orders placed or performed during the hospital encounter of 01/03/24 (from the past 96 hour(s))   CBC   Result Value Ref Range    WBC 11.2 4.4 - 11.3 x10*3/uL    nRBC 0.0 0.0 - 0.0 /100 WBCs    RBC 2.91 (L) 4.00 - 5.20 x10*6/uL    Hemoglobin 9.2 (L) 12.0 - 16.0 g/dL    Hematocrit 28.5 (L) 36.0 - 46.0 %    MCV 98 80 - 100 fL    MCH 31.6 26.0 - 34.0 pg    MCHC 32.3 32.0 - 36.0 g/dL    RDW 13.2 11.5 - 14.5 %    Platelets 321 150 - 450 x10*3/uL   Comprehensive metabolic panel   Result Value Ref Range    Glucose 98 65 - 99 mg/dL    Sodium 133 133 - 145 mmol/L    Potassium 5.2 (H) 3.4 - 5.1 mmol/L    Chloride 96 (L) 97 - 107 mmol/L    Bicarbonate 27 24 - 31 mmol/L    Urea Nitrogen 48 (H) 8 - 25 mg/dL    Creatinine 2.40 (H) " 0.40 - 1.60 mg/dL    eGFR 19 (L) >60 mL/min/1.73m*2    Calcium 8.5 8.5 - 10.4 mg/dL    Albumin 3.4 (L) 3.5 - 5.0 g/dL    Alkaline Phosphatase 69 35 - 125 U/L    Total Protein 7.1 5.9 - 7.9 g/dL    AST 9 5 - 40 U/L    Bilirubin, Total <0.2 0.1 - 1.2 mg/dL    ALT 6 5 - 40 U/L    Anion Gap 10 <=19 mmol/L   NT Pro-BNP   Result Value Ref Range    PROBNP 1,551 (H) 0 - 624 pg/mL   Serial Troponin, Initial ([facility])   Result Value Ref Range    Troponin T, High Sensitivity 28 (H) <=15 ng/L   ECG 12 lead   Result Value Ref Range    Ventricular Rate 72 BPM    Atrial Rate 72 BPM    WA Interval 158 ms    QRS Duration 88 ms    QT Interval 410 ms    QTC Calculation(Bazett) 448 ms    P Axis 66 degrees    R Axis 33 degrees    T Axis 59 degrees    QRS Count 12 beats    Q Onset 214 ms    P Onset 135 ms    P Offset 200 ms    T Offset 419 ms    QTC Fredericia 435 ms   Basic metabolic panel   Result Value Ref Range    Glucose 88 65 - 99 mg/dL    Sodium 134 133 - 145 mmol/L    Potassium 4.5 3.4 - 5.1 mmol/L    Chloride 98 97 - 107 mmol/L    Bicarbonate 28 24 - 31 mmol/L    Urea Nitrogen 46 (H) 8 - 25 mg/dL    Creatinine 2.30 (H) 0.40 - 1.60 mg/dL    eGFR 20 (L) >60 mL/min/1.73m*2    Calcium 8.9 8.5 - 10.4 mg/dL    Anion Gap 8 <=19 mmol/L   CBC   Result Value Ref Range    WBC 9.7 4.4 - 11.3 x10*3/uL    nRBC 0.0 0.0 - 0.0 /100 WBCs    RBC 3.17 (L) 4.00 - 5.20 x10*6/uL    Hemoglobin 9.8 (L) 12.0 - 16.0 g/dL    Hematocrit 31.4 (L) 36.0 - 46.0 %    MCV 99 80 - 100 fL    MCH 30.9 26.0 - 34.0 pg    MCHC 31.2 (L) 32.0 - 36.0 g/dL    RDW 13.4 11.5 - 14.5 %    Platelets 344 150 - 450 x10*3/uL          Assessment/Plan     ECG 12 lead    Result Date: 1/4/2024  Normal sinus rhythm with sinus arrhythmia Inferior infarct (cited on or before 13-DEC-2023) Abnormal ECG When compared with ECG of 13-DEC-2023 01:02, No significant change was found    CT head wo IV contrast    Result Date: 1/3/2024  STUDY: CT Head without IV Contrast; 1/3/2024 at 11:03 PM  INDICATION: Fall. COMPARISON: 12/12/2023. TECHNIQUE: Unenhanced CT scan of the brain. Automated mA/kV exposure control was utilized and patient examination was performed in strict accordance with principles of ALARA. FINDINGS: Encephalomalacia of the right frontal and parietal lobes are again noted.  Periventricular white matter hypodensities are noted consistent with areas of prior microvascular insults.  An unchanged 5 mm low-density areas noted in the right thalamus consistent with a prior lacunar infarction.  Otherwise, the gray-white differentiation is normal. There is no shift of the midline. No abnormal masses, mass effect, fluid collections, or recent hemorrhages are seen.  The gray-white differentiation is normal. The visualized portions of the paranasal sinuses and mastoid air cells are clear. The bony structures are normal.    No acute intracerebral changes. No significant change. 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 White matter changes consistent with areas of prior microvascular insults. Signed by Phillip Jensen MD    XR chest 2 views    Result Date: 1/3/2024  STUDY: Chest Radiographs;  1/3/24 at 10:55pm INDICATION: Fall. COMPARISON: 10/17/23 XR Chest ACCESSION NUMBER(S): [id number] ORDERING CLINICIAN: VIRY ROBERT TECHNIQUE:  Frontal and lateral chest. FINDINGS: CARDIOMEDIASTINAL SILHOUETTE: Cardiomediastinal silhouette is normal in size and configuration. Calcified plaque is seen in the aorta.  LUNGS: Increased interstitial markings are seen bilaterally.  Slightly prominent right hilum is noted similar to prior studies with surgical clips in the region of the right hilum suggesting chronic postoperative change.  Flattening of hemidiaphragms may indicate chronic changes of COPD.  ABDOMEN: No remarkable upper abdominal findings.  BONES: No acute osseous changes.  Median sternotomy changes are noted.    Increased interstitial markings bilaterally, likely mild pulmonary vascular congestion with a trace right  pleural effusion and suspected chronic changes of COPD. Stable chronic postoperative change in the region of the right hilum. Signed by Abdirashid Panda    Impression:  Acute kidney injury I believe this is possibly prerenal etiology most likely dehydration from the use of diuretics I am concerned about acute rhabdomyolysis with this fall and being on statin infection  Status post a fall  Mild hyperkalemia secondary to KYLEE  History of CKD stage III    Recommendations:  Discontinue torsemide  Gentle IV hydration  CPK level stat  Ultrasound of the kidneys  Urinalysis with reflex to culture  No indication for dialysis therapy we will continue to monitor renal function very closely discussed with the daughter    Thank you for your consultation      Cy Gamez MDInpatient consult to Nephrology  Consult performed by: Cy Gamez MD  Consult ordered by: Seth Monge MD

## 2024-01-04 NOTE — NURSING NOTE
Patient to room 327 from ER, no c/o voiced. This RN oriented patient to room and call light system, call light in reach.

## 2024-01-04 NOTE — PROGRESS NOTES
Occupational Therapy    Evaluation/Treatment    Patient Name: Juli Del Castillo  MRN: [id number]  : 1940  Today's Date: 24  Time Calculation  Start Time: 914  Stop Time: 944  Time Calculation (min): 30 min     Assessment:  OT Assessment: 84 yo female presents significantly below baseline functional status d/t impaired activity tolerance, impaired balance, pain, and generalized weakness s/p multiple falls.  OT to address deficits by providing ADL retraining utilizing compensatory techniques and progressive strengthening in order to maximize functional capacity and safety with mobility prior to discharge.  Prognosis: Good  Barriers to Discharge: None  Evaluation/Treatment Tolerance: Patient limited by fatigue  Medical Staff Made Aware: Yes  End of Session Communication: Bedside nurse  End of Session Patient Position: Bed, 3 rail up  OT Assessment Results: Decreased ADL status, Decreased endurance, Decreased functional mobility, Decreased IADLs  Prognosis: Good  Barriers to Discharge: None  Evaluation/Treatment Tolerance: Patient limited by fatigue  Medical Staff Made Aware: Yes  Strengths: Ability to acquire knowledge, Support of extended family/friends  Barriers to Participation: Comorbidities    Plan:  Treatment Interventions: ADL retraining, Functional transfer training, UE strengthening/ROM, Endurance training, Patient/family training, Equipment evaluation/education, Compensatory technique education  OT Frequency: 3 times per week  OT Discharge Recommendations: Moderate intensity level of continued care  Equipment Recommended upon Discharge: Wheeled walker  OT - OK to Discharge: Yes  Treatment Interventions: ADL retraining, Functional transfer training, UE strengthening/ROM, Endurance training, Patient/family training, Equipment evaluation/education, Compensatory technique education    Subjective   Current Problem:  1. Fall, initial encounter        2. Acute kidney injury superimposed on CKD (CMS/[facility])         3. Congestive heart failure, unspecified HF chronicity, unspecified heart failure type (CMS/HCC)          General:   OT Received On: 01/04/24  General  Reason for Referral: decline in ADLs  Referred By: Dr MATTHEW Monge  Past Medical History Relevant to Rehab: fibromyalgia, HTN, CHF, HLD, anxiety, Depression, atrial fib, elevated BNP, CHF  Family/Caregiver Present: Yes  Caregiver Feedback: daughter-in-law  Co-Treatment: PT  Co-Treatment Reason:  (safety and decreased endurance)  Prior to Session Communication: Bedside nurse  Patient Position Received: Bed, 4 rail up  Preferred Learning Style: verbal  General Comment: Cleared by nursing and agreeable for therapy    Precautions:  Medical Precautions: Fall precautions, Oxygen therapy device and L/min (3L O2 via NC (baseline))    Pain:  Pain Assessment  Pain Assessment: 0-10  Pain Score: 7  Pain Type: Acute pain  Pain Location: Head  Pain Interventions:  (reported request for pain meds to nurse)    Objective     Cognition:  Overall Cognitive Status: Within Functional Limits     Home Living:  Type of Home: House  Lives With: Alone (son, daughter-in-law live next door)  Home Adaptive Equipment: Reacher, Cane, Hospital bed (rollator)  Home Layout: One level  Home Access:  (threshold entry)  Bathroom Shower/Tub: Walk-in shower  Bathroom Toilet: Adaptive toilet seating  Bathroom Equipment: Grab bars in shower, Shower chair with back    Prior Function:  Level of [place]: Independent with ADLs and functional transfers, Independent with homemaking with ambulation, Independent with homemaking with wheelchair (prior to previous hospitalization ~2 weeks ago)  Receives Help From: Family  ADL Assistance: Needs assistance (Daughter-in-law currently assists with shower and donning  socks)  Ambulatory Assistance: Independent (with rollator)  Hand Dominance: Right  Prior Function Comments: decline from baseline d/t multiple recent falls    ADL:  Eating Assistance: Stand by  Eating  Deficit: Setup (per clinical judgement)  Grooming Assistance: Stand by  Grooming Deficit: Setup (per clinical judgement)  Bathing Assistance: Moderate (assist washing feet and britni area)  UE Dressing Assistance: Stand by  UE Dressing Deficit: Setup (per clinical judgement)  LE Dressing Assistance: Maximal (assist donning/doffing socks)  Toileting Assistance with Device: Total  Toileting Deficit:  (assist changing soiled depends and for hygeine)    Activity Tolerance:  Endurance: Decreased tolerance for upright activites    Bed Mobility/Transfers: Bed Mobility  Bed Mobility: Yes  Bed Mobility 1  Bed Mobility 1: Supine to sitting  Level of Assistance 1: Moderate assistance  Bed Mobility Comments 1: assist with trunk toward left side of bed with head elevated ~40 degrees  Bed Mobility 2  Bed Mobility  2: Sitting to supine  Level of Assistance 2: Minimum assistance  Bed Mobility Comments 2: assist with bilateral LEs    Transfers  Transfer: Yes  Transfer 1  Transfer From 1: Bed to  Transfer to 1: Stand  Technique 1: Sit to stand, Stand to sit  Transfer Device 1: Walker  Transfer Level of Assistance 1: Minimum assistance  Trials/Comments 1:  (x 2 with verbal cues for safe hand placement)    Ambulation/Gait Training:  Ambulation/Gait Training  Ambulation/Gait Training Performed: Yes (min assist x 2 to take ~5 lateral steps with a 2 wheeled walker; gait unsteady and effortful)    Sensation:  Light Touch: No apparent deficits    Strength:  Strength Comments: B UEs = ~3+/5    Hand Function:  Hand Function  Gross Grasp: Functional  Extremities: RUE   RUE : Within Functional Limits and LUE   LUE: Within Functional Limits    Outcome Measures: [facility] Daily Activity  Putting on and taking off regular lower body clothing: A lot  Bathing (including washing, rinsing, drying): A lot  Putting on and taking off regular upper body clothing: A little  Toileting, which includes using toilet, bedpan or urinal: Total  Taking care of personal  grooming such as brushing teeth: A little  Eating Meals: A little  Daily Activity - Total Score: 14      OP EDUCATION:  Education  Individual(s) Educated: Patient  Education Provided: Fall precautons, Risk and benefits of OT discussed with patient or other, POC discussed and agreed upon  Risk and Benefits Discussed with Patient/Caregiver/Other: yes  Patient/Caregiver Demonstrated Understanding: yes  Plan of Care Discussed and Agreed Upon: yes  Patient Response to Education: Patient/Caregiver Verbalized Understanding of Information    Goals:  Encounter Problems       Encounter Problems (Active)       OT Goals       ADLs (Progressing)       Start:  01/04/24    Expected End:  01/18/24       Patient will complete bathing, dressing, and toileting tasks with setup/supervision assist using adaptive aides as needed.         Functional transfers (Progressing)       Start:  01/04/24    Expected End:  01/18/24       Patient will complete bed, toilet, and chair transfers at a modified independent level from elevated seat heights and using bilateral arm supports as needed.         Impaired activity tolerance (Progressing)       Start:  01/04/24    Expected End:  01/18/24       Patient will tolerate 20 minutes of therapeutic activity in order to increase activity tolerance needed for ADLs.

## 2024-01-04 NOTE — PROGRESS NOTES
Physical Therapy    Physical Therapy Evaluation    Patient Name: Jlui Del Castillo  MRN: [id number]  Today's Date: 1/4/2024   Time Calculation  Start Time: 0915  Stop Time: 0944  Time Calculation (min): 29 min    Assessment/Plan   PT Assessment  PT Assessment Results: Decreased strength, Decreased endurance, Impaired balance, Decreased mobility, Impaired hearing  Rehab Prognosis: Good  Evaluation/Treatment Tolerance: Patient limited by fatigue  Strengths: Ability to acquire knowledge, Support of Caregivers  End of Session Communication: Bedside nurse  Assessment Comment: 83 year old admitted s/p mechanical fall at home. Patient recently in hospital 2 weeks ago then  discharged to rehab, home for 1 day and suffered 2 falls. Patient has suffered a functional decline and requires acute PT toddress impaired strength, impaired balance, and impaired functional activity tolerance.  End of Session Patient Position: Bed, 3 rail up  IP OR SWING BED PT PLAN  Inpatient or Swing Bed: Inpatient  PT Plan  Treatment/Interventions: Bed mobility, Transfer training, Gait training, Balance training, Neuromuscular re-education, Strengthening, Endurance training, Therapeutic exercise, Therapeutic activity  PT Plan: Skilled PT  PT Frequency: 4 times per week  PT Discharge Recommendations: Moderate intensity level of continued care  PT Recommended Transfer Status: Assist x1, Assistive device  PT - OK to Discharge: Yes      Subjective   General Visit Information:  General  Reason for Referral: impaired mobility  Referred By: Dr. Monge  Past Medical History Relevant to Rehab: fibromyalgia, HTN, CHF, HLD, anxiety, Depression, atrial fib, elevated BNP, CHF  Family/Caregiver Present: Yes  Caregiver Feedback: daughter-in-law  Prior to Session Communication: Bedside nurse  Patient Position Received: Bed, 4 rail up  Preferred Learning Style: verbal  General Comment: patient cleared by RN for treatment, Patient is agreeable to PT  Home Living:  Home  Living  Type of Home: House  Lives With: Alone ([employer] is next store)  Home Adaptive Equipment:  (rollator)  Home Layout: One level  Home Access: Other (Comment) (threshold entry)  Bathroom Shower/Tub: Walk-in shower  Bathroom Toilet: Adaptive toilet seating  Bathroom Equipment: Grab bars in shower  Home Living Comments: has reacher  Prior Level of Function:  Prior Function Per Pt/Caregiver Report  Level of [place]: Independent with ADLs and functional transfers, Independent with homemaking with ambulation  Receives Help From: Family (jcarlos and [employer] live next store)  ADL Assistance: Needs assistance ([employer] assists with shower)  Dressing: Other (comment) (assist for socks)  Homemaking Assistance: Independent  Ambulatory Assistance: Independent  Precautions:  Precautions  Medical Precautions: Fall precautions  Vital Signs:       Objective   Pain:  Pain Assessment  Pain Assessment: 0-10  Pain Score: 7  Pain Type: Acute pain  Clinical Progression:  (headache, requested pain medication from RN)  Cognition:  Cognition  Overall Cognitive Status: Within Functional Limits    General Assessments:                  Activity Tolerance  Endurance: Decreased tolerance for upright activites    Sensation  Light Touch: No apparent deficits    Strength  Strength Comments: weakness BLE  Coordination  Movements are Fluid and Coordinated: Yes    Postural Control  Posture Comment: rounded shoulder, forward flexed    Static Sitting Balance  Static Sitting-Balance Support: Bilateral upper extremity supported  Static Sitting-Level of Assistance: Close supervision  Dynamic Sitting Balance  Dynamic Sitting-Balance Support: Bilateral upper extremity supported  Dynamic Sitting-Balance:  (close Supervision)    Static Standing Balance  Static Standing-Balance Support: Bilateral upper extremity supported  Static Standing-Level of Assistance: Minimum assistance  Static Standing-Comment/Number of Minutes: on RW, static standing for about 1-2 min at  bedside for Depends change  Dynamic Standing Balance  Dynamic Standing-Balance Support: Bilateral upper extremity supported  Dynamic Standing-Comments: on RW, min A x 1 for side stepping to left  Functional Assessments:  Bed Mobility 1  Bed Mobility 1: Supine to sitting  Level of Assistance 1: Moderate assistance  Bed Mobility Comments 1: from flat bed, assist for trunk up  Bed Mobility 2  Bed Mobility  2: Sitting to supine  Level of Assistance 2: Minimum assistance  Bed Mobility Comments 2: B LE in    Transfer 1  Transfer From 1: Bed to  Transfer to 1: Stand  Technique 1: Sit to stand, Stand to sit  Transfer Device 1: Walker  Transfer Level of Assistance 1: Minimum assistance  Trials/Comments 1: v.c. to push up on bed.    Ambulation/Gait Training 1  Surface 1: Level tile  Device 1: Rolling walker  Assistance 1: Minimum assistance  Quality of Gait 1: Decreased step length  Comments/Distance (ft) 1: side steps up along side of bed  Extremity/Trunk Assessments:  RLE   RLE : Exceptions to WFL  Strength RLE  RLE Overall Strength:  (ROM WFL)  R Hip Flexion: 3/5  R Knee Flexion: 3/5  R Knee Extension: 3/5  R Ankle Dorsiflexion: 3/5  R Ankle Plantar Flexion: 3/5  LLE   LLE : Exceptions to WFL (ROM WFL)  Strength LLE  L Hip Flexion: 3/5  L Knee Flexion: 3/5  L Knee Extension: 3/5  L Ankle Dorsiflexion: 3/5  L Ankle Plantar Flexion: 3/5  Outcome Measures:  [facility] Basic Mobility  Turning from your back to your side while in a flat bed without using bedrails: A little  Moving from lying on your back to sitting on the side of a flat bed without using bedrails: A little  Moving to and from bed to chair (including a wheelchair): A little  Standing up from a chair using your arms (e.g. wheelchair or bedside chair): A little  To walk in hospital room: A lot  Climbing 3-5 steps with railing: A lot  Basic Mobility - Total Score: 16    Encounter Problems       Encounter Problems (Active)       PT Goals       Patient will amb 100 feet  with rolling walker device including two turns on even surface with independent assist to facilitate safe mobility.  (Progressing)       Start:  01/04/24    Expected End:  01/18/24            Patient will transfer sit to stand and stand to sit with  independent assist to facilitate mobility.  (Progressing)       Start:  01/04/24    Expected End:  01/18/24            Patient will improve standing dynamic balance to Good- for safety with standing activities with use of assistive device. (Progressing)       Start:  01/04/24    Expected End:  01/18/24            Patient will transition supine to sit and sit to supine mod Independent (Progressing)       Start:  01/04/24    Expected End:  01/18/24                   Education Documentation  Mobility Training, taught by Kaitlynn Hobbs PT at 1/4/2024 10:59 AM.  Learner: Family, Patient  Readiness: Acceptance  Method: Explanation  Response: Needs Reinforcement  Comment: Education provided re: PT purpose, POC, safe mobility techniques.    Education Comments  No comments found.

## 2024-01-04 NOTE — PROGRESS NOTES
"   01/04/24 0829   Discharge Planning   Living Arrangements Alone   Support Systems Family members   Type of Residence Private residence   Number of Stairs to Enter Residence 2   Patient expects to be discharged to: TBD         Juli Del Castillo is a 83 y.o. female on day 0 of admission presenting with Fall, initial encounter.    Subjective          Objective     Physical Exam    Last Recorded Vitals  Blood pressure (!) 113/48, pulse 71, temperature 36.4 °C (97.5 °F), temperature source Oral, resp. rate 16, height 1.626 m (5' 4\"), weight 82.9 kg (182 lb 12.2 oz), SpO2 97 %.  Intake/Output last 3 Shifts:  No intake/output data recorded.    Relevant Results                This patient has a urinary catheter   Reason for the urinary catheter remaining today?                Assessment/Plan   Principal Problem:    Fall, initial encounter  Active Problems:    Fibromyalgia    Acute congestive heart failure (CMS/HCC)    Hyperlipidemia    Major depressive disorder, single episode, unspecified    Overactive bladder    History of lung cancer    Chronic respiratory failure (CMS/HCC)    Chronic diastolic heart failure (CMS/HCC)    Accidental fall    Acute on chronic kidney failure (CMS/HCC)    Hyperkalemia               SHANE Zapata      "

## 2024-01-04 NOTE — H&P
History Of Present Illness        Juli Del Castillo is a 83 y.o. female presenting with Mechanical Fall.         Ms. Juli Del Castillo is an 82 year old Female with PMHx of CRF on 2-3 L O2, H/o Chronic obstructive pulmonary disease, H/o HARPREET, H/o CVA, H/o CHF, H/o PVD, H/o Colon Cancer, H/o Lung Cancer, H/o PAH, H/o GERD, H/o HTN, H/o CAD, and H/o Depression who presents with a Mechanical Fall.     She was discharged from this hospital about 2 weeks ago after presenting with a fall and suffering from right hip pain.  She was discharged on December 17, 2023.  It was thought to be related to a deep contusion and she was discharged to rehab.  Since then she has gone home but continues to be weak. Today she says she fell twice.  She fell forward during her second fall and struck both the front and back of her head.  She had no LOC.  No nausea vomiting.  She says she feels weak.  Denies any other pain complaints.  No chest pain.  No shortness of breath.  No dysuria or hematuria.  She has no recent cough fevers or chills. No HA. No vision changes. Says she is at baseline.     Today the patient's ER diagnostic workup was noted for a normal WBC count of 11.2.  Her H&H slightly low at 9.2/28.5.  Patient's platelet count was normal at 321.  proBNP was elevated 1551.  Troponin elevation at 28.  Patient's blood chemistry noted for slightly elevated potassium of 5.2.  Chloride was 96.  Creatinine was 2.4.  On December 27, 2023 the patient's creatinine was 1.98.      Rhythm NSR  Rate 72  ST changes nl  Axis nl  Intervals Qtc 448      Her chest x-ray was read as increased interstitial markings bilaterally, likely mild pulmonary vascular congestion with a trace right pleural effusion and suspected chronic changes of COPD.  Stable chronic postoperative change in the region of the right hilum.      CT of the brain was read as no acute intracerebral changes.  No significant change.  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  White matter changes consistent  with areas of prior microvascular insults.      Patient was admitted last hospitalization for A-fib subsequent fall and right hip contusion.  Found to have a UTI treated with IV antibiotics.  Found to have an acute kidney insufficiency with some mild hyperkalemia.  Was seen by the nephrology specialist who recommended to hold lisinopril and torsemide.  Patient was made to resume her torsemide but continue to hold on the lisinopril at discharge.  He was transferred to skilled nursing facility.          Past Medical History      Chronic obstructive pulmonary disease-- on 2 l NC   Obstructive sleep apnea  Cerebrovascular accident  Peripheral vascular disease  Coronary artery disease  Congestive heart failure with preserved ejection fraction  Colon cancer  Depression  Hyperlipidemia  Gastroesophageal reflux disease  Hypertension  Lung cancer  Carotid stenosis  Migraine  Anxiety  Pulmonary hypertension  Chronic neck pain .      Surgical History        Past Surgical History:   Procedure Laterality Date    MR HEAD ANGIO WO IV CONTRAST  10/31/2015    MR HEAD ANGIO WO IV CONTRAST [facility]    MR HEAD ANGIO WO IV CONTRAST  2/15/2019    MR HEAD ANGIO WO IV CONTRAST [facility]    MR NECK ANGIO WO IV CONTRAST  10/31/2015    MR NECK ANGIO WO IV CONTRAST [facility]       Right and left carotid endarterectomy  Appendectomy  Bilateral cataracts  Hysterectomy  Right breast lumpectomy  Partial colectomy  Open reduction internal fixation left elbow  C3-C7 cervical laminectomy  Coronary artery bypass graft  Tonsillectomy  Cardiac catheterization with stent placement  Femoral artery stent placement  Right upper lobe lobectomy   Right total hip arthroplasty           Social History    She reports that she has quit smoking. Her smoking use included cigarettes. She has never used smokeless tobacco. She reports that she does not drink alcohol and does not use drugs.    Ex tobacco use, quit August '19, 67  "pack/year, denies EtOH or illicit drug use. Lives at home by herself and walks with a walker.      Family History      Cardiac disease, cancer.             Allergies      Cortisone, Other, Acth, Haemoph b poly conj-tet tox-pf, Codeine, and Hydrochlorothiazide      Review of Systems    14-point ROS otherwise negative, as per HPI/Interval History.    General: No change in weight. No weakenss. No Fevers/Chills/Night Sweats   Skin: No skin/hair/nail changes. No rashes or sores.  Head:  No trauma. No Headache/nasuea/vomitting.   Eyes: No visual changes. No tearing. No itching.   Ears: No hearing loss. No tinnitus. No vertigo. No discharge.  Nose, Sinuses: No rhinorrhea, No nasal congestion. No epistaxis.  Mouth, Throat, Neck: No bleeding gums, hoarseness, sore throat or swollen neck  Cardiac: No palpitations. No SERRA. No PND. No Orthopnea.   Respiratory: No Shortness of Breath. No wheezing. No cough. No hemoptysis.   GI: No nausea/vomiting. No indigestion. No diarrhea. No constipation.   Extremities: No numbness or tingling. No paresthesias.   Urinary: No change in urinary frequency. No change in hesitancy. No hematuria. No incontinence.         Physical Exam        Constitutional:  Pleasant  Eyes: PERRL, EOMI,   ENMT: mucous membranes moist  Head/Neck: Neck supple, No JVD,   Respiratory/Thorax: Patent airways, CTAB,   Cardiovascular: Regular, rate and rhythm, no murmurs  Gastrointestinal: Soft, non-distended, +BS.  Musculoskeletal: ROM intact, no joint swelling, normal strength  Extremities: peripheral pulses intact; no edema  Neurological: Alert and Oriented x 3; no focal deficits; gross motor and sensation intact; CN II-XII intact. No asterixis.  Psychological: Appropriate mood and behavior  Skin: No lesions, No rashes.         Last Recorded Vitals  Blood pressure (!) 116/49, pulse 67, temperature 36.4 °C (97.5 °F), temperature source Oral, resp. rate 16, height 1.626 m (5' 4\"), weight 82.9 kg (182 lb 12.2 oz), SpO2 99 " %.    Relevant Results    Lab Results   Component Value Date    WBC 11.2 01/03/2024    HGB 9.2 (L) 01/03/2024    HCT 28.5 (L) 01/03/2024    MCV 98 01/03/2024     01/03/2024       Lab Results   Component Value Date    GLUCOSE 98 01/03/2024    CALCIUM 8.5 01/03/2024     01/03/2024    K 5.2 (H) 01/03/2024    CO2 27 01/03/2024    CL 96 (L) 01/03/2024    BUN 48 (H) 01/03/2024    CREATININE 2.40 (H) 01/03/2024       Lab Results   Component Value Date    HGBA1C 5.3 12/12/2023         CT head wo IV contrast    Result Date: 1/3/2024  STUDY: CT Head without IV Contrast; 1/3/2024 at 11:03 PM INDICATION: Fall. COMPARISON: 12/12/2023. TECHNIQUE: Unenhanced CT scan of the brain. Automated mA/kV exposure control was utilized and patient examination was performed in strict accordance with principles of ALARA. FINDINGS: Encephalomalacia of the right frontal and parietal lobes are again noted.  Periventricular white matter hypodensities are noted consistent with areas of prior microvascular insults.  An unchanged 5 mm low-density areas noted in the right thalamus consistent with a prior lacunar infarction.  Otherwise, the gray-white differentiation is normal. There is no shift of the midline. No abnormal masses, mass effect, fluid collections, or recent hemorrhages are seen.  The gray-white differentiation is normal. The visualized portions of the paranasal sinuses and mastoid air cells are clear. The bony structures are normal.    No acute intracerebral changes. No significant change. 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 White matter changes consistent with areas of prior microvascular insults. Signed by Phillip Jensen MD    CT head wo IV contrast    Result Date: 12/13/2023  Interpreted By:  Shad Hernandez, STUDY: CT HEAD WO IV CONTRAST; 12/12/2023 11:34 pm   INDICATION: fall;   COMPARISON: 05/28/2020   ACCESSION NUMBER(S): [id number]   ORDERING CLINICIAN: BRENDA KNAPP   TECHNIQUE: Contiguous axial images were acquired from  the vertex through the posterior fossa without IV contrast.   All CT examinations are performed with one or more of the following dose reduction techniques: Automated Exposure Control, adjustment of mA and/or kV according to patient size, or use of iterative reconstruction techniques.   FINDINGS: No focal mass effect or midline shift is identified. The ventricles and sulci are symmetric and appropriate for the patient's age. However, chronic involutional change of the cerebral hemispheres is noted.   Encephalomalacia of the right frontal and parietal lobes can be seen which may represent the sequela of remote infarcts. Nonspecific hypodensities are identified within the periventricular and subcortical white matter likely due to chronic postischemic white matter disease. Atherosclerotic calcifications are seen within the carotid siphons and vertebral arteries.   No acute intracranial hemorrhage is identified. No intra-axial or extra-axial fluid collection is seen.   The visualized paranasal sinuses and mastoid air cells are clear.       No acute intracranial findings. . .   This report is in agreement with the preliminary report issued by [facility].   MACRO: None   Signed by: Shad Hernandez 12/13/2023 8:18 AM Dictation workstation:   [id number]       Scheduled medications    Continuous medications    PRN medications        Assessment/Plan   Principal Problem:    Fall, initial encounter  Active Problems:    Fibromyalgia    Acute congestive heart failure (CMS/HCC)    Hyperlipidemia    Major depressive disorder, single episode, unspecified    Overactive bladder    History of lung cancer    Chronic respiratory failure (CMS/HCC)    Chronic diastolic heart failure (CMS/HCC)    Accidental fall    Acute on chronic kidney failure (CMS/HCC)    Hyperkalemia          Ms. Juli Del Castillo is an 82 year old Female with PMHx of CRF on 2-3 L O2, H/o Chronic obstructive pulmonary disease, H/o HARPREET, H/o CVA, H/o CHF, H/o PVD,  H/o Colon Cancer, H/o Lung Cancer, H/o PAH, H/o GERD, H/o HTN, H/o CAD, and H/o Depression who presents with a Mechanical Fall.         H/o Multiple Accidental Falls       Admit to RN F with telemetry  CT brain without contrast report appreciated  Follow-up orthostatic vital signs  Fall precautions  Aspiration precautions  PT/OT Evaluation  She may need to consider long-term care if this continues  Social work consultation for consideration of shelter      Acute on Chronic Renal Failure    Patient's baseline Cr is around 1.5-1.6  She was seen by Dr. Lamar last few weeks ago  He decided to discontinue lisinopril especially if potassium comes closer to 5 range  [place] that she was volume depleted last hospitalization  Chest x-ray read as congested  Defer additional IV fluids versus continuation of torsemide to nephrology service  Defer renal ultrasound to nephrology service      HFpEF    Home Torsemide dose on hold for now until Nephrology evaluation    Hyperkalemia    Lisinopril discontinued  Continue with albuterol treatments  Consider low potassium diet    Chronic Respiratory Failure/COPD    Patient is at her baseline oxygen requirement  At home inhaled corticosteroid  Aerosolized bronchodilators    H/o LLL Spiculated 7 mm Nodule     Follows with pulmonary services outpatient  Defer subsequent imaging to PCP    Urge Urinary Incontinence    Can consider urinalysis if necessary  Patient takes Myrbetriq home  Continue formulary oxybutynin here    Essential Tremor       Plan Continue home primidone dose      GI + DVT PPX    Home PPI  Heparin subcu            This Dictation was Transcribed using a Nuance Dragon Voice Recognition System Device (with Compatible Computer + Software) and as such may contain Grammatical Errors and Unintentional Typing Misprints.      I spent 35 minutes in the professional and overall care of this patient.      Seth Monge MD

## 2024-01-04 NOTE — ED PROVIDER NOTES
TIME: 3:37 AM 01/04/24    PCP: BRYANNA Ye-CARLINE    HISTORY   CHIEF COMPLAINT entered by TRIAGE:  Triage note reviewed and states: Fall (Pt. Presents to the ED following a fall at home. Pt. States that she lives alone and was recently discharged from rehab. Pt. States that she slipped out of bed prior to arrival. Pt. Denies any blood thinners or head and neck pain. Pt. Is on oxygen at home 3L via nasal cannula and continued upon arrival.)      HISTORY:  Historian is: patient. History/Exam Limitations: none.  Juli Del Castillo is a 83 y.o. female who comes from home with a complaint of fall    83-year-old female who presents to the emergency department after a fall.  She was discharged from this hospital about 2 weeks ago after presenting with a fall and suffering from right hip pain.  It was thought to be related to a deep contusion and she was discharged to rehab.  Since then she has gone home but continues to be weak.  Today she says she fell twice.  She fell forward during her second fall and struck both the front and back of her head.  She had no LOC.  No nausea vomiting.  She says she feels weak.  Denies any other pain complaints.  No chest pain.  No shortness of breath.  No dysuria or hematuria.  She has no recent cough fevers or chills. No HA. No vision changes. Says she is at baseline.    Nursing notes reviewed. Outside records reviewed: DC summary, progress notes x3    ROS:  Constitutional - No fever, no chills  HEENT - No visual changes, no eye pain, no ear pain, no sore throat  Head: Atraumatic, normocephalic  Respiratory - No cough, no shortness of breath  Cardiovascular - No dyspnea on exertion, no chest pain, no LE edema, no palpitations   Gastrointestinal - No nausea or vomiting, no abd pain, no diarrhea, no black or bloody stools  Genitourinary - No dysuria, urgency, or frequency, no hematuria; no lesions or discharge  Skin - No rash, no bruising  Musculoskeletal - No joint pain or  swelling  Neurological - No weakness, no numbness, no HA, no ataxia    PAST MEDICAL HISTORY:  Patient Active Problem List   Diagnosis    Lung mass    Pleural effusion    Fibromyalgia    History of coronary artery stent placement    Acute congestive heart failure (CMS/HCC)    History of transient ischemic attack    Hyperlipidemia    Major depressive disorder, single episode, unspecified    Weakness    Vitamin D deficiency    Urinary incontinence    Trochanteric bursitis of right hip    Spinal stenosis of lumbar region    Piriformis syndrome    Diastolic hypertension    Hip pain    Overactive bladder    Other sequelae following unspecified cerebrovascular disease    Other peripheral vertigo, unspecified ear    Osteopenia    Occlusion and stenosis of unspecified carotid artery    Myocardial infarction (CMS/HCC)    Low back pain    Insomnia    Injury of head    Inflammation of sacroiliac joint (CMS/HCC)    Hypoxia    History of lung cancer    History of heart failure    History of colon cancer    Gastroenteritis    Essential tremor    Dizziness and giddiness    Difficulty walking    Degenerative spondylolisthesis    Coronary artery disease    Contusion of elbow    Contusion of knee    Constipation    Compression fracture of lumbar vertebra (CMS/HCC)    Common bile duct dilatation    Cigarette smoker    Chronic respiratory failure (CMS/HCC)    Acute exacerbation of chronic obstructive airways disease (CMS/HCC)    Chronic diastolic heart failure (CMS/HCC)    Cervicogenic headache    Cervical spondylosis without myelopathy    Carotid artery disease (CMS/HCC)    Carcinoma of colon (CMS/HCC)    Anxiety disorder, unspecified    Amaurosis fugax    Acute ST segment elevation myocardial infarction (CMS/HCC)    Accidental fall    Medical home patient    At moderate risk for fall    Fall, initial encounter    No past medical history on file.  PAST SURGICAL HISTORY:  Past Surgical History:   Procedure Laterality Date    MR HEAD  ANGIO WO IV CONTRAST  10/31/2015    MR HEAD ANGIO WO IV CONTRAST [facility]    MR HEAD ANGIO WO IV CONTRAST  2/15/2019    MR HEAD ANGIO WO IV CONTRAST [facility]    MR NECK ANGIO WO IV CONTRAST  10/31/2015    MR NECK ANGIO WO IV CONTRAST [facility]     MEDICATIONS:  No current facility-administered medications on file prior to encounter.     Current Outpatient Medications on File Prior to Encounter   Medication Sig Dispense Refill    acetaminophen (Tylenol) 325 mg tablet Take 2 tablets (650 mg) by mouth every 6 hours if needed for moderate pain (4 - 6). 30 tablet 0    albuterol 2.5 mg /3 mL (0.083 %) nebulizer solution Take 3 mL (2.5 mg) by nebulization every 6 hours while awake. 180 mL 2    albuterol 90 mcg/actuation inhaler INHALE 2 PUFFS INTO THE LUNGS EVERY 4 HOURS AS NEEDED FOR 30 DAYS      ALPRAZolam (Xanax) 0.25 mg tablet Take 1 tablet (0.25 mg) by mouth once daily as needed for anxiety. 2 tablet 0    aspirin 81 mg chewable tablet Chew 1 tablet (81 mg) once daily. 30 tablet 3    budesonide (Pulmicort) 0.5 mg/2 mL nebulizer solution Take 2 mL (0.5 mg) by nebulization 2 times a day. Rinse mouth with water after use to reduce aftertaste and incidence of candidiasis. Do not swallow. 120 mL 3    cholecalciferol, vitamin D3, (VITAMIN D3 ORAL) once every 24 hours.      citalopram (CeleXA) 20 mg tablet Take 1 tablet (20 mg) by mouth once daily. 30 tablet 3    DULoxetine (Cymbalta) 30 mg DR capsule Take 1 capsule (30 mg) by mouth once daily. 90 capsule 3    fluticasone furoate-vilanteroL (Breo Ellipta) 200-25 mcg/dose inhaler TAKE 1 PUFF ONCE A DAY FOR 30 DAYS      gabapentin (Neurontin) 400 mg capsule Take 1 capsule (400 mg) by mouth 3 times a day.      nebulizer and compressor device use as directed with nebulizer treatments 1 each 0    nitroglycerin (Nitrostat) 0.4 mg SL tablet AS DIRECTED SUBLINGUAL AS NEEDED 90 DAYS      omeprazole (PriLOSEC) 40 mg DR capsule Take 1 capsule (40 mg)  "by mouth once daily.      oxybutynin XL (Ditropan-XL) 5 mg 24 hr tablet Take 1 tablet (5 mg) by mouth once daily. 90 tablet 3    oxyCODONE (Roxicodone) 5 mg immediate release tablet Take 1 tablet (5 mg) by mouth every 6 hours if needed for severe pain (7 - 10). 8 tablet 0    oxygen (O2) gas therapy 3 l at night; 4 throughout the day nasal cannula Continuous      primidone (Mysoline) 50 mg tablet Take 2 tablets (100 mg) by mouth 3 times a day.      simvastatin (Zocor) 80 mg tablet Take 1 tablet (80 mg) by mouth once daily at bedtime. 30 tablet 3    temazepam (Restoril) 15 mg capsule Take 1 capsule (15 mg) by mouth as needed at bedtime for sleep. 2 capsule 0    torsemide (Demadex) 20 mg tablet Take 1 tablet (20 mg) by mouth once daily. 30 tablet 3    traMADol (Ultram) 50 mg tablet Take 1 tablet (50 mg) by mouth every 6 hours if needed for severe pain (7 - 10). 8 tablet 0     Medications ordered at this visit have been reconciled with the above list of medications.    ALLERGIES:  Allergies   Allergen Reactions    Cortisone Swelling and Rash    Other Swelling and Rash     ACTH    Acth Hives    Haemoph B Poly Conj-Tet Tox-Pf Other    Codeine Nausea/vomiting, Nausea Only and Rash    Hydrochlorothiazide Rash and Swelling     SOCIAL HISTORY:  Social History     Tobacco Use   Smoking Status Former    Types: Cigarettes   Smokeless Tobacco Never      Social History     Substance and Sexual Activity   Alcohol Use Never      Social History     Substance and Sexual Activity   Drug Use Never     FAMILY HISTORY:  No family history on file.    PHYSICAL EXAM   Triage vitals: Visit Vitals  BP (!) 116/49 (BP Location: Right arm, Patient Position: Lying)   Pulse 67   Temp 36.4 °C (97.5 °F) (Oral)   Resp 16   Ht 1.626 m (5' 4\")   Wt 82.9 kg (182 lb 12.2 oz)   SpO2 99%   BMI 31.37 kg/m²   Smoking Status Former   BSA 1.94 m²     Vital signs, oxygen saturation have been reviewed and interpreted as: BP low/borderline    General - Alert, no " acute distress, nontoxic, oriented x 4  Head - Normocephalic and atraumatic, no gross abnormalities  Eyes - Eyes normal  Ears - Ear external normal  Nose - No congestion or discharge  Throat - Clear, mmm  Neck - Supple  Lungs - CTAB, normal resp effort  Heart - RRR, no murmur  Abdomen - Soft, no tenderness, no rebound, no rigidity  Extremity - No focal tenderness, normal ROM, no trauma  Back - Normal, no midline spine tenderness, no CVAT  Skin - Warm, dry, no rash  Neuro - CN 2-12 intact, moves all extremities spontaneously bilaterally, 5/5 UE and LE strength bilaterally  Psych - normal mood and affect, normal judgment    ED COURSE/MDM     MDM:  Head injury - Acute.  ddx includes any complication including bleed, fracture, contusion, concussion, laceration, other co-injuries like Cspine or extremity injury.  Pt's head to toe exam is neg for other injury including FROM of the large joints (shoulders, elbows, wrists, hips, knees, ankles).  Pt's neurologic exam is entirely intact.  Pt is AAOx4.  Examination of the head shows no obvious hematoma or abrasion.  Imaging ordered and reviewed by me shows XR with congestion, neg ct head.     ED COURSE:    3A  Labs obtained here in the ER revealed a mild NSTEMI and an elevated BNP to 1500.  She also has a worsening renal function compared to her admission last month.  Her creatinine is now up to 2.4.  I suspect that she is intravascularly depleted despite her CHF exacerbation which is causing her worsening renal function likely contributing to her falls.  Anticipate admission.    3:37 AM  Discussed with Dr. Monge.  Some concern for underlying cardiorenal syndrome.  She has not had an echo since September.  Will admit the patient to telemetry in the setting of her elevated BNP.  She is on 2 L of oxygen here. CT head is neg for bleed. Got 500cc bolus in ED.     ED PROVIDER INTERPRETATION OF DATA (RAD, EKG):    EKG (interpreted by me):  Rhythm NSR  Rate 72  ST changes nl  [place]  nl  Intervals Qtc 448    Chest X-ray Interpretation (interpreted by me):  Views: AP(portable).  Findings: Congestion    LABS SUMMARY:  Labs Reviewed   CBC - Abnormal       Result Value    WBC 11.2      nRBC 0.0      RBC 2.91 (*)     Hemoglobin 9.2 (*)     Hematocrit 28.5 (*)     MCV 98      MCH 31.6      MCHC 32.3      RDW 13.2      Platelets 321     COMPREHENSIVE METABOLIC PANEL - Abnormal    Glucose 98      Sodium 133      Potassium 5.2 (*)     Chloride 96 (*)     Bicarbonate 27      Urea Nitrogen 48 (*)     Creatinine 2.40 (*)     eGFR 19 (*)     Calcium 8.5      Albumin 3.4 (*)     Alkaline Phosphatase 69      Total Protein 7.1      AST 9      Bilirubin, Total <0.2      ALT 6      Anion Gap 10     N-TERMINAL PROBNP - Abnormal    PROBNP 1,551 (*)     Narrative:     Reference ranges are based on clinical submission data. These ranges represent the 95th percentile of normal cut-off points. As NT Pro- BNP values approach 1000 pg/ml, clinical symptoms are more likely associated with CHF.   SERIAL TROPONIN, INITIAL ([facility]) - Abnormal    Troponin T, High Sensitivity 28 (*)    TROPONIN T SERIES, HIGH SENSITIVITY (0, 2 HR, 6 HR)    Narrative:     The following orders were created for panel order Troponin T Series, High Sensitivity (0, 2HR, 6HR).                  Procedure                               Abnormality         Status                                     ---------                               -----------         ------                                     Serial Troponin, Initial...[[id number]]  Abnormal            Final result                                                 Please view results for these tests on the individual orders.   URINALYSIS WITH REFLEX MICROSCOPIC     The above laboratory studies were interpreted by me and reveal dino on cld, nstemi, BNP elevated    ED MEDICATIONS GIVEN:  Medications   sodium chloride 0.9 % bolus 500 mL (500 mL intravenous New Bag 1/3/24 8505)       DIAGNOSIS AND  DISPOSITION   DIAGNOSIS/IMPRESSION:  1. Fall, initial encounter        2. Acute kidney injury superimposed on CKD (CMS/[facility])        3. Congestive heart failure, unspecified HF chronicity, unspecified heart failure type (CMS/[facility])            DISPOSITION:  Disposition: Admit to hospital  Condition: Stable    DISCHARGE PRESCRIPTIONS/INSTRUCTIONS:  Discharge instructions given to patient. I discussed the diagnosis, treatment plan and follow-up plan with the patient and/or family. Reasons to return to the Emergency Department were reviewed in detail. All questions were answered. The patient and/or family expressed understanding of instructions and return precautions.    Referral to PCP/specialist for further evaluation, if specified:  No follow-up provider specified.    DIAGNOSTIC REPORTS:  Laboratory/radiology tests have been ordered with results reviewed and considered in the medical decision making process.    Provider Attestation (if applicable) and Signature:  Yared Ryan MD  Emergency Department    Notes: Portions of this report may have been transcribed using voice recognition software. Every effort was made to ensure accuracy; however, inadvertent computerized transcription errors may be present.       Yared Ryan MD  01/04/24 0337

## 2024-01-04 NOTE — PROGRESS NOTES
"       01/04/24 0827   Housing Stability   In the last 12 months, was there a time when you were not able to pay the mortgage or rent on time? N   In the last 12 months, was there a time when you did not have a steady place to sleep or slept in a shelter (including now)? N   Transportation Needs   In the past 12 months, has lack of transportation kept you from medical appointments or from getting medications? no   In the past 12 months, has lack of transportation kept you from meetings, work, or from getting things needed for daily living? No   Intimate Partner Violence   Within the last year, have you been afraid of your partner or ex-partner? No   Within the last year, have you been humiliated or emotionally abused in other ways by your partner or ex-partner? No   Within the last year, have you been kicked, hit, slapped, or otherwise physically hurt by your partner or ex-partner? No   Within the last year, have you been raped or forced to have any kind of sexual activity by your partner or ex-partner? No   Utilities   In the past 12 months has the electric, gas, oil, or water company threatened to shut off services in your home? No     Juli Del Castillo is a 83 y.o. female on day 0 of admission presenting with Fall, initial encounter.    Subjective          Objective     Physical Exam    Last Recorded Vitals  Blood pressure (!) 113/48, pulse 71, temperature 36.4 °C (97.5 °F), temperature source Oral, resp. rate 16, height 1.626 m (5' 4\"), weight 82.9 kg (182 lb 12.2 oz), SpO2 97 %.  Intake/Output last 3 Shifts:  No intake/output data recorded.    Relevant Results                This patient has a urinary catheter   Reason for the urinary catheter remaining today?                Assessment/Plan   Principal Problem:    Fall, initial encounter  Active Problems:    Fibromyalgia    Acute congestive heart failure (CMS/HCC)    Hyperlipidemia    Major depressive disorder, single episode, unspecified    Overactive bladder    " History of lung cancer    Chronic respiratory failure (CMS/HCC)    Chronic diastolic heart failure (CMS/HCC)    Accidental fall    Acute on chronic kidney failure (CMS/HCC)    Hyperkalemia               ANNE ZapataW

## 2024-01-05 LAB
ANION GAP SERPL CALC-SCNC: 8 MMOL/L
APPEARANCE UR: ABNORMAL
BILIRUB UR STRIP.AUTO-MCNC: NEGATIVE MG/DL
BUN SERPL-MCNC: 46 MG/DL (ref 8–25)
CALCIUM SERPL-MCNC: 8.6 MG/DL (ref 8.5–10.4)
CHLORIDE SERPL-SCNC: 102 MMOL/L (ref 97–107)
CO2 SERPL-SCNC: 24 MMOL/L (ref 24–31)
COLOR UR: COLORLESS
CREAT SERPL-MCNC: 2.4 MG/DL (ref 0.4–1.6)
ERYTHROCYTE [DISTWIDTH] IN BLOOD BY AUTOMATED COUNT: 13.6 % (ref 11.5–14.5)
GFR SERPL CREATININE-BSD FRML MDRD: 19 ML/MIN/1.73M*2
GLUCOSE SERPL-MCNC: 91 MG/DL (ref 65–99)
GLUCOSE UR STRIP.AUTO-MCNC: NORMAL MG/DL
HCT VFR BLD AUTO: 29.5 % (ref 36–46)
HGB BLD-MCNC: 9.1 G/DL (ref 12–16)
KETONES UR STRIP.AUTO-MCNC: NEGATIVE MG/DL
LEUKOCYTE ESTERASE UR QL STRIP.AUTO: ABNORMAL
MCH RBC QN AUTO: 30.8 PG (ref 26–34)
MCHC RBC AUTO-ENTMCNC: 30.8 G/DL (ref 32–36)
MCV RBC AUTO: 100 FL (ref 80–100)
NITRITE UR QL STRIP.AUTO: NEGATIVE
NRBC BLD-RTO: 0 /100 WBCS (ref 0–0)
PH UR STRIP.AUTO: 6 [PH]
PLATELET # BLD AUTO: 310 X10*3/UL (ref 150–450)
POTASSIUM SERPL-SCNC: 4.6 MMOL/L (ref 3.4–5.1)
PROT UR STRIP.AUTO-MCNC: ABNORMAL MG/DL
RBC # BLD AUTO: 2.95 X10*6/UL (ref 4–5.2)
RBC # UR STRIP.AUTO: ABNORMAL /UL
RBC #/AREA URNS AUTO: ABNORMAL /HPF
SODIUM SERPL-SCNC: 134 MMOL/L (ref 133–145)
SP GR UR STRIP.AUTO: 1.01
SQUAMOUS #/AREA URNS AUTO: ABNORMAL /HPF
UROBILINOGEN UR STRIP.AUTO-MCNC: NORMAL MG/DL
WBC # BLD AUTO: 7.4 X10*3/UL (ref 4.4–11.3)
WBC #/AREA URNS AUTO: ABNORMAL /HPF
WBC CLUMPS #/AREA URNS AUTO: ABNORMAL /HPF

## 2024-01-05 PROCEDURE — 36415 COLL VENOUS BLD VENIPUNCTURE: CPT | Performed by: INTERNAL MEDICINE

## 2024-01-05 PROCEDURE — 81001 URINALYSIS AUTO W/SCOPE: CPT | Performed by: INTERNAL MEDICINE

## 2024-01-05 PROCEDURE — 2500000005 HC RX 250 GENERAL PHARMACY W/O HCPCS: Performed by: INTERNAL MEDICINE

## 2024-01-05 PROCEDURE — 2500000001 HC RX 250 WO HCPCS SELF ADMINISTERED DRUGS (ALT 637 FOR MEDICARE OP): Performed by: NURSE PRACTITIONER

## 2024-01-05 PROCEDURE — 87086 URINE CULTURE/COLONY COUNT: CPT | Mod: TRILAB | Performed by: INTERNAL MEDICINE

## 2024-01-05 PROCEDURE — 2500000002 HC RX 250 W HCPCS SELF ADMINISTERED DRUGS (ALT 637 FOR MEDICARE OP, ALT 636 FOR OP/ED): Performed by: INTERNAL MEDICINE

## 2024-01-05 PROCEDURE — 94640 AIRWAY INHALATION TREATMENT: CPT

## 2024-01-05 PROCEDURE — 85027 COMPLETE CBC AUTOMATED: CPT | Performed by: INTERNAL MEDICINE

## 2024-01-05 PROCEDURE — 1200000002 HC GENERAL ROOM WITH TELEMETRY DAILY

## 2024-01-05 PROCEDURE — 2500000004 HC RX 250 GENERAL PHARMACY W/ HCPCS (ALT 636 FOR OP/ED): Performed by: INTERNAL MEDICINE

## 2024-01-05 PROCEDURE — 2500000001 HC RX 250 WO HCPCS SELF ADMINISTERED DRUGS (ALT 637 FOR MEDICARE OP): Performed by: INTERNAL MEDICINE

## 2024-01-05 PROCEDURE — 82374 ASSAY BLOOD CARBON DIOXIDE: CPT | Performed by: INTERNAL MEDICINE

## 2024-01-05 PROCEDURE — 9420000001 HC RT PATIENT EDUCATION 5 MIN

## 2024-01-05 RX ADMIN — BUDESONIDE INHALATION 0.5 MG: 0.5 SUSPENSION RESPIRATORY (INHALATION) at 08:02

## 2024-01-05 RX ADMIN — OXYBUTYNIN CHLORIDE 5 MG: 5 TABLET ORAL at 20:56

## 2024-01-05 RX ADMIN — CEFTAZIDIME 1 G: 1 INJECTION, POWDER, FOR SOLUTION INTRAMUSCULAR; INTRAVENOUS at 23:01

## 2024-01-05 RX ADMIN — PRIMIDONE 100 MG: 50 TABLET ORAL at 20:58

## 2024-01-05 RX ADMIN — PRIMIDONE 100 MG: 50 TABLET ORAL at 14:43

## 2024-01-05 RX ADMIN — SODIUM CHLORIDE 75 ML/HR: 900 INJECTION, SOLUTION INTRAVENOUS at 08:38

## 2024-01-05 RX ADMIN — OXYBUTYNIN CHLORIDE 5 MG: 5 TABLET ORAL at 08:37

## 2024-01-05 RX ADMIN — CITALOPRAM HYDROBROMIDE 20 MG: 20 TABLET ORAL at 08:37

## 2024-01-05 RX ADMIN — ALBUTEROL SULFATE 2.5 MG: 2.5 SOLUTION RESPIRATORY (INHALATION) at 08:02

## 2024-01-05 RX ADMIN — HEPARIN SODIUM 5000 UNITS: 5000 INJECTION, SOLUTION INTRAVENOUS; SUBCUTANEOUS at 20:59

## 2024-01-05 RX ADMIN — PRIMIDONE 100 MG: 50 TABLET ORAL at 08:37

## 2024-01-05 RX ADMIN — HYDROCODONE BITARTRATE AND ACETAMINOPHEN 1 TABLET: 5; 325 TABLET ORAL at 08:58

## 2024-01-05 RX ADMIN — HEPARIN SODIUM 5000 UNITS: 5000 INJECTION, SOLUTION INTRAVENOUS; SUBCUTANEOUS at 08:41

## 2024-01-05 RX ADMIN — ALBUTEROL SULFATE 2.5 MG: 2.5 SOLUTION RESPIRATORY (INHALATION) at 17:52

## 2024-01-05 RX ADMIN — Medication: at 17:54

## 2024-01-05 RX ADMIN — ACETAMINOPHEN 650 MG: 325 TABLET ORAL at 16:05

## 2024-01-05 RX ADMIN — ALBUTEROL SULFATE 2.5 MG: 2.5 SOLUTION RESPIRATORY (INHALATION) at 12:24

## 2024-01-05 RX ADMIN — SIMVASTATIN 80 MG: 40 TABLET, FILM COATED ORAL at 20:56

## 2024-01-05 RX ADMIN — BUDESONIDE INHALATION 0.5 MG: 0.5 SUSPENSION RESPIRATORY (INHALATION) at 17:52

## 2024-01-05 RX ADMIN — HYDROCODONE BITARTRATE AND ACETAMINOPHEN 1 TABLET: 5; 325 TABLET ORAL at 20:56

## 2024-01-05 RX ADMIN — ASPIRIN 81 MG: 81 TABLET, CHEWABLE ORAL at 08:37

## 2024-01-05 RX ADMIN — PANTOPRAZOLE SODIUM 40 MG: 40 TABLET, DELAYED RELEASE ORAL at 08:37

## 2024-01-05 RX ADMIN — HEPARIN SODIUM 5000 UNITS: 5000 INJECTION, SOLUTION INTRAVENOUS; SUBCUTANEOUS at 12:30

## 2024-01-05 RX ADMIN — DULOXETINE HYDROCHLORIDE 30 MG: 30 CAPSULE, DELAYED RELEASE PELLETS ORAL at 08:37

## 2024-01-05 ASSESSMENT — COGNITIVE AND FUNCTIONAL STATUS - GENERAL
MOBILITY SCORE: 18
MOVING TO AND FROM BED TO CHAIR: A LITTLE
DAILY ACTIVITIY SCORE: 19
MOVING TO AND FROM BED TO CHAIR: A LITTLE
STANDING UP FROM CHAIR USING ARMS: A LITTLE
TURNING FROM BACK TO SIDE WHILE IN FLAT BAD: A LITTLE
TOILETING: A LITTLE
CLIMB 3 TO 5 STEPS WITH RAILING: A LOT
STANDING UP FROM CHAIR USING ARMS: A LITTLE
HELP NEEDED FOR BATHING: A LITTLE
DRESSING REGULAR LOWER BODY CLOTHING: A LITTLE
PERSONAL GROOMING: A LITTLE
MOBILITY SCORE: 18
PERSONAL GROOMING: A LITTLE
DAILY ACTIVITIY SCORE: 19
DRESSING REGULAR UPPER BODY CLOTHING: A LITTLE
DRESSING REGULAR UPPER BODY CLOTHING: A LITTLE
CLIMB 3 TO 5 STEPS WITH RAILING: A LOT
WALKING IN HOSPITAL ROOM: A LITTLE
TURNING FROM BACK TO SIDE WHILE IN FLAT BAD: A LITTLE
DRESSING REGULAR LOWER BODY CLOTHING: A LITTLE
HELP NEEDED FOR BATHING: A LITTLE
TOILETING: A LITTLE
WALKING IN HOSPITAL ROOM: A LITTLE

## 2024-01-05 ASSESSMENT — PAIN - FUNCTIONAL ASSESSMENT
PAIN_FUNCTIONAL_ASSESSMENT: 0-10
PAIN_FUNCTIONAL_ASSESSMENT: 0-10

## 2024-01-05 ASSESSMENT — PAIN SCALES - GENERAL
PAINLEVEL_OUTOF10: 6
PAINLEVEL_OUTOF10: 0 - NO PAIN
PAINLEVEL_OUTOF10: 6

## 2024-01-05 NOTE — PROGRESS NOTES
"Juli Del Castillo is a 83 y.o. female on day 1 of admission presenting with Fall, initial encounter.    Subjective   TCSW submitted SNF referrals to both [facility] and [facility].   [facility] requests updated therapy notes, as the currents notes do not show pt ambulated at all. [facility] states pt must have an ambulation trial attempt, as she was just discharged from SNF 2 days ago.      The Weekend DC planner will send therapy updates tomorrow.     Objective     Physical Exam    Last Recorded Vitals  Blood pressure (!) 105/35, pulse 64, temperature 36.6 °C (97.9 °F), temperature source Oral, resp. rate 18, height 1.626 m (5' 4\"), weight 82.9 kg (182 lb 12.2 oz), SpO2 98 %.  Intake/Output last 3 Shifts:  I/O last 3 completed shifts:  In: 650 (7.8 mL/kg) [P.O.:650]  Out: - (0 mL/kg)   Weight: 82.9 kg     Relevant Results                             Assessment/Plan   Principal Problem:    Fall, initial encounter  Active Problems:    Fibromyalgia    Acute congestive heart failure (CMS/HCC)    Hyperlipidemia    Major depressive disorder, single episode, unspecified    Overactive bladder    History of lung cancer    Chronic respiratory failure (CMS/HCC)    Chronic diastolic heart failure (CMS/HCC)    Accidental fall    Acute on chronic kidney failure (CMS/HCC)    Hyperkalemia               Ying James, SHANE      "

## 2024-01-05 NOTE — CARE PLAN
Problem: Fall/Injury  Goal: Not fall by end of shift  Outcome: Progressing  Goal: Be free from injury by end of the shift  Outcome: Progressing  Goal: Verbalize understanding of personal risk factors for fall in the hospital  Outcome: Progressing  Goal: Verbalize understanding of risk factor reduction measures to prevent injury from fall in the home  Outcome: Progressing  Goal: Use assistive devices by end of the shift  Outcome: Progressing  Goal: Pace activities to prevent fatigue by end of the shift  Outcome: Progressing     Problem: Respiratory  Goal: Clear secretions with interventions this shift  Outcome: Progressing  Goal: Minimize anxiety/maximize coping throughout shift  Outcome: Progressing  Goal: Minimal/no exertional discomfort or dyspnea this shift  Outcome: Progressing  Goal: No signs of respiratory distress (eg. Use of accessory muscles. Peds grunting)  Outcome: Progressing  Goal: Verbalize decreased shortness of breath this shift  Outcome: Progressing  Goal: Wean oxygen to maintain O2 saturation per order/standard this shift  Outcome: Progressing  Goal: Increase self care and/or family involvement in next 24 hours  Outcome: Progressing     Problem: Respiratory  Goal: Clear secretions with interventions this shift  Outcome: Progressing  Goal: Minimize anxiety/maximize coping throughout shift  Outcome: Progressing  Goal: Minimal/no exertional discomfort or dyspnea this shift  Outcome: Progressing  Goal: No signs of respiratory distress (eg. Use of accessory muscles. Peds grunting)  Outcome: Progressing  Goal: Verbalize decreased shortness of breath this shift  Outcome: Progressing  Goal: Wean oxygen to maintain O2 saturation per order/standard this shift  Outcome: Progressing  Goal: Increase self care and/or family involvement in next 24 hours  Outcome: Progressing   The patient's goals for the shift include  comfort    The clinical goals for the shift include decreased oxygen needs/safety

## 2024-01-05 NOTE — PROGRESS NOTES
"Physical Therapy                 Therapy Communication Note    Patient Name: Juli Del Castillo  MRN: [id number]  Today's Date: 1/5/2024     Discipline: Physical Therapy    Missed Visit Reason: Missed Visit Reason: Patient refused    Missed Time: Attempt    Comment: Pt pleasantly declined any activity at this time due to pain \"all over.\"   "

## 2024-01-05 NOTE — CONSULTS
"Nutrition Assessment Note Pt was sleeping soundly when visited, daughter in room, states pt's wt and apppetite have been stable. States pt understands low Na diet, but does not always follow. Denies need for additional info    Nutrition Assessment    Reason for Assessment: Dietitian discretion    Reason for Hospital Admission:  Juli Del Castillo is a 83 y.o. female who is admitted for acute kidney injury, congestive heart failure    Nutrition History:     Energy Intake: Good > 75 %  Food Allergies/Intolerances:  None  GI Symptoms: None  Oral Problems: None    Anthropometrics:  Ht: 162.6 cm (5' 4\"), Wt: 82.9 kg (182 lb 12.2 oz), BMI: 31.36  IBW/kg (Dietitian Calculated): 54.5 kg  Percent of IBW: 152 %  Adjusted Body Weight (kg): 62 kg    Weight Change: None              Nutrition Focused Physical Exam Findings: pt sleeping, visually without deficits      Edema  Edema: none     Nutrition Significant Labs:  Lab Results   Component Value Date    WBC 7.4 01/05/2024    HGB 9.1 (L) 01/05/2024    HCT 29.5 (L) 01/05/2024     01/05/2024    CHOL 197 12/12/2023    TRIG 104 12/12/2023    HDL 70.0 12/12/2023    ALT 6 01/03/2024    AST 9 01/03/2024     01/05/2024    K 4.6 01/05/2024     01/05/2024    CREATININE 2.40 (H) 01/05/2024    BUN 46 (H) 01/05/2024    CO2 24 01/05/2024    TSH 1.42 04/13/2023    INR 1.0 09/29/2023    HGBA1C 5.3 12/12/2023       Current Facility-Administered Medications:     acetaminophen (Tylenol) tablet 650 mg, 650 mg, oral, q4h PRN, 650 mg at 01/04/24 1001 **OR** acetaminophen (Tylenol) oral liquid 650 mg, 650 mg, nasogastric tube, q4h PRN **OR** acetaminophen (Tylenol) suppository 650 mg, 650 mg, rectal, q4h PRN, Seth Monge MD    albuterol 2.5 mg /3 mL (0.083 %) nebulizer solution 2.5 mg, 2.5 mg, nebulization, q6h while awake, Seth Monge MD, 2.5 mg at 01/05/24 1224    aspirin chewable tablet 81 mg, 81 mg, oral, Daily, Seth Monge MD, 81 mg at 01/05/24 0837    " benzocaine-menthol (Cepastat Sore Throat) 15-3.6 mg lozenge 1 lozenge, 1 lozenge, Mouth/Throat, q2h PRN, Seth Monge MD    budesonide (Pulmicort) 0.5 mg/2 mL nebulizer solution 0.5 mg, 0.5 mg, nebulization, BID, Seth Monge MD, 0.5 mg at 01/05/24 0802    citalopram (CeleXA) tablet 20 mg, 20 mg, oral, Daily, Seth Monge MD, 20 mg at 01/05/24 0837    dextromethorphan-guaifenesin (Robitussin DM)  mg/5 mL oral liquid 5 mL, 5 mL, oral, q4h PRN, Seth Monge MD    DULoxetine (Cymbalta) DR capsule 30 mg, 30 mg, oral, Daily, Seth Monge MD, 30 mg at 01/05/24 0837    guaiFENesin (Mucinex) 12 hr tablet 600 mg, 600 mg, oral, q12h PRN, Seth Monge MD    heparin (porcine) injection 5,000 Units, 5,000 Units, subcutaneous, q8h, Seth Monge MD, 5,000 Units at 01/05/24 1230    HYDROcodone-acetaminophen (Norco) 5-325 mg per tablet 1 tablet, 1 tablet, oral, BID PRN, Heydi Devlin, APRN-CNP, 1 tablet at 01/05/24 0858    ipratropium-albuteroL (Duo-Neb) 0.5-2.5 mg/3 mL nebulizer solution 3 mL, 3 mL, nebulization, q2h PRN, Seth Monge MD    melatonin tablet 3 mg, 3 mg, oral, Nightly PRN, Seth Monge MD, 3 mg at 01/04/24 2015    oxybutynin (Ditropan) tablet 5 mg, 5 mg, oral, BID, Seth Monge MD, 5 mg at 01/05/24 0837    oxygen (O2) therapy, , inhalation, Continuous PRN - O2/gases, Seth Monge MD, Start at 01/04/24 1937    pantoprazole (ProtoNix) EC tablet 40 mg, 40 mg, oral, Daily before breakfast, Seth Monge MD, 40 mg at 01/05/24 0837    polyethylene glycol (Glycolax, Miralax) packet 17 g, 17 g, oral, Daily PRN, Seth Monge MD    primidone (Mysoline) tablet 100 mg, 100 mg, oral, TID, Seth Monge MD, 100 mg at 01/05/24 0837    simvastatin (Zocor) tablet 80 mg, 80 mg, oral, Nightly, Seth Monge MD, 80 mg at 01/04/24 2015    sodium chloride 0.9% infusion, 75 mL/hr, intravenous, Continuous, Cy Gamez MD, Last Rate: 75 mL/hr at 01/05/24 0838, 75 mL/hr at 01/05/24  0838    Dietary Orders (From admission, onward)       Start     Ordered    01/04/24 0528  Adult diet Cardiac; 70 gm fat; 2 - 3 grams Sodium; 2000 mL fluid  Diet effective now        Question Answer Comment   Diet type Cardiac    Fat restriction: 70 gm fat    Sodium restriction: 2 - 3 grams Sodium    Dietary fluid restriction / 24h: 2000 mL fluid        01/04/24 0527                     Estimated Needs:   Estimated Energy Needs  Total Energy Estimated Needs (kCal): 1540 kCal  Total Estimated Energy Need per Day (kCal/kg): 25 kCal/kg  Method for Estimating Needs: adj wt    Estimated Protein Needs  Total Protein Estimated Needs (g): 62 g  Total Protein Estimated Needs (g/kg): 1 g/kg  Method for Estimating Needs: adj wt    Estimated Fluid Needs  Total Fluid Estimated Needs (mL): 2000 mL      Nutrition Diagnosis   Nutrition Diagnosis:  Malnutrition Diagnosis  Patient has Malnutrition Diagnosis: No    Nutrition Diagnosis  Patient has Nutrition Diagnosis: No     Nutrition Interventions/Recommendations   Nutrition Interventions and Recommendations:    Nutrition Prescription:  Individualized Nutrition Prescription Provided for : 1540 calories, 62gm protein    Nutrition Interventions:   Food and/or Nutrient Delivery Interventions  Interventions: Meals and snacks  Meals and Snacks: Mineral-modified diet  Goal: low Na diet w/ fluid restriction as ordered    Education Documentation  No documentation found.       Nutrition Monitoring and Evaluation   Monitoring/Evaluation:   Food/Nutrient Related History Monitoring  Monitoring and Evaluation Plan: Energy intake  Energy Intake: Estimated energy intake  Criteria: Goal po intake > 75% estimated energy needs                 Time Spent/Follow-up:   Follow Up  Time Spent (min): 30 minutes  Last Date of Nutrition Visit: 01/05/24  Nutrition Follow-Up Needed?: 5-7 days  Follow up Comment: 1/11/24

## 2024-01-05 NOTE — PROGRESS NOTES
Juli Del Castillo is a 83 y.o. female on day 1 of admission presenting with Fall, initial encounter.      Subjective   Patient resting on bed, with mild pain in her joints.        Objective     Last Recorded Vitals  BP (!) 105/35 (BP Location: Right arm, Patient Position: Lying)   Pulse 64   Temp 36.6 °C (97.9 °F) (Oral)   Resp 18   Wt 82.9 kg (182 lb 12.2 oz)   SpO2 98%   Intake/Output last 3 Shifts:    Intake/Output Summary (Last 24 hours) at 1/5/2024 1427  Last data filed at 1/5/2024 0900  Gross per 24 hour   Intake 400 ml   Output 650 ml   Net -250 ml       Admission Weight  Weight: 82.9 kg (182 lb 12.2 oz) (01/03/24 2159)    Daily Weight  01/03/24 : 82.9 kg (182 lb 12.2 oz)    Image Results  ECG 12 lead  Normal sinus rhythm with sinus arrhythmia  Inferior infarct (cited on or before 13-DEC-2023)  Abnormal ECG  When compared with ECG of 13-DEC-2023 01:02,  No significant change was found  Confirmed by Luan Monge ([id number]) on 1/4/2024 12:44:09 PM  US renal complete  Narrative: Interpreted By:  Tayler Trujillo,   STUDY:  US RENAL COMPLETE; 1/4/2024 12:11 pm      INDICATION:  Signs/Symptoms:Acute kidney injury superimposed upon chronic kidney  disease. Hyperkalemia.      COMPARISON:  12/14/2023      ACCESSION NUMBER(S):  [id number]      ORDERING CLINICIAN:  JENNY YU      TECHNIQUE:  Grayscale and color Doppler imaging of the kidneys.      FINDINGS:  The right kidney measures 9.5 cm x 6.1 cm x 4.7 cm. There is a 1.1 x  1.4 x 1.4 cm right renal cyst. The left kidney measures 11.0 cm x 4.8  cm x 5.1 cm. There is a 1.7 x 2.3 x 2.4 cm left renal cyst. There is  no shadowing calculus, hydronephrosis, or solid mass identified. The  renal cortical thickness and echogenicity is normal.      Cursory evaluation of the urinary bladder shows no evidence for mass,  wall thickening, or calculus.      Impression: Simple bilateral renal cyst noted.      No hydronephrosis or renal atrophy.      MACRO:  None.      Signed by: Tayler  Ronnie 1/4/2024 12:19 PM  Dictation workstation:   [id number]    Results for orders placed or performed during the hospital encounter of 01/03/24 (from the past 24 hour(s))   CBC   Result Value Ref Range    WBC 7.4 4.4 - 11.3 x10*3/uL    nRBC 0.0 0.0 - 0.0 /100 WBCs    RBC 2.95 (L) 4.00 - 5.20 x10*6/uL    Hemoglobin 9.1 (L) 12.0 - 16.0 g/dL    Hematocrit 29.5 (L) 36.0 - 46.0 %     80 - 100 fL    MCH 30.8 26.0 - 34.0 pg    MCHC 30.8 (L) 32.0 - 36.0 g/dL    RDW 13.6 11.5 - 14.5 %    Platelets 310 150 - 450 x10*3/uL   Basic metabolic panel   Result Value Ref Range    Glucose 91 65 - 99 mg/dL    Sodium 134 133 - 145 mmol/L    Potassium 4.6 3.4 - 5.1 mmol/L    Chloride 102 97 - 107 mmol/L    Bicarbonate 24 24 - 31 mmol/L    Urea Nitrogen 46 (H) 8 - 25 mg/dL    Creatinine 2.40 (H) 0.40 - 1.60 mg/dL    eGFR 19 (L) >60 mL/min/1.73m*2    Calcium 8.6 8.5 - 10.4 mg/dL    Anion Gap 8 <=19 mmol/L       Physical Exam  Constitutional:       Appearance: She is obese.   Cardiovascular:      Rate and Rhythm: Normal rate and regular rhythm.   Pulmonary:      Effort: Pulmonary effort is normal.      Breath sounds: Normal breath sounds.   Musculoskeletal:      Cervical back: Normal range of motion.   Neurological:      Mental Status: She is alert and oriented to person, place, and time.   Psychiatric:         Mood and Affect: Mood normal.         Behavior: Behavior normal.         Relevant Results      Results for orders placed or performed during the hospital encounter of 01/03/24 (from the past 24 hour(s))   CBC   Result Value Ref Range    WBC 7.4 4.4 - 11.3 x10*3/uL    nRBC 0.0 0.0 - 0.0 /100 WBCs    RBC 2.95 (L) 4.00 - 5.20 x10*6/uL    Hemoglobin 9.1 (L) 12.0 - 16.0 g/dL    Hematocrit 29.5 (L) 36.0 - 46.0 %     80 - 100 fL    MCH 30.8 26.0 - 34.0 pg    MCHC 30.8 (L) 32.0 - 36.0 g/dL    RDW 13.6 11.5 - 14.5 %    Platelets 310 150 - 450 x10*3/uL   Basic metabolic panel   Result Value Ref Range    Glucose 91 65 - 99 mg/dL     Sodium 134 133 - 145 mmol/L    Potassium 4.6 3.4 - 5.1 mmol/L    Chloride 102 97 - 107 mmol/L    Bicarbonate 24 24 - 31 mmol/L    Urea Nitrogen 46 (H) 8 - 25 mg/dL    Creatinine 2.40 (H) 0.40 - 1.60 mg/dL    eGFR 19 (L) >60 mL/min/1.73m*2    Calcium 8.6 8.5 - 10.4 mg/dL    Anion Gap 8 <=19 mmol/L   Urinalysis with Reflex Microscopic   Result Value Ref Range    Color, Urine Colorless (N) Light-Yellow, Yellow, Dark-Yellow    Appearance, Urine Turbid (N) Clear    Specific Gravity, Urine 1.009 1.005 - 1.035    pH, Urine 6.0 5.0, 5.5, 6.0, 6.5, 7.0, 7.5, 8.0    Protein, Urine 10 (TRACE) NEGATIVE, 10 (TRACE), 20 (TRACE) mg/dL    Glucose, Urine Normal Normal mg/dL    Blood, Urine 0.03 (TRACE) (A) NEGATIVE    Ketones, Urine NEGATIVE NEGATIVE mg/dL    Bilirubin, Urine NEGATIVE NEGATIVE    Urobilinogen, Urine Normal Normal mg/dL    Nitrite, Urine NEGATIVE NEGATIVE    Leukocyte Esterase, Urine 500 Jacques/µL (A) NEGATIVE   Microscopic Only, Urine   Result Value Ref Range    WBC, Urine 21-50 (A) 1-5, NONE /HPF    WBC Clumps, Urine OCCASIONAL Reference range not established. /HPF    RBC, Urine 3-5 NONE, 1-2, 3-5 /HPF    Squamous Epithelial Cells, Urine 10-25 (FEW) Reference range not established. /HPF                Assessment/Plan      Principal Problem:    Fall, initial encounter  Active Problems:    Fibromyalgia    Acute congestive heart failure (CMS/HCC)    Hyperlipidemia    Major depressive disorder, single episode, unspecified    Overactive bladder    History of lung cancer    Chronic respiratory failure (CMS/HCC)    Chronic diastolic heart failure (CMS/HCC)    Accidental fall    Acute on chronic kidney failure (CMS/HCC)    Hyperkalemia    H/o Multiple Accidental Falls   CT brain no new findings; old infarct noted  Follow-up orthostatic vital signs  Fall precautions  Aspiration precautions  PT/OT Evaluation  She may need to consider long-term care if this continues  Social work consultation for consideration of ADRIANA         Acute on Chronic Renal Failure  Patient's baseline Cr is around 1.5-1.6  She was seen by Dr. Lamar last few weeks ago, felt that she was volume depleted last hospitalization  Chest x-ray read as congested  Nephrology ordered IVF NS at 75ml/hour yesterday and to continue today.   Renal Ultrasound done, no acute change or obstruction     HFpEF  Diuretics on hold     Hyperkalemia  Resolved, K 4.6  Lisinopril discontinued  Continue with albuterol treatments  Consider low potassium diet     Chronic Respiratory Failure/COPD  Patient is at her baseline oxygen requirement  At home inhaled corticosteroid  Aerosolized bronchodilators    H/o LLL Spiculated 7 mm Nodule   Follows with pulmonary services outpatient  Defer subsequent imaging to PCP    Urge Urinary Incontinence     Patient takes Myrbetriq home  Continue formulary oxybutynin here    Essential Tremor   Plan Continue home primidone dose    HTN  Patient run low blood pressure during the admission  Today's /35  Continue monitoring for S/S of hypotension         GI + DVT PPX  Home PPI  Heparin subcu     Plan of Care   Patient will likely need SNF at discharge, she is refusing at this time.     Plan of Care discussed with patient and collaborating physician, Dr. Bullard.                  Rosalie Holguin

## 2024-01-05 NOTE — NURSING NOTE
Assumed care of patient. Patient is A&Ox3 and is resting in bed. Call light in reach. Patient is normal sinus on monitor @ 88bpm. Patient is on 4L nc.

## 2024-01-05 NOTE — PROGRESS NOTES
Juli Del Castillo is a 83 y.o. female on day 1 of admission presenting with Fall, initial encounter.      Subjective   Patient seen in room alert awake up in chair patient reports she still has mild body aches but is doing better overall denies any further complaints       Objective          Vitals 24HR  Heart Rate:  [64-85]   Temp:  [36.4 °C (97.5 °F)-36.9 °C (98.4 °F)]   Resp:  [16-18]   BP: ()/()   SpO2:  [93 %-100 %]         Intake/Output last 3 Shifts:    Intake/Output Summary (Last 24 hours) at 1/5/2024 1222  Last data filed at 1/5/2024 0900  Gross per 24 hour   Intake 400 ml   Output 650 ml   Net -250 ml       Physical Exam  Constitutional:       Appearance: Normal appearance.   HENT:      Head: Normocephalic and atraumatic.      Mouth/Throat:      Mouth: Mucous membranes are moist.   Cardiovascular:      Rate and Rhythm: Normal rate and regular rhythm.      Pulses: Normal pulses.      Heart sounds: Normal heart sounds.   Pulmonary:      Effort: Pulmonary effort is normal.      Breath sounds: Normal breath sounds.   Abdominal:      General: Bowel sounds are normal. There is no distension.      Palpations: Abdomen is soft.      Tenderness: There is no abdominal tenderness. There is no guarding or rebound.   Skin:     Capillary Refill: Capillary refill takes less than 2 seconds.   Neurological:      Mental Status: She is alert. Mental status is at baseline.   Psychiatric:         Mood and Affect: Mood normal.         Behavior: Behavior normal.         Relevant Results  Results for orders placed or performed during the hospital encounter of 01/03/24 (from the past 24 hour(s))   CBC   Result Value Ref Range    WBC 7.4 4.4 - 11.3 x10*3/uL    nRBC 0.0 0.0 - 0.0 /100 WBCs    RBC 2.95 (L) 4.00 - 5.20 x10*6/uL    Hemoglobin 9.1 (L) 12.0 - 16.0 g/dL    Hematocrit 29.5 (L) 36.0 - 46.0 %     80 - 100 fL    MCH 30.8 26.0 - 34.0 pg    MCHC 30.8 (L) 32.0 - 36.0 g/dL    RDW 13.6 11.5 - 14.5 %    Platelets 310  150 - 450 x10*3/uL   Basic metabolic panel   Result Value Ref Range    Glucose 91 65 - 99 mg/dL    Sodium 134 133 - 145 mmol/L    Potassium 4.6 3.4 - 5.1 mmol/L    Chloride 102 97 - 107 mmol/L    Bicarbonate 24 24 - 31 mmol/L    Urea Nitrogen 46 (H) 8 - 25 mg/dL    Creatinine 2.40 (H) 0.40 - 1.60 mg/dL    eGFR 19 (L) >60 mL/min/1.73m*2    Calcium 8.6 8.5 - 10.4 mg/dL    Anion Gap 8 <=19 mmol/L      Procedure Component Value Units Date/Time   US renal complete [[id number]] Collected: 01/04/24 1221   Order Status: Completed Updated: 01/04/24 1221   Narrative:     Interpreted By:  Tayler Trujillo,  STUDY:  US RENAL COMPLETE; 1/4/2024 12:11 pm      INDICATION:  Signs/Symptoms:Acute kidney injury superimposed upon chronic kidney  disease. Hyperkalemia.      COMPARISON:  12/14/2023      ACCESSION NUMBER(S):  [id number]      ORDERING CLINICIAN:  JENNY YU      TECHNIQUE:  Grayscale and color Doppler imaging of the kidneys.      FINDINGS:  The right kidney measures 9.5 cm x 6.1 cm x 4.7 cm. There is a 1.1 x  1.4 x 1.4 cm right renal cyst. The left kidney measures 11.0 cm x 4.8  cm x 5.1 cm. There is a 1.7 x 2.3 x 2.4 cm left renal cyst. There is  no shadowing calculus, hydronephrosis, or solid mass identified. The  renal cortical thickness and echogenicity is normal.      Cursory evaluation of the urinary bladder shows no evidence for mass,  wall thickening, or calculus.       Impression:     Simple bilateral renal cyst noted.      No hydronephrosis or renal atrophy.      MACRO:  None.      Signed by: Tayler Trujillo 1/4/2024 12:19 PM  Dictation workstation:   [id number]   CT head wo IV contrast [[id number]] Collected: 01/03/24 2323   Order Status: Completed Updated: 01/03/24 2334   Narrative:     STUDY:  CT Head without IV Contrast; 1/3/2024 at 11:03 PM  INDICATION:  Fall.  COMPARISON:  12/12/2023.  TECHNIQUE:  Unenhanced CT scan of the brain.  Automated mA/kV exposure control was utilized and patient examination  was performed  in strict accordance with principles of ALARA.  FINDINGS:  Encephalomalacia of the right frontal and parietal lobes are again  noted.  Periventricular white matter hypodensities are noted  consistent with areas of prior microvascular insults.  An unchanged 5  mm low-density areas noted in the right thalamus consistent with a  prior lacunar infarction.  Otherwise, the gray-white differentiation  is normal.  There is no shift of the midline.  No abnormal masses, mass effect, fluid collections, or recent  hemorrhages are seen.  The gray-white differentiation is normal.  The visualized portions of the paranasal sinuses and mastoid air cells  are clear.  The bony structures are normal.   Impression:     No acute intracerebral changes.  No significant change.  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  White matter changes consistent with areas of prior microvascular  insults.  Signed by Phillip Jensen MD   XR chest 2 views [[id number]] Collected: 01/03/24 2301   Order Status: Completed Updated: 01/03/24 2321   Narrative:     STUDY:  Chest Radiographs;  1/3/24 at 10:55pm  INDICATION:  Fall.  COMPARISON:  10/17/23 XR Chest  ACCESSION NUMBER(S):  [id number]  ORDERING CLINICIAN:  VIRY ROBERT  TECHNIQUE:  Frontal and lateral chest.  FINDINGS:  CARDIOMEDIASTINAL SILHOUETTE:  Cardiomediastinal silhouette is normal in size and configuration.  Calcified plaque is seen in the aorta.     LUNGS:  Increased interstitial markings are seen bilaterally.  Slightly  prominent right hilum is noted similar to prior studies with surgical  clips in the region of the right hilum suggesting chronic  postoperative change.  Flattening of hemidiaphragms may indicate  chronic changes of COPD.     ABDOMEN:  No remarkable upper abdominal findings.     BONES:  No acute osseous changes.  Median sternotomy changes are noted.   Impression:     Increased interstitial markings bilaterally, likely mild pulmonary  vascular congestion with a trace right pleural  effusion and suspected  chronic changes of COPD.  Stable chronic postoperative change in the region of the right hilum.  Signed by Abdirashid Panda            Assessment/Plan    Acute kidney injury-  continue Iv hydration , avoid nephrotoxic medications, continue on renal friendly diet   Status post a fall  Mild hyperkalemia secondary to KYLEE- resolved   History of CKD stage III  Possible urinary tract infection       I was the supervising physician in the delivery of the service.   This is a shared visit. I have reviewed the Advanced Practice Provider's encounter note, approve the Advanced Practice Provider's documentation, and provide the following additional information from my personal encounter.    Patient seen and examined she was admitted with acute kidney injury on top of chronic kidney disease stage III she is receiving IV hydration she feels fine without any complaints very weak lungs clear extremities no edema creatinine stable at 2.4 will continue gentle IV hydration recheck renal function tomorrow morning also send urine for culture and sensitivity and start empiric antibiotics with Fortaz since she had Pseudomonas infection in the past I will consult ID also    MD Cheyanne Venegas, APRN-CNP

## 2024-01-06 LAB
ANION GAP SERPL CALC-SCNC: 10 MMOL/L
BUN SERPL-MCNC: 40 MG/DL (ref 8–25)
CALCIUM SERPL-MCNC: 8.6 MG/DL (ref 8.5–10.4)
CHLORIDE SERPL-SCNC: 109 MMOL/L (ref 97–107)
CO2 SERPL-SCNC: 23 MMOL/L (ref 24–31)
CREAT SERPL-MCNC: 2 MG/DL (ref 0.4–1.6)
GFR SERPL CREATININE-BSD FRML MDRD: 24 ML/MIN/1.73M*2
GLUCOSE SERPL-MCNC: 87 MG/DL (ref 65–99)
POTASSIUM SERPL-SCNC: 5.1 MMOL/L (ref 3.4–5.1)
SODIUM SERPL-SCNC: 142 MMOL/L (ref 133–145)

## 2024-01-06 PROCEDURE — 2500000001 HC RX 250 WO HCPCS SELF ADMINISTERED DRUGS (ALT 637 FOR MEDICARE OP): Performed by: NURSE PRACTITIONER

## 2024-01-06 PROCEDURE — 9420000001 HC RT PATIENT EDUCATION 5 MIN

## 2024-01-06 PROCEDURE — 2500000001 HC RX 250 WO HCPCS SELF ADMINISTERED DRUGS (ALT 637 FOR MEDICARE OP): Performed by: INTERNAL MEDICINE

## 2024-01-06 PROCEDURE — 2500000004 HC RX 250 GENERAL PHARMACY W/ HCPCS (ALT 636 FOR OP/ED): Performed by: INTERNAL MEDICINE

## 2024-01-06 PROCEDURE — 1200000002 HC GENERAL ROOM WITH TELEMETRY DAILY

## 2024-01-06 PROCEDURE — 2500000005 HC RX 250 GENERAL PHARMACY W/O HCPCS: Performed by: INTERNAL MEDICINE

## 2024-01-06 PROCEDURE — 2500000002 HC RX 250 W HCPCS SELF ADMINISTERED DRUGS (ALT 637 FOR MEDICARE OP, ALT 636 FOR OP/ED): Performed by: INTERNAL MEDICINE

## 2024-01-06 PROCEDURE — 94640 AIRWAY INHALATION TREATMENT: CPT

## 2024-01-06 PROCEDURE — 36415 COLL VENOUS BLD VENIPUNCTURE: CPT | Performed by: INTERNAL MEDICINE

## 2024-01-06 PROCEDURE — 80048 BASIC METABOLIC PNL TOTAL CA: CPT | Performed by: INTERNAL MEDICINE

## 2024-01-06 RX ADMIN — ASPIRIN 81 MG: 81 TABLET, CHEWABLE ORAL at 08:11

## 2024-01-06 RX ADMIN — BUDESONIDE INHALATION 0.5 MG: 0.5 SUSPENSION RESPIRATORY (INHALATION) at 08:24

## 2024-01-06 RX ADMIN — CEFTAZIDIME 1 G: 1 INJECTION, POWDER, FOR SOLUTION INTRAMUSCULAR; INTRAVENOUS at 21:08

## 2024-01-06 RX ADMIN — BUDESONIDE INHALATION 0.5 MG: 0.5 SUSPENSION RESPIRATORY (INHALATION) at 19:23

## 2024-01-06 RX ADMIN — CITALOPRAM HYDROBROMIDE 20 MG: 20 TABLET ORAL at 08:11

## 2024-01-06 RX ADMIN — PRIMIDONE 100 MG: 50 TABLET ORAL at 14:50

## 2024-01-06 RX ADMIN — HYDROCODONE BITARTRATE AND ACETAMINOPHEN 1 TABLET: 5; 325 TABLET ORAL at 13:29

## 2024-01-06 RX ADMIN — ACETAMINOPHEN 650 MG: 325 TABLET ORAL at 21:05

## 2024-01-06 RX ADMIN — ALBUTEROL SULFATE 2.5 MG: 2.5 SOLUTION RESPIRATORY (INHALATION) at 13:51

## 2024-01-06 RX ADMIN — HEPARIN SODIUM 5000 UNITS: 5000 INJECTION, SOLUTION INTRAVENOUS; SUBCUTANEOUS at 06:03

## 2024-01-06 RX ADMIN — Medication: at 08:25

## 2024-01-06 RX ADMIN — DULOXETINE HYDROCHLORIDE 30 MG: 30 CAPSULE, DELAYED RELEASE PELLETS ORAL at 08:11

## 2024-01-06 RX ADMIN — OXYBUTYNIN CHLORIDE 5 MG: 5 TABLET ORAL at 08:11

## 2024-01-06 RX ADMIN — Medication 3 MG: at 21:07

## 2024-01-06 RX ADMIN — PRIMIDONE 100 MG: 50 TABLET ORAL at 08:11

## 2024-01-06 RX ADMIN — OXYBUTYNIN CHLORIDE 5 MG: 5 TABLET ORAL at 21:05

## 2024-01-06 RX ADMIN — HEPARIN SODIUM 5000 UNITS: 5000 INJECTION, SOLUTION INTRAVENOUS; SUBCUTANEOUS at 13:32

## 2024-01-06 RX ADMIN — PANTOPRAZOLE SODIUM 40 MG: 40 TABLET, DELAYED RELEASE ORAL at 06:03

## 2024-01-06 RX ADMIN — PRIMIDONE 100 MG: 50 TABLET ORAL at 21:05

## 2024-01-06 RX ADMIN — ALBUTEROL SULFATE 2.5 MG: 2.5 SOLUTION RESPIRATORY (INHALATION) at 08:24

## 2024-01-06 RX ADMIN — SIMVASTATIN 80 MG: 40 TABLET, FILM COATED ORAL at 21:05

## 2024-01-06 RX ADMIN — ALBUTEROL SULFATE 2.5 MG: 2.5 SOLUTION RESPIRATORY (INHALATION) at 19:22

## 2024-01-06 ASSESSMENT — COGNITIVE AND FUNCTIONAL STATUS - GENERAL
DRESSING REGULAR LOWER BODY CLOTHING: A LITTLE
TOILETING: A LITTLE
MOVING TO AND FROM BED TO CHAIR: A LITTLE
HELP NEEDED FOR BATHING: A LITTLE
WALKING IN HOSPITAL ROOM: A LITTLE
PERSONAL GROOMING: A LITTLE
DAILY ACTIVITIY SCORE: 19
MOBILITY SCORE: 18
TURNING FROM BACK TO SIDE WHILE IN FLAT BAD: A LITTLE
DRESSING REGULAR UPPER BODY CLOTHING: A LITTLE
STANDING UP FROM CHAIR USING ARMS: A LITTLE
CLIMB 3 TO 5 STEPS WITH RAILING: A LOT

## 2024-01-06 ASSESSMENT — ENCOUNTER SYMPTOMS
WOUND: 0
DIARRHEA: 0
FATIGUE: 0
VOMITING: 0
DYSURIA: 0
FEVER: 0
CHILLS: 0
LIGHT-HEADEDNESS: 0
ABDOMINAL PAIN: 0
NAUSEA: 1
SHORTNESS OF BREATH: 1
COUGH: 0
ACTIVITY CHANGE: 1
HEMATURIA: 0
DIZZINESS: 0
FREQUENCY: 1

## 2024-01-06 ASSESSMENT — ACTIVITIES OF DAILY LIVING (ADL): LACK_OF_TRANSPORTATION: NO

## 2024-01-06 ASSESSMENT — PAIN SCALES - GENERAL
PAINLEVEL_OUTOF10: 0 - NO PAIN
PAINLEVEL_OUTOF10: 9

## 2024-01-06 NOTE — NURSING NOTE
Assumed care of patient. Patient is A&Ox3 and is lying in bed resting. Call light in reach. Patient is normal sinus on monitor @ 75bpm.

## 2024-01-06 NOTE — CARE PLAN
Problem: Respiratory  Goal: Minimal/no exertional discomfort or dyspnea this shift  Outcome: Progressing  Goal: No signs of respiratory distress (eg. Use of accessory muscles. Peds grunting)  Outcome: Progressing

## 2024-01-06 NOTE — PROGRESS NOTES
Juli Del Castillo is a 83 y.o. female on day 2 of admission presenting with Fall, initial encounter.      Subjective    Patient seen alert and awake reports she has some mild discomfort over entire body at times, otherwise she is doing well         Objective          Vitals 24HR  Heart Rate:  [69-81]   Temp:  [37 °C (98.6 °F)-37.3 °C (99.1 °F)]   Resp:  [18-20]   BP: ()/(28-49)   SpO2:  [94 %-100 %]         Intake/Output last 3 Shifts:    Intake/Output Summary (Last 24 hours) at 1/6/2024 1421  Last data filed at 1/6/2024 0700  Gross per 24 hour   Intake 2738.75 ml   Output 700 ml   Net 2038.75 ml       Physical Exam  Constitutional:       Appearance: Normal appearance.   HENT:      Head: Normocephalic and atraumatic.      Mouth/Throat:      Mouth: Mucous membranes are moist.   Cardiovascular:      Rate and Rhythm: Normal rate and regular rhythm.      Pulses: Normal pulses.      Heart sounds: Normal heart sounds.   Pulmonary:      Effort: Pulmonary effort is normal.      Breath sounds: Normal breath sounds.   Abdominal:      General: Bowel sounds are normal.   Skin:     General: Skin is warm.      Capillary Refill: Capillary refill takes less than 2 seconds.   Neurological:      Mental Status: She is alert. Mental status is at baseline.   Psychiatric:         Mood and Affect: Mood normal.         Relevant Results  Results for orders placed or performed during the hospital encounter of 01/03/24 (from the past 24 hour(s))   Urinalysis with Reflex Microscopic   Result Value Ref Range    Color, Urine Colorless (N) Light-Yellow, Yellow, Dark-Yellow    Appearance, Urine Turbid (N) Clear    Specific Gravity, Urine 1.009 1.005 - 1.035    pH, Urine 6.0 5.0, 5.5, 6.0, 6.5, 7.0, 7.5, 8.0    Protein, Urine 10 (TRACE) NEGATIVE, 10 (TRACE), 20 (TRACE) mg/dL    Glucose, Urine Normal Normal mg/dL    Blood, Urine 0.03 (TRACE) (A) NEGATIVE    Ketones, Urine NEGATIVE NEGATIVE mg/dL    Bilirubin, Urine NEGATIVE NEGATIVE     Urobilinogen, Urine Normal Normal mg/dL    Nitrite, Urine NEGATIVE NEGATIVE    Leukocyte Esterase, Urine 500 Jacques/µL (A) NEGATIVE   Microscopic Only, Urine   Result Value Ref Range    WBC, Urine 21-50 (A) 1-5, NONE /HPF    WBC Clumps, Urine OCCASIONAL Reference range not established. /HPF    RBC, Urine 3-5 NONE, 1-2, 3-5 /HPF    Squamous Epithelial Cells, Urine 10-25 (FEW) Reference range not established. /HPF   Basic Metabolic Panel   Result Value Ref Range    Glucose 87 65 - 99 mg/dL    Sodium 142 133 - 145 mmol/L    Potassium 5.1 3.4 - 5.1 mmol/L    Chloride 109 (H) 97 - 107 mmol/L    Bicarbonate 23 (L) 24 - 31 mmol/L    Urea Nitrogen 40 (H) 8 - 25 mg/dL    Creatinine 2.00 (H) 0.40 - 1.60 mg/dL    eGFR 24 (L) >60 mL/min/1.73m*2    Calcium 8.6 8.5 - 10.4 mg/dL    Anion Gap 10 <=19 mmol/L               Assessment/Plan       Acute kidney injury-   improved cr at 2 today Gfr improved to 24 we will discontinue hydration , avoid nephrotoxic medications, continue on renal friendly diet   Status post a fall  Mild hyperkalemia secondary to KYLEE- resolved   History of CKD stage III  Possible urinary tract infection- continue fortaz    I was the supervising physician in the delivery of the service.   This is a shared visit. I have reviewed the Advanced Practice Provider's encounter note, approve the Advanced Practice Provider's documentation, and provide the following additional information from my personal encounter.    Patient seen and examined she feels better today I did start her on Fortaz last night after obtaining  urine cultures her vital signs are stable lungs are clear extremities no edema we will continue with antibiotics continue to monitor renal function is seen that her KYLEE is improving we will discontinue IV fluids today    MD Cheyanne Venegas, APRN-CNP

## 2024-01-06 NOTE — PROGRESS NOTES
Juli Del Castillo is a 83 y.o. female on day 2 of admission presenting with Fall, initial encounter.      Subjective   Patient sitting up in chair with family at bedside.        Objective     Last Recorded Vitals  BP (!) 121/40 (BP Location: Right arm, Patient Position: Sitting)   Pulse 71   Temp 36.8 °C (98.2 °F) (Oral)   Resp 18   Wt 82.9 kg (182 lb 12.2 oz)   SpO2 99%   Intake/Output last 3 Shifts:    Intake/Output Summary (Last 24 hours) at 1/6/2024 1625  Last data filed at 1/6/2024 0700  Gross per 24 hour   Intake 2738.75 ml   Output 450 ml   Net 2288.75 ml       Admission Weight  Weight: 82.9 kg (182 lb 12.2 oz) (01/03/24 2159)    Daily Weight  01/03/24 : 82.9 kg (182 lb 12.2 oz)    Image Results  ECG 12 lead  Normal sinus rhythm with sinus arrhythmia  Inferior infarct (cited on or before 13-DEC-2023)  Abnormal ECG  When compared with ECG of 13-DEC-2023 01:02,  No significant change was found  Confirmed by Luan Monge ([id number]) on 1/4/2024 12:44:09 PM  US renal complete  Narrative: Interpreted By:  Tayler Trujillo,   STUDY:  US RENAL COMPLETE; 1/4/2024 12:11 pm      INDICATION:  Signs/Symptoms:Acute kidney injury superimposed upon chronic kidney  disease. Hyperkalemia.      COMPARISON:  12/14/2023      ACCESSION NUMBER(S):  [id number]      ORDERING CLINICIAN:  JENNY YU      TECHNIQUE:  Grayscale and color Doppler imaging of the kidneys.      FINDINGS:  The right kidney measures 9.5 cm x 6.1 cm x 4.7 cm. There is a 1.1 x  1.4 x 1.4 cm right renal cyst. The left kidney measures 11.0 cm x 4.8  cm x 5.1 cm. There is a 1.7 x 2.3 x 2.4 cm left renal cyst. There is  no shadowing calculus, hydronephrosis, or solid mass identified. The  renal cortical thickness and echogenicity is normal.      Cursory evaluation of the urinary bladder shows no evidence for mass,  wall thickening, or calculus.      Impression: Simple bilateral renal cyst noted.      No hydronephrosis or renal atrophy.      MACRO:  None.      Signed by:  Tayler Ronnie 1/4/2024 12:19 PM  Dictation workstation:   [id number]      Physical Exam  Constitutional:       Appearance: Normal appearance.   Cardiovascular:      Rate and Rhythm: Normal rate and regular rhythm.      Pulses: Normal pulses.      Heart sounds: Normal heart sounds.   Pulmonary:      Effort: Pulmonary effort is normal.      Breath sounds: Wheezing present.   Abdominal:      General: Abdomen is flat.      Palpations: Abdomen is soft.   Skin:     General: Skin is warm and dry.   Neurological:      Mental Status: She is alert and oriented to person, place, and time.         Relevant Results             Results for orders placed or performed during the hospital encounter of 01/03/24 (from the past 24 hour(s))   Basic Metabolic Panel   Result Value Ref Range    Glucose 87 65 - 99 mg/dL    Sodium 142 133 - 145 mmol/L    Potassium 5.1 3.4 - 5.1 mmol/L    Chloride 109 (H) 97 - 107 mmol/L    Bicarbonate 23 (L) 24 - 31 mmol/L    Urea Nitrogen 40 (H) 8 - 25 mg/dL    Creatinine 2.00 (H) 0.40 - 1.60 mg/dL    eGFR 24 (L) >60 mL/min/1.73m*2    Calcium 8.6 8.5 - 10.4 mg/dL    Anion Gap 10 <=19 mmol/L       Assessment/Plan                  Principal Problem:    Fall, initial encounter  Active Problems:    Fibromyalgia    Acute congestive heart failure (CMS/HCC)    Hyperlipidemia    Major depressive disorder, single episode, unspecified    Overactive bladder    History of lung cancer    Chronic respiratory failure (CMS/HCC)    Chronic diastolic heart failure (CMS/HCC)    Accidental fall    Acute on chronic kidney failure (CMS/HCC)    Hyperkalemia    H/o Multiple Accidental Falls   CT brain no new findings; old infarct noted  Follow-up orthostatic vital signs  Fall precautions  Aspiration precautions  PT/OT Evaluation  She may need to consider long-term care if this continues  Social work consultation for consideration of ADRIANA        Acute on Chronic Renal Failure  Patient's baseline Cr is around 1.5-1.6  She was seen by  Dr. Lamar last few weeks ago, felt that she was volume depleted last hospitalization  Chest x-ray read as congested  Nephrology ordered IVF NS at 75ml/hour yesterday and to continue today.   Renal Ultrasound done, no acute change or obstruction     HFpEF  Diuretics on hold     Hyperkalemia  Resolved, K 4.6  Lisinopril discontinued  Continue with albuterol treatments  Consider low potassium diet     Chronic Respiratory Failure/COPD  Patient is at her baseline oxygen requirement  At home inhaled corticosteroid  Aerosolized bronchodilators    H/o LLL Spiculated 7 mm Nodule   Follows with pulmonary services outpatient  Defer subsequent imaging to PCP    Urge Urinary Incontinence      Patient takes Myrbetriq home  Continue formulary oxybutynin here    Essential Tremor   Plan Continue home primidone dose     HTN  Patient run low blood pressure during the admission  Today's /35  Continue monitoring for S/S of hypotension          GI + DVT PPX  Home PPI  Heparin subcu     Plan of Care   Patient will likely need SNF at discharge, she is refusing at this time.      Plan of Care discussed with patient and collaborating physician, Dr. Bullard.               Heydi Devlin, APRN-CNP

## 2024-01-06 NOTE — NURSING NOTE
Hany completed, patient agreeable to go to rehab [facility], [facility] willing to accept [facility], gave report to nurse at [facility], transport arranged.

## 2024-01-06 NOTE — CONSULTS
Inpatient consult to Infectious Diseases  Consult performed by: BRYANNA Zepeda-CNP  Consult ordered by: Cy Gamez MD  Reason for consult: UTI          Primary MD: HORACIO Ye    History Of Present Illness  Juli Del Castillo is a 83 y.o. female presenting with falls at home on 1/3/24.  She has a PMH of COPD-on chronic oxygen, CHF.  She was admitted about 3 weeks ago, discharged on 12/17/23 to rehab with a deep right hip contusion.    She was discharged home but fell twice at home.  She was admitted for further evaluation and management.  She was found to have acute and chronic kidney disease and hyperkalemia.  She has been followed by nephrology.  Urinalysis with +leuks and WBCs.  She had a recent UTI secondary to pseudomonas in Dec 2023.  She is on IV ceftazidime.  She is afebrile. Denies chills.  Reports chronic SOB.  No cough or chest pain.  Reports chronic intermittent nausea. Denies vomiting, diarrhea, abdominal pain.  Denies dysuria or hematuria. Reports urgency and frequency-states this is chronic.       Past Medical History  She has a past medical history of Cancer (CMS/[facility]), CHF (congestive heart failure) (CMS/[facility]), COPD (chronic obstructive pulmonary disease) (CMS/[facility]), and Depression.    Surgical History  She has a past surgical history that includes MR angio neck wo IV contrast (10/31/2015); MR angio head wo IV contrast (10/31/2015); and MR angio head wo IV contrast (2/15/2019).     Social History     Occupational History    Not on file   Tobacco Use    Smoking status: Former     Types: Cigarettes    Smokeless tobacco: Never   Vaping Use    Vaping Use: Never used   Substance and Sexual Activity    Alcohol use: Never    Drug use: Never    Sexual activity: Not on file     Travel History   Travel since 12/06/23    No documented travel since 12/06/23                Family History  No family history on file.  Allergies  Cortisone, Other, Acth, Haemoph b poly conj-tet tox-pf, Codeine, and  Hydrochlorothiazide     Immunization History   Administered Date(s) Administered    Flu vaccine, quadrivalent, high-dose, preservative free, age 65y+ (FLUZONE) 11/06/2020, 11/06/2020, 12/01/2022, 12/12/2023    Influenza, High Dose Seasonal, Preservative Free 10/12/2017, 10/02/2018, 12/03/2019    Influenza, seasonal, injectable 11/13/2010, 10/03/2011, 10/02/2012, 10/01/2013, 10/01/2014, 10/01/2015    Moderna COVID-19 vaccine, bivalent, blue cap/gray label *Check age/dose* 12/01/2022    Moderna SARS-CoV-2 Vaccination 01/28/2021, 02/25/2021, 10/29/2021    PPD Test 01/11/2019, 01/11/2020, 01/18/2020    Pneumococcal conjugate vaccine, 13-valent (PREVNAR 13) 12/21/2015    Pneumococcal polysaccharide vaccine, 23-valent, age 2 years and older (PNEUMOVAX 23) 10/13/2006, 10/22/2018    Td vaccine, age 7 years and older (TDVAX) 07/13/2001    Tdap vaccine, age 7 year and older (BOOSTRIX) 08/25/2015     Medications  Home medications:  Medications Prior to Admission   Medication Sig Dispense Refill Last Dose    acetaminophen (Tylenol) 325 mg tablet Take 2 tablets (650 mg) by mouth every 6 hours if needed for moderate pain (4 - 6). 30 tablet 0 Unknown    albuterol 2.5 mg /3 mL (0.083 %) nebulizer solution Take 3 mL (2.5 mg) by nebulization every 6 hours while awake. 180 mL 2 Unknown    albuterol 90 mcg/actuation inhaler INHALE 2 PUFFS INTO THE LUNGS EVERY 4 HOURS AS NEEDED FOR 30 DAYS   Unknown    ALPRAZolam (Xanax) 0.25 mg tablet Take 1 tablet (0.25 mg) by mouth once daily as needed for anxiety. 2 tablet 0 Unknown    aspirin 81 mg chewable tablet Chew 1 tablet (81 mg) once daily. 30 tablet 3 Unknown    budesonide (Pulmicort) 0.5 mg/2 mL nebulizer solution Take 2 mL (0.5 mg) by nebulization 2 times a day. Rinse mouth with water after use to reduce aftertaste and incidence of candidiasis. Do not swallow. 120 mL 3 Unknown    cholecalciferol, vitamin D3, (VITAMIN D3 ORAL) once every 24 hours.   Unknown    citalopram (CeleXA) 20 mg  tablet Take 1 tablet (20 mg) by mouth once daily. 30 tablet 3 Unknown    DULoxetine (Cymbalta) 30 mg DR capsule Take 1 capsule (30 mg) by mouth once daily. 90 capsule 3 Unknown    fluticasone furoate-vilanteroL (Breo Ellipta) 200-25 mcg/dose inhaler TAKE 1 PUFF ONCE A DAY FOR 30 DAYS   Unknown    gabapentin (Neurontin) 400 mg capsule Take 1 capsule (400 mg) by mouth 3 times a day.   Unknown    nebulizer and compressor device use as directed with nebulizer treatments 1 each 0 Unknown    nitroglycerin (Nitrostat) 0.4 mg SL tablet AS DIRECTED SUBLINGUAL AS NEEDED 90 DAYS   Unknown    omeprazole (PriLOSEC) 40 mg DR capsule Take 1 capsule (40 mg) by mouth once daily.   Unknown    oxybutynin XL (Ditropan-XL) 5 mg 24 hr tablet Take 1 tablet (5 mg) by mouth once daily. 90 tablet 3 Unknown    oxyCODONE (Roxicodone) 5 mg immediate release tablet Take 1 tablet (5 mg) by mouth every 6 hours if needed for severe pain (7 - 10). 8 tablet 0 Unknown    oxygen (O2) gas therapy 3 l at night; 4 throughout the day nasal cannula Continuous   Unknown    primidone (Mysoline) 50 mg tablet Take 2 tablets (100 mg) by mouth 3 times a day.   Unknown    simvastatin (Zocor) 80 mg tablet Take 1 tablet (80 mg) by mouth once daily at bedtime. 30 tablet 3 Unknown    temazepam (Restoril) 15 mg capsule Take 1 capsule (15 mg) by mouth as needed at bedtime for sleep. 2 capsule 0 Unknown    torsemide (Demadex) 20 mg tablet Take 1 tablet (20 mg) by mouth once daily. 30 tablet 3 Unknown    traMADol (Ultram) 50 mg tablet Take 1 tablet (50 mg) by mouth every 6 hours if needed for severe pain (7 - 10). 8 tablet 0 Unknown     Current medications:  Scheduled medications  albuterol, 2.5 mg, nebulization, q6h while awake  aspirin, 81 mg, oral, Daily  budesonide, 0.5 mg, nebulization, BID  cefTAZidime, 1 g, intravenous, q24h  citalopram, 20 mg, oral, Daily  DULoxetine, 30 mg, oral, Daily  heparin (porcine), 5,000 Units, subcutaneous, q8h  oxybutynin, 5 mg, oral,  BID  pantoprazole, 40 mg, oral, Daily before breakfast  primidone, 100 mg, oral, TID  simvastatin, 80 mg, oral, Nightly      Continuous medications  sodium chloride 0.9%, 75 mL/hr, Last Rate: 75 mL/hr (01/05/24 6118)      PRN medications  PRN medications: acetaminophen **OR** acetaminophen **OR** acetaminophen, benzocaine-menthol, dextromethorphan-guaifenesin, guaiFENesin, HYDROcodone-acetaminophen, ipratropium-albuteroL, melatonin, oxygen, polyethylene glycol    Review of Systems   Constitutional:  Positive for activity change. Negative for chills, fatigue and fever.   Respiratory:  Positive for shortness of breath. Negative for cough.    Cardiovascular:  Negative for chest pain and leg swelling.   Gastrointestinal:  Positive for nausea. Negative for abdominal pain, diarrhea and vomiting.   Genitourinary:  Positive for frequency and urgency. Negative for dysuria and hematuria.   Skin:  Negative for rash and wound.   Neurological:  Negative for dizziness and light-headedness.        Objective  Range of Vitals (last 24 hours)  Heart Rate:  [64-81]   Temp:  [36.6 °C (97.9 °F)-37.3 °C (99.1 °F)]   Resp:  [18-20]   BP: ()/(28-49)   SpO2:  [94 %-99 %]   Daily Weight  01/03/24 : 82.9 kg (182 lb 12.2 oz)    Body mass index is 31.37 kg/m².     Physical Exam  Constitutional:       Appearance: Normal appearance.   HENT:      Head: Normocephalic and atraumatic.      Nose: Nose normal.      Mouth/Throat:      Mouth: Mucous membranes are moist.      Pharynx: Oropharynx is clear.   Eyes:      Extraocular Movements: Extraocular movements intact.      Conjunctiva/sclera: Conjunctivae normal.   Cardiovascular:      Rate and Rhythm: Normal rate and regular rhythm.   Pulmonary:      Effort: Pulmonary effort is normal.      Breath sounds: Normal breath sounds.   Abdominal:      General: Bowel sounds are normal.      Palpations: Abdomen is soft.      Tenderness: There is no abdominal tenderness.   Musculoskeletal:         General:  "No swelling. Normal range of motion.      Cervical back: Normal range of motion and neck supple.   Skin:     General: Skin is warm and dry.   Neurological:      General: No focal deficit present.      Mental Status: She is alert and oriented to person, place, and time.   Psychiatric:         Mood and Affect: Mood normal.         Behavior: Behavior normal.            Relevant Results    Labs  Results from last 72 hours   Lab Units 01/05/24  0452 01/04/24  0917 01/03/24  2250   WBC AUTO x10*3/uL 7.4 9.7 11.2   HEMOGLOBIN g/dL 9.1* 9.8* 9.2*   HEMATOCRIT % 29.5* 31.4* 28.5*   PLATELETS AUTO x10*3/uL 310 344 321     Results from last 72 hours   Lab Units 01/06/24  0429 01/05/24  0452 01/04/24  0917   SODIUM mmol/L 142 134 134   POTASSIUM mmol/L 5.1 4.6 4.5   CHLORIDE mmol/L 109* 102 98   CO2 mmol/L 23* 24 28   BUN mg/dL 40* 46* 46*   CREATININE mg/dL 2.00* 2.40* 2.30*   GLUCOSE mg/dL 87 91 88   CALCIUM mg/dL 8.6 8.6 8.9   ANION GAP mmol/L 10 8 8   EGFR mL/min/1.73m*2 24* 19* 20*     Results from last 72 hours   Lab Units 01/03/24  2250   ALK PHOS U/L 69   BILIRUBIN TOTAL mg/dL <0.2   PROTEIN TOTAL g/dL 7.1   ALT U/L 6   AST U/L 9   ALBUMIN g/dL 3.4*     Estimated Creatinine Clearance: 21.8 mL/min (A) (by C-G formula based on SCr of 2 mg/dL (H)).  CRP   Date Value Ref Range Status   02/15/2019 0.3 0 - 2.0 MG/DL Final     Comment:     Performed at [facility] [address] [place] [address] [place] [address]     Sedimentation Rate   Date Value Ref Range Status   02/15/2019 18 0 - 20 MM/HR Final     Comment:     Performed at [facility] [address] [place] [address] [place] [address]     No results found for: \"HIV1X2\", \"HIVCONF\", \"RFASBX6UX\"  No results found for: \"HEPCABINIT\", \"HEPCAB\", \"HCVPCRQUANT\"  Microbiology  Susceptibility data from last 90 days.  Collected Specimen Info Organism Aztreonam Cefepime Ceftazidime Ciprofloxacin Gentamicin Piperacillin/Tazobactam Tobramycin   12/13/23 Urine from Clean Catch/Voided Pseudomonas aeruginosa S S S S S S S "     Urine culture pending   Imaging  ECG 12 lead    Result Date: 1/4/2024  Normal sinus rhythm with sinus arrhythmia Inferior infarct (cited on or before 13-DEC-2023) Abnormal ECG When compared with ECG of 13-DEC-2023 01:02, No significant change was found Confirmed by Luan Monge ([id number]) on 1/4/2024 12:44:09 PM    US renal complete    Result Date: 1/4/2024  Interpreted By:  Tayler Trujillo, STUDY: US RENAL COMPLETE; 1/4/2024 12:11 pm   INDICATION: Signs/Symptoms:Acute kidney injury superimposed upon chronic kidney disease. Hyperkalemia.   COMPARISON: 12/14/2023   ACCESSION NUMBER(S): [id number]   ORDERING CLINICIAN: JENNY YU   TECHNIQUE: Grayscale and color Doppler imaging of the kidneys.   FINDINGS: The right kidney measures 9.5 cm x 6.1 cm x 4.7 cm. There is a 1.1 x 1.4 x 1.4 cm right renal cyst. The left kidney measures 11.0 cm x 4.8 cm x 5.1 cm. There is a 1.7 x 2.3 x 2.4 cm left renal cyst. There is no shadowing calculus, hydronephrosis, or solid mass identified. The renal cortical thickness and echogenicity is normal.   Cursory evaluation of the urinary bladder shows no evidence for mass, wall thickening, or calculus.       Simple bilateral renal cyst noted.   No hydronephrosis or renal atrophy.   MACRO: None.   Signed by: Tayler Trujillo 1/4/2024 12:19 PM Dictation workstation:   [id number]    CT head wo IV contrast    Result Date: 1/3/2024  STUDY: CT Head without IV Contrast; 1/3/2024 at 11:03 PM INDICATION: Fall. COMPARISON: 12/12/2023. TECHNIQUE: Unenhanced CT scan of the brain. Automated mA/kV exposure control was utilized and patient examination was performed in strict accordance with principles of ALARA. FINDINGS: Encephalomalacia of the right frontal and parietal lobes are again noted.  Periventricular white matter hypodensities are noted consistent with areas of prior microvascular insults.  An unchanged 5 mm low-density areas noted in the right thalamus consistent with a prior lacunar infarction.   Otherwise, the gray-white differentiation is normal. There is no shift of the midline. No abnormal masses, mass effect, fluid collections, or recent hemorrhages are seen.  The gray-white differentiation is normal. The visualized portions of the paranasal sinuses and mastoid air cells are clear. The bony structures are normal.    No acute intracerebral changes. No significant change. 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 White matter changes consistent with areas of prior microvascular insults. Signed by Phillip Jensen MD    XR chest 2 views    Result Date: 1/3/2024  STUDY: Chest Radiographs;  1/3/24 at 10:55pm INDICATION: Fall. COMPARISON: 10/17/23 XR Chest ACCESSION NUMBER(S): [id number] ORDERING CLINICIAN: VIRY ROBERT TECHNIQUE:  Frontal and lateral chest. FINDINGS: CARDIOMEDIASTINAL SILHOUETTE: Cardiomediastinal silhouette is normal in size and configuration. Calcified plaque is seen in the aorta.  LUNGS: Increased interstitial markings are seen bilaterally.  Slightly prominent right hilum is noted similar to prior studies with surgical clips in the region of the right hilum suggesting chronic postoperative change.  Flattening of hemidiaphragms may indicate chronic changes of COPD.  ABDOMEN: No remarkable upper abdominal findings.  BONES: No acute osseous changes.  Median sternotomy changes are noted.    Increased interstitial markings bilaterally, likely mild pulmonary vascular congestion with a trace right pleural effusion and suspected chronic changes of COPD. Stable chronic postoperative change in the region of the right hilum. Signed by Abdirashid Panda      Assessment/Plan   Acute on chronic renal failure-nephrology on consult  Pyuria-versus UTI  History of pseudomonas UTI    Continue ceftazidime  Follow-up urine culture  Monitor WBC and temperature  Monitor renal function  Nephrology following  Further recommendations based on culture result    Discussed with Dr Lul LEON Staff, APRN-CNP

## 2024-01-06 NOTE — NURSING NOTE
Patient is reporting sharp pain when taking a deep breath on right chest, notified VIRGEN Devlin.

## 2024-01-07 LAB
ANION GAP SERPL CALC-SCNC: 10 MMOL/L
BUN SERPL-MCNC: 32 MG/DL (ref 8–25)
CALCIUM SERPL-MCNC: 9.4 MG/DL (ref 8.5–10.4)
CHLORIDE SERPL-SCNC: 106 MMOL/L (ref 97–107)
CO2 SERPL-SCNC: 24 MMOL/L (ref 24–31)
CREAT SERPL-MCNC: 1.9 MG/DL (ref 0.4–1.6)
GFR SERPL CREATININE-BSD FRML MDRD: 26 ML/MIN/1.73M*2
GLUCOSE SERPL-MCNC: 98 MG/DL (ref 65–99)
POTASSIUM SERPL-SCNC: 4.9 MMOL/L (ref 3.4–5.1)
SODIUM SERPL-SCNC: 140 MMOL/L (ref 133–145)

## 2024-01-07 PROCEDURE — 9420000001 HC RT PATIENT EDUCATION 5 MIN

## 2024-01-07 PROCEDURE — 2500000004 HC RX 250 GENERAL PHARMACY W/ HCPCS (ALT 636 FOR OP/ED): Performed by: NURSE PRACTITIONER

## 2024-01-07 PROCEDURE — 2500000002 HC RX 250 W HCPCS SELF ADMINISTERED DRUGS (ALT 637 FOR MEDICARE OP, ALT 636 FOR OP/ED): Performed by: INTERNAL MEDICINE

## 2024-01-07 PROCEDURE — 2500000001 HC RX 250 WO HCPCS SELF ADMINISTERED DRUGS (ALT 637 FOR MEDICARE OP): Performed by: INTERNAL MEDICINE

## 2024-01-07 PROCEDURE — 2500000001 HC RX 250 WO HCPCS SELF ADMINISTERED DRUGS (ALT 637 FOR MEDICARE OP): Performed by: NURSE PRACTITIONER

## 2024-01-07 PROCEDURE — 1200000002 HC GENERAL ROOM WITH TELEMETRY DAILY

## 2024-01-07 PROCEDURE — 2500000005 HC RX 250 GENERAL PHARMACY W/O HCPCS: Performed by: INTERNAL MEDICINE

## 2024-01-07 PROCEDURE — 94640 AIRWAY INHALATION TREATMENT: CPT

## 2024-01-07 PROCEDURE — 2500000004 HC RX 250 GENERAL PHARMACY W/ HCPCS (ALT 636 FOR OP/ED): Performed by: INTERNAL MEDICINE

## 2024-01-07 PROCEDURE — 80048 BASIC METABOLIC PNL TOTAL CA: CPT | Performed by: NURSE PRACTITIONER

## 2024-01-07 PROCEDURE — 36415 COLL VENOUS BLD VENIPUNCTURE: CPT | Performed by: NURSE PRACTITIONER

## 2024-01-07 RX ORDER — BISACODYL 5 MG
5 TABLET, DELAYED RELEASE (ENTERIC COATED) ORAL DAILY PRN
Status: DISCONTINUED | OUTPATIENT
Start: 2024-01-07 | End: 2024-01-15 | Stop reason: HOSPADM

## 2024-01-07 RX ORDER — ONDANSETRON HYDROCHLORIDE 2 MG/ML
4 INJECTION, SOLUTION INTRAVENOUS EVERY 6 HOURS PRN
Status: DISCONTINUED | OUTPATIENT
Start: 2024-01-07 | End: 2024-01-15 | Stop reason: HOSPADM

## 2024-01-07 RX ORDER — DOCUSATE SODIUM 100 MG/1
100 CAPSULE, LIQUID FILLED ORAL 2 TIMES DAILY
Status: DISCONTINUED | OUTPATIENT
Start: 2024-01-07 | End: 2024-01-15 | Stop reason: HOSPADM

## 2024-01-07 RX ORDER — BISACODYL 10 MG/1
10 SUPPOSITORY RECTAL DAILY
Status: DISCONTINUED | OUTPATIENT
Start: 2024-01-07 | End: 2024-01-15 | Stop reason: HOSPADM

## 2024-01-07 RX ORDER — GLYCERIN 1 G/1
1 SUPPOSITORY RECTAL DAILY PRN
Status: DISCONTINUED | OUTPATIENT
Start: 2024-01-07 | End: 2024-01-15 | Stop reason: HOSPADM

## 2024-01-07 RX ORDER — POLYETHYLENE GLYCOL 3350 17 G/17G
17 POWDER, FOR SOLUTION ORAL DAILY PRN
Status: DISCONTINUED | OUTPATIENT
Start: 2024-01-07 | End: 2024-01-15 | Stop reason: HOSPADM

## 2024-01-07 RX ADMIN — ALBUTEROL SULFATE 2.5 MG: 2.5 SOLUTION RESPIRATORY (INHALATION) at 19:37

## 2024-01-07 RX ADMIN — BUDESONIDE INHALATION 0.5 MG: 0.5 SUSPENSION RESPIRATORY (INHALATION) at 07:52

## 2024-01-07 RX ADMIN — HEPARIN SODIUM 5000 UNITS: 5000 INJECTION, SOLUTION INTRAVENOUS; SUBCUTANEOUS at 06:05

## 2024-01-07 RX ADMIN — HYDROCODONE BITARTRATE AND ACETAMINOPHEN 1 TABLET: 5; 325 TABLET ORAL at 09:11

## 2024-01-07 RX ADMIN — POLYETHYLENE GLYCOL 3350 17 G: 17 POWDER, FOR SOLUTION ORAL at 05:11

## 2024-01-07 RX ADMIN — PANTOPRAZOLE SODIUM 40 MG: 40 TABLET, DELAYED RELEASE ORAL at 06:05

## 2024-01-07 RX ADMIN — BISACODYL 5 MG: 5 TABLET, COATED ORAL at 11:32

## 2024-01-07 RX ADMIN — CEFTAZIDIME 1 G: 1 INJECTION, POWDER, FOR SOLUTION INTRAMUSCULAR; INTRAVENOUS at 23:24

## 2024-01-07 RX ADMIN — DULOXETINE HYDROCHLORIDE 30 MG: 30 CAPSULE, DELAYED RELEASE PELLETS ORAL at 09:09

## 2024-01-07 RX ADMIN — BISACODYL 10 MG: 10 SUPPOSITORY RECTAL at 10:15

## 2024-01-07 RX ADMIN — CITALOPRAM HYDROBROMIDE 20 MG: 20 TABLET ORAL at 09:09

## 2024-01-07 RX ADMIN — SIMVASTATIN 80 MG: 40 TABLET, FILM COATED ORAL at 22:10

## 2024-01-07 RX ADMIN — ALBUTEROL SULFATE 2.5 MG: 2.5 SOLUTION RESPIRATORY (INHALATION) at 07:52

## 2024-01-07 RX ADMIN — HEPARIN SODIUM 5000 UNITS: 5000 INJECTION, SOLUTION INTRAVENOUS; SUBCUTANEOUS at 22:10

## 2024-01-07 RX ADMIN — ACETAMINOPHEN 650 MG: 325 TABLET ORAL at 22:09

## 2024-01-07 RX ADMIN — ALBUTEROL SULFATE 2.5 MG: 2.5 SOLUTION RESPIRATORY (INHALATION) at 12:58

## 2024-01-07 RX ADMIN — ASPIRIN 81 MG: 81 TABLET, CHEWABLE ORAL at 09:09

## 2024-01-07 RX ADMIN — ONDANSETRON 4 MG: 2 INJECTION INTRAMUSCULAR; INTRAVENOUS at 12:36

## 2024-01-07 RX ADMIN — Medication: at 07:52

## 2024-01-07 RX ADMIN — PRIMIDONE 100 MG: 50 TABLET ORAL at 15:04

## 2024-01-07 RX ADMIN — OXYBUTYNIN CHLORIDE 5 MG: 5 TABLET ORAL at 09:09

## 2024-01-07 RX ADMIN — PRIMIDONE 100 MG: 50 TABLET ORAL at 09:09

## 2024-01-07 RX ADMIN — HEPARIN SODIUM 5000 UNITS: 5000 INJECTION, SOLUTION INTRAVENOUS; SUBCUTANEOUS at 15:05

## 2024-01-07 RX ADMIN — PRIMIDONE 100 MG: 50 TABLET ORAL at 22:09

## 2024-01-07 RX ADMIN — BUDESONIDE INHALATION 0.5 MG: 0.5 SUSPENSION RESPIRATORY (INHALATION) at 19:37

## 2024-01-07 RX ADMIN — DOCUSATE SODIUM 100 MG: 100 CAPSULE, LIQUID FILLED ORAL at 22:10

## 2024-01-07 RX ADMIN — Medication 3 MG: at 22:12

## 2024-01-07 RX ADMIN — OXYBUTYNIN CHLORIDE 5 MG: 5 TABLET ORAL at 22:10

## 2024-01-07 ASSESSMENT — COGNITIVE AND FUNCTIONAL STATUS - GENERAL
MOVING TO AND FROM BED TO CHAIR: A LITTLE
DAILY ACTIVITIY SCORE: 19
HELP NEEDED FOR BATHING: A LITTLE
CLIMB 3 TO 5 STEPS WITH RAILING: A LOT
MOBILITY SCORE: 18
DRESSING REGULAR UPPER BODY CLOTHING: A LITTLE
STANDING UP FROM CHAIR USING ARMS: A LITTLE
MOVING TO AND FROM BED TO CHAIR: A LITTLE
DRESSING REGULAR UPPER BODY CLOTHING: A LITTLE
MOBILITY SCORE: 18
WALKING IN HOSPITAL ROOM: A LITTLE
DRESSING REGULAR LOWER BODY CLOTHING: A LITTLE
TOILETING: A LITTLE
DAILY ACTIVITIY SCORE: 19
PERSONAL GROOMING: A LITTLE
WALKING IN HOSPITAL ROOM: A LITTLE
TURNING FROM BACK TO SIDE WHILE IN FLAT BAD: A LITTLE
HELP NEEDED FOR BATHING: A LITTLE
CLIMB 3 TO 5 STEPS WITH RAILING: A LOT
TURNING FROM BACK TO SIDE WHILE IN FLAT BAD: A LITTLE
PERSONAL GROOMING: A LITTLE
DRESSING REGULAR LOWER BODY CLOTHING: A LITTLE
STANDING UP FROM CHAIR USING ARMS: A LITTLE
TOILETING: A LITTLE

## 2024-01-07 ASSESSMENT — PAIN - FUNCTIONAL ASSESSMENT
PAIN_FUNCTIONAL_ASSESSMENT: 0-10
PAIN_FUNCTIONAL_ASSESSMENT: 0-10

## 2024-01-07 ASSESSMENT — PAIN SCALES - GENERAL
PAINLEVEL_OUTOF10: 8
PAINLEVEL_OUTOF10: 0 - NO PAIN

## 2024-01-07 NOTE — NURSING NOTE
Patient can't get suppository due to finding large hard stool impacted in rectum upon assessment, notified VIRGEN Devlin NP to come assess patient and order oral ducolax.

## 2024-01-07 NOTE — PROGRESS NOTES
Juli Del Castillo is a 83 y.o. female on day 3 of admission presenting with Fall, initial encounter.      Subjective       Patient seen in room awake and alert, reports that overall she is doing well is having issues with constipation has attempted a few modalities with no relief, patient reports she is experience some fullness possibly related to constipation does not report any nausea vomiting, chest pain or shortness of breath.  Patient has no other complaints at this time       Objective          Vitals 24HR  Heart Rate:  []   Temp:  [36.8 °C (98.2 °F)-37.2 °C (99 °F)]   Resp:  [18-20]   BP: (117-158)/(40-78)   SpO2:  [97 %-100 %]         Intake/Output last 3 Shifts:    Intake/Output Summary (Last 24 hours) at 1/7/2024 1131  Last data filed at 1/6/2024 2138  Gross per 24 hour   Intake 290 ml   Output 400 ml   Net -110 ml       Physical Exam  Constitutional:       Appearance: Normal appearance.   HENT:      Head: Normocephalic and atraumatic.      Mouth/Throat:      Mouth: Mucous membranes are moist.   Cardiovascular:      Rate and Rhythm: Normal rate and regular rhythm.      Pulses: Normal pulses.      Heart sounds: Normal heart sounds.   Pulmonary:      Effort: Pulmonary effort is normal.      Breath sounds: Normal breath sounds.   Abdominal:      General: Bowel sounds are normal. There is no distension.      Tenderness: There is abdominal tenderness. There is no guarding or rebound.   Skin:     General: Skin is warm.      Capillary Refill: Capillary refill takes less than 2 seconds.   Neurological:      General: No focal deficit present.      Mental Status: She is alert and oriented to person, place, and time.   Psychiatric:         Mood and Affect: Mood normal.         Behavior: Behavior normal.         Relevant Results  Results for orders placed or performed during the hospital encounter of 01/03/24 (from the past 24 hour(s))   Basic metabolic panel   Result Value Ref Range    Glucose 98 65 - 99 mg/dL     Sodium 140 133 - 145 mmol/L    Potassium 4.9 3.4 - 5.1 mmol/L    Chloride 106 97 - 107 mmol/L    Bicarbonate 24 24 - 31 mmol/L    Urea Nitrogen 32 (H) 8 - 25 mg/dL    Creatinine 1.90 (H) 0.40 - 1.60 mg/dL    eGFR 26 (L) >60 mL/min/1.73m*2    Calcium 9.4 8.5 - 10.4 mg/dL    Anion Gap 10 <=19 mmol/L      ontains abnormal data Urine culture  Order: [id number]  Collected 1/5/2024 18:25       Status: Preliminary result       Visible to patient: No (not released)    Specimen Information: Clean Catch/Voided; Urine   0 Result Notes  Urine Culture >100,000 Gram Negative Bacilli Abnormal            Resulting Agency: [facility]           Specimen Collected: 01/05/24 18:25 Last Resulted: 01/07/24 07:48       Order Details        View Encounter        Lab and Collection Details        Routing        Result History     View All Conversations on this Encounter           Result Care Coordination      Patient Communication     Not Released  Not seen Back to Top                    Assessment/Plan      Acute kidney injury-   stable cr at 2 today Gfr stable at 24  we will continue with oral  hydration , avoid nephrotoxic medications, continue on renal friendly diet   Status post a fall  Mild hyperkalemia secondary to KYLEE- resolved   History of CKD stage III  Possible urinary tract infection- continue jahaira Uribe, APRN-CNP

## 2024-01-07 NOTE — CARE PLAN
The patient's goals for the shift include have BM      The clinical goals for the shift include Have BM    Over the shift, the patient did not make progress toward the following goals. Barriers to progression include constipation, pain medication. Recommendations to address these barriers include continue treatment.

## 2024-01-07 NOTE — PROGRESS NOTES
Juli Del Castillo is a 83 y.o. female on day 3 of admission presenting with Fall, initial encounter.    Subjective   Interval History:   Afebrile, no chills  No abdominal pain, nausea or vomiting  No chest pain or shortness of breath    Review of Systems   All other systems reviewed and are negative.      Objective   Range of Vitals (last 24 hours)  Heart Rate:  []   Temp:  [36.8 °C (98.2 °F)-37.2 °C (99 °F)]   Resp:  [18-20]   BP: (117-158)/(37-78)   SpO2:  [97 %-100 %]   Daily Weight  01/03/24 : 82.9 kg (182 lb 12.2 oz)    Body mass index is 31.37 kg/m².    Physical Exam  Constitutional:       Appearance: Normal appearance.   HENT:      Head: Normocephalic and atraumatic.      Nose: Nose normal.      Mouth/Throat:      Mouth: Mucous membranes are moist.      Pharynx: Oropharynx is clear.   Eyes:      Extraocular Movements: Extraocular movements intact.      Conjunctiva/sclera: Conjunctivae normal.   Cardiovascular:      Rate and Rhythm: Normal rate and regular rhythm.   Pulmonary:      Effort: Pulmonary effort is normal.      Breath sounds: Normal breath sounds.   Abdominal:      General: Bowel sounds are normal.      Palpations: Abdomen is soft.      Tenderness: There is no abdominal tenderness.   Musculoskeletal:         General: No swelling. Normal range of motion.      Cervical back: Normal range of motion and neck supple.   Skin:     General: Skin is warm and dry.   Neurological:      General: No focal deficit present.      Mental Status: She is alert and oriented to person, place, and time.   Psychiatric:         Mood and Affect: Mood normal.         Behavior: Behavior normal.     Antibiotics  sodium chloride 0.9 % bolus 500 mL  albuterol 2.5 mg /3 mL (0.083 %) nebulizer solution 2.5 mg  aspirin chewable tablet 81 mg  budesonide (Pulmicort) 0.5 mg/2 mL nebulizer solution 0.5 mg  citalopram (CeleXA) tablet 20 mg  DULoxetine (Cymbalta) DR capsule 30 mg  pantoprazole (ProtoNix) EC tablet 40 mg  oxybutynin  (Ditropan) tablet 5 mg  oxygen (O2) therapy  primidone (Mysoline) tablet 100 mg  simvastatin (Zocor) tablet 80 mg  torsemide (Demadex) tablet 20 mg  heparin (porcine) injection 5,000 Units  acetaminophen (Tylenol) tablet 650 mg  acetaminophen (Tylenol) oral liquid 650 mg  acetaminophen (Tylenol) suppository 650 mg  ondansetron (Zofran) tablet 4 mg  ondansetron (Zofran) injection 4 mg  melatonin tablet 3 mg  polyethylene glycol (Glycolax, Miralax) packet 17 g  benzocaine-menthol (Cepastat Sore Throat) 15-3.6 mg lozenge 1 lozenge  dextromethorphan-guaifenesin (Robitussin DM)  mg/5 mL oral liquid 5 mL  guaiFENesin (Mucinex) 12 hr tablet 600 mg  ipratropium-albuteroL (Duo-Neb) 0.5-2.5 mg/3 mL nebulizer solution 3 mL  sodium chloride 0.9% infusion  HYDROcodone-acetaminophen (Norco) 5-325 mg per tablet 1 tablet  cefTAZidime (Fortaz) 1 g in dextrose 5 % in water (D5W) 50 mL IV  cefTAZidime (Fortaz) 1 g in dextrose 5 % in water (D5W) 50 mL IV  bisacodyl (Dulcolax) suppository 10 mg      Relevant Results  Labs  Results from last 72 hours   Lab Units 01/05/24  0452   WBC AUTO x10*3/uL 7.4   HEMOGLOBIN g/dL 9.1*   HEMATOCRIT % 29.5*   PLATELETS AUTO x10*3/uL 310     Results from last 72 hours   Lab Units 01/07/24  0829 01/06/24  0429 01/05/24  0452   SODIUM mmol/L 140 142 134   POTASSIUM mmol/L 4.9 5.1 4.6   CHLORIDE mmol/L 106 109* 102   CO2 mmol/L 24 23* 24   BUN mg/dL 32* 40* 46*   CREATININE mg/dL 1.90* 2.00* 2.40*   GLUCOSE mg/dL 98 87 91   CALCIUM mg/dL 9.4 8.6 8.6   ANION GAP mmol/L 10 10 8   EGFR mL/min/1.73m*2 26* 24* 19*         Estimated Creatinine Clearance: 23 mL/min (A) (by C-G formula based on SCr of 1.9 mg/dL (H)).  CRP   Date Value Ref Range Status   02/15/2019 0.3 0 - 2.0 MG/DL Final     Comment:     Performed at [address] [address]     Microbiology  Susceptibility data from last 14 days.  Collected Specimen Info Organism   01/05/24 Urine from Clean Catch/Voided Gram Negative  Bacilli     Imaging  ECG 12 lead    Result Date: 1/4/2024  Normal sinus rhythm with sinus arrhythmia Inferior infarct (cited on or before 13-DEC-2023) Abnormal ECG When compared with ECG of 13-DEC-2023 01:02, No significant change was found Confirmed by Luan Monge ([id number]) on 1/4/2024 12:44:09 PM    US renal complete    Result Date: 1/4/2024  Interpreted By:  Tayler Trujillo, STUDY: US RENAL COMPLETE; 1/4/2024 12:11 pm   INDICATION: Signs/Symptoms:Acute kidney injury superimposed upon chronic kidney disease. Hyperkalemia.   COMPARISON: 12/14/2023   ACCESSION NUMBER(S): [id number]   ORDERING CLINICIAN: JENNY YU   TECHNIQUE: Grayscale and color Doppler imaging of the kidneys.   FINDINGS: The right kidney measures 9.5 cm x 6.1 cm x 4.7 cm. There is a 1.1 x 1.4 x 1.4 cm right renal cyst. The left kidney measures 11.0 cm x 4.8 cm x 5.1 cm. There is a 1.7 x 2.3 x 2.4 cm left renal cyst. There is no shadowing calculus, hydronephrosis, or solid mass identified. The renal cortical thickness and echogenicity is normal.   Cursory evaluation of the urinary bladder shows no evidence for mass, wall thickening, or calculus.       Simple bilateral renal cyst noted.   No hydronephrosis or renal atrophy.   MACRO: None.   Signed by: Tayler Trujillo 1/4/2024 12:19 PM Dictation workstation:   [id number]    CT head wo IV contrast    Result Date: 1/3/2024  STUDY: CT Head without IV Contrast; 1/3/2024 at 11:03 PM INDICATION: Fall. COMPARISON: 12/12/2023. TECHNIQUE: Unenhanced CT scan of the brain. Automated mA/kV exposure control was utilized and patient examination was performed in strict accordance with principles of ALARA. FINDINGS: Encephalomalacia of the right frontal and parietal lobes are again noted.  Periventricular white matter hypodensities are noted consistent with areas of prior microvascular insults.  An unchanged 5 mm low-density areas noted in the right thalamus consistent with a prior lacunar infarction.  Otherwise, the  gray-white differentiation is normal. There is no shift of the midline. No abnormal masses, mass effect, fluid collections, or recent hemorrhages are seen.  The gray-white differentiation is normal. The visualized portions of the paranasal sinuses and mastoid air cells are clear. The bony structures are normal.    No acute intracerebral changes. No significant change. 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 White matter changes consistent with areas of prior microvascular insults. Signed by Phillip Jensen MD    XR chest 2 views    Result Date: 1/3/2024  STUDY: Chest Radiographs;  1/3/24 at 10:55pm INDICATION: Fall. COMPARISON: 10/17/23 XR Chest ACCESSION NUMBER(S): [id number] ORDERING CLINICIAN: VIRY ROBERT TECHNIQUE:  Frontal and lateral chest. FINDINGS: CARDIOMEDIASTINAL SILHOUETTE: Cardiomediastinal silhouette is normal in size and configuration. Calcified plaque is seen in the aorta.  LUNGS: Increased interstitial markings are seen bilaterally.  Slightly prominent right hilum is noted similar to prior studies with surgical clips in the region of the right hilum suggesting chronic postoperative change.  Flattening of hemidiaphragms may indicate chronic changes of COPD.  ABDOMEN: No remarkable upper abdominal findings.  BONES: No acute osseous changes.  Median sternotomy changes are noted.    Increased interstitial markings bilaterally, likely mild pulmonary vascular congestion with a trace right pleural effusion and suspected chronic changes of COPD. Stable chronic postoperative change in the region of the right hilum. Signed by Abdirashid Panda    ECG 12 lead    Result Date: 12/20/2023   Poor data quality, interpretation may be adversely affected Normal sinus rhythm with sinus arrhythmia Possible Inferior infarct , age undetermined Abnormal ECG No previous ECGs available Confirmed by Agnieszka Ashby ([id number]) on 12/20/2023 6:19:30 AM    US renal complete    Result Date: 12/14/2023  Interpreted By:  Ed Cano, STUDY: US  RENAL COMPLETE; 12/14/2023 11:29 am   INDICATION: Signs/Symptoms:KYLEE.   COMPARISON: None.   ACCESSION NUMBER(S): [id number]   ORDERING CLINICIAN: TAZ IGLESIAS   TECHNIQUE: Grayscale and color Doppler imaging of the kidneys.   FINDINGS: The right kidney measures 9.6 cm x 4.6 cm x 5.2 cm. The left kidney measures 9.4 cm x 5.4 cm x 5.6 cm. There is a lesion consistent with a cyst in the upper-mid peripelvic location measuring 2.3 x 2.3 x 2.5 cm. There is no shadowing calculus, hydronephrosis, or solid mass identified. The renal cortical thickness and echogenicity is normal.   The urinary bladder is minimally distended and otherwise unremarkable.       No calculi or hydronephrosis bilaterally.   2.5 cm cyst in the left peripelvic location, otherwise unremarkable examination.   MACRO: None.   Signed by: Ed Cano 12/14/2023 3:47 PM Dictation workstation:   [id number]    CT hip right wo IV contrast    Result Date: 12/13/2023  Interpreted By:  Alicia Pierce, STUDY: CT HIP RIGHT WO IV CONTRAST; ;  12/13/2023 2:03 pm   INDICATION: Signs/Symptoms:Fall, pain, prostehesis in place, possible occult fracture.   COMPARISON: None.   ACCESSION NUMBER(S): [id number]   ORDERING CLINICIAN: TAZ IGLESIAS   TECHNIQUE: Serial axial CT images obtained of the right hip. Images reformatted in the coronal and sagittal projection   All CT examinations are performed with 1 or more of the following dose reduction techniques: Automated exposure control, adjustment of mA and/or kv according to patient's size, or use of iterative reconstruction techniques.   FINDINGS: Right total hip arthroplasty is in place. Evaluation about the femoral component of the total hip arthroplasty demonstrates no evidence for lucency. No fracture demonstrated. Evaluation about the acetabular component demonstrates no evidence for fracture. Included portions visualized right hemipelvis demonstrates unremarkable iliac wing. Visualized portions of  the sacrum are unremarkable.   Musculature about the right hip demonstrates generalized atrophy. Right common hamstring tendon origin is unremarkable. There is chronic enthesopathy about the right ischial tuberosity.           1. No acute osseous abnormality about the right total hip arthroplasty.     MACRO: None   Signed by: Alicia Pierce 12/13/2023 6:16 PM Dictation workstation:   [id number]    CT cervical spine wo IV contrast    Result Date: 12/13/2023  Interpreted By:  Shad Hernandez, STUDY: CT CERVICAL SPINE WO IV CONTRAST; 12/12/2023 11:34 pm   INDICATION: Signs/Symptoms:fall;   COMPARISON: None   ACCESSION NUMBER(S): [id number]   ORDERING CLINICIAN: BRENDA KNAPP   TECHNIQUE: Contiguous axial images of the cervical spine were performed. PATIENT RADIATION EXPOSURE DATA: CTDI: DLP:   All CT examinations are performed with one or more of the following dose reduction techniques: Automated Exposure Control, adjustment of mA and/or kV according to patient size, or use of iterative reconstruction techniques.   FINDINGS: There is straightening of the normal cervical lordosis.   No acute fracture or spondylolisthesis is identified.   Posterior fusion and laminectomy is noted C3 through C6 with hardware intact. No periprosthetic lucency is identified to suggest loosening.   Multilevel disc space narrowing can be seen. Endplate osteophytosis facet arthropathy is noted.   The neural foramina and spinal canal are grossly patent.   Severe calcific atherosclerosis of the origin of the left subclavian artery can be seen. Retropharyngeal course of the ICAs can be seen bilaterally. Calcification of the carotid bulbs are noted.   Severe left TMJ osteoarthritic changes are noted.   The patient is edentulous.   Limited visualization of the lung apices reveal emphysema.       1. No acute fracture or spondylolisthesis. 2. Degenerative disc disease and spondylosis. 3. Postsurgical changes as above.   . .   This report is in  agreement with the preliminary report issued by [facility].   MACRO: None.   Signed by: Shad Hernandez 12/13/2023 8:22 AM Dictation workstation:   [id number]    CT head wo IV contrast    Result Date: 12/13/2023  Interpreted By:  Shad Hernandez, STUDY: CT HEAD WO IV CONTRAST; 12/12/2023 11:34 pm   INDICATION: fall;   COMPARISON: 05/28/2020   ACCESSION NUMBER(S): [id number]   ORDERING CLINICIAN: BRENDA KNAPP   TECHNIQUE: Contiguous axial images were acquired from the vertex through the posterior fossa without IV contrast.   All CT examinations are performed with one or more of the following dose reduction techniques: Automated Exposure Control, adjustment of mA and/or kV according to patient size, or use of iterative reconstruction techniques.   FINDINGS: No focal mass effect or midline shift is identified. The ventricles and sulci are symmetric and appropriate for the patient's age. However, chronic involutional change of the cerebral hemispheres is noted.   Encephalomalacia of the right frontal and parietal lobes can be seen which may represent the sequela of remote infarcts. Nonspecific hypodensities are identified within the periventricular and subcortical white matter likely due to chronic postischemic white matter disease. Atherosclerotic calcifications are seen within the carotid siphons and vertebral arteries.   No acute intracranial hemorrhage is identified. No intra-axial or extra-axial fluid collection is seen.   The visualized paranasal sinuses and mastoid air cells are clear.       No acute intracranial findings. . .   This report is in agreement with the preliminary report issued by [facility].   MACRO: None   Signed by: Shad Hernandez 12/13/2023 8:18 AM Dictation workstation:   [id number]    XR hip right with pelvis when performed 2 or 3 views    Result Date: 12/13/2023  Interpreted By:  Shad Hernandez, STUDY: XR HIP RIGHT WITH PELVIS WHEN PERFORMED 2 OR 3 VIEWS; 12/12/2023  11:49 pm   INDICATION: Signs/Symptoms:fall.   COMPARISON: 05/29/2022   ACCESSION NUMBER(S): [id number]   ORDERING CLINICIAN: BRENDA KNAPP   FINDINGS: Right total hip arthroplasty changes are identified. No periprosthetic lucencies are identified.   No fracture or subluxation is identified. Left hip osteoarthritic changes are noted.   The SI joints are unremarkable.   Pelvic sutures are noted.       No acute fracture or dislocation.   MACRO: None.   Signed by: Shad Hernandez 12/13/2023 8:16 AM Dictation workstation:   [id number]    XR shoulder right 2+ views    Result Date: 12/13/2023  Interpreted By:  Shad Hernandez, STUDY: XR SHOULDER RIGHT 2+ VIEWS; 12/12/2023 11:49 pm   INDICATION: Signs/Symptoms:fall;   COMPARISON: None   ACCESSION NUMBER(S): [id number]   ORDERING CLINICIAN: BRENDA KNAPP   TECHNIQUE: AP, scapular and axillary projections.   FINDINGS: No fracture or dislocation is seen. Moderate AC joint osteoarthrosis is noted.   The included intrathoracic structures are reveal sternotomy wires and postsurgical changes seen within the right hilar region and peripheral right lung.       No acute fracture or dislocation.   MACRO: None.   Signed by: Shad Hernandez 12/13/2023 8:07 AM Dictation workstation:   [id number]    XR elbow right 3+ views    Result Date: 12/13/2023  Interpreted By:  Shad Hernandez, STUDY: XR ELBOW RIGHT 3+ VIEWS; 12/12/2023 11:49 pm   INDICATION: Signs/Symptoms:fall.   COMPARISON: None   ACCESSION NUMBER(S): [id number]   ORDERING CLINICIAN: BRENDA KNAPP   FINDINGS: Olecranon fixation plate and screws are seen. The hardware appears intact.   No fracture or subluxation is identified. No significant elbow effusion is seen.       No acute fracture or dislocation.   MACRO: None   Signed by: Shad Hernandez 12/13/2023 7:40 AM Dictation workstation:   [id number]    XR chest 1 view    Result Date: 12/13/2023  Interpreted By:  Shad Hernandez, STUDY: XR CHEST 1 VIEW; 12/12/2023 11:49 pm    INDICATION: Signs/Symptoms:fall   COMPARISON: 10/17/2023   ACCESSION NUMBER(S): [id number]   ORDERING CLINICIAN: BRENDA KNAPP   TECHNIQUE: 1 AP projection.   FINDINGS: Sternotomy wires and right hilar clips are seen.   The cardiomediastinal silhouette is enlarged but stable. Mild vascular congestion is noted. No pleural effusion is identified.   Bilateral rib deformities are noted.       Mild vascular congestion.   MACRO: None   Signed by: Shad Hernandez 12/13/2023 7:38 AM Dictation workstation:   [id number]     Assessment/Plan   Acute on chronic renal failure-nephrology on consult  Gram-negative urinary tract infection  History of pseudomonas UTI     Continue ceftazidime  Follow-up urine culture  Monitor WBC and temperature  Monitor renal function  Nephrology following  Further recommendations based on culture result      Blue Mccarty MD

## 2024-01-07 NOTE — PROGRESS NOTES
Juli Del Castillo is a 83 y.o. female on day 3 of admission presenting with Fall, initial encounter.      Subjective   Patient having great discomfort from constipation; miralax given, enema attempted and suppository given. Patient denies any chest pain or sob.        Objective     Last Recorded Vitals  /64 (BP Location: Right arm, Patient Position: Sitting)   Pulse 92   Temp 37 °C (98.6 °F) (Oral)   Resp 18   Wt 82.9 kg (182 lb 12.2 oz)   SpO2 98%   Intake/Output last 3 Shifts:    Intake/Output Summary (Last 24 hours) at 1/7/2024 1119  Last data filed at 1/6/2024 2138  Gross per 24 hour   Intake 290 ml   Output 400 ml   Net -110 ml       Admission Weight  Weight: 82.9 kg (182 lb 12.2 oz) (01/03/24 2159)    Daily Weight  01/03/24 : 82.9 kg (182 lb 12.2 oz)    Image Results  ECG 12 lead  Normal sinus rhythm with sinus arrhythmia  Inferior infarct (cited on or before 13-DEC-2023)  Abnormal ECG  When compared with ECG of 13-DEC-2023 01:02,  No significant change was found  Confirmed by Luan Monge ([id number]) on 1/4/2024 12:44:09 PM  US renal complete  Narrative: Interpreted By:  Tayler Trujillo,   STUDY:  US RENAL COMPLETE; 1/4/2024 12:11 pm      INDICATION:  Signs/Symptoms:Acute kidney injury superimposed upon chronic kidney  disease. Hyperkalemia.      COMPARISON:  12/14/2023      ACCESSION NUMBER(S):  [id number]      ORDERING CLINICIAN:  JENNY YU      TECHNIQUE:  Grayscale and color Doppler imaging of the kidneys.      FINDINGS:  The right kidney measures 9.5 cm x 6.1 cm x 4.7 cm. There is a 1.1 x  1.4 x 1.4 cm right renal cyst. The left kidney measures 11.0 cm x 4.8  cm x 5.1 cm. There is a 1.7 x 2.3 x 2.4 cm left renal cyst. There is  no shadowing calculus, hydronephrosis, or solid mass identified. The  renal cortical thickness and echogenicity is normal.      Cursory evaluation of the urinary bladder shows no evidence for mass,  wall thickening, or calculus.      Impression: Simple bilateral renal cyst  noted.      No hydronephrosis or renal atrophy.      MACRO:  None.      Signed by: Tayler Trujillo 1/4/2024 12:19 PM  Dictation workstation:   [id number]      Physical Exam  Constitutional:       Appearance: Normal appearance.   Cardiovascular:      Rate and Rhythm: Normal rate and regular rhythm.   Pulmonary:      Effort: Pulmonary effort is normal.      Breath sounds: Normal breath sounds.   Abdominal:      General: Abdomen is flat.      Palpations: Abdomen is soft.   Skin:     General: Skin is warm and dry.   Neurological:      Mental Status: She is alert and oriented to person, place, and time.         Relevant Results             Results for orders placed or performed during the hospital encounter of 01/03/24 (from the past 24 hour(s))   Basic metabolic panel   Result Value Ref Range    Glucose 98 65 - 99 mg/dL    Sodium 140 133 - 145 mmol/L    Potassium 4.9 3.4 - 5.1 mmol/L    Chloride 106 97 - 107 mmol/L    Bicarbonate 24 24 - 31 mmol/L    Urea Nitrogen 32 (H) 8 - 25 mg/dL    Creatinine 1.90 (H) 0.40 - 1.60 mg/dL    eGFR 26 (L) >60 mL/min/1.73m*2    Calcium 9.4 8.5 - 10.4 mg/dL    Anion Gap 10 <=19 mmol/L       Assessment/Plan                  Principal Problem:    Fall, initial encounter  Active Problems:    Fibromyalgia    Acute congestive heart failure (CMS/HCC)    Hyperlipidemia    Major depressive disorder, single episode, unspecified    Overactive bladder    History of lung cancer    Chronic respiratory failure (CMS/HCC)    Chronic diastolic heart failure (CMS/HCC)    Accidental fall    Acute on chronic kidney failure (CMS/HCC)    Hyperkalemia    H/o Multiple Accidental Falls   CT brain no new findings; old infarct noted  Follow-up orthostatic vital signs  Fall precautions  Aspiration precautions  PT/OT Evaluation  She may need to consider long-term care if this continues  Social work consultation for consideration of nursing home        Acute on Chronic Renal Failure  Patient's baseline Cr is around 1.5-1.6  She  was seen by Dr. Lamar last few weeks ago, felt that she was volume depleted last hospitalization  Chest x-ray read as congested  IV fluid given and discontinued yesterday  Renal Ultrasound done, no acute change or obstruction     HFpEF  Diuretics on hold     Hyperkalemia  Resolved, K 4.6  Lisinopril discontinued  Continue with albuterol treatments  Consider low potassium diet     Chronic Respiratory Failure/COPD  Patient is at her baseline oxygen requirement  At home inhaled corticosteroid  Aerosolized bronchodilators    H/o LLL Spiculated 7 mm Nodule   Follows with pulmonary services outpatient  Defer subsequent imaging to PCP    Urge Urinary Incontinence      Patient takes Myrbetriq home  Continue formulary oxybutynin here    Essential Tremor   Plan Continue home primidone dose     HTN  Patient run low blood pressure during the admission  Today's /35  Continue monitoring for S/S of hypotension          GI + DVT PPX  Home PPI  Heparin subcu    Constipation  Patient has had several days of constipation.   Miralax given; dulcolax suppository and tap water enema ordered.      Plan of Care   Patient will likely need SNF at discharge, she is refusing at this time.      Plan of Care discussed with patient and collaborating physician, Dr. Henrry Devlin, APRN-CNP

## 2024-01-07 NOTE — NURSING NOTE
Enema ordered, when inserted enema it was blocked by stool and fluid would not come out into colon, tried suppository instead and still no success having BM, notified VIRGEN Devlin of this.

## 2024-01-08 ENCOUNTER — APPOINTMENT (OUTPATIENT)
Dept: RADIOLOGY | Facility: HOSPITAL | Age: 84
DRG: 682 | End: 2024-01-08
Payer: MEDICARE

## 2024-01-08 LAB
ANION GAP SERPL CALC-SCNC: 10 MMOL/L
BACTERIA UR CULT: ABNORMAL
BUN SERPL-MCNC: 27 MG/DL (ref 8–25)
CALCIUM SERPL-MCNC: 9.3 MG/DL (ref 8.5–10.4)
CHLORIDE SERPL-SCNC: 104 MMOL/L (ref 97–107)
CO2 SERPL-SCNC: 24 MMOL/L (ref 24–31)
CREAT SERPL-MCNC: 1.8 MG/DL (ref 0.4–1.6)
ERYTHROCYTE [DISTWIDTH] IN BLOOD BY AUTOMATED COUNT: 13.9 % (ref 11.5–14.5)
GFR SERPL CREATININE-BSD FRML MDRD: 27 ML/MIN/1.73M*2
GLUCOSE SERPL-MCNC: 92 MG/DL (ref 65–99)
HCT VFR BLD AUTO: 27 % (ref 36–46)
HGB BLD-MCNC: 8.6 G/DL (ref 12–16)
MCH RBC QN AUTO: 31.3 PG (ref 26–34)
MCHC RBC AUTO-ENTMCNC: 31.9 G/DL (ref 32–36)
MCV RBC AUTO: 98 FL (ref 80–100)
NRBC BLD-RTO: 0 /100 WBCS (ref 0–0)
PLATELET # BLD AUTO: 258 X10*3/UL (ref 150–450)
POTASSIUM SERPL-SCNC: 5.3 MMOL/L (ref 3.4–5.1)
POTASSIUM SERPL-SCNC: 5.3 MMOL/L (ref 3.4–5.1)
RBC # BLD AUTO: 2.75 X10*6/UL (ref 4–5.2)
SODIUM SERPL-SCNC: 138 MMOL/L (ref 133–145)
WBC # BLD AUTO: 8.6 X10*3/UL (ref 4.4–11.3)

## 2024-01-08 PROCEDURE — 82374 ASSAY BLOOD CARBON DIOXIDE: CPT | Performed by: NURSE PRACTITIONER

## 2024-01-08 PROCEDURE — 97116 GAIT TRAINING THERAPY: CPT | Mod: GP

## 2024-01-08 PROCEDURE — 2500000004 HC RX 250 GENERAL PHARMACY W/ HCPCS (ALT 636 FOR OP/ED): Performed by: INTERNAL MEDICINE

## 2024-01-08 PROCEDURE — 1100000001 HC PRIVATE ROOM DAILY

## 2024-01-08 PROCEDURE — 99232 SBSQ HOSP IP/OBS MODERATE 35: CPT | Performed by: NURSE PRACTITIONER

## 2024-01-08 PROCEDURE — 94640 AIRWAY INHALATION TREATMENT: CPT

## 2024-01-08 PROCEDURE — 97110 THERAPEUTIC EXERCISES: CPT | Mod: GP

## 2024-01-08 PROCEDURE — 92610 EVALUATE SWALLOWING FUNCTION: CPT | Mod: GN | Performed by: SPEECH-LANGUAGE PATHOLOGIST

## 2024-01-08 PROCEDURE — 2500000004 HC RX 250 GENERAL PHARMACY W/ HCPCS (ALT 636 FOR OP/ED): Performed by: NURSE PRACTITIONER

## 2024-01-08 PROCEDURE — 84132 ASSAY OF SERUM POTASSIUM: CPT | Performed by: INTERNAL MEDICINE

## 2024-01-08 PROCEDURE — 97535 SELF CARE MNGMENT TRAINING: CPT | Mod: GO

## 2024-01-08 PROCEDURE — 2500000001 HC RX 250 WO HCPCS SELF ADMINISTERED DRUGS (ALT 637 FOR MEDICARE OP): Performed by: NURSE PRACTITIONER

## 2024-01-08 PROCEDURE — 36415 COLL VENOUS BLD VENIPUNCTURE: CPT | Performed by: NURSE PRACTITIONER

## 2024-01-08 PROCEDURE — 9420000001 HC RT PATIENT EDUCATION 5 MIN

## 2024-01-08 PROCEDURE — 85027 COMPLETE CBC AUTOMATED: CPT | Performed by: NURSE PRACTITIONER

## 2024-01-08 PROCEDURE — 2500000005 HC RX 250 GENERAL PHARMACY W/O HCPCS: Performed by: INTERNAL MEDICINE

## 2024-01-08 PROCEDURE — 2500000001 HC RX 250 WO HCPCS SELF ADMINISTERED DRUGS (ALT 637 FOR MEDICARE OP): Performed by: INTERNAL MEDICINE

## 2024-01-08 PROCEDURE — 2500000002 HC RX 250 W HCPCS SELF ADMINISTERED DRUGS (ALT 637 FOR MEDICARE OP, ALT 636 FOR OP/ED): Performed by: INTERNAL MEDICINE

## 2024-01-08 PROCEDURE — 36415 COLL VENOUS BLD VENIPUNCTURE: CPT | Performed by: INTERNAL MEDICINE

## 2024-01-08 PROCEDURE — 71046 X-RAY EXAM CHEST 2 VIEWS: CPT

## 2024-01-08 RX ORDER — MEROPENEM 500 MG/1
500 INJECTION, POWDER, FOR SOLUTION INTRAVENOUS EVERY 12 HOURS
Status: DISCONTINUED | OUTPATIENT
Start: 2024-01-08 | End: 2024-01-12

## 2024-01-08 RX ADMIN — HYDROCODONE BITARTRATE AND ACETAMINOPHEN 1 TABLET: 5; 325 TABLET ORAL at 20:15

## 2024-01-08 RX ADMIN — SIMVASTATIN 80 MG: 40 TABLET, FILM COATED ORAL at 20:15

## 2024-01-08 RX ADMIN — Medication: at 07:29

## 2024-01-08 RX ADMIN — PRIMIDONE 100 MG: 50 TABLET ORAL at 16:01

## 2024-01-08 RX ADMIN — DOCUSATE SODIUM 100 MG: 100 CAPSULE, LIQUID FILLED ORAL at 10:39

## 2024-01-08 RX ADMIN — MEROPENEM 500 MG: 500 INJECTION, POWDER, FOR SOLUTION INTRAVENOUS at 20:50

## 2024-01-08 RX ADMIN — BUDESONIDE INHALATION 0.5 MG: 0.5 SUSPENSION RESPIRATORY (INHALATION) at 07:28

## 2024-01-08 RX ADMIN — ACETAMINOPHEN 650 MG: 325 TABLET ORAL at 10:42

## 2024-01-08 RX ADMIN — PANTOPRAZOLE SODIUM 40 MG: 40 TABLET, DELAYED RELEASE ORAL at 10:47

## 2024-01-08 RX ADMIN — PRIMIDONE 100 MG: 50 TABLET ORAL at 10:40

## 2024-01-08 RX ADMIN — HEPARIN SODIUM 5000 UNITS: 5000 INJECTION, SOLUTION INTRAVENOUS; SUBCUTANEOUS at 05:31

## 2024-01-08 RX ADMIN — ALBUTEROL SULFATE 2.5 MG: 2.5 SOLUTION RESPIRATORY (INHALATION) at 07:27

## 2024-01-08 RX ADMIN — BUDESONIDE INHALATION 0.5 MG: 0.5 SUSPENSION RESPIRATORY (INHALATION) at 19:41

## 2024-01-08 RX ADMIN — PRIMIDONE 100 MG: 50 TABLET ORAL at 20:16

## 2024-01-08 RX ADMIN — DOCUSATE SODIUM 100 MG: 100 CAPSULE, LIQUID FILLED ORAL at 20:15

## 2024-01-08 RX ADMIN — DULOXETINE HYDROCHLORIDE 30 MG: 30 CAPSULE, DELAYED RELEASE PELLETS ORAL at 10:39

## 2024-01-08 RX ADMIN — CITALOPRAM HYDROBROMIDE 20 MG: 20 TABLET ORAL at 10:39

## 2024-01-08 RX ADMIN — OXYBUTYNIN CHLORIDE 5 MG: 5 TABLET ORAL at 10:40

## 2024-01-08 RX ADMIN — POLYETHYLENE GLYCOL (3350) 17 G: 17 POWDER, FOR SOLUTION ORAL at 20:22

## 2024-01-08 RX ADMIN — PATIROMER 8.4 G: 8.4 POWDER, FOR SUSPENSION ORAL at 12:48

## 2024-01-08 RX ADMIN — HEPARIN SODIUM 5000 UNITS: 5000 INJECTION, SOLUTION INTRAVENOUS; SUBCUTANEOUS at 13:01

## 2024-01-08 RX ADMIN — ALBUTEROL SULFATE 2.5 MG: 2.5 SOLUTION RESPIRATORY (INHALATION) at 13:17

## 2024-01-08 RX ADMIN — HEPARIN SODIUM 5000 UNITS: 5000 INJECTION, SOLUTION INTRAVENOUS; SUBCUTANEOUS at 20:16

## 2024-01-08 RX ADMIN — OXYBUTYNIN CHLORIDE 5 MG: 5 TABLET ORAL at 20:15

## 2024-01-08 RX ADMIN — ALBUTEROL SULFATE 2.5 MG: 2.5 SOLUTION RESPIRATORY (INHALATION) at 19:41

## 2024-01-08 RX ADMIN — Medication 3 MG: at 20:15

## 2024-01-08 RX ADMIN — ASPIRIN 81 MG: 81 TABLET, CHEWABLE ORAL at 10:38

## 2024-01-08 RX ADMIN — HYDROCODONE BITARTRATE AND ACETAMINOPHEN 1 TABLET: 5; 325 TABLET ORAL at 00:24

## 2024-01-08 ASSESSMENT — PAIN SCALES - GENERAL
PAINLEVEL_OUTOF10: 5 - MODERATE PAIN
PAINLEVEL_OUTOF10: 6
PAINLEVEL_OUTOF10: 5 - MODERATE PAIN
PAINLEVEL_OUTOF10: 5 - MODERATE PAIN
PAINLEVEL_OUTOF10: 2
PAINLEVEL_OUTOF10: 6

## 2024-01-08 ASSESSMENT — COGNITIVE AND FUNCTIONAL STATUS - GENERAL
MOVING FROM LYING ON BACK TO SITTING ON SIDE OF FLAT BED WITH BEDRAILS: A LITTLE
CLIMB 3 TO 5 STEPS WITH RAILING: A LOT
WALKING IN HOSPITAL ROOM: A LITTLE
TURNING FROM BACK TO SIDE WHILE IN FLAT BAD: A LITTLE
WALKING IN HOSPITAL ROOM: A LITTLE
DRESSING REGULAR UPPER BODY CLOTHING: A LITTLE
CLIMB 3 TO 5 STEPS WITH RAILING: TOTAL
STANDING UP FROM CHAIR USING ARMS: A LITTLE
TURNING FROM BACK TO SIDE WHILE IN FLAT BAD: A LITTLE
MOBILITY SCORE: 18
DRESSING REGULAR UPPER BODY CLOTHING: A LITTLE
MOVING TO AND FROM BED TO CHAIR: A LITTLE
DAILY ACTIVITIY SCORE: 17
EATING MEALS: A LITTLE
DRESSING REGULAR UPPER BODY CLOTHING: A LITTLE
CLIMB 3 TO 5 STEPS WITH RAILING: TOTAL
HELP NEEDED FOR BATHING: A LITTLE
EATING MEALS: A LITTLE
STANDING UP FROM CHAIR USING ARMS: A LITTLE
MOBILITY SCORE: 16
PERSONAL GROOMING: A LITTLE
DRESSING REGULAR LOWER BODY CLOTHING: A LITTLE
HELP NEEDED FOR BATHING: A LITTLE
STANDING UP FROM CHAIR USING ARMS: A LITTLE
PERSONAL GROOMING: A LITTLE
DRESSING REGULAR LOWER BODY CLOTHING: A LOT
DAILY ACTIVITIY SCORE: 17
MOVING TO AND FROM BED TO CHAIR: A LITTLE
TOILETING: A LITTLE
MOVING TO AND FROM BED TO CHAIR: A LITTLE
TOILETING: A LITTLE
DRESSING REGULAR LOWER BODY CLOTHING: A LOT
TURNING FROM BACK TO SIDE WHILE IN FLAT BAD: A LITTLE
MOBILITY SCORE: 16
WALKING IN HOSPITAL ROOM: A LITTLE
HELP NEEDED FOR BATHING: A LITTLE
TOILETING: A LITTLE
PERSONAL GROOMING: A LITTLE
DAILY ACTIVITIY SCORE: 19
MOVING FROM LYING ON BACK TO SITTING ON SIDE OF FLAT BED WITH BEDRAILS: A LITTLE

## 2024-01-08 ASSESSMENT — ACTIVITIES OF DAILY LIVING (ADL)
HOME_MANAGEMENT_TIME_ENTRY: 23
BATHING_LEVEL_OF_ASSISTANCE: MINIMUM ASSISTANCE
BATHING_WHERE_ASSESSED: OTHER (COMMENT)

## 2024-01-08 ASSESSMENT — PAIN - FUNCTIONAL ASSESSMENT
PAIN_FUNCTIONAL_ASSESSMENT: 0-10

## 2024-01-08 ASSESSMENT — PAIN DESCRIPTION - LOCATION: LOCATION: HEAD

## 2024-01-08 ASSESSMENT — PAIN DESCRIPTION - ORIENTATION: ORIENTATION: OTHER (COMMENT)

## 2024-01-08 NOTE — NURSING NOTE
Pt choked on eggs this am  small  amount of  eggs suctioned out of mouth pt able to cough coughing up small amount of eggs . Lungs rhonchi upper lobes diminished lower lobes

## 2024-01-08 NOTE — PROGRESS NOTES
Juli Del Castillo is a 83 y.o. female on day 4 of admission presenting with Fall, initial encounter.    Subjective   Interval History:   Sitting up in chair  Reports choking this morning on scrambled eggs  Afebrile, no chills  Denies abdominal pain, nausea or vomiting  Denies chest pain or shortness of breath  Cultures reviewed       Objective   Range of Vitals (last 24 hours)  Heart Rate:  [60-96]   Temp:  [36.6 °C (97.9 °F)-37.5 °C (99.5 °F)]   Resp:  [17-18]   BP: (105-148)/(44-75)   SpO2:  [75 %-98 %]   Daily Weight  01/03/24 : 82.9 kg (182 lb 12.2 oz)    Body mass index is 31.37 kg/m².    Physical Exam  Constitutional:       Appearance: Normal appearance.   HENT:      Head: Normocephalic and atraumatic.      Nose: Nose normal.      Mouth/Throat:      Mouth: Mucous membranes are moist.      Pharynx: Oropharynx is clear.   Eyes:      Extraocular Movements: Extraocular movements intact.      Conjunctiva/sclera: Conjunctivae normal.   Cardiovascular:      Rate and Rhythm: Normal rate and regular rhythm.   Pulmonary:      Effort: Pulmonary effort is normal.      Breath sounds: Normal breath sounds.   Abdominal:      General: Bowel sounds are normal.      Palpations: Abdomen is soft.      Tenderness: There is no abdominal tenderness.   Musculoskeletal:         General: No swelling. Normal range of motion.      Cervical back: Normal range of motion and neck supple.   Skin:     General: Skin is warm and dry.   Neurological:      General: No focal deficit present.      Mental Status: She is alert and oriented to person, place, and time.   Psychiatric:         Mood and Affect: Mood normal.         Behavior: Behavior normal.     Antibiotics  sodium chloride 0.9 % bolus 500 mL  albuterol 2.5 mg /3 mL (0.083 %) nebulizer solution 2.5 mg  aspirin chewable tablet 81 mg  budesonide (Pulmicort) 0.5 mg/2 mL nebulizer solution 0.5 mg  citalopram (CeleXA) tablet 20 mg  DULoxetine (Cymbalta) DR capsule 30 mg  pantoprazole (ProtoNix)  EC tablet 40 mg  oxybutynin (Ditropan) tablet 5 mg  oxygen (O2) therapy  primidone (Mysoline) tablet 100 mg  simvastatin (Zocor) tablet 80 mg  torsemide (Demadex) tablet 20 mg  heparin (porcine) injection 5,000 Units  acetaminophen (Tylenol) tablet 650 mg  acetaminophen (Tylenol) oral liquid 650 mg  acetaminophen (Tylenol) suppository 650 mg  ondansetron (Zofran) tablet 4 mg  ondansetron (Zofran) injection 4 mg  melatonin tablet 3 mg  polyethylene glycol (Glycolax, Miralax) packet 17 g  benzocaine-menthol (Cepastat Sore Throat) 15-3.6 mg lozenge 1 lozenge  dextromethorphan-guaifenesin (Robitussin DM)  mg/5 mL oral liquid 5 mL  guaiFENesin (Mucinex) 12 hr tablet 600 mg  ipratropium-albuteroL (Duo-Neb) 0.5-2.5 mg/3 mL nebulizer solution 3 mL  sodium chloride 0.9% infusion  HYDROcodone-acetaminophen (Norco) 5-325 mg per tablet 1 tablet  cefTAZidime (Fortaz) 1 g in dextrose 5 % in water (D5W) 50 mL IV  cefTAZidime (Fortaz) 1 g in dextrose 5 % in water (D5W) 50 mL IV  bisacodyl (Dulcolax) suppository 10 mg      Relevant Results  Labs  Results from last 72 hours   Lab Units 01/08/24  0421   WBC AUTO x10*3/uL 8.6   HEMOGLOBIN g/dL 8.6*   HEMATOCRIT % 27.0*   PLATELETS AUTO x10*3/uL 258       Results from last 72 hours   Lab Units 01/08/24  0421 01/07/24  0829 01/06/24  0429   SODIUM mmol/L 138 140 142   POTASSIUM mmol/L 5.3* 4.9 5.1   CHLORIDE mmol/L 104 106 109*   CO2 mmol/L 24 24 23*   BUN mg/dL 27* 32* 40*   CREATININE mg/dL 1.80* 1.90* 2.00*   GLUCOSE mg/dL 92 98 87   CALCIUM mg/dL 9.3 9.4 8.6   ANION GAP mmol/L 10 10 10   EGFR mL/min/1.73m*2 27* 26* 24*           Estimated Creatinine Clearance: 24.2 mL/min (A) (by CAIN-G formula based on SCr of 1.8 mg/dL (H)).  CRP   Date Value Ref Range Status   02/15/2019 0.3 0 - 2.0 MG/DL Final     Comment:     Performed at [address] [address]     Microbiology  Susceptibility data from last 14 days.  Collected Specimen Info Organism Aztreonam Cefepime  Ceftazidime Ciprofloxacin Gentamicin Meropenem Piperacillin/Tazobactam Tobramycin   01/05/24 Urine from Clean Catch/Voided Pseudomonas aeruginosa R I R S S S R S       Imaging  ECG 12 lead    Result Date: 1/4/2024  Normal sinus rhythm with sinus arrhythmia Inferior infarct (cited on or before 13-DEC-2023) Abnormal ECG When compared with ECG of 13-DEC-2023 01:02, No significant change was found Confirmed by Luan Monge ([id number]) on 1/4/2024 12:44:09 PM    US renal complete    Result Date: 1/4/2024  Interpreted By:  Tayler Trujillo, STUDY: US RENAL COMPLETE; 1/4/2024 12:11 pm   INDICATION: Signs/Symptoms:Acute kidney injury superimposed upon chronic kidney disease. Hyperkalemia.   COMPARISON: 12/14/2023   ACCESSION NUMBER(S): [id number]   ORDERING CLINICIAN: JENNY YU   TECHNIQUE: Grayscale and color Doppler imaging of the kidneys.   FINDINGS: The right kidney measures 9.5 cm x 6.1 cm x 4.7 cm. There is a 1.1 x 1.4 x 1.4 cm right renal cyst. The left kidney measures 11.0 cm x 4.8 cm x 5.1 cm. There is a 1.7 x 2.3 x 2.4 cm left renal cyst. There is no shadowing calculus, hydronephrosis, or solid mass identified. The renal cortical thickness and echogenicity is normal.   Cursory evaluation of the urinary bladder shows no evidence for mass, wall thickening, or calculus.       Simple bilateral renal cyst noted.   No hydronephrosis or renal atrophy.   MACRO: None.   Signed by: Tayler Trujillo 1/4/2024 12:19 PM Dictation workstation:   [id number]    CT head wo IV contrast    Result Date: 1/3/2024  STUDY: CT Head without IV Contrast; 1/3/2024 at 11:03 PM INDICATION: Fall. COMPARISON: 12/12/2023. TECHNIQUE: Unenhanced CT scan of the brain. Automated mA/kV exposure control was utilized and patient examination was performed in strict accordance with principles of ALARA. FINDINGS: Encephalomalacia of the right frontal and parietal lobes are again noted.  Periventricular white matter hypodensities are noted consistent with areas of  prior microvascular insults.  An unchanged 5 mm low-density areas noted in the right thalamus consistent with a prior lacunar infarction.  Otherwise, the gray-white differentiation is normal. There is no shift of the midline. No abnormal masses, mass effect, fluid collections, or recent hemorrhages are seen.  The gray-white differentiation is normal. The visualized portions of the paranasal sinuses and mastoid air cells are clear. The bony structures are normal.    No acute intracerebral changes. No significant change. 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 White matter changes consistent with areas of prior microvascular insults. Signed by Phillip Jensen MD    XR chest 2 views    Result Date: 1/3/2024  STUDY: Chest Radiographs;  1/3/24 at 10:55pm INDICATION: Fall. COMPARISON: 10/17/23 XR Chest ACCESSION NUMBER(S): [id number] ORDERING CLINICIAN: VIRY ROBERT TECHNIQUE:  Frontal and lateral chest. FINDINGS: CARDIOMEDIASTINAL SILHOUETTE: Cardiomediastinal silhouette is normal in size and configuration. Calcified plaque is seen in the aorta.  LUNGS: Increased interstitial markings are seen bilaterally.  Slightly prominent right hilum is noted similar to prior studies with surgical clips in the region of the right hilum suggesting chronic postoperative change.  Flattening of hemidiaphragms may indicate chronic changes of COPD.  ABDOMEN: No remarkable upper abdominal findings.  BONES: No acute osseous changes.  Median sternotomy changes are noted.    Increased interstitial markings bilaterally, likely mild pulmonary vascular congestion with a trace right pleural effusion and suspected chronic changes of COPD. Stable chronic postoperative change in the region of the right hilum. Signed by Abdirashid Panda    ECG 12 lead    Result Date: 12/20/2023   Poor data quality, interpretation may be adversely affected Normal sinus rhythm with sinus arrhythmia Possible Inferior infarct , age undetermined Abnormal ECG No previous ECGs available  Confirmed by Agnieszka Ashby ([id number]) on 12/20/2023 6:19:30 AM    US renal complete    Result Date: 12/14/2023  Interpreted By:  Ed Cano, STUDY: US RENAL COMPLETE; 12/14/2023 11:29 am   INDICATION: Signs/Symptoms:KYLEE.   COMPARISON: None.   ACCESSION NUMBER(S): [id number]   ORDERING CLINICIAN: TAZ IGLESIAS   TECHNIQUE: Grayscale and color Doppler imaging of the kidneys.   FINDINGS: The right kidney measures 9.6 cm x 4.6 cm x 5.2 cm. The left kidney measures 9.4 cm x 5.4 cm x 5.6 cm. There is a lesion consistent with a cyst in the upper-mid peripelvic location measuring 2.3 x 2.3 x 2.5 cm. There is no shadowing calculus, hydronephrosis, or solid mass identified. The renal cortical thickness and echogenicity is normal.   The urinary bladder is minimally distended and otherwise unremarkable.       No calculi or hydronephrosis bilaterally.   2.5 cm cyst in the left peripelvic location, otherwise unremarkable examination.   MACRO: None.   Signed by: Ed Cano 12/14/2023 3:47 PM Dictation workstation:   [id number]    CT hip right wo IV contrast    Result Date: 12/13/2023  Interpreted By:  Alicia Pierce, STUDY: CT HIP RIGHT WO IV CONTRAST; ;  12/13/2023 2:03 pm   INDICATION: Signs/Symptoms:Fall, pain, prostehesis in place, possible occult fracture.   COMPARISON: None.   ACCESSION NUMBER(S): [id number]   ORDERING CLINICIAN: TAZ IGLESIAS   TECHNIQUE: Serial axial CT images obtained of the right hip. Images reformatted in the coronal and sagittal projection   All CT examinations are performed with 1 or more of the following dose reduction techniques: Automated exposure control, adjustment of mA and/or kv according to patient's size, or use of iterative reconstruction techniques.   FINDINGS: Right total hip arthroplasty is in place. Evaluation about the femoral component of the total hip arthroplasty demonstrates no evidence for lucency. No fracture demonstrated. Evaluation about the acetabular  component demonstrates no evidence for fracture. Included portions visualized right hemipelvis demonstrates unremarkable iliac wing. Visualized portions of the sacrum are unremarkable.   Musculature about the right hip demonstrates generalized atrophy. Right common hamstring tendon origin is unremarkable. There is chronic enthesopathy about the right ischial tuberosity.           1. No acute osseous abnormality about the right total hip arthroplasty.     MACRO: None   Signed by: Alicia Pierce 12/13/2023 6:16 PM Dictation workstation:   [id number]    CT cervical spine wo IV contrast    Result Date: 12/13/2023  Interpreted By:  Shad Hernandez, STUDY: CT CERVICAL SPINE WO IV CONTRAST; 12/12/2023 11:34 pm   INDICATION: Signs/Symptoms:fall;   COMPARISON: None   ACCESSION NUMBER(S): [id number]   ORDERING CLINICIAN: BRENDA KNAPP   TECHNIQUE: Contiguous axial images of the cervical spine were performed. PATIENT RADIATION EXPOSURE DATA: CTDI: DLP:   All CT examinations are performed with one or more of the following dose reduction techniques: Automated Exposure Control, adjustment of mA and/or kV according to patient size, or use of iterative reconstruction techniques.   FINDINGS: There is straightening of the normal cervical lordosis.   No acute fracture or spondylolisthesis is identified.   Posterior fusion and laminectomy is noted C3 through C6 with hardware intact. No periprosthetic lucency is identified to suggest loosening.   Multilevel disc space narrowing can be seen. Endplate osteophytosis facet arthropathy is noted.   The neural foramina and spinal canal are grossly patent.   Severe calcific atherosclerosis of the origin of the left subclavian artery can be seen. Retropharyngeal course of the ICAs can be seen bilaterally. Calcification of the carotid bulbs are noted.   Severe left TMJ osteoarthritic changes are noted.   The patient is edentulous.   Limited visualization of the lung apices reveal emphysema.        1. No acute fracture or spondylolisthesis. 2. Degenerative disc disease and spondylosis. 3. Postsurgical changes as above.   . .   This report is in agreement with the preliminary report issued by [employer].   MACRO: None.   Signed by: Shad Hernandez 12/13/2023 8:22 AM Dictation workstation:   [id number]    CT head wo IV contrast    Result Date: 12/13/2023  Interpreted By:  Shad Hernandez, STUDY: CT HEAD WO IV CONTRAST; 12/12/2023 11:34 pm   INDICATION: fall;   COMPARISON: 05/28/2020   ACCESSION NUMBER(S): [id number]   ORDERING CLINICIAN: BRENDA KNAPP   TECHNIQUE: Contiguous axial images were acquired from the vertex through the posterior fossa without IV contrast.   All CT examinations are performed with one or more of the following dose reduction techniques: Automated Exposure Control, adjustment of mA and/or kV according to patient size, or use of iterative reconstruction techniques.   FINDINGS: No focal mass effect or midline shift is identified. The ventricles and sulci are symmetric and appropriate for the patient's age. However, chronic involutional change of the cerebral hemispheres is noted.   Encephalomalacia of the right frontal and parietal lobes can be seen which may represent the sequela of remote infarcts. Nonspecific hypodensities are identified within the periventricular and subcortical white matter likely due to chronic postischemic white matter disease. Atherosclerotic calcifications are seen within the carotid siphons and vertebral arteries.   No acute intracranial hemorrhage is identified. No intra-axial or extra-axial fluid collection is seen.   The visualized paranasal sinuses and mastoid air cells are clear.       No acute intracranial findings. . .   This report is in agreement with the preliminary report issued by [employer].   MACRO: None   Signed by: Shad Hernandez 12/13/2023 8:18 AM Dictation workstation:   [id number]    XR hip right with pelvis when performed  2 or 3 views    Result Date: 12/13/2023  Interpreted By:  Shad Hernandez, STUDY: XR HIP RIGHT WITH PELVIS WHEN PERFORMED 2 OR 3 VIEWS; 12/12/2023 11:49 pm   INDICATION: Signs/Symptoms:fall.   COMPARISON: 05/29/2022   ACCESSION NUMBER(S): [id number]   ORDERING CLINICIAN: BRENDA KNAPP   FINDINGS: Right total hip arthroplasty changes are identified. No periprosthetic lucencies are identified.   No fracture or subluxation is identified. Left hip osteoarthritic changes are noted.   The SI joints are unremarkable.   Pelvic sutures are noted.       No acute fracture or dislocation.   MACRO: None.   Signed by: Shad Hernandez 12/13/2023 8:16 AM Dictation workstation:   [id number]    XR shoulder right 2+ views    Result Date: 12/13/2023  Interpreted By:  Shad Hernandez, STUDY: XR SHOULDER RIGHT 2+ VIEWS; 12/12/2023 11:49 pm   INDICATION: Signs/Symptoms:fall;   COMPARISON: None   ACCESSION NUMBER(S): [id number]   ORDERING CLINICIAN: BRENDA KNAPP   TECHNIQUE: AP, scapular and axillary projections.   FINDINGS: No fracture or dislocation is seen. Moderate AC joint osteoarthrosis is noted.   The included intrathoracic structures are reveal sternotomy wires and postsurgical changes seen within the right hilar region and peripheral right lung.       No acute fracture or dislocation.   MACRO: None.   Signed by: Shad Hernandez 12/13/2023 8:07 AM Dictation workstation:   [id number]    XR elbow right 3+ views    Result Date: 12/13/2023  Interpreted By:  Shad Hernandez, STUDY: XR ELBOW RIGHT 3+ VIEWS; 12/12/2023 11:49 pm   INDICATION: Signs/Symptoms:fall.   COMPARISON: None   ACCESSION NUMBER(S): [id number]   ORDERING CLINICIAN: BRENDA KNAPP   FINDINGS: Olecranon fixation plate and screws are seen. The hardware appears intact.   No fracture or subluxation is identified. No significant elbow effusion is seen.       No acute fracture or dislocation.   MACRO: None   Signed by: Shad Hernandez 12/13/2023 7:40 AM Dictation  workstation:   [id number]    XR chest 1 view    Result Date: 12/13/2023  Interpreted By:  Shad Hernandez, STUDY: XR CHEST 1 VIEW; 12/12/2023 11:49 pm   INDICATION: Signs/Symptoms:fall   COMPARISON: 10/17/2023   ACCESSION NUMBER(S): [id number]   ORDERING CLINICIAN: BRENDA KNAPP   TECHNIQUE: 1 AP projection.   FINDINGS: Sternotomy wires and right hilar clips are seen.   The cardiomediastinal silhouette is enlarged but stable. Mild vascular congestion is noted. No pleural effusion is identified.   Bilateral rib deformities are noted.       Mild vascular congestion.   MACRO: None   Signed by: Shad Hernandez 12/13/2023 7:38 AM Dictation workstation:   [id number]     Assessment/Plan   Acute on chronic renal failure-nephrology on consult  Pseudomonas urinary tract infection  History of pseudomonas UTI     Discontinue ceftazidime-resistant  Stat meropenem  Follow up chest xray  Monitor WBC and temperature  Monitor renal function  Nephrology following        Malena Milner, APRN-CNP

## 2024-01-08 NOTE — NURSING NOTE
Pt's oxygen found off pulse ox 75 % oxygen placed back on pt. Oxygen placed back on pt pulse ox up to 98%

## 2024-01-08 NOTE — PROGRESS NOTES
"Juli Del Castillo is a 83 y.o. female on day 4 of admission presenting with Fall, initial encounter.    Subjective   TCSW submitted SNF updates. Precert to be started upon acceptance to one of the two new choices, [facility] and [facility]. At this time, TCSW has not received an correspondence from ether facility. TCSW will follow up for updates tomorrow.        Objective     Physical Exam    Last Recorded Vitals  Blood pressure 148/69, pulse 82, temperature 37 °C (98.6 °F), temperature source Oral, resp. rate 18, height 1.626 m (5' 4\"), weight 82.9 kg (182 lb 12.2 oz), SpO2 98 %.  Intake/Output last 3 Shifts:  I/O last 3 completed shifts:  In: 700 (8.4 mL/kg) [P.O.:650; IV Piggyback:50]  Out: 802 (9.7 mL/kg) [Urine:800 (0.3 mL/kg/hr); Stool:2]  Weight: 82.9 kg     Relevant Results                             Assessment/Plan   Principal Problem:    Fall, initial encounter  Active Problems:    Fibromyalgia    Acute congestive heart failure (CMS/HCC)    Hyperlipidemia    Major depressive disorder, single episode, unspecified    Overactive bladder    History of lung cancer    Chronic respiratory failure (CMS/HCC)    Chronic diastolic heart failure (CMS/HCC)    Accidental fall    Acute on chronic kidney failure (CMS/HCC)    Hyperkalemia               SHANE Russo      "

## 2024-01-08 NOTE — PROGRESS NOTES
"Juli Del Castillo is a 83 y.o. female on day 4 of admission presenting with Fall, initial encounter.    Subjective   Patient seen and examined.  Patient apparently had an episode during breakfast where she was choking on eggs and had to be suctioned.  She was evaluated by speech therapy.  Denies any chest pain or palpitations.  She does report mild dyspnea.       Objective     Physical Exam  Constitutional:       General: She is not in acute distress.     Appearance: She is ill-appearing. She is not toxic-appearing.   Cardiovascular:      Rate and Rhythm: Normal rate and regular rhythm.      Pulses: Normal pulses.      Heart sounds: Normal heart sounds.   Pulmonary:      Effort: No respiratory distress.      Breath sounds: Rhonchi present.   Abdominal:      General: Bowel sounds are normal.      Palpations: Abdomen is soft.   Skin:     General: Skin is warm and dry.   Neurological:      General: No focal deficit present.      Mental Status: She is alert and oriented to person, place, and time.   Psychiatric:         Mood and Affect: Mood normal.         Behavior: Behavior normal.         Last Recorded Vitals  Blood pressure 148/69, pulse 82, temperature 37 °C (98.6 °F), temperature source Oral, resp. rate 18, height 1.626 m (5' 4\"), weight 82.9 kg (182 lb 12.2 oz), SpO2 97 %.  Intake/Output last 3 Shifts:  I/O last 3 completed shifts:  In: 700 (8.4 mL/kg) [P.O.:650; IV Piggyback:50]  Out: 802 (9.7 mL/kg) [Urine:800 (0.3 mL/kg/hr); Stool:2]  Weight: 82.9 kg     Relevant Results  Scheduled medications  albuterol, 2.5 mg, nebulization, q6h while awake  aspirin, 81 mg, oral, Daily  bisacodyl, 10 mg, rectal, Daily  budesonide, 0.5 mg, nebulization, BID  cefTAZidime, 1 g, intravenous, q24h  citalopram, 20 mg, oral, Daily  docusate sodium, 100 mg, oral, BID  DULoxetine, 30 mg, oral, Daily  heparin (porcine), 5,000 Units, subcutaneous, q8h  oxybutynin, 5 mg, oral, BID  pantoprazole, 40 mg, oral, Daily before " breakfast  patiromer calcium sorbitex, 8.4 g, oral, Daily  primidone, 100 mg, oral, TID  simvastatin, 80 mg, oral, Nightly      Continuous medications     PRN medications  PRN medications: acetaminophen **OR** acetaminophen **OR** acetaminophen, benzocaine-menthol, bisacodyl, dextromethorphan-guaifenesin, glycerin, guaiFENesin, HYDROcodone-acetaminophen, ipratropium-albuteroL, melatonin, ondansetron, oxygen, polyethylene glycol     following data reviewed    WBC-8.6  Hgb-8.6  Hct-27.0  Platelets-258    Na-138  K+-5.3  Cl-104  Bicarb-24  BUN-27  creatinine-1.8    Urine culture pending growing greater than 100,000 CFU per mL gram-negative bacilli                             Assessment/Plan   Principal Problem:    Fall, initial encounter  Active Problems:    Fibromyalgia    Acute congestive heart failure (CMS/[facility])    Hyperlipidemia    Major depressive disorder, single episode, unspecified    Overactive bladder    History of lung cancer    Chronic respiratory failure (CMS/[facility])    Chronic diastolic heart failure (CMS/[facility])    Accidental fall    Acute on chronic kidney failure (CMS/[facility])    Hyperkalemia    Falls  -CT brain no acute findings  -PT, OT recommend moderate intensity rehab  -Fall precaution    Acute on chronic renal failure  -Nephrology following  -Monitor  -Hold nephrotoxic agents  -Renally dose medications  -Renal ultrasound no acute finding    UTI  -Awaiting culture  -ID following  -IV Fortaz due to previous culture with Pseudomonas    HTN  -Monitor blood pressure  -Hold lisinopril due to KYLEE        Plan  Above plan discussed length with patient and she is in agreement       I spent 30 minutes in the professional and overall care of this patient.      Sapphire Paula, APRN-CNP

## 2024-01-08 NOTE — PROGRESS NOTES
Occupational Therapy    Occupational Therapy Treatment    Name: Juli Del Castillo  MRN: [id number]  : 1940  Date: 24  Time Calculation  Start Time: 1043  Stop Time: 1111  Time Calculation (min): 28 min    Assessment:  OT Assessment: Patient has demonstrated improvements with ADLs this date. Patient would continue to benefit from skilled OT.  Prognosis: Good  Barriers to Discharge: None  Evaluation/Treatment Tolerance: Patient tolerated treatment well  End of Session Communication: Bedside nurse  End of Session Patient Position: Up in chair  Plan:  Treatment Interventions: ADL retraining, Functional transfer training, UE strengthening/ROM, Endurance training, Patient/family training, Compensatory technique education  OT Frequency: 3 times per week  OT Discharge Recommendations: Moderate intensity level of continued care  Equipment Recommended upon Discharge: Wheeled walker  OT Recommended Transfer Status: Assist of 1  OT - OK to Discharge: Yes    Subjective   Previous Visit Info:  OT Last Visit  OT Received On: 24  General:  General  Family/Caregiver Present: Yes  Caregiver Feedback: family member  Prior to Session Communication: Bedside nurse  Patient Position Received: Bed, 3 rail up  Preferred Learning Style: verbal  General Comment: Patient cleared by nursing for therapy. Patient in bed upon arrival and agreeable to participate  Precautions:  Medical Precautions: Fall precautions, Oxygen therapy device and L/min (3L O2 via NC)  Pain Assessment:  Pain Assessment  Pain Assessment: 0-10  Pain Score: 5 - Moderate pain  Pain Type: Acute pain  Pain Location: Head  Pain Orientation: Other (Comment)  Pain Interventions: Other (Comment) (RN gave medication)     Objective   Activities of Daily Living: Feeding  Feeding Level of Assistance: Setup  Feeding Where Assessed: Chair  Feeding Comments: items within reach    Grooming  Grooming Level of Assistance: Setup  Grooming Where Assessed: Chair  Grooming  Comments: wash face, brush hair, brush teeth    UE Bathing  UE Bathing Level of Assistance: Setup  UE Bathing Where Assessed: Other (Comment) (chair)  UE Bathing Comments: UB sponge bath - increased time    LE Bathing  LE Bathing Level of Assistance: Minimum assistance  LE Bathing Where Assessed: Other (Comment) (chair)  LE Bathing Comments: LB sponge bath, feet    UE Dressing  UE Dressing Level of Assistance: Minimum assistance  UE Dressing Where Assessed: Chair  UE Dressing Comments: line management to doff/don gown    LE Dressing  LE Dressing: Yes  Sock Level of Assistance: Dependent  Adult Briefs Level of Assistance: Minimum assistance  LE Dressing Where Assessed: Chair  LE Dressing Comments: don socks at bed level, assistance to don brief over feet    Toileting  Toileting Level of Assistance: Moderate assistance  Where Assessed: Other (Comment) (standing at 2WW)  Toileting Comments: assistance to wash posterior britni area    Functional Standing Tolerance:  Functional Standing Tolerance  Time: ~3 minutes  Activity: ADL  Functional Standing Tolerance Comments: Close supervision, Fair+ balance  Bed Mobility/Transfers: Bed Mobility  Bed Mobility: Yes  Bed Mobility 1  Bed Mobility 1: Supine to sitting  Level of Assistance 1: Close supervision  Bed Mobility Comments 1: head of bed elevated    Transfers  Transfer: Yes  Transfer 1  Transfer From 1: Bed to  Transfer to 1: Stand  Technique 1: Sit to stand  Transfer Device 1: Walker  Transfer Level of Assistance 1: Contact guard  Trials/Comments 1: patient then turns to be seated in the chair using 2WW  Transfers 2  Transfer From 2: Chair with arms to  Transfer to 2: Stand  Technique 2: Sit to stand  Transfer Device 2: Walker  Transfer Level of Assistance 2: Contact guard    Outcome Measures:  [facility] Daily Activity  Putting on and taking off regular lower body clothing: A lot  Bathing (including washing, rinsing, drying): A little  Putting on and taking off regular upper  body clothing: A little  Toileting, which includes using toilet, bedpan or urinal: A little  Taking care of personal grooming such as brushing teeth: A little  Eating Meals: A little  Daily Activity - Total Score: 17    Education Documentation  Body Mechanics, taught by Darby Mccoy OT at 1/8/2024 11:22 AM.  Learner: Patient  Readiness: Acceptance  Method: Explanation, Demonstration  Response: Needs Reinforcement    Precautions, taught by Darby Mccoy OT at 1/8/2024 11:22 AM.  Learner: Patient  Readiness: Acceptance  Method: Explanation, Demonstration  Response: Needs Reinforcement    ADL Training, taught by Darby Mccoy OT at 1/8/2024 11:22 AM.  Learner: Patient  Readiness: Acceptance  Method: Explanation, Demonstration  Response: Needs Reinforcement    Education Comments  No comments found.      Goals:  Encounter Problems       Encounter Problems (Active)       OT Goals       ADLs (Progressing)       Start:  01/04/24    Expected End:  01/18/24       Patient will complete bathing, dressing, and toileting tasks with setup/supervision assist using adaptive aides as needed.         Functional transfers (Progressing)       Start:  01/04/24    Expected End:  01/18/24       Patient will complete bed, toilet, and chair transfers at a modified independent level from elevated seat heights and using bilateral arm supports as needed.         Impaired activity tolerance (Progressing)       Start:  01/04/24    Expected End:  01/18/24       Patient will tolerate 20 minutes of therapeutic activity in order to increase activity tolerance needed for ADLs.

## 2024-01-08 NOTE — PROGRESS NOTES
Physical Therapy    Physical Therapy Treatment    Patient Name: Juli Del Castillo  MRN: [id number]  Today's Date: 1/8/2024  Time Calculation  Start Time: 1140  Stop Time: 1206  Time Calculation (min): 26 min       Much improved functionally compared to previous session. Works hard and is with good motivation. Family present and Pt both concerned about consistency of functional abilities as Pt is home alone normally. Still would benefit from Moderate Intensity Rehab at [place]    Assessment/Plan   PT Assessment  Evaluation/Treatment Tolerance: Patient tolerated treatment well  End of Session Communication: Bedside nurse  End of Session Patient Position: Bed, 3 rail up, Alarm on  PT Plan  Inpatient/Swing Bed or Outpatient: Inpatient  PT Plan  Treatment/Interventions: Bed mobility, Transfer training, Gait training, Balance training, Neuromuscular re-education, Strengthening, Endurance training, Therapeutic exercise, Therapeutic activity  PT Plan: Skilled PT  PT Frequency: 4 times per week  PT Discharge Recommendations: Moderate intensity level of continued care  Equipment Recommended upon Discharge: Wheeled walker  PT Recommended Transfer Status: Assist x1, Assistive device  PT - OK to Discharge: Yes      General Visit Information:   PT  Visit  PT Received On: 01/08/24  Response to Previous Treatment: Patient with no complaints from previous session.  General  Family/Caregiver Present: Yes  Caregiver Feedback: dtr  Prior to Session Communication: Bedside nurse  Patient Position Received: Up in chair  Preferred Learning Style: verbal  General Comment: Agreeable to PT follow up    Subjective   Precautions:  Precautions  Medical Precautions: Fall precautions    Objective   Pain:  Pain Assessment  Pain Assessment: 0-10  Pain Score: 5 - Moderate pain  Pain Type: Acute pain  Pain Location: Head  Cognition:  Cognition  Overall Cognitive Status: Within Functional Limits  Orientation Level: Oriented X4    Activity Tolerance:  Activity  Tolerance  Endurance: Decreased tolerance for upright activites  Treatments:  Therapeutic Exercise  Therapeutic Exercise Performed: Yes  Therapeutic Exercise Activity 2: Pt educated on and completes B ankle pumps x20, Knee Kicks x10, Seated Knee Marches x10, Resisted Hip Abd x10, Resisted Hip Add x10, and Glute sets x10    Bed Mobility  Bed Mobility: Yes  Bed Mobility 1  Bed Mobility 1: Sitting to supine  Level of Assistance 1: Close supervision  Bed Mobility Comments 1: cues on technique. Laborous but self completed    Ambulation/Gait Training  Ambulation/Gait Training Performed: Yes  Ambulation/Gait Training 1  Surface 1: Level tile  Device 1: Rolling walker  Assistance 1: Minimum assistance  Quality of Gait 1:  (slow, kyphotic, and laborous. Mild unsteadiness with increased lateral sway. Complains of BUE fatigue from leaning on walker. Discussed proper AD use)  Comments/Distance (ft) 1: 30'x2  Transfers  Transfer: Yes  Transfer 1  Technique 1: Sit to stand  Transfer Device 1: Walker  Transfer Level of Assistance 1: Minimum assistance  Trials/Comments 1: cue for setup, BUE pushoff, and anterior weight shifting    Outcome Measures:  [facility] Basic Mobility  Turning from your back to your side while in a flat bed without using bedrails: A little  Moving from lying on your back to sitting on the side of a flat bed without using bedrails: A little  Moving to and from bed to chair (including a wheelchair): A little  Standing up from a chair using your arms (e.g. wheelchair or bedside chair): A little  To walk in hospital room: A little  Climbing 3-5 steps with railing: Total  Basic Mobility - Total Score: 16    Education Documentation  No documentation found.  Education Comments  No comments found.        OP EDUCATION:       Encounter Problems       Encounter Problems (Active)       PT Goals       Patient will amb 100 feet with rolling walker device including two turns on even surface with independent assist to facilitate  safe mobility.  (Progressing)       Start:  01/04/24    Expected End:  01/18/24            Patient will transfer sit to stand and stand to sit with  independent assist to facilitate mobility.  (Progressing)       Start:  01/04/24    Expected End:  01/18/24            Patient will improve standing dynamic balance to Good- for safety with standing activities with use of assistive device. (Progressing)       Start:  01/04/24    Expected End:  01/18/24            Patient will transition supine to sit and sit to supine mod Independent (Progressing)       Start:  01/04/24    Expected End:  01/18/24

## 2024-01-08 NOTE — CARE PLAN
Problem: Respiratory  Goal: Minimal/no exertional discomfort or dyspnea this shift  Outcome: Progressing  Goal: No signs of respiratory distress (eg. Use of accessory muscles. Peds grunting)  Outcome: Progressing     Problem: Respiratory  Goal: Wean oxygen to maintain O2 saturation per order/standard this shift  Outcome: Met

## 2024-01-08 NOTE — PROGRESS NOTES
Juli Del Castillo is a 83 y.o. female on day 4 of admission presenting with Fall, initial encounter.      Subjective   Patient seen for acute kidney injury admitted to hospital after a fall she was dehydrated and also she had severe renal tract infection on IV Fortaz right now awaiting the final culture results we know that she had grown Pseudomonas aeruginosa in the past I did consult ID the patient choked on her food this morning however she is doing better and she is about to have swallowing evaluation       Objective          Vitals 24HR  Heart Rate:  []   Temp:  [36.6 °C (97.9 °F)-37.5 °C (99.5 °F)]   Resp:  [17-20]   BP: (105-162)/(44-75)   SpO2:  [90 %-98 %]         Intake/Output last 3 Shifts:    Intake/Output Summary (Last 24 hours) at 1/8/2024 1108  Last data filed at 1/8/2024 0900  Gross per 24 hour   Intake 520 ml   Output 802 ml   Net -282 ml         Physical Exam  Constitutional:       General: She is not in acute distress.     Appearance: Normal appearance. She is not toxic-appearing.   HENT:      Head: Normocephalic and atraumatic.      Mouth/Throat:      Mouth: Mucous membranes are moist.   Neck:      Vascular: No carotid bruit.   Cardiovascular:      Rate and Rhythm: Normal rate and regular rhythm.      Pulses: Normal pulses.      Heart sounds: Normal heart sounds. No murmur heard.     No friction rub. No gallop.   Pulmonary:      Effort: Pulmonary effort is normal.      Breath sounds: Normal breath sounds. No wheezing, rhonchi or rales.   Chest:      Chest wall: No tenderness.   Abdominal:      General: Bowel sounds are normal. There is no distension.      Palpations: Abdomen is soft.      Tenderness: There is no abdominal tenderness. There is no right CVA tenderness, left CVA tenderness, guarding or rebound.   Musculoskeletal:         General: No swelling or tenderness.      Cervical back: Neck supple.      Right lower leg: No edema.      Left lower leg: No edema.   Lymphadenopathy:       Cervical: No cervical adenopathy.   Skin:     Capillary Refill: Capillary refill takes less than 2 seconds.   Neurological:      Mental Status: She is alert. Mental status is at baseline.   Psychiatric:         Mood and Affect: Mood normal.         Behavior: Behavior normal.         Relevant Results  Results for orders placed or performed during the hospital encounter of 01/03/24 (from the past 24 hour(s))   CBC   Result Value Ref Range    WBC 8.6 4.4 - 11.3 x10*3/uL    nRBC 0.0 0.0 - 0.0 /100 WBCs    RBC 2.75 (L) 4.00 - 5.20 x10*6/uL    Hemoglobin 8.6 (L) 12.0 - 16.0 g/dL    Hematocrit 27.0 (L) 36.0 - 46.0 %    MCV 98 80 - 100 fL    MCH 31.3 26.0 - 34.0 pg    MCHC 31.9 (L) 32.0 - 36.0 g/dL    RDW 13.9 11.5 - 14.5 %    Platelets 258 150 - 450 x10*3/uL   Basic Metabolic Panel   Result Value Ref Range    Glucose 92 65 - 99 mg/dL    Sodium 138 133 - 145 mmol/L    Potassium 5.3 (H) 3.4 - 5.1 mmol/L    Chloride 104 97 - 107 mmol/L    Bicarbonate 24 24 - 31 mmol/L    Urea Nitrogen 27 (H) 8 - 25 mg/dL    Creatinine 1.80 (H) 0.40 - 1.60 mg/dL    eGFR 27 (L) >60 mL/min/1.73m*2    Calcium 9.3 8.5 - 10.4 mg/dL    Anion Gap 10 <=19 mmol/L      Procedure Component Value Units Date/Time    renal complete [[id number]] Collected: 01/04/24 1221   Order Status: Completed Updated: 01/04/24 1221   Narrative:     Interpreted By:  Tayler Trujillo,  STUDY:  US RENAL COMPLETE; 1/4/2024 12:11 pm      INDICATION:  Signs/Symptoms:Acute kidney injury superimposed upon chronic kidney  disease. Hyperkalemia.      COMPARISON:  12/14/2023      ACCESSION NUMBER(S):  [id number]      ORDERING CLINICIAN:  JENNY YU      TECHNIQUE:  Grayscale and color Doppler imaging of the kidneys.      FINDINGS:  The right kidney measures 9.5 cm x 6.1 cm x 4.7 cm. There is a 1.1 x  1.4 x 1.4 cm right renal cyst. The left kidney measures 11.0 cm x 4.8  cm x 5.1 cm. There is a 1.7 x 2.3 x 2.4 cm left renal cyst. There is  no shadowing calculus, hydronephrosis, or  solid mass identified. The  renal cortical thickness and echogenicity is normal.      Cursory evaluation of the urinary bladder shows no evidence for mass,  wall thickening, or calculus.       Impression:     Simple bilateral renal cyst noted.      No hydronephrosis or renal atrophy.      MACRO:  None.      Signed by: Tayler Trujillo 1/4/2024 12:19 PM  Dictation workstation:   [id number]   CT head wo IV contrast [[id number]] Collected: 01/03/24 2323   Order Status: Completed Updated: 01/03/24 2334   Narrative:     STUDY:  CT Head without IV Contrast; 1/3/2024 at 11:03 PM  INDICATION:  Fall.  COMPARISON:  12/12/2023.  TECHNIQUE:  Unenhanced CT scan of the brain.  Automated mA/kV exposure control was utilized and patient examination  was performed in strict accordance with principles of ALARA.  FINDINGS:  Encephalomalacia of the right frontal and parietal lobes are again  noted.  Periventricular white matter hypodensities are noted  consistent with areas of prior microvascular insults.  An unchanged 5  mm low-density areas noted in the right thalamus consistent with a  prior lacunar infarction.  Otherwise, the gray-white differentiation  is normal.  There is no shift of the midline.  No abnormal masses, mass effect, fluid collections, or recent  hemorrhages are seen.  The gray-white differentiation is normal.  The visualized portions of the paranasal sinuses and mastoid air cells  are clear.  The bony structures are normal.   Impression:     No acute intracerebral changes.  No significant change.  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  White matter changes consistent with areas of prior microvascular  insults.  Signed by Phillip Jensen MD   XR chest 2 views [[id number]] Collected: 01/03/24 2301   Order Status: Completed Updated: 01/03/24 2321   Narrative:     STUDY:  Chest Radiographs;  1/3/24 at 10:55pm  INDICATION:  Fall.  COMPARISON:  10/17/23 XR Chest  ACCESSION NUMBER(S):  [id number]  ORDERING CLINICIAN:  VIRY  YESICA  TECHNIQUE:  Frontal and lateral chest.  FINDINGS:  CARDIOMEDIASTINAL SILHOUETTE:  Cardiomediastinal silhouette is normal in size and configuration.  Calcified plaque is seen in the aorta.     LUNGS:  Increased interstitial markings are seen bilaterally.  Slightly  prominent right hilum is noted similar to prior studies with surgical  clips in the region of the right hilum suggesting chronic  postoperative change.  Flattening of hemidiaphragms may indicate  chronic changes of COPD.     ABDOMEN:  No remarkable upper abdominal findings.     BONES:  No acute osseous changes.  Median sternotomy changes are noted.   Impression:     Increased interstitial markings bilaterally, likely mild pulmonary  vascular congestion with a trace right pleural effusion and suspected  chronic changes of COPD.  Stable chronic postoperative change in the region of the right hilum.  Signed by Abdirashid Panda            Assessment/Plan    Acute kidney injury creatinine slightly better output is better discussed with the daughter in details we will continue to monitor renal function very closely  Status post a fall  Mild hyperkalemia secondary to KYLEE continue low potassium diet and will give Veltassa today  urinary tract infection     will consult ID also    MD Cy Venegas MD

## 2024-01-08 NOTE — CARE PLAN
The patient's goals for the shift include      The clinical goals for the shift include less pain      Problem: Fall/Injury  Goal: Not fall by end of shift  Outcome: Progressing  Goal: Be free from injury by end of the shift  Outcome: Progressing  Goal: Verbalize understanding of personal risk factors for fall in the hospital  Outcome: Progressing  Goal: Verbalize understanding of risk factor reduction measures to prevent injury from fall in the home  Outcome: Progressing  Goal: Use assistive devices by end of the shift  Outcome: Progressing  Goal: Pace activities to prevent fatigue by end of the shift  Outcome: Progressing     Problem: Respiratory  Goal: Clear secretions with interventions this shift  Outcome: Progressing  Goal: Minimize anxiety/maximize coping throughout shift  Outcome: Progressing  Goal: Minimal/no exertional discomfort or dyspnea this shift  Outcome: Progressing  Goal: No signs of respiratory distress (eg. Use of accessory muscles. Peds grunting)  Outcome: Progressing  Goal: Verbalize decreased shortness of breath this shift  Outcome: Progressing  Goal: Increase self care and/or family involvement in next 24 hours  Outcome: Progressing

## 2024-01-08 NOTE — PROGRESS NOTES
Speech-Language Pathology    Inpatient Speech-Language Pathology Clinical Swallow Evaluation       Patient Name: Juli Del Castillo  MRN: [id number]  : 1940  Today's Date: 24  Time Calculation  Start Time: 951  Stop Time:   Time Calculation (min): 24 min       Reason for Consultation:  Choking episode with eggs this morning. Pt thought she was going to die. Required suction. Very concerned.     Recommendations:  Risk for Aspiration: No   Solid Diet Recommendations : Regular (IDDSI Level 7)   Liquid Diet Recommendations: Thin (IDDSI Level 0)   Compensatory Swallowing Strategies: Upright 90 degrees as possible for all oral intake, Eat/feed slowly, Decrease distractions during eating/feeding, Other (Comment) (Sit up in chair for meals)     Medication Administration Recommendations: Whole, With Liquid, With Pureed      Skilled dysphagia treatment is not warranted  at next level of care.     Assessment:  Consistencies Trialed:      Pt participated in diagnostic trials of 5 oz thin water (3 oz successively swallowed), 3 tsp apple sauce, and 2 zaire crackers.     OME and CNE are grossly WFL. Vocal quality and cough are perceptually WFL. Pt denies any prior dysphagia, any recent rapid weight loss, and any recent pneumonias.     Oral phase - Oral mucosa moist and normal in coloration. Mastication, bolus manipulation, and oral clearance timely and functional.     Pharyngeal phase - Although it is a limited assessment technique; Hyolaryngeal elevation/excursion assessed via digital palpation and appeared consistent across all trials. No coughing, choking, nor change in vocal quality present throughout trials. No overt s/s of aspiration present at the bedside.          Prognosis: Good         Plan:  SLP Plan: No skilled SLP   Discussed POC: Patient, Nursing   Discussed Risks/Benefits: Yes, Patient, Nursing   Patient/Caregiver Agreeable: Yes     Skilled dysphagia treatment is not warranted due to patient is at  functional baseline      Subjective   Pt is pleasant and participative. Daughter in law, Amanda, at bedside throughout. Patient recalls events from this morning sharing she was sitting upright in bed eating eggs when she suddenly began choking and coughing. Had eggs suctioned out of mouth/pharynx with relief following.       General Visit Information:  Past medical history:   Per H&P:   Juli Del Castillo is a 83 y.o. female presenting with Mechanical Fall.      Ms. Juli Del Castillo is an 82 year old Female with PMHx of CRF on 2-3 L O2, H/o Chronic obstructive pulmonary disease, H/o HARPREET, H/o CVA, H/o CHF, H/o PVD, H/o Colon Cancer, H/o Lung Cancer, H/o PAH, H/o GERD, H/o HTN, H/o CAD, and H/o Depression who presents with a Mechanical Fall.      She was discharged from this hospital about 2 weeks ago after presenting with a fall and suffering from right hip pain.  She was discharged on December 17, 2023.  It was thought to be related to a deep contusion and she was discharged to rehab.  Since then she has gone home but continues to be weak. Today she says she fell twice.  She fell forward during her second fall and struck both the front and back of her head.  She had no LOC.  No nausea vomiting.  She says she feels weak.  Denies any other pain complaints.  No chest pain.  No shortness of breath.  No dysuria or hematuria.  She has no recent cough fevers or chills. No HA. No vision changes. Says she is at baseline.      Today the patient's ER diagnostic workup was noted for a normal WBC count of 11.2.  Her H&H slightly low at 9.2/28.5.  Patient's platelet count was normal at 321.  proBNP was elevated 1551.  Troponin elevation at 28.  Patient's blood chemistry noted for slightly elevated potassium of 5.2.  Chloride was 96.  Creatinine was 2.4.  On December 27, 2023 the patient's creatinine was 1.98.      Rhythm NSR  Rate 72  ST changes nl  Axis nl  Intervals Qtc 448      Her chest x-ray was read as increased interstitial markings  bilaterally, likely mild pulmonary vascular congestion with a trace right pleural effusion and suspected chronic changes of COPD.  Stable chronic postoperative change in the region of the right hilum.      CT of the brain was read as no acute intracerebral changes.  No significant change.  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  White matter changes consistent with areas of prior microvascular insults.      Patient was admitted last hospitalization for A-fib subsequent fall and right hip contusion.  Found to have a UTI treated with IV antibiotics.  Found to have an acute kidney insufficiency with some mild hyperkalemia.  Was seen by the nephrology specialist who recommended to hold lisinopril and torsemide.  Patient was made to resume her torsemide but continue to hold on the lisinopril at discharge.  He was transferred to skilled nursing facility.    Past Medical History      Chronic obstructive pulmonary disease-- on 2 l NC   Obstructive sleep apnea  Cerebrovascular accident  Peripheral vascular disease  Coronary artery disease  Congestive heart failure with preserved ejection fraction  Colon cancer  Depression  Hyperlipidemia  Gastroesophageal reflux disease  Hypertension  Lung cancer  Carotid stenosis  Migraine  Anxiety  Pulmonary hypertension  Chronic neck pain .     Prior to Session Communication: Bedside nurse       Pain:    Mild pain in throat.         Education:   Patient education completed regarding results and recommendations of SLP evaluation and compensatory swallowing strategies. Pt verbalizes understanding with all patient questions answered to satisfaction.     Care Team involvement:   Communicated with bedside nurse prior to evaluation with clearance for patient participation obtained and Results of the bedside swallowing evaluation were discussed with nurse and verbalized understanding was noted.

## 2024-01-09 LAB
ALBUMIN SERPL-MCNC: 3.2 G/DL (ref 3.5–5)
ANION GAP SERPL CALC-SCNC: 11 MMOL/L
BUN SERPL-MCNC: 26 MG/DL (ref 8–25)
CALCIUM SERPL-MCNC: 9.4 MG/DL (ref 8.5–10.4)
CHLORIDE SERPL-SCNC: 102 MMOL/L (ref 97–107)
CO2 SERPL-SCNC: 24 MMOL/L (ref 24–31)
CREAT SERPL-MCNC: 1.7 MG/DL (ref 0.4–1.6)
EGFRCR SERPLBLD CKD-EPI 2021: 29 ML/MIN/1.73M*2
GLUCOSE SERPL-MCNC: 89 MG/DL (ref 65–99)
PHOSPHATE SERPL-MCNC: 4.1 MG/DL (ref 2.5–4.5)
POTASSIUM SERPL-SCNC: 4.4 MMOL/L (ref 3.4–5.1)
SODIUM SERPL-SCNC: 137 MMOL/L (ref 133–145)

## 2024-01-09 PROCEDURE — 36415 COLL VENOUS BLD VENIPUNCTURE: CPT | Performed by: INTERNAL MEDICINE

## 2024-01-09 PROCEDURE — 2500000004 HC RX 250 GENERAL PHARMACY W/ HCPCS (ALT 636 FOR OP/ED): Performed by: INTERNAL MEDICINE

## 2024-01-09 PROCEDURE — 2500000001 HC RX 250 WO HCPCS SELF ADMINISTERED DRUGS (ALT 637 FOR MEDICARE OP): Performed by: INTERNAL MEDICINE

## 2024-01-09 PROCEDURE — 97110 THERAPEUTIC EXERCISES: CPT | Mod: GO

## 2024-01-09 PROCEDURE — 80069 RENAL FUNCTION PANEL: CPT | Performed by: INTERNAL MEDICINE

## 2024-01-09 PROCEDURE — 2500000002 HC RX 250 W HCPCS SELF ADMINISTERED DRUGS (ALT 637 FOR MEDICARE OP, ALT 636 FOR OP/ED): Performed by: INTERNAL MEDICINE

## 2024-01-09 PROCEDURE — 9420000001 HC RT PATIENT EDUCATION 5 MIN

## 2024-01-09 PROCEDURE — 94640 AIRWAY INHALATION TREATMENT: CPT

## 2024-01-09 PROCEDURE — 2500000005 HC RX 250 GENERAL PHARMACY W/O HCPCS: Performed by: INTERNAL MEDICINE

## 2024-01-09 PROCEDURE — 1100000001 HC PRIVATE ROOM DAILY

## 2024-01-09 PROCEDURE — 2500000001 HC RX 250 WO HCPCS SELF ADMINISTERED DRUGS (ALT 637 FOR MEDICARE OP): Performed by: NURSE PRACTITIONER

## 2024-01-09 PROCEDURE — 2500000004 HC RX 250 GENERAL PHARMACY W/ HCPCS (ALT 636 FOR OP/ED): Performed by: NURSE PRACTITIONER

## 2024-01-09 RX ADMIN — PRIMIDONE 100 MG: 50 TABLET ORAL at 14:41

## 2024-01-09 RX ADMIN — CITALOPRAM HYDROBROMIDE 20 MG: 20 TABLET ORAL at 08:39

## 2024-01-09 RX ADMIN — HEPARIN SODIUM 5000 UNITS: 5000 INJECTION, SOLUTION INTRAVENOUS; SUBCUTANEOUS at 08:40

## 2024-01-09 RX ADMIN — BISACODYL 10 MG: 10 SUPPOSITORY RECTAL at 12:31

## 2024-01-09 RX ADMIN — MEROPENEM 500 MG: 500 INJECTION, POWDER, FOR SOLUTION INTRAVENOUS at 08:43

## 2024-01-09 RX ADMIN — PANTOPRAZOLE SODIUM 40 MG: 40 TABLET, DELAYED RELEASE ORAL at 08:42

## 2024-01-09 RX ADMIN — ALBUTEROL SULFATE 2.5 MG: 2.5 SOLUTION RESPIRATORY (INHALATION) at 20:02

## 2024-01-09 RX ADMIN — MEROPENEM 500 MG: 500 INJECTION, POWDER, FOR SOLUTION INTRAVENOUS at 20:05

## 2024-01-09 RX ADMIN — ACETAMINOPHEN 650 MG: 325 TABLET ORAL at 11:00

## 2024-01-09 RX ADMIN — HEPARIN SODIUM 5000 UNITS: 5000 INJECTION, SOLUTION INTRAVENOUS; SUBCUTANEOUS at 16:39

## 2024-01-09 RX ADMIN — DOCUSATE SODIUM 100 MG: 100 CAPSULE, LIQUID FILLED ORAL at 08:39

## 2024-01-09 RX ADMIN — OXYBUTYNIN CHLORIDE 5 MG: 5 TABLET ORAL at 08:42

## 2024-01-09 RX ADMIN — SIMVASTATIN 80 MG: 40 TABLET, FILM COATED ORAL at 20:05

## 2024-01-09 RX ADMIN — OXYBUTYNIN CHLORIDE 5 MG: 5 TABLET ORAL at 20:05

## 2024-01-09 RX ADMIN — PRIMIDONE 100 MG: 50 TABLET ORAL at 08:43

## 2024-01-09 RX ADMIN — PATIROMER 8.4 G: 8.4 POWDER, FOR SUSPENSION ORAL at 08:42

## 2024-01-09 RX ADMIN — PRIMIDONE 100 MG: 50 TABLET ORAL at 20:05

## 2024-01-09 RX ADMIN — ALBUTEROL SULFATE 2.5 MG: 2.5 SOLUTION RESPIRATORY (INHALATION) at 08:19

## 2024-01-09 RX ADMIN — ALBUTEROL SULFATE 2.5 MG: 2.5 SOLUTION RESPIRATORY (INHALATION) at 12:15

## 2024-01-09 RX ADMIN — ACETAMINOPHEN 650 MG: 325 TABLET ORAL at 15:03

## 2024-01-09 RX ADMIN — ASPIRIN 81 MG: 81 TABLET, CHEWABLE ORAL at 08:39

## 2024-01-09 RX ADMIN — DULOXETINE HYDROCHLORIDE 30 MG: 30 CAPSULE, DELAYED RELEASE PELLETS ORAL at 08:40

## 2024-01-09 RX ADMIN — ACETAMINOPHEN 650 MG: 325 TABLET ORAL at 20:05

## 2024-01-09 RX ADMIN — DOCUSATE SODIUM 100 MG: 100 CAPSULE, LIQUID FILLED ORAL at 20:05

## 2024-01-09 RX ADMIN — BUDESONIDE INHALATION 0.5 MG: 0.5 SUSPENSION RESPIRATORY (INHALATION) at 08:19

## 2024-01-09 RX ADMIN — HYDROCODONE BITARTRATE AND ACETAMINOPHEN 1 TABLET: 5; 325 TABLET ORAL at 20:05

## 2024-01-09 RX ADMIN — BUDESONIDE INHALATION 0.5 MG: 0.5 SUSPENSION RESPIRATORY (INHALATION) at 20:03

## 2024-01-09 RX ADMIN — Medication: at 08:19

## 2024-01-09 ASSESSMENT — PAIN SCALES - GENERAL
PAINLEVEL_OUTOF10: 2
PAINLEVEL_OUTOF10: 2
PAINLEVEL_OUTOF10: 6
PAINLEVEL_OUTOF10: 6
PAINLEVEL_OUTOF10: 7
PAINLEVEL_OUTOF10: 7

## 2024-01-09 ASSESSMENT — PAIN - FUNCTIONAL ASSESSMENT
PAIN_FUNCTIONAL_ASSESSMENT: 0-10

## 2024-01-09 ASSESSMENT — COGNITIVE AND FUNCTIONAL STATUS - GENERAL
PERSONAL GROOMING: A LITTLE
EATING MEALS: A LITTLE
DRESSING REGULAR UPPER BODY CLOTHING: A LITTLE
MOVING FROM LYING ON BACK TO SITTING ON SIDE OF FLAT BED WITH BEDRAILS: A LITTLE
CLIMB 3 TO 5 STEPS WITH RAILING: TOTAL
STANDING UP FROM CHAIR USING ARMS: A LITTLE
DRESSING REGULAR UPPER BODY CLOTHING: A LITTLE
TOILETING: A LITTLE
WALKING IN HOSPITAL ROOM: A LITTLE
MOBILITY SCORE: 16
DAILY ACTIVITIY SCORE: 17
HELP NEEDED FOR BATHING: A LITTLE
TURNING FROM BACK TO SIDE WHILE IN FLAT BAD: A LITTLE
PERSONAL GROOMING: A LITTLE
MOVING TO AND FROM BED TO CHAIR: A LITTLE
HELP NEEDED FOR BATHING: A LITTLE
DRESSING REGULAR LOWER BODY CLOTHING: A LOT
TOILETING: A LITTLE
DAILY ACTIVITIY SCORE: 17
EATING MEALS: A LITTLE
DRESSING REGULAR LOWER BODY CLOTHING: A LOT

## 2024-01-09 ASSESSMENT — PAIN DESCRIPTION - LOCATION: LOCATION: HEAD

## 2024-01-09 ASSESSMENT — PAIN DESCRIPTION - ORIENTATION: ORIENTATION: OTHER (COMMENT)

## 2024-01-09 NOTE — NURSING NOTE
Assumed care of pateint. Patient is A&Ox3 and is resting in bed . Call light in reach. Patient in in 3LO nc.

## 2024-01-09 NOTE — PROGRESS NOTES
Occupational Therapy    Occupational Therapy Treatment    Name: Juli Del Castillo  MRN: [id number]  : 1940  Date: 24  Time Calculation  Start Time: 1409  Stop Time: 1423  Time Calculation (min): 14 min    Assessment:  Prognosis: Good  Barriers to Discharge: None  Evaluation/Treatment Tolerance: Patient limited by pain (headache)  End of Session Communication: Bedside nurse  End of Session Patient Position: Bed, 2 rail up  Plan:  Treatment Interventions: ADL retraining, Functional transfer training, UE strengthening/ROM, Endurance training, Patient/family training, Neuromuscular reeducation, Compensatory technique education  OT Frequency: 3 times per week  OT Discharge Recommendations: Moderate intensity level of continued care  Equipment Recommended upon Discharge: Wheeled walker  OT Recommended Transfer Status: Assist of 1  OT - OK to Discharge: Yes    Subjective   Previous Visit Info:  OT Last Visit  OT Received On: 24  General:  General  Prior to Session Communication: Bedside nurse  Patient Position Received: Bed, 2 rail up  Preferred Learning Style: verbal  General Comment: Patient cleared by nursing for therapy. Patient in bed upon arrival, reports she just returned to bed, but agreeable to ther ex at EOB  Precautions:  Medical Precautions: Fall precautions, Oxygen therapy device and L/min (3L O2 via NC)  Pain Assessment:  Pain Assessment  Pain Assessment: 0-10  Pain Score: 6  Pain Type: Acute pain  Pain Location: Head     Objective     Bed Mobility/Transfers: Bed Mobility  Bed Mobility: Yes  Bed Mobility 1  Bed Mobility 1: Sitting to supine  Level of Assistance 1: Close supervision  Bed Mobility Comments 1: head of bed elevated  Bed Mobility 2  Bed Mobility  2: Supine to sitting  Level of Assistance 2: Close supervision  Bed Mobility Comments 2: head of bed elevated    Transfers  Transfer: Yes  Transfer 1  Transfer From 1: Bed to  Transfer to 1: Stand  Technique 1: Sit to stand  Transfer  Device 1: Walker  Transfer Level of Assistance 1: Contact guard  Trials/Comments 1: patient takes ~3 steps towards the head of the bed using 2WW with CGA    Therapy/Activity: Therapeutic Exercise  Therapeutic Exercise Performed: Yes  Therapeutic Exercise Activity 1: patient completes 1x15 B UE AROM exercises. exercises include bicep curls, chest press, shoulder flex/ext, shoulder horizontal abduction/adduction, forearm supination/pronation, wrist flex/ext, shoulder elevation/depression. patient completes while seated EOB with Fair+ balance    Outcome Measures:  [facility] Daily Activity  Putting on and taking off regular lower body clothing: A lot  Bathing (including washing, rinsing, drying): A little  Putting on and taking off regular upper body clothing: A little  Toileting, which includes using toilet, bedpan or urinal: A little  Taking care of personal grooming such as brushing teeth: A little  Eating Meals: A little  Daily Activity - Total Score: 17    Education Documentation  Body Mechanics, taught by Darby Mccoy OT at 1/9/2024  3:20 PM.  Learner: Patient  Readiness: Acceptance  Method: Explanation, Demonstration  Response: Needs Reinforcement    Precautions, taught by Darby Mccoy OT at 1/9/2024  3:20 PM.  Learner: Patient  Readiness: Acceptance  Method: Explanation, Demonstration  Response: Needs Reinforcement    Home Exercise Program, taught by Darby Mccoy OT at 1/9/2024  3:20 PM.  Learner: Patient  Readiness: Acceptance  Method: Explanation, Demonstration  Response: Needs Reinforcement    Education Comments  No comments found.      Goals:  Encounter Problems       Encounter Problems (Active)       OT Goals       ADLs (Progressing)       Start:  01/04/24    Expected End:  01/18/24       Patient will complete bathing, dressing, and toileting tasks with setup/supervision assist using adaptive aides as needed.         Functional transfers (Progressing)       Start:  01/04/24    Expected End:  01/18/24        Patient will complete bed, toilet, and chair transfers at a modified independent level from elevated seat heights and using bilateral arm supports as needed.         Impaired activity tolerance (Progressing)       Start:  01/04/24    Expected End:  01/18/24       Patient will tolerate 20 minutes of therapeutic activity in order to increase activity tolerance needed for ADLs.

## 2024-01-09 NOTE — PROGRESS NOTES
Juli Del Castillo is a 83 y.o. female on day 5 of admission presenting with Fall, initial encounter.    Subjective   Interval History:   Sitting up in chair  Afebrile, no chills  Denies abdominal pain, nausea or vomiting  Denies chest pain or shortness of breath      Objective   Range of Vitals (last 24 hours)  Heart Rate:  [76-93]   Temp:  [36.8 °C (98.2 °F)-37.4 °C (99.3 °F)]   Resp:  [14-17]   BP: (126-144)/(40-65)   SpO2:  [75 %-100 %]   Daily Weight  01/03/24 : 82.9 kg (182 lb 12.2 oz)    Body mass index is 31.37 kg/m².    Physical Exam  Constitutional:       Appearance: Normal appearance.   HENT:      Head: Normocephalic and atraumatic.      Nose: Nose normal.      Mouth/Throat:      Mouth: Mucous membranes are moist.      Pharynx: Oropharynx is clear.   Eyes:      Extraocular Movements: Extraocular movements intact.      Conjunctiva/sclera: Conjunctivae normal.   Cardiovascular:      Rate and Rhythm: Normal rate and regular rhythm.   Pulmonary:      Effort: Pulmonary effort is normal.      Breath sounds: Normal breath sounds.   Abdominal:      General: Bowel sounds are normal.      Palpations: Abdomen is soft.      Tenderness: There is no abdominal tenderness.   Musculoskeletal:         General: No swelling. Normal range of motion.      Cervical back: Normal range of motion and neck supple.   Skin:     General: Skin is warm and dry.   Neurological:      General: No focal deficit present.      Mental Status: She is alert and oriented to person, place, and time.   Psychiatric:         Mood and Affect: Mood normal.         Behavior: Behavior normal.     Antibiotics  sodium chloride 0.9 % bolus 500 mL  albuterol 2.5 mg /3 mL (0.083 %) nebulizer solution 2.5 mg  aspirin chewable tablet 81 mg  budesonide (Pulmicort) 0.5 mg/2 mL nebulizer solution 0.5 mg  citalopram (CeleXA) tablet 20 mg  DULoxetine (Cymbalta) DR capsule 30 mg  pantoprazole (ProtoNix) EC tablet 40 mg  oxybutynin (Ditropan) tablet 5 mg  oxygen (O2)  therapy  primidone (Mysoline) tablet 100 mg  simvastatin (Zocor) tablet 80 mg  torsemide (Demadex) tablet 20 mg  heparin (porcine) injection 5,000 Units  acetaminophen (Tylenol) tablet 650 mg  acetaminophen (Tylenol) oral liquid 650 mg  acetaminophen (Tylenol) suppository 650 mg  ondansetron (Zofran) tablet 4 mg  ondansetron (Zofran) injection 4 mg  melatonin tablet 3 mg  polyethylene glycol (Glycolax, Miralax) packet 17 g  benzocaine-menthol (Cepastat Sore Throat) 15-3.6 mg lozenge 1 lozenge  dextromethorphan-guaifenesin (Robitussin DM)  mg/5 mL oral liquid 5 mL  guaiFENesin (Mucinex) 12 hr tablet 600 mg  ipratropium-albuteroL (Duo-Neb) 0.5-2.5 mg/3 mL nebulizer solution 3 mL  sodium chloride 0.9% infusion  HYDROcodone-acetaminophen (Norco) 5-325 mg per tablet 1 tablet  cefTAZidime (Fortaz) 1 g in dextrose 5 % in water (D5W) 50 mL IV  cefTAZidime (Fortaz) 1 g in dextrose 5 % in water (D5W) 50 mL IV  bisacodyl (Dulcolax) suppository 10 mg      Relevant Results  Labs  Results from last 72 hours   Lab Units 01/08/24  0421   WBC AUTO x10*3/uL 8.6   HEMOGLOBIN g/dL 8.6*   HEMATOCRIT % 27.0*   PLATELETS AUTO x10*3/uL 258       Results from last 72 hours   Lab Units 01/09/24  0418 01/08/24  1800 01/08/24  0421 01/07/24  0829   SODIUM mmol/L 137  --  138 140   POTASSIUM mmol/L 4.4 5.3* 5.3* 4.9   CHLORIDE mmol/L 102  --  104 106   CO2 mmol/L 24 -- 24 24   BUN mg/dL 26*  --  27* 32*   CREATININE mg/dL 1.70*  --  1.80* 1.90*   GLUCOSE mg/dL 89  --  92 98   CALCIUM mg/dL 9.4  --  9.3 9.4   ANION GAP mmol/L 11  --  10 10   EGFR mL/min/1.73m*2 29*  --  27* 26*   PHOSPHORUS mg/dL 4.1  --   --   --        Results from last 72 hours   Lab Units 01/09/24  0418   ALBUMIN g/dL 3.2*     Estimated Creatinine Clearance: 25.7 mL/min (A) (by C-G formula based on SCr of 1.7 mg/dL (H)).  CRP   Date Value Ref Range Status   02/15/2019 0.3 0 - 2.0 MG/DL Final     Comment:     Performed at [facility] [address] [facility] [address]      Microbiology  Susceptibility data from last 14 days.  Collected Specimen Info Organism Aztreonam Cefepime Ceftazidime Ciprofloxacin Gentamicin Meropenem Piperacillin/Tazobactam Tobramycin   01/05/24 Urine from Clean Catch/Voided Pseudomonas aeruginosa R I R S S S R S       Imaging  ECG 12 lead    Result Date: 1/4/2024  Normal sinus rhythm with sinus arrhythmia Inferior infarct (cited on or before 13-DEC-2023) Abnormal ECG When compared with ECG of 13-DEC-2023 01:02, No significant change was found Confirmed by Luan Monge ([id number]) on 1/4/2024 12:44:09 PM    US renal complete    Result Date: 1/4/2024  Interpreted By:  Tayler Trujillo, STUDY: US RENAL COMPLETE; 1/4/2024 12:11 pm   INDICATION: Signs/Symptoms:Acute kidney injury superimposed upon chronic kidney disease. Hyperkalemia.   COMPARISON: 12/14/2023   ACCESSION NUMBER(S): [id number]   ORDERING CLINICIAN: JENNY YU   TECHNIQUE: Grayscale and color Doppler imaging of the kidneys.   FINDINGS: The right kidney measures 9.5 cm x 6.1 cm x 4.7 cm. There is a 1.1 x 1.4 x 1.4 cm right renal cyst. The left kidney measures 11.0 cm x 4.8 cm x 5.1 cm. There is a 1.7 x 2.3 x 2.4 cm left renal cyst. There is no shadowing calculus, hydronephrosis, or solid mass identified. The renal cortical thickness and echogenicity is normal.   Cursory evaluation of the urinary bladder shows no evidence for mass, wall thickening, or calculus.       Simple bilateral renal cyst noted.   No hydronephrosis or renal atrophy.   MACRO: None.   Signed by: Tayler Trujillo 1/4/2024 12:19 PM Dictation workstation:   [id number]    CT head wo IV contrast    Result Date: 1/3/2024  STUDY: CT Head without IV Contrast; 1/3/2024 at 11:03 PM INDICATION: Fall. COMPARISON: 12/12/2023. TECHNIQUE: Unenhanced CT scan of the brain. Automated mA/kV exposure control was utilized and patient examination was performed in strict accordance with principles of ALARA. FINDINGS: Encephalomalacia of the right frontal and  parietal lobes are again noted.  Periventricular white matter hypodensities are noted consistent with areas of prior microvascular insults.  An unchanged 5 mm low-density areas noted in the right thalamus consistent with a prior lacunar infarction.  Otherwise, the gray-white differentiation is normal. There is no shift of the midline. No abnormal masses, mass effect, fluid collections, or recent hemorrhages are seen.  The gray-white differentiation is normal. The visualized portions of the paranasal sinuses and mastoid air cells are clear. The bony structures are normal.    No acute intracerebral changes. No significant change. 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 White matter changes consistent with areas of prior microvascular insults. Signed by Phillip Jensen MD    XR chest 2 views    Result Date: 1/3/2024  STUDY: Chest Radiographs;  1/3/24 at 10:55pm INDICATION: Fall. COMPARISON: 10/17/23 XR Chest ACCESSION NUMBER(S): [id number] ORDERING CLINICIAN: VIRY ROBERT TECHNIQUE:  Frontal and lateral chest. FINDINGS: CARDIOMEDIASTINAL SILHOUETTE: Cardiomediastinal silhouette is normal in size and configuration. Calcified plaque is seen in the aorta.  LUNGS: Increased interstitial markings are seen bilaterally.  Slightly prominent right hilum is noted similar to prior studies with surgical clips in the region of the right hilum suggesting chronic postoperative change.  Flattening of hemidiaphragms may indicate chronic changes of COPD.  ABDOMEN: No remarkable upper abdominal findings.  BONES: No acute osseous changes.  Median sternotomy changes are noted.    Increased interstitial markings bilaterally, likely mild pulmonary vascular congestion with a trace right pleural effusion and suspected chronic changes of COPD. Stable chronic postoperative change in the region of the right hilum. Signed by Abdirashid Panda    ECG 12 lead    Result Date: 12/20/2023   Poor data quality, interpretation may be adversely affected Normal sinus  rhythm with sinus arrhythmia Possible Inferior infarct , age undetermined Abnormal ECG No previous ECGs available Confirmed by Agnieszka Ashby ([id number]) on 12/20/2023 6:19:30 AM    US renal complete    Result Date: 12/14/2023  Interpreted By:  Ed Cano, STUDY: US RENAL COMPLETE; 12/14/2023 11:29 am   INDICATION: Signs/Symptoms:KYLEE.   COMPARISON: None.   ACCESSION NUMBER(S): [id number]   ORDERING CLINICIAN: TAZ IGLESIAS   TECHNIQUE: Grayscale and color Doppler imaging of the kidneys.   FINDINGS: The right kidney measures 9.6 cm x 4.6 cm x 5.2 cm. The left kidney measures 9.4 cm x 5.4 cm x 5.6 cm. There is a lesion consistent with a cyst in the upper-mid peripelvic location measuring 2.3 x 2.3 x 2.5 cm. There is no shadowing calculus, hydronephrosis, or solid mass identified. The renal cortical thickness and echogenicity is normal.   The urinary bladder is minimally distended and otherwise unremarkable.       No calculi or hydronephrosis bilaterally.   2.5 cm cyst in the left peripelvic location, otherwise unremarkable examination.   MACRO: None.   Signed by: Ed Cano 12/14/2023 3:47 PM Dictation workstation:   [id number]    CT hip right wo IV contrast    Result Date: 12/13/2023  Interpreted By:  Alicia Pierce, STUDY: CT HIP RIGHT WO IV CONTRAST; ;  12/13/2023 2:03 pm   INDICATION: Signs/Symptoms:Fall, pain, prostehesis in place, possible occult fracture.   COMPARISON: None.   ACCESSION NUMBER(S): [id number]   ORDERING CLINICIAN: TAZ IGLESIAS   TECHNIQUE: Serial axial CT images obtained of the right hip. Images reformatted in the coronal and sagittal projection   All CT examinations are performed with 1 or more of the following dose reduction techniques: Automated exposure control, adjustment of mA and/or kv according to patient's size, or use of iterative reconstruction techniques.   FINDINGS: Right total hip arthroplasty is in place. Evaluation about the femoral component of the total  hip arthroplasty demonstrates no evidence for lucency. No fracture demonstrated. Evaluation about the acetabular component demonstrates no evidence for fracture. Included portions visualized right hemipelvis demonstrates unremarkable iliac wing. Visualized portions of the sacrum are unremarkable.   Musculature about the right hip demonstrates generalized atrophy. Right common hamstring tendon origin is unremarkable. There is chronic enthesopathy about the right ischial tuberosity.           1. No acute osseous abnormality about the right total hip arthroplasty.     MACRO: None   Signed by: Alicia Pierce 12/13/2023 6:16 PM Dictation workstation:   [id number]    CT cervical spine wo IV contrast    Result Date: 12/13/2023  Interpreted By:  Shad Hernandez, STUDY: CT CERVICAL SPINE WO IV CONTRAST; 12/12/2023 11:34 pm   INDICATION: Signs/Symptoms:fall;   COMPARISON: None   ACCESSION NUMBER(S): [id number]   ORDERING CLINICIAN: BRENDA KNAPP   TECHNIQUE: Contiguous axial images of the cervical spine were performed. PATIENT RADIATION EXPOSURE DATA: CTDI: DLP:   All CT examinations are performed with one or more of the following dose reduction techniques: Automated Exposure Control, adjustment of mA and/or kV according to patient size, or use of iterative reconstruction techniques.   FINDINGS: There is straightening of the normal cervical lordosis.   No acute fracture or spondylolisthesis is identified.   Posterior fusion and laminectomy is noted C3 through C6 with hardware intact. No periprosthetic lucency is identified to suggest loosening.   Multilevel disc space narrowing can be seen. Endplate osteophytosis facet arthropathy is noted.   The neural foramina and spinal canal are grossly patent.   Severe calcific atherosclerosis of the origin of the left subclavian artery can be seen. Retropharyngeal course of the ICAs can be seen bilaterally. Calcification of the carotid bulbs are noted.   Severe left TMJ osteoarthritic  changes are noted.   The patient is edentulous.   Limited visualization of the lung apices reveal emphysema.       1. No acute fracture or spondylolisthesis. 2. Degenerative disc disease and spondylosis. 3. Postsurgical changes as above.   . .   This report is in agreement with the preliminary report issued by [employer].   MACRO: None.   Signed by: Shad Hernandez 12/13/2023 8:22 AM Dictation workstation:   [id number]    CT head wo IV contrast    Result Date: 12/13/2023  Interpreted By:  Shad Hernandez, STUDY: CT HEAD WO IV CONTRAST; 12/12/2023 11:34 pm   INDICATION: fall;   COMPARISON: 05/28/2020   ACCESSION NUMBER(S): [id number]   ORDERING CLINICIAN: BRENDA KNAPP   TECHNIQUE: Contiguous axial images were acquired from the vertex through the posterior fossa without IV contrast.   All CT examinations are performed with one or more of the following dose reduction techniques: Automated Exposure Control, adjustment of mA and/or kV according to patient size, or use of iterative reconstruction techniques.   FINDINGS: No focal mass effect or midline shift is identified. The ventricles and sulci are symmetric and appropriate for the patient's age. However, chronic involutional change of the cerebral hemispheres is noted.   Encephalomalacia of the right frontal and parietal lobes can be seen which may represent the sequela of remote infarcts. Nonspecific hypodensities are identified within the periventricular and subcortical white matter likely due to chronic postischemic white matter disease. Atherosclerotic calcifications are seen within the carotid siphons and vertebral arteries.   No acute intracranial hemorrhage is identified. No intra-axial or extra-axial fluid collection is seen.   The visualized paranasal sinuses and mastoid air cells are clear.       No acute intracranial findings. . .   This report is in agreement with the preliminary report issued by [employer].   MACRO: None   Signed by:  Shad Hernandez 12/13/2023 8:18 AM Dictation workstation:   [id number]    XR hip right with pelvis when performed 2 or 3 views    Result Date: 12/13/2023  Interpreted By:  Shad Hernandez, STUDY: XR HIP RIGHT WITH PELVIS WHEN PERFORMED 2 OR 3 VIEWS; 12/12/2023 11:49 pm   INDICATION: Signs/Symptoms:fall.   COMPARISON: 05/29/2022   ACCESSION NUMBER(S): [id number]   ORDERING CLINICIAN: BRENDA KNAPP   FINDINGS: Right total hip arthroplasty changes are identified. No periprosthetic lucencies are identified.   No fracture or subluxation is identified. Left hip osteoarthritic changes are noted.   The SI joints are unremarkable.   Pelvic sutures are noted.       No acute fracture or dislocation.   MACRO: None.   Signed by: Shad Hernandez 12/13/2023 8:16 AM Dictation workstation:   [id number]    XR shoulder right 2+ views    Result Date: 12/13/2023  Interpreted By:  Shad Hernandez, STUDY: XR SHOULDER RIGHT 2+ VIEWS; 12/12/2023 11:49 pm   INDICATION: Signs/Symptoms:fall;   COMPARISON: None   ACCESSION NUMBER(S): [id number]   ORDERING CLINICIAN: BRENDA KNAPP   TECHNIQUE: AP, scapular and axillary projections.   FINDINGS: No fracture or dislocation is seen. Moderate AC joint osteoarthrosis is noted.   The included intrathoracic structures are reveal sternotomy wires and postsurgical changes seen within the right hilar region and peripheral right lung.       No acute fracture or dislocation.   MACRO: None.   Signed by: Shad Hernandez 12/13/2023 8:07 AM Dictation workstation:   [id number]    XR elbow right 3+ views    Result Date: 12/13/2023  Interpreted By:  Shad Hernandez, STUDY: XR ELBOW RIGHT 3+ VIEWS; 12/12/2023 11:49 pm   INDICATION: Signs/Symptoms:fall.   COMPARISON: None   ACCESSION NUMBER(S): [id number]   ORDERING CLINICIAN: BRENDA KNAPP   FINDINGS: Olecranon fixation plate and screws are seen. The hardware appears intact.   No fracture or subluxation is identified. No significant elbow effusion is seen.        No acute fracture or dislocation.   MACRO: None   Signed by: Shad Hernandez 12/13/2023 7:40 AM Dictation workstation:   [id number]    XR chest 1 view    Result Date: 12/13/2023  Interpreted By:  Shad Hernandez, STUDY: XR CHEST 1 VIEW; 12/12/2023 11:49 pm   INDICATION: Signs/Symptoms:fall   COMPARISON: 10/17/2023   ACCESSION NUMBER(S): [id number]   ORDERING CLINICIAN: BRENDA KNAPP   TECHNIQUE: 1 AP projection.   FINDINGS: Sternotomy wires and right hilar clips are seen.   The cardiomediastinal silhouette is enlarged but stable. Mild vascular congestion is noted. No pleural effusion is identified.   Bilateral rib deformities are noted.       Mild vascular congestion.   MACRO: None   Signed by: Sahd Hernandez 12/13/2023 7:38 AM Dictation workstation:   [id number]     Assessment/Plan   Acute on chronic renal failure-nephrology on consult  Pseudomonas urinary tract infection  History of pseudomonas UTI       Continue meropenem-up to 7 days-started 1/8/2024  Follow up chest xray  Monitor WBC and temperature  Monitor renal function  Nephrology following        BRYANNA Foreman-CNP

## 2024-01-09 NOTE — CARE PLAN
The patient's goals for the shift include      The clinical goals for the shift include rest    Problem: Fall/Injury  Goal: Not fall by end of shift  Outcome: Progressing  Goal: Be free from injury by end of the shift  Outcome: Progressing  Goal: Verbalize understanding of personal risk factors for fall in the hospital  Outcome: Progressing  Goal: Verbalize understanding of risk factor reduction measures to prevent injury from fall in the home  Outcome: Progressing  Goal: Use assistive devices by end of the shift  Outcome: Progressing  Goal: Pace activities to prevent fatigue by end of the shift  Outcome: Progressing     Problem: Respiratory  Goal: Clear secretions with interventions this shift  Outcome: Progressing  Goal: Minimize anxiety/maximize coping throughout shift  Outcome: Progressing  Goal: No signs of respiratory distress (eg. Use of accessory muscles. Peds grunting)  Outcome: Progressing  Goal: Increase self care and/or family involvement in next 24 hours  Outcome: Progressing

## 2024-01-09 NOTE — CARE PLAN
Problem: Respiratory  Goal: Minimal/no exertional discomfort or dyspnea this shift  Outcome: Met  Goal: Verbalize decreased shortness of breath this shift  Outcome: Met

## 2024-01-09 NOTE — PROGRESS NOTES
"Juli Del Castillo is a 83 y.o. female on day 5 of admission presenting with Fall, initial encounter.    [facility] is unable to accept pt at this time. TCSW will begin precert to [place] when pt is medically ready.   TCSW will touch base with Sapphire NORRIS CNP tomorrow morning.        Objective     Physical Exam    Last Recorded Vitals  Blood pressure (!) 140/47, pulse 83, temperature 37 °C (98.6 °F), resp. rate 14, height 1.626 m (5' 4\"), weight 82.9 kg (182 lb 12.2 oz), SpO2 100 %.  Intake/Output last 3 Shifts:  I/O last 3 completed shifts:  In: 630 (7.6 mL/kg) [P.O.:630]  Out: 202 (2.4 mL/kg) [Urine:200 (0.1 mL/kg/hr); Stool:2]  Weight: 82.9 kg     Relevant Results                             Assessment/Plan   Principal Problem:    Fall, initial encounter  Active Problems:    Fibromyalgia    Acute congestive heart failure (CMS/HCC)    Hyperlipidemia    Major depressive disorder, single episode, unspecified    Overactive bladder    History of lung cancer    Chronic respiratory failure (CMS/HCC)    Chronic diastolic heart failure (CMS/HCC)    Accidental fall    Acute on chronic kidney failure (CMS/HCC)    Hyperkalemia               SHANE Russo      "

## 2024-01-09 NOTE — PROGRESS NOTES
Juli Del Castillo is a 83 y.o. female on day 5 of admission presenting with Fall, initial encounter.      Subjective   Patient seen for acute kidney injurySuperimposed on chronic kidney disease stage IIIAlso she has a UTI antibiotics were changed to meropenem clinically she feels fine       Objective          Vitals 24HR  Heart Rate:  [76-93]   Temp:  [36.8 °C (98.2 °F)-37.4 °C (99.3 °F)]   Resp:  [14-17]   BP: (126-144)/(40-65)   SpO2:  [75 %-100 %]         Intake/Output last 3 Shifts:    Intake/Output Summary (Last 24 hours) at 1/9/2024 1242  Last data filed at 1/8/2024 1700  Gross per 24 hour   Intake 240 ml   Output --   Net 240 ml         Physical Exam  Constitutional:       General: She is not in acute distress.     Appearance: Normal appearance. She is not toxic-appearing.   HENT:      Head: Normocephalic and atraumatic.      Mouth/Throat:      Mouth: Mucous membranes are moist.   Neck:      Vascular: No carotid bruit.   Cardiovascular:      Rate and Rhythm: Normal rate and regular rhythm.      Pulses: Normal pulses.      Heart sounds: Normal heart sounds. No murmur heard.     No friction rub. No gallop.   Pulmonary:      Effort: Pulmonary effort is normal.      Breath sounds: Normal breath sounds. No wheezing, rhonchi or rales.   Chest:      Chest wall: No tenderness.   Abdominal:      General: Bowel sounds are normal. There is no distension.      Palpations: Abdomen is soft.      Tenderness: There is no abdominal tenderness. There is no right CVA tenderness, left CVA tenderness, guarding or rebound.   Musculoskeletal:         General: No swelling or tenderness.      Cervical back: Neck supple.      Right lower leg: No edema.      Left lower leg: No edema.   Lymphadenopathy:      Cervical: No cervical adenopathy.   Skin:     Capillary Refill: Capillary refill takes less than 2 seconds.   Neurological:      Mental Status: She is alert. Mental status is at baseline.   Psychiatric:         Mood and Affect: Mood  normal.         Behavior: Behavior normal.         Relevant Results  Results for orders placed or performed during the hospital encounter of 01/03/24 (from the past 24 hour(s))   Potassium   Result Value Ref Range    Potassium 5.3 (H) 3.4 - 5.1 mmol/L   Renal Function Panel   Result Value Ref Range    Glucose 89 65 - 99 mg/dL    Sodium 137 133 - 145 mmol/L    Potassium 4.4 3.4 - 5.1 mmol/L    Chloride 102 97 - 107 mmol/L    Bicarbonate 24 24 - 31 mmol/L    Urea Nitrogen 26 (H) 8 - 25 mg/dL    Creatinine 1.70 (H) 0.40 - 1.60 mg/dL    eGFR 29 (L) >60 mL/min/1.73m*2    Calcium 9.4 8.5 - 10.4 mg/dL    Phosphorus 4.1 2.5 - 4.5 mg/dL    Albumin 3.2 (L) 3.5 - 5.0 g/dL    Anion Gap 11 <=19 mmol/L      Procedure Component Value Units Date/Time   US renal complete [[id number]] Collected: 01/04/24 1221   Order Status: Completed Updated: 01/04/24 1221   Narrative:     Interpreted By:  Tayler Trujillo,  STUDY:  US RENAL COMPLETE; 1/4/2024 12:11 pm      INDICATION:  Signs/Symptoms:Acute kidney injury superimposed upon chronic kidney  disease. Hyperkalemia.      COMPARISON:  12/14/2023      ACCESSION NUMBER(S):  [id number]      ORDERING CLINICIAN:  JENNY YU      TECHNIQUE:  Grayscale and color Doppler imaging of the kidneys.      FINDINGS:  The right kidney measures 9.5 cm x 6.1 cm x 4.7 cm. There is a 1.1 x  1.4 x 1.4 cm right renal cyst. The left kidney measures 11.0 cm x 4.8  cm x 5.1 cm. There is a 1.7 x 2.3 x 2.4 cm left renal cyst. There is  no shadowing calculus, hydronephrosis, or solid mass identified. The  renal cortical thickness and echogenicity is normal.      Cursory evaluation of the urinary bladder shows no evidence for mass,  wall thickening, or calculus.       Impression:     Simple bilateral renal cyst noted.      No hydronephrosis or renal atrophy.      MACRO:  None.      Signed by: Tayler Trujillo 1/4/2024 12:19 PM  Dictation workstation:   [id number]   CT head wo IV contrast [[id number]] Collected: 01/03/24 1979    Order Status: Completed Updated: 01/03/24 2334   Narrative:     STUDY:  CT Head without IV Contrast; 1/3/2024 at 11:03 PM  INDICATION:  Fall.  COMPARISON:  12/12/2023.  TECHNIQUE:  Unenhanced CT scan of the brain.  Automated mA/kV exposure control was utilized and patient examination  was performed in strict accordance with principles of ALARA.  FINDINGS:  Encephalomalacia of the right frontal and parietal lobes are again  noted.  Periventricular white matter hypodensities are noted  consistent with areas of prior microvascular insults.  An unchanged 5  mm low-density areas noted in the right thalamus consistent with a  prior lacunar infarction.  Otherwise, the gray-white differentiation  is normal.  There is no shift of the midline.  No abnormal masses, mass effect, fluid collections, or recent  hemorrhages are seen.  The gray-white differentiation is normal.  The visualized portions of the paranasal sinuses and mastoid air cells  are clear.  The bony structures are normal.   Impression:     No acute intracerebral changes.  No significant change.  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  White matter changes consistent with areas of prior microvascular  insults.  Signed by Phillip Jensen MD   XR chest 2 views [[id number]] Collected: 01/03/24 2301   Order Status: Completed Updated: 01/03/24 2321   Narrative:     STUDY:  Chest Radiographs;  1/3/24 at 10:55pm  INDICATION:  Fall.  COMPARISON:  10/17/23 XR Chest  ACCESSION NUMBER(S):  [id number]  ORDERING CLINICIAN:  VIRY ROBERT  TECHNIQUE:  Frontal and lateral chest.  FINDINGS:  CARDIOMEDIASTINAL SILHOUETTE:  Cardiomediastinal silhouette is normal in size and configuration.  Calcified plaque is seen in the aorta.     LUNGS:  Increased interstitial markings are seen bilaterally.  Slightly  prominent right hilum is noted similar to prior studies with surgical  clips in the region of the right hilum suggesting chronic  postoperative change.  Flattening of hemidiaphragms may  indicate  chronic changes of COPD.     ABDOMEN:  No remarkable upper abdominal findings.     BONES:  No acute osseous changes.  Median sternotomy changes are noted.   Impression:     Increased interstitial markings bilaterally, likely mild pulmonary  vascular congestion with a trace right pleural effusion and suspected  chronic changes of COPD.  Stable chronic postoperative change in the region of the right hilum.  Signed by Abdirashid Panda            Assessment/Plan    Acute kidney injury  creatinine is trending down  Status post a fall  Mild hyperkalemia  urinary tract infection continue with meropenem         MD Cy Venegas MD

## 2024-01-10 ENCOUNTER — APPOINTMENT (OUTPATIENT)
Dept: RADIOLOGY | Facility: HOSPITAL | Age: 84
End: 2024-01-10
Payer: MEDICARE

## 2024-01-10 LAB
ANION GAP SERPL CALC-SCNC: 10 MMOL/L
BUN SERPL-MCNC: 26 MG/DL (ref 8–25)
CALCIUM SERPL-MCNC: 9.6 MG/DL (ref 8.5–10.4)
CHLORIDE SERPL-SCNC: 101 MMOL/L (ref 97–107)
CO2 SERPL-SCNC: 27 MMOL/L (ref 24–31)
CREAT SERPL-MCNC: 1.5 MG/DL (ref 0.4–1.6)
EGFRCR SERPLBLD CKD-EPI 2021: 34 ML/MIN/1.73M*2
GLUCOSE BLD MANUAL STRIP-MCNC: 128 MG/DL (ref 74–99)
GLUCOSE BLD MANUAL STRIP-MCNC: 84 MG/DL (ref 74–99)
GLUCOSE BLD MANUAL STRIP-MCNC: 95 MG/DL (ref 74–99)
GLUCOSE SERPL-MCNC: 94 MG/DL (ref 65–99)
POTASSIUM SERPL-SCNC: 4.4 MMOL/L (ref 3.4–5.1)
SODIUM SERPL-SCNC: 138 MMOL/L (ref 133–145)

## 2024-01-10 PROCEDURE — 2500000004 HC RX 250 GENERAL PHARMACY W/ HCPCS (ALT 636 FOR OP/ED): Performed by: NURSE PRACTITIONER

## 2024-01-10 PROCEDURE — 99232 SBSQ HOSP IP/OBS MODERATE 35: CPT | Performed by: NURSE PRACTITIONER

## 2024-01-10 PROCEDURE — 94640 AIRWAY INHALATION TREATMENT: CPT

## 2024-01-10 PROCEDURE — 2500000001 HC RX 250 WO HCPCS SELF ADMINISTERED DRUGS (ALT 637 FOR MEDICARE OP): Performed by: INTERNAL MEDICINE

## 2024-01-10 PROCEDURE — 2500000002 HC RX 250 W HCPCS SELF ADMINISTERED DRUGS (ALT 637 FOR MEDICARE OP, ALT 636 FOR OP/ED): Performed by: INTERNAL MEDICINE

## 2024-01-10 PROCEDURE — 2500000001 HC RX 250 WO HCPCS SELF ADMINISTERED DRUGS (ALT 637 FOR MEDICARE OP): Performed by: NURSE PRACTITIONER

## 2024-01-10 PROCEDURE — 97110 THERAPEUTIC EXERCISES: CPT | Mod: GP

## 2024-01-10 PROCEDURE — 9420000001 HC RT PATIENT EDUCATION 5 MIN

## 2024-01-10 PROCEDURE — 80048 BASIC METABOLIC PNL TOTAL CA: CPT | Performed by: INTERNAL MEDICINE

## 2024-01-10 PROCEDURE — 36415 COLL VENOUS BLD VENIPUNCTURE: CPT | Performed by: INTERNAL MEDICINE

## 2024-01-10 PROCEDURE — 94760 N-INVAS EAR/PLS OXIMETRY 1: CPT

## 2024-01-10 PROCEDURE — 82947 ASSAY GLUCOSE BLOOD QUANT: CPT

## 2024-01-10 PROCEDURE — 97116 GAIT TRAINING THERAPY: CPT | Mod: GP

## 2024-01-10 PROCEDURE — 2500000004 HC RX 250 GENERAL PHARMACY W/ HCPCS (ALT 636 FOR OP/ED): Performed by: INTERNAL MEDICINE

## 2024-01-10 PROCEDURE — 2500000005 HC RX 250 GENERAL PHARMACY W/O HCPCS: Performed by: INTERNAL MEDICINE

## 2024-01-10 PROCEDURE — 1100000001 HC PRIVATE ROOM DAILY

## 2024-01-10 RX ADMIN — ALBUTEROL SULFATE 2.5 MG: 2.5 SOLUTION RESPIRATORY (INHALATION) at 07:09

## 2024-01-10 RX ADMIN — MEROPENEM 500 MG: 500 INJECTION, POWDER, FOR SOLUTION INTRAVENOUS at 09:48

## 2024-01-10 RX ADMIN — OXYBUTYNIN CHLORIDE 5 MG: 5 TABLET ORAL at 21:02

## 2024-01-10 RX ADMIN — Medication: at 07:11

## 2024-01-10 RX ADMIN — PRIMIDONE 100 MG: 50 TABLET ORAL at 21:03

## 2024-01-10 RX ADMIN — ACETAMINOPHEN 650 MG: 325 TABLET ORAL at 21:14

## 2024-01-10 RX ADMIN — OXYBUTYNIN CHLORIDE 5 MG: 5 TABLET ORAL at 09:32

## 2024-01-10 RX ADMIN — SIMVASTATIN 80 MG: 40 TABLET, FILM COATED ORAL at 21:02

## 2024-01-10 RX ADMIN — PANTOPRAZOLE SODIUM 40 MG: 40 TABLET, DELAYED RELEASE ORAL at 09:33

## 2024-01-10 RX ADMIN — HEPARIN SODIUM 5000 UNITS: 5000 INJECTION, SOLUTION INTRAVENOUS; SUBCUTANEOUS at 09:33

## 2024-01-10 RX ADMIN — BISACODYL 10 MG: 10 SUPPOSITORY RECTAL at 17:30

## 2024-01-10 RX ADMIN — BUDESONIDE INHALATION 0.5 MG: 0.5 SUSPENSION RESPIRATORY (INHALATION) at 07:09

## 2024-01-10 RX ADMIN — PRIMIDONE 100 MG: 50 TABLET ORAL at 09:33

## 2024-01-10 RX ADMIN — BUDESONIDE INHALATION 0.5 MG: 0.5 SUSPENSION RESPIRATORY (INHALATION) at 19:33

## 2024-01-10 RX ADMIN — CITALOPRAM HYDROBROMIDE 20 MG: 20 TABLET ORAL at 09:33

## 2024-01-10 RX ADMIN — Medication: at 12:40

## 2024-01-10 RX ADMIN — HEPARIN SODIUM 5000 UNITS: 5000 INJECTION, SOLUTION INTRAVENOUS; SUBCUTANEOUS at 17:22

## 2024-01-10 RX ADMIN — Medication 3 MG: at 21:14

## 2024-01-10 RX ADMIN — DOCUSATE SODIUM 100 MG: 100 CAPSULE, LIQUID FILLED ORAL at 09:33

## 2024-01-10 RX ADMIN — ACETAMINOPHEN 650 MG: 325 TABLET ORAL at 09:32

## 2024-01-10 RX ADMIN — PATIROMER 8.4 G: 8.4 POWDER, FOR SUSPENSION ORAL at 11:36

## 2024-01-10 RX ADMIN — DOCUSATE SODIUM 100 MG: 100 CAPSULE, LIQUID FILLED ORAL at 21:02

## 2024-01-10 RX ADMIN — ASPIRIN 81 MG: 81 TABLET, CHEWABLE ORAL at 09:33

## 2024-01-10 RX ADMIN — MEROPENEM 500 MG: 500 INJECTION, POWDER, FOR SOLUTION INTRAVENOUS at 21:02

## 2024-01-10 RX ADMIN — PRIMIDONE 100 MG: 50 TABLET ORAL at 15:33

## 2024-01-10 RX ADMIN — ALBUTEROL SULFATE 2.5 MG: 2.5 SOLUTION RESPIRATORY (INHALATION) at 19:33

## 2024-01-10 RX ADMIN — DULOXETINE HYDROCHLORIDE 30 MG: 30 CAPSULE, DELAYED RELEASE PELLETS ORAL at 09:33

## 2024-01-10 RX ADMIN — HYDROCODONE BITARTRATE AND ACETAMINOPHEN 1 TABLET: 5; 325 TABLET ORAL at 11:29

## 2024-01-10 RX ADMIN — ALBUTEROL SULFATE 2.5 MG: 2.5 SOLUTION RESPIRATORY (INHALATION) at 12:38

## 2024-01-10 ASSESSMENT — COGNITIVE AND FUNCTIONAL STATUS - GENERAL
WALKING IN HOSPITAL ROOM: A LITTLE
MOVING TO AND FROM BED TO CHAIR: A LITTLE
TOILETING: A LITTLE
DAILY ACTIVITIY SCORE: 18
HELP NEEDED FOR BATHING: A LITTLE
MOVING FROM LYING ON BACK TO SITTING ON SIDE OF FLAT BED WITH BEDRAILS: A LITTLE
MOVING FROM LYING ON BACK TO SITTING ON SIDE OF FLAT BED WITH BEDRAILS: A LITTLE
STANDING UP FROM CHAIR USING ARMS: A LITTLE
TURNING FROM BACK TO SIDE WHILE IN FLAT BAD: A LITTLE
DRESSING REGULAR UPPER BODY CLOTHING: A LITTLE
EATING MEALS: A LITTLE
PERSONAL GROOMING: A LITTLE
WALKING IN HOSPITAL ROOM: A LITTLE
TURNING FROM BACK TO SIDE WHILE IN FLAT BAD: A LITTLE
MOBILITY SCORE: 17
TOILETING: A LITTLE
MOBILITY SCORE: 17
DRESSING REGULAR LOWER BODY CLOTHING: A LOT
WALKING IN HOSPITAL ROOM: A LITTLE
CLIMB 3 TO 5 STEPS WITH RAILING: A LOT
MOVING TO AND FROM BED TO CHAIR: A LITTLE
HELP NEEDED FOR BATHING: A LITTLE
STANDING UP FROM CHAIR USING ARMS: A LITTLE
PERSONAL GROOMING: A LITTLE
MOBILITY SCORE: 16
DRESSING REGULAR LOWER BODY CLOTHING: A LOT
MOVING TO AND FROM BED TO CHAIR: A LITTLE
DRESSING REGULAR UPPER BODY CLOTHING: A LITTLE
CLIMB 3 TO 5 STEPS WITH RAILING: A LOT
MOVING FROM LYING ON BACK TO SITTING ON SIDE OF FLAT BED WITH BEDRAILS: A LITTLE
TURNING FROM BACK TO SIDE WHILE IN FLAT BAD: A LITTLE
STANDING UP FROM CHAIR USING ARMS: A LITTLE
CLIMB 3 TO 5 STEPS WITH RAILING: TOTAL
DAILY ACTIVITIY SCORE: 17

## 2024-01-10 ASSESSMENT — PAIN SCALES - GENERAL
PAINLEVEL_OUTOF10: 5 - MODERATE PAIN
PAINLEVEL_OUTOF10: 7
PAINLEVEL_OUTOF10: 2
PAINLEVEL_OUTOF10: 5 - MODERATE PAIN

## 2024-01-10 ASSESSMENT — PAIN DESCRIPTION - LOCATION: LOCATION: HEAD

## 2024-01-10 ASSESSMENT — PAIN - FUNCTIONAL ASSESSMENT
PAIN_FUNCTIONAL_ASSESSMENT: 0-10

## 2024-01-10 NOTE — PROGRESS NOTES
"Juli Del Castillo is a 83 y.o. female on day 6 of admission presenting with Fall, initial encounter.    Subjective   Pt will require IV Meropenum BID for 7 days post discharge. TCSW inquired with [facility] if they are able to accommodate pt's needs. [facility] is able to accommodate pt's IV ATBX needs.   TCSW requested precert to be started today to [facility].        Objective     Physical Exam    Last Recorded Vitals  Blood pressure (!) 136/46, pulse 72, temperature 36.8 °C (98.2 °F), temperature source Oral, resp. rate 18, height 1.626 m (5' 4\"), weight 82.9 kg (182 lb 12.2 oz), SpO2 99 %.  Intake/Output last 3 Shifts:  I/O last 3 completed shifts:  In: 350 (4.2 mL/kg) [P.O.:350]  Out: - (0 mL/kg)   Weight: 82.9 kg     Relevant Results                             Assessment/Plan   Principal Problem:    Fall, initial encounter  Active Problems:    Fibromyalgia    Acute congestive heart failure (CMS/HCC)    Hyperlipidemia    Major depressive disorder, single episode, unspecified    Overactive bladder    History of lung cancer    Chronic respiratory failure (CMS/HCC)    Chronic diastolic heart failure (CMS/HCC)    Accidental fall    Acute on chronic kidney failure (CMS/HCC)    Hyperkalemia               SHANE Russo      "

## 2024-01-10 NOTE — PROGRESS NOTES
Physical Therapy    Physical Therapy Treatment    Patient Name: Juli Del Castillo  MRN: [id number]  Today's Date: 1/10/2024  Time Calculation  Start Time: 1529  Stop Time: 1559  Time Calculation (min): 30 min       Assessment/Plan   PT Assessment  PT Assessment Results: Decreased strength, Decreased endurance, Impaired balance, Decreased mobility, Impaired hearing  Rehab Prognosis: Good  Evaluation/Treatment Tolerance: Patient limited by pain  Strengths: Ability to acquire knowledge, Support of Caregivers  Barriers to Participation: Comorbidities  Assessment Comment: Pt gives effort, limited by moderate chronic pain.  End of Session Patient Position: Up in chair (set up w;/ food tray)  PT Plan  Inpatient/Swing Bed or Outpatient: Inpatient  PT Plan  Treatment/Interventions: Bed mobility, Transfer training, Gait training, Balance training, Strengthening, Endurance training, Range of motion, Therapeutic exercise, Therapeutic activity  PT Plan: Skilled PT  PT Frequency: 4 times per week  PT Discharge Recommendations: Moderate intensity level of continued care  Equipment Recommended upon Discharge: Wheeled walker  PT Recommended Transfer Status: Assist x1, Assistive device  PT - OK to Discharge: Yes      General Visit Information:   PT  Visit  PT Received On: 01/10/24  Response to Previous Treatment: Other (Comment) (pt reports that her legs hurt at night after last session from walking too much)  General  Reason for Referral: impaired mobility  Referred By: Dr. Monge  Past Medical History Relevant to Rehab: fibromyalgia, HTN, CHF, HLD, anxiety, Depression, atrial fib, elevated BNP, CHF  Missed Visit: Yes  Missed Visit Reason: Patient refused  Family/Caregiver Present: No  Prior to Session Communication: Bedside nurse  Patient Position Received: Bed, 3 rail up  Preferred Learning Style: verbal  General Comment: Pt reports that she is hurting some, but agreeable to Pt session    Subjective   Precautions:  Precautions  Medical  Precautions: Fall precautions    Objective   Pain:  Pain Assessment  Pain Assessment: 0-10  Pain Score: 5 - Moderate pain  Pain Type: Chronic pain  Pain Location: Back    Postural Control:  Dynamic Standing Balance  Dynamic Standing-Balance Support: Bilateral upper extremity supported  Dynamic Standing-Balance:  (amb)  Dynamic Standing-Comments: Fair, no LOB    Activity Tolerance:  Activity Tolerance  Activity Tolerance Comments: Fair  Treatments:  Therapeutic Exercise  Therapeutic Exercise Performed: Yes  Therapeutic Exercise Activity 1: Pt performed bilat ankle dorsi/plantarflexion, hip flexion, LAQs, micaela hip add and abduction x 15 reps seated in bedside chair w/ waffle cushion added.    Bed Mobility  Bed Mobility: Yes  Bed Mobility 1  Bed Mobility 1: Supine to sitting  Level of Assistance 1: Close supervision  Bed Mobility Comments 1: Pt struggled w/ transfer to sitting EOB Rt, required increased time, HOB elevated slightly    Ambulation/Gait Training  Ambulation/Gait Training Performed: Yes  Ambulation/Gait Training 1  Surface 1: Level tile  Device 1: Rolling walker  Assistance 1: Contact guard  Comments/Distance (ft) 1: Pt amb ~ 50' w/ slow pace, decreased step length, no LOB.  Transfers  Transfer: Yes  Transfer 1  Technique 1: Sit to stand, Stand to sit  Transfer Device 1: Walker  Transfer Level of Assistance 1: Contact guard  Trials/Comments 1: Performed from EOB, to bedside chair. Cues for hand placement in each direction, especially to not plop and aggravate chronic back/tailbone issues.    Outcome Measures:  [facility] Basic Mobility  Turning from your back to your side while in a flat bed without using bedrails: A little  Moving from lying on your back to sitting on the side of a flat bed without using bedrails: A little  Moving to and from bed to chair (including a wheelchair): A little  Standing up from a chair using your arms (e.g. wheelchair or bedside chair): A little  To walk in hospital room: A  little  Climbing 3-5 steps with railing: A lot  Basic Mobility - Total Score: 17      Encounter Problems       Encounter Problems (Active)       PT Goals       Patient will amb 100 feet with rolling walker device including two turns on even surface with independent assist to facilitate safe mobility.  (Progressing)       Start:  01/04/24    Expected End:  01/18/24            Patient will transfer sit to stand and stand to sit with  independent assist to facilitate mobility.  (Progressing)       Start:  01/04/24    Expected End:  01/18/24            Patient will improve standing dynamic balance to Good- for safety with standing activities with use of assistive device. (Progressing)       Start:  01/04/24    Expected End:  01/18/24            Patient will transition supine to sit and sit to supine mod Independent (Progressing)       Start:  01/04/24    Expected End:  01/18/24

## 2024-01-10 NOTE — PROGRESS NOTES
Physical Therapy                 Therapy Communication Note    Patient Name: Juli Del Castillo  MRN: [id number]  Today's Date: 1/10/2024     Discipline: Physical Therapy    Missed Visit Reason: Missed Visit Reason: Patient refused    Missed Time: Attempt    Comment: Pt reports just back to bed, needs pain medication, declined therapy at this time.

## 2024-01-10 NOTE — PROGRESS NOTES
Juli Del Castillo is a 83 y.o. female on day 6 of admission presenting with Fall, initial encounter.      Subjective   Patient seen for acute kidney injury Superimposed on chronic kidney disease stage III also she has a UTI on antibiotics were changed to meropenem she continues to feel better no overnight events noted or choking       Objective          Vitals 24HR  Heart Rate:  [79-91]   Temp:  [36.8 °C (98.2 °F)-37 °C (98.6 °F)]   Resp:  [14-18]   BP: (126-146)/(40-64)   SpO2:  [96 %-100 %]         Intake/Output last 3 Shifts:    Intake/Output Summary (Last 24 hours) at 1/10/2024 1126  Last data filed at 1/9/2024 1417  Gross per 24 hour   Intake 350 ml   Output --   Net 350 ml         Physical Exam  Constitutional:       General: She is not in acute distress.     Appearance: Normal appearance. She is not toxic-appearing.   HENT:      Head: Normocephalic and atraumatic.      Mouth/Throat:      Mouth: Mucous membranes are moist.   Neck:      Vascular: No carotid bruit.   Cardiovascular:      Rate and Rhythm: Normal rate and regular rhythm.      Pulses: Normal pulses.      Heart sounds: Normal heart sounds. No murmur heard.     No friction rub. No gallop.   Pulmonary:      Effort: Pulmonary effort is normal.      Breath sounds: Normal breath sounds. No wheezing, rhonchi or rales.   Chest:      Chest wall: No tenderness.   Abdominal:      General: Bowel sounds are normal. There is no distension.      Palpations: Abdomen is soft.      Tenderness: There is no abdominal tenderness. There is no right CVA tenderness, left CVA tenderness, guarding or rebound.   Musculoskeletal:         General: No swelling or tenderness.      Cervical back: Neck supple.      Right lower leg: No edema.      Left lower leg: No edema.   Lymphadenopathy:      Cervical: No cervical adenopathy.   Skin:     Capillary Refill: Capillary refill takes less than 2 seconds.   Neurological:      Mental Status: She is alert. Mental status is at baseline.    Psychiatric:         Mood and Affect: Mood normal.         Behavior: Behavior normal.         Relevant Results  Results for orders placed or performed during the hospital encounter of 01/03/24 (from the past 24 hour(s))   POCT GLUCOSE   Result Value Ref Range    POCT Glucose 95 74 - 99 mg/dL      Procedure Component Value Units Date/Time   US renal complete [[id number]] Collected: 01/04/24 1221   Order Status: Completed Updated: 01/04/24 1221   Narrative:     Interpreted By:  Tayler Trujillo,  STUDY:  US RENAL COMPLETE; 1/4/2024 12:11 pm      INDICATION:  Signs/Symptoms:Acute kidney injury superimposed upon chronic kidney  disease. Hyperkalemia.      COMPARISON:  12/14/2023      ACCESSION NUMBER(S):  [id number]      ORDERING CLINICIAN:  JENNY YU      TECHNIQUE:  Grayscale and color Doppler imaging of the kidneys.      FINDINGS:  The right kidney measures 9.5 cm x 6.1 cm x 4.7 cm. There is a 1.1 x  1.4 x 1.4 cm right renal cyst. The left kidney measures 11.0 cm x 4.8  cm x 5.1 cm. There is a 1.7 x 2.3 x 2.4 cm left renal cyst. There is  no shadowing calculus, hydronephrosis, or solid mass identified. The  renal cortical thickness and echogenicity is normal.      Cursory evaluation of the urinary bladder shows no evidence for mass,  wall thickening, or calculus.       Impression:     Simple bilateral renal cyst noted.      No hydronephrosis or renal atrophy.      MACRO:  None.      Signed by: Tayler Trujillo 1/4/2024 12:19 PM  Dictation workstation:   [id number]   CT head wo IV contrast [[id number]] Collected: 01/03/24 2323   Order Status: Completed Updated: 01/03/24 2334   Narrative:     STUDY:  CT Head without IV Contrast; 1/3/2024 at 11:03 PM  INDICATION:  Fall.  COMPARISON:  12/12/2023.  TECHNIQUE:  Unenhanced CT scan of the brain.  Automated mA/kV exposure control was utilized and patient examination  was performed in strict accordance with principles of ALARA.  FINDINGS:  Encephalomalacia of the right frontal and  parietal lobes are again  noted.  Periventricular white matter hypodensities are noted  consistent with areas of prior microvascular insults.  An unchanged 5  mm low-density areas noted in the right thalamus consistent with a  prior lacunar infarction.  Otherwise, the gray-white differentiation  is normal.  There is no shift of the midline.  No abnormal masses, mass effect, fluid collections, or recent  hemorrhages are seen.  The gray-white differentiation is normal.  The visualized portions of the paranasal sinuses and mastoid air cells  are clear.  The bony structures are normal.   Impression:     No acute intracerebral changes.  No significant change.  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  White matter changes consistent with areas of prior microvascular  insults.  Signed by Phillip Jensen MD   XR chest 2 views [[id number]] Collected: 01/03/24 2301   Order Status: Completed Updated: 01/03/24 2321   Narrative:     STUDY:  Chest Radiographs;  1/3/24 at 10:55pm  INDICATION:  Fall.  COMPARISON:  10/17/23 XR Chest  ACCESSION NUMBER(S):  [id number]  ORDERING CLINICIAN:  VIRY ROBERT  TECHNIQUE:  Frontal and lateral chest.  FINDINGS:  CARDIOMEDIASTINAL SILHOUETTE:  Cardiomediastinal silhouette is normal in size and configuration.  Calcified plaque is seen in the aorta.     LUNGS:  Increased interstitial markings are seen bilaterally.  Slightly  prominent right hilum is noted similar to prior studies with surgical  clips in the region of the right hilum suggesting chronic  postoperative change.  Flattening of hemidiaphragms may indicate  chronic changes of COPD.     ABDOMEN:  No remarkable upper abdominal findings.     BONES:  No acute osseous changes.  Median sternotomy changes are noted.   Impression:     Increased interstitial markings bilaterally, likely mild pulmonary  vascular congestion with a trace right pleural effusion and suspected  chronic changes of COPD.  Stable chronic postoperative change in the region of the  right hilum.  Signed by Abdirashid Panda            Assessment/Plan    Acute kidney injury  creatinine is trending down however renal function panel results are still pending today we will check that as soon as possible  Status post a fall  Mild hyperkalemia  urinary tract infection with Pseudomonas aeruginosa continue with meropenem infectious disease is following    MD Cy Venegas MD

## 2024-01-10 NOTE — PROGRESS NOTES
Juli Del Castillo is a 83 y.o. female on day 6 of admission presenting with Fall, initial encounter.    Subjective   Interval History:   Sitting up in chair  Afebrile, no chills  Denies abdominal pain, nausea or vomiting  Denies chest pain or shortness of breath  Feeling good today      Objective   Range of Vitals (last 24 hours)  Heart Rate:  [72-91]   Temp:  [36.8 °C (98.2 °F)-37 °C (98.6 °F)]   Resp:  [14-18]   BP: (126-146)/(44-64)   SpO2:  [96 %-99 %]   Daily Weight  01/03/24 : 82.9 kg (182 lb 12.2 oz)    Body mass index is 31.37 kg/m².    Physical Exam  Constitutional:       Appearance: Normal appearance.   HENT:      Head: Normocephalic and atraumatic.      Nose: Nose normal.      Mouth/Throat:      Mouth: Mucous membranes are moist.      Pharynx: Oropharynx is clear.   Eyes:      Extraocular Movements: Extraocular movements intact.      Conjunctiva/sclera: Conjunctivae normal.   Cardiovascular:      Rate and Rhythm: Normal rate and regular rhythm.   Pulmonary:      Effort: Pulmonary effort is normal.      Breath sounds: Normal breath sounds.   Abdominal:      General: Bowel sounds are normal.      Palpations: Abdomen is soft.      Tenderness: There is no abdominal tenderness.   Musculoskeletal:         General: No swelling. Normal range of motion.      Cervical back: Normal range of motion and neck supple.   Skin:     General: Skin is warm and dry.   Neurological:      General: No focal deficit present.      Mental Status: She is alert and oriented to person, place, and time.   Psychiatric:         Mood and Affect: Mood normal.         Behavior: Behavior normal.     Antibiotics  sodium chloride 0.9 % bolus 500 mL  albuterol 2.5 mg /3 mL (0.083 %) nebulizer solution 2.5 mg  aspirin chewable tablet 81 mg  budesonide (Pulmicort) 0.5 mg/2 mL nebulizer solution 0.5 mg  citalopram (CeleXA) tablet 20 mg  DULoxetine (Cymbalta) DR capsule 30 mg  pantoprazole (ProtoNix) EC tablet 40 mg  oxybutynin (Ditropan) tablet 5  mg  oxygen (O2) therapy  primidone (Mysoline) tablet 100 mg  simvastatin (Zocor) tablet 80 mg  torsemide (Demadex) tablet 20 mg  heparin (porcine) injection 5,000 Units  acetaminophen (Tylenol) tablet 650 mg  acetaminophen (Tylenol) oral liquid 650 mg  acetaminophen (Tylenol) suppository 650 mg  ondansetron (Zofran) tablet 4 mg  ondansetron (Zofran) injection 4 mg  melatonin tablet 3 mg  polyethylene glycol (Glycolax, Miralax) packet 17 g  benzocaine-menthol (Cepastat Sore Throat) 15-3.6 mg lozenge 1 lozenge  dextromethorphan-guaifenesin (Robitussin DM)  mg/5 mL oral liquid 5 mL  guaiFENesin (Mucinex) 12 hr tablet 600 mg  ipratropium-albuteroL (Duo-Neb) 0.5-2.5 mg/3 mL nebulizer solution 3 mL  sodium chloride 0.9% infusion  HYDROcodone-acetaminophen (Norco) 5-325 mg per tablet 1 tablet  cefTAZidime (Fortaz) 1 g in dextrose 5 % in water (D5W) 50 mL IV  cefTAZidime (Fortaz) 1 g in dextrose 5 % in water (D5W) 50 mL IV  bisacodyl (Dulcolax) suppository 10 mg      Relevant Results  Labs  Results from last 72 hours   Lab Units 01/08/24  0421   WBC AUTO x10*3/uL 8.6   HEMOGLOBIN g/dL 8.6*   HEMATOCRIT % 27.0*   PLATELETS AUTO x10*3/uL 258       Results from last 72 hours   Lab Units 01/10/24  1053 01/09/24  0418 01/08/24  1800 01/08/24  0421   SODIUM mmol/L 138 137  --  138   POTASSIUM mmol/L 4.4 4.4 5.3* 5.3*   CHLORIDE mmol/L 101 102  --  104   CO2 mmol/L 27 24  --  24   BUN mg/dL 26* 26*  --  27*   CREATININE mg/dL 1.50 1.70*  --  1.80*   GLUCOSE mg/dL 94 89  --  92   CALCIUM mg/dL 9.6 9.4  --  9.3   ANION GAP mmol/L 10 11  --  10   EGFR mL/min/1.73m*2 34* 29*  --  27*   PHOSPHORUS mg/dL  --  4.1  --   --        Results from last 72 hours   Lab Units 01/09/24  0418   ALBUMIN g/dL 3.2*       Estimated Creatinine Clearance: 29.1 mL/min (by C-G formula based on SCr of 1.5 mg/dL).  CRP   Date Value Ref Range Status   02/15/2019 0.3 0 - 2.0 MG/DL Final     Comment:     Performed at [facility] [address] [place]  [address]     Microbiology  Susceptibility data from last 14 days.  Collected Specimen Info Organism Aztreonam Cefepime Ceftazidime Ciprofloxacin Gentamicin Meropenem Piperacillin/Tazobactam Tobramycin   01/05/24 Urine from Clean Catch/Voided Pseudomonas aeruginosa R I R S S S R S       Imaging  ECG 12 lead    Result Date: 1/4/2024  Normal sinus rhythm with sinus arrhythmia Inferior infarct (cited on or before 13-DEC-2023) Abnormal ECG When compared with ECG of 13-DEC-2023 01:02, No significant change was found Confirmed by Luan Monge ([id number]) on 1/4/2024 12:44:09 PM    US renal complete    Result Date: 1/4/2024  Interpreted By:  Tayler Trujillo, STUDY: US RENAL COMPLETE; 1/4/2024 12:11 pm   INDICATION: Signs/Symptoms:Acute kidney injury superimposed upon chronic kidney disease. Hyperkalemia.   COMPARISON: 12/14/2023   ACCESSION NUMBER(S): [id number]   ORDERING CLINICIAN: JENNY YU   TECHNIQUE: Grayscale and color Doppler imaging of the kidneys.   FINDINGS: The right kidney measures 9.5 cm x 6.1 cm x 4.7 cm. There is a 1.1 x 1.4 x 1.4 cm right renal cyst. The left kidney measures 11.0 cm x 4.8 cm x 5.1 cm. There is a 1.7 x 2.3 x 2.4 cm left renal cyst. There is no shadowing calculus, hydronephrosis, or solid mass identified. The renal cortical thickness and echogenicity is normal.   Cursory evaluation of the urinary bladder shows no evidence for mass, wall thickening, or calculus.       Simple bilateral renal cyst noted.   No hydronephrosis or renal atrophy.   MACRO: None.   Signed by: Tayler Trujillo 1/4/2024 12:19 PM Dictation workstation:   [id number]    CT head wo IV contrast    Result Date: 1/3/2024  STUDY: CT Head without IV Contrast; 1/3/2024 at 11:03 PM INDICATION: Fall. COMPARISON: 12/12/2023. TECHNIQUE: Unenhanced CT scan of the brain. Automated mA/kV exposure control was utilized and patient examination was performed in strict accordance with principles of ALARA. FINDINGS: Encephalomalacia of the right frontal  and parietal lobes are again noted.  Periventricular white matter hypodensities are noted consistent with areas of prior microvascular insults.  An unchanged 5 mm low-density areas noted in the right thalamus consistent with a prior lacunar infarction.  Otherwise, the gray-white differentiation is normal. There is no shift of the midline. No abnormal masses, mass effect, fluid collections, or recent hemorrhages are seen.  The gray-white differentiation is normal. The visualized portions of the paranasal sinuses and mastoid air cells are clear. The bony structures are normal.    No acute intracerebral changes. No significant change. 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 White matter changes consistent with areas of prior microvascular insults. Signed by Phillip Jensen MD    XR chest 2 views    Result Date: 1/3/2024  STUDY: Chest Radiographs;  1/3/24 at 10:55pm INDICATION: Fall. COMPARISON: 10/17/23 XR Chest ACCESSION NUMBER(S): [id number] ORDERING CLINICIAN: VIRY ROBERT TECHNIQUE:  Frontal and lateral chest. FINDINGS: CARDIOMEDIASTINAL SILHOUETTE: Cardiomediastinal silhouette is normal in size and configuration. Calcified plaque is seen in the aorta.  LUNGS: Increased interstitial markings are seen bilaterally.  Slightly prominent right hilum is noted similar to prior studies with surgical clips in the region of the right hilum suggesting chronic postoperative change.  Flattening of hemidiaphragms may indicate chronic changes of COPD.  ABDOMEN: No remarkable upper abdominal findings.  BONES: No acute osseous changes.  Median sternotomy changes are noted.    Increased interstitial markings bilaterally, likely mild pulmonary vascular congestion with a trace right pleural effusion and suspected chronic changes of COPD. Stable chronic postoperative change in the region of the right hilum. Signed by Abdirashid Panda    ECG 12 lead    Result Date: 12/20/2023   Poor data quality, interpretation may be adversely affected Normal sinus  rhythm with sinus arrhythmia Possible Inferior infarct , age undetermined Abnormal ECG No previous ECGs available Confirmed by Agnieszka Ashby ([id number]) on 12/20/2023 6:19:30 AM    US renal complete    Result Date: 12/14/2023  Interpreted By:  Ed Cano, STUDY: US RENAL COMPLETE; 12/14/2023 11:29 am   INDICATION: Signs/Symptoms:KYLEE.   COMPARISON: None.   ACCESSION NUMBER(S): [id number]   ORDERING CLINICIAN: TAZ IGLESIAS   TECHNIQUE: Grayscale and color Doppler imaging of the kidneys.   FINDINGS: The right kidney measures 9.6 cm x 4.6 cm x 5.2 cm. The left kidney measures 9.4 cm x 5.4 cm x 5.6 cm. There is a lesion consistent with a cyst in the upper-mid peripelvic location measuring 2.3 x 2.3 x 2.5 cm. There is no shadowing calculus, hydronephrosis, or solid mass identified. The renal cortical thickness and echogenicity is normal.   The urinary bladder is minimally distended and otherwise unremarkable.       No calculi or hydronephrosis bilaterally.   2.5 cm cyst in the left peripelvic location, otherwise unremarkable examination.   MACRO: None.   Signed by: Ed Cano 12/14/2023 3:47 PM Dictation workstation:   [id number]    CT hip right wo IV contrast    Result Date: 12/13/2023  Interpreted By:  Alicia Pierce, STUDY: CT HIP RIGHT WO IV CONTRAST; ;  12/13/2023 2:03 pm   INDICATION: Signs/Symptoms:Fall, pain, prostehesis in place, possible occult fracture.   COMPARISON: None.   ACCESSION NUMBER(S): [id number]   ORDERING CLINICIAN: TAZ IGLESIAS   TECHNIQUE: Serial axial CT images obtained of the right hip. Images reformatted in the coronal and sagittal projection   All CT examinations are performed with 1 or more of the following dose reduction techniques: Automated exposure control, adjustment of mA and/or kv according to patient's size, or use of iterative reconstruction techniques.   FINDINGS: Right total hip arthroplasty is in place. Evaluation about the femoral component of the total  hip arthroplasty demonstrates no evidence for lucency. No fracture demonstrated. Evaluation about the acetabular component demonstrates no evidence for fracture. Included portions visualized right hemipelvis demonstrates unremarkable iliac wing. Visualized portions of the sacrum are unremarkable.   Musculature about the right hip demonstrates generalized atrophy. Right common hamstring tendon origin is unremarkable. There is chronic enthesopathy about the right ischial tuberosity.           1. No acute osseous abnormality about the right total hip arthroplasty.     MACRO: None   Signed by: Alicia Pierce 12/13/2023 6:16 PM Dictation workstation:   [id number]    CT cervical spine wo IV contrast    Result Date: 12/13/2023  Interpreted By:  Shad Hernandez, STUDY: CT CERVICAL SPINE WO IV CONTRAST; 12/12/2023 11:34 pm   INDICATION: Signs/Symptoms:fall;   COMPARISON: None   ACCESSION NUMBER(S): [id number]   ORDERING CLINICIAN: BRENDA KNAPP   TECHNIQUE: Contiguous axial images of the cervical spine were performed. PATIENT RADIATION EXPOSURE DATA: CTDI: DLP:   All CT examinations are performed with one or more of the following dose reduction techniques: Automated Exposure Control, adjustment of mA and/or kV according to patient size, or use of iterative reconstruction techniques.   FINDINGS: There is straightening of the normal cervical lordosis.   No acute fracture or spondylolisthesis is identified.   Posterior fusion and laminectomy is noted C3 through C6 with hardware intact. No periprosthetic lucency is identified to suggest loosening.   Multilevel disc space narrowing can be seen. Endplate osteophytosis facet arthropathy is noted.   The neural foramina and spinal canal are grossly patent.   Severe calcific atherosclerosis of the origin of the left subclavian artery can be seen. Retropharyngeal course of the ICAs can be seen bilaterally. Calcification of the carotid bulbs are noted.   Severe left TMJ osteoarthritic  changes are noted.   The patient is edentulous.   Limited visualization of the lung apices reveal emphysema.       1. No acute fracture or spondylolisthesis. 2. Degenerative disc disease and spondylosis. 3. Postsurgical changes as above.   . .   This report is in agreement with the preliminary report issued by [employer].   MACRO: None.   Signed by: Shad Hernandez 12/13/2023 8:22 AM Dictation workstation:   [id number]    CT head wo IV contrast    Result Date: 12/13/2023  Interpreted By:  Shad Hernandez, STUDY: CT HEAD WO IV CONTRAST; 12/12/2023 11:34 pm   INDICATION: fall;   COMPARISON: 05/28/2020   ACCESSION NUMBER(S): [id number]   ORDERING CLINICIAN: BRENDA KNAPP   TECHNIQUE: Contiguous axial images were acquired from the vertex through the posterior fossa without IV contrast.   All CT examinations are performed with one or more of the following dose reduction techniques: Automated Exposure Control, adjustment of mA and/or kV according to patient size, or use of iterative reconstruction techniques.   FINDINGS: No focal mass effect or midline shift is identified. The ventricles and sulci are symmetric and appropriate for the patient's age. However, chronic involutional change of the cerebral hemispheres is noted.   Encephalomalacia of the right frontal and parietal lobes can be seen which may represent the sequela of remote infarcts. Nonspecific hypodensities are identified within the periventricular and subcortical white matter likely due to chronic postischemic white matter disease. Atherosclerotic calcifications are seen within the carotid siphons and vertebral arteries.   No acute intracranial hemorrhage is identified. No intra-axial or extra-axial fluid collection is seen.   The visualized paranasal sinuses and mastoid air cells are clear.       No acute intracranial findings. . .   This report is in agreement with the preliminary report issued by [employer].   MACRO: None   Signed by:  Shad Hernandez 12/13/2023 8:18 AM Dictation workstation:   [id number]    XR hip right with pelvis when performed 2 or 3 views    Result Date: 12/13/2023  Interpreted By:  Shad Hernandez, STUDY: XR HIP RIGHT WITH PELVIS WHEN PERFORMED 2 OR 3 VIEWS; 12/12/2023 11:49 pm   INDICATION: Signs/Symptoms:fall.   COMPARISON: 05/29/2022   ACCESSION NUMBER(S): [id number]   ORDERING CLINICIAN: BRENDA KNAPP   FINDINGS: Right total hip arthroplasty changes are identified. No periprosthetic lucencies are identified.   No fracture or subluxation is identified. Left hip osteoarthritic changes are noted.   The SI joints are unremarkable.   Pelvic sutures are noted.       No acute fracture or dislocation.   MACRO: None.   Signed by: Shad Hernandez 12/13/2023 8:16 AM Dictation workstation:   [id number]    XR shoulder right 2+ views    Result Date: 12/13/2023  Interpreted By:  Shad Hernandez, STUDY: XR SHOULDER RIGHT 2+ VIEWS; 12/12/2023 11:49 pm   INDICATION: Signs/Symptoms:fall;   COMPARISON: None   ACCESSION NUMBER(S): [id number]   ORDERING CLINICIAN: BRENDA KNAPP   TECHNIQUE: AP, scapular and axillary projections.   FINDINGS: No fracture or dislocation is seen. Moderate AC joint osteoarthrosis is noted.   The included intrathoracic structures are reveal sternotomy wires and postsurgical changes seen within the right hilar region and peripheral right lung.       No acute fracture or dislocation.   MACRO: None.   Signed by: Shad Hernandez 12/13/2023 8:07 AM Dictation workstation:   [id number]    XR elbow right 3+ views    Result Date: 12/13/2023  Interpreted By:  Shad Hernandez, STUDY: XR ELBOW RIGHT 3+ VIEWS; 12/12/2023 11:49 pm   INDICATION: Signs/Symptoms:fall.   COMPARISON: None   ACCESSION NUMBER(S): [id number]   ORDERING CLINICIAN: BRENDA KNAPP   FINDINGS: Olecranon fixation plate and screws are seen. The hardware appears intact.   No fracture or subluxation is identified. No significant elbow effusion is seen.        No acute fracture or dislocation.   MACRO: None   Signed by: Shad Hernandez 12/13/2023 7:40 AM Dictation workstation:   [id number]    XR chest 1 view    Result Date: 12/13/2023  Interpreted By:  Shad Hernandez, STUDY: XR CHEST 1 VIEW; 12/12/2023 11:49 pm   INDICATION: Signs/Symptoms:fall   COMPARISON: 10/17/2023   ACCESSION NUMBER(S): [id number]   ORDERING CLINICIAN: BRENDA KNAPP   TECHNIQUE: 1 AP projection.   FINDINGS: Sternotomy wires and right hilar clips are seen.   The cardiomediastinal silhouette is enlarged but stable. Mild vascular congestion is noted. No pleural effusion is identified.   Bilateral rib deformities are noted.       Mild vascular congestion.   MACRO: None   Signed by: Shad Hernandez 12/13/2023 7:38 AM Dictation workstation:   [id number]     Assessment/Plan   Acute on chronic renal failure-nephrology on consult, improving  Pseudomonas urinary tract infection  History of pseudomonas UTI       Continue meropenem-up to 7 days-started 1/8/2024  Monitor WBC and temperature  Monitor renal function  Nephrology following  Repeat urine culture tomorrow        Malena Milner, APRN-CNP

## 2024-01-10 NOTE — NURSING NOTE
Assumed care of patient. Patient is A&Ox3 and is resting in bed. Call light in reach. Patient is on 3L nc.

## 2024-01-10 NOTE — CARE PLAN
Problem: PT Goals  Goal: Patient will amb 100 feet with rolling walker device including two turns on even surface with independent assist to facilitate safe mobility.   Outcome: Progressing  Goal: Patient will transfer sit to stand and stand to sit with  independent assist to facilitate mobility.   Outcome: Progressing  Goal: Patient will improve standing dynamic balance to Good- for safety with standing activities with use of assistive device.  Outcome: Progressing  Goal: Patient will transition supine to sit and sit to supine mod Independent  Outcome: Progressing

## 2024-01-11 LAB
ANION GAP SERPL CALC-SCNC: 9 MMOL/L
APPEARANCE UR: CLEAR
BILIRUB UR STRIP.AUTO-MCNC: NEGATIVE MG/DL
BUN SERPL-MCNC: 26 MG/DL (ref 8–25)
CALCIUM SERPL-MCNC: 9.3 MG/DL (ref 8.5–10.4)
CHLORIDE SERPL-SCNC: 103 MMOL/L (ref 97–107)
CO2 SERPL-SCNC: 26 MMOL/L (ref 24–31)
COLOR UR: ABNORMAL
CREAT SERPL-MCNC: 1.4 MG/DL (ref 0.4–1.6)
EGFRCR SERPLBLD CKD-EPI 2021: 37 ML/MIN/1.73M*2
GLUCOSE SERPL-MCNC: 97 MG/DL (ref 65–99)
GLUCOSE UR STRIP.AUTO-MCNC: NORMAL MG/DL
KETONES UR STRIP.AUTO-MCNC: NEGATIVE MG/DL
LEUKOCYTE ESTERASE UR QL STRIP.AUTO: ABNORMAL
MUCOUS THREADS #/AREA URNS AUTO: ABNORMAL /LPF
NITRITE UR QL STRIP.AUTO: NEGATIVE
PH UR STRIP.AUTO: 6 [PH]
POTASSIUM SERPL-SCNC: 4.3 MMOL/L (ref 3.4–5.1)
PROT UR STRIP.AUTO-MCNC: ABNORMAL MG/DL
RBC # UR STRIP.AUTO: NEGATIVE /UL
RBC #/AREA URNS AUTO: ABNORMAL /HPF
SODIUM SERPL-SCNC: 138 MMOL/L (ref 133–145)
SP GR UR STRIP.AUTO: 1.02
SQUAMOUS #/AREA URNS AUTO: ABNORMAL /HPF
UROBILINOGEN UR STRIP.AUTO-MCNC: NORMAL MG/DL
WBC #/AREA URNS AUTO: ABNORMAL /HPF

## 2024-01-11 PROCEDURE — 97116 GAIT TRAINING THERAPY: CPT | Mod: GP

## 2024-01-11 PROCEDURE — 2500000001 HC RX 250 WO HCPCS SELF ADMINISTERED DRUGS (ALT 637 FOR MEDICARE OP): Performed by: INTERNAL MEDICINE

## 2024-01-11 PROCEDURE — 2500000002 HC RX 250 W HCPCS SELF ADMINISTERED DRUGS (ALT 637 FOR MEDICARE OP, ALT 636 FOR OP/ED): Performed by: INTERNAL MEDICINE

## 2024-01-11 PROCEDURE — 2500000004 HC RX 250 GENERAL PHARMACY W/ HCPCS (ALT 636 FOR OP/ED): Performed by: INTERNAL MEDICINE

## 2024-01-11 PROCEDURE — 2500000005 HC RX 250 GENERAL PHARMACY W/O HCPCS: Performed by: INTERNAL MEDICINE

## 2024-01-11 PROCEDURE — 9420000001 HC RT PATIENT EDUCATION 5 MIN

## 2024-01-11 PROCEDURE — 87086 URINE CULTURE/COLONY COUNT: CPT | Mod: TRILAB,WESLAB | Performed by: NURSE PRACTITIONER

## 2024-01-11 PROCEDURE — 1100000001 HC PRIVATE ROOM DAILY

## 2024-01-11 PROCEDURE — 99232 SBSQ HOSP IP/OBS MODERATE 35: CPT | Performed by: NURSE PRACTITIONER

## 2024-01-11 PROCEDURE — 81001 URINALYSIS AUTO W/SCOPE: CPT | Performed by: NURSE PRACTITIONER

## 2024-01-11 PROCEDURE — 97110 THERAPEUTIC EXERCISES: CPT | Mod: GP

## 2024-01-11 PROCEDURE — 94640 AIRWAY INHALATION TREATMENT: CPT

## 2024-01-11 PROCEDURE — 97535 SELF CARE MNGMENT TRAINING: CPT | Mod: GO

## 2024-01-11 PROCEDURE — 36415 COLL VENOUS BLD VENIPUNCTURE: CPT | Performed by: INTERNAL MEDICINE

## 2024-01-11 PROCEDURE — 80048 BASIC METABOLIC PNL TOTAL CA: CPT | Performed by: INTERNAL MEDICINE

## 2024-01-11 PROCEDURE — 2500000004 HC RX 250 GENERAL PHARMACY W/ HCPCS (ALT 636 FOR OP/ED): Performed by: NURSE PRACTITIONER

## 2024-01-11 PROCEDURE — 2500000001 HC RX 250 WO HCPCS SELF ADMINISTERED DRUGS (ALT 637 FOR MEDICARE OP): Performed by: NURSE PRACTITIONER

## 2024-01-11 RX ADMIN — DOCUSATE SODIUM 100 MG: 100 CAPSULE, LIQUID FILLED ORAL at 08:27

## 2024-01-11 RX ADMIN — OXYBUTYNIN CHLORIDE 5 MG: 5 TABLET ORAL at 20:30

## 2024-01-11 RX ADMIN — BISACODYL 10 MG: 10 SUPPOSITORY RECTAL at 08:41

## 2024-01-11 RX ADMIN — IPRATROPIUM BROMIDE AND ALBUTEROL SULFATE 3 ML: 2.5; .5 SOLUTION RESPIRATORY (INHALATION) at 07:35

## 2024-01-11 RX ADMIN — HEPARIN SODIUM 5000 UNITS: 5000 INJECTION, SOLUTION INTRAVENOUS; SUBCUTANEOUS at 00:19

## 2024-01-11 RX ADMIN — ASPIRIN 81 MG: 81 TABLET, CHEWABLE ORAL at 08:27

## 2024-01-11 RX ADMIN — CITALOPRAM HYDROBROMIDE 20 MG: 20 TABLET ORAL at 08:27

## 2024-01-11 RX ADMIN — DOCUSATE SODIUM 100 MG: 100 CAPSULE, LIQUID FILLED ORAL at 20:31

## 2024-01-11 RX ADMIN — MEROPENEM 500 MG: 500 INJECTION, POWDER, FOR SOLUTION INTRAVENOUS at 20:31

## 2024-01-11 RX ADMIN — HYDROCODONE BITARTRATE AND ACETAMINOPHEN 1 TABLET: 5; 325 TABLET ORAL at 20:29

## 2024-01-11 RX ADMIN — PRIMIDONE 100 MG: 50 TABLET ORAL at 20:30

## 2024-01-11 RX ADMIN — HYDROCODONE BITARTRATE AND ACETAMINOPHEN 1 TABLET: 5; 325 TABLET ORAL at 10:57

## 2024-01-11 RX ADMIN — Medication 3 L/MIN: at 07:37

## 2024-01-11 RX ADMIN — Medication 3 MG: at 20:29

## 2024-01-11 RX ADMIN — OXYBUTYNIN CHLORIDE 5 MG: 5 TABLET ORAL at 08:27

## 2024-01-11 RX ADMIN — MEROPENEM 500 MG: 500 INJECTION, POWDER, FOR SOLUTION INTRAVENOUS at 08:42

## 2024-01-11 RX ADMIN — BUDESONIDE INHALATION 0.5 MG: 0.5 SUSPENSION RESPIRATORY (INHALATION) at 07:35

## 2024-01-11 RX ADMIN — PRIMIDONE 100 MG: 50 TABLET ORAL at 08:27

## 2024-01-11 RX ADMIN — PATIROMER 8.4 G: 8.4 POWDER, FOR SUSPENSION ORAL at 05:45

## 2024-01-11 RX ADMIN — SIMVASTATIN 80 MG: 40 TABLET, FILM COATED ORAL at 20:30

## 2024-01-11 RX ADMIN — HEPARIN SODIUM 5000 UNITS: 5000 INJECTION, SOLUTION INTRAVENOUS; SUBCUTANEOUS at 08:28

## 2024-01-11 RX ADMIN — HEPARIN SODIUM 5000 UNITS: 5000 INJECTION, SOLUTION INTRAVENOUS; SUBCUTANEOUS at 16:14

## 2024-01-11 RX ADMIN — ALBUTEROL SULFATE 2.5 MG: 2.5 SOLUTION RESPIRATORY (INHALATION) at 21:03

## 2024-01-11 RX ADMIN — PANTOPRAZOLE SODIUM 40 MG: 40 TABLET, DELAYED RELEASE ORAL at 05:45

## 2024-01-11 RX ADMIN — ALBUTEROL SULFATE 2.5 MG: 2.5 SOLUTION RESPIRATORY (INHALATION) at 14:05

## 2024-01-11 RX ADMIN — DULOXETINE HYDROCHLORIDE 30 MG: 30 CAPSULE, DELAYED RELEASE PELLETS ORAL at 08:27

## 2024-01-11 RX ADMIN — BUDESONIDE INHALATION 0.5 MG: 0.5 SUSPENSION RESPIRATORY (INHALATION) at 21:06

## 2024-01-11 RX ADMIN — PRIMIDONE 100 MG: 50 TABLET ORAL at 16:14

## 2024-01-11 ASSESSMENT — COGNITIVE AND FUNCTIONAL STATUS - GENERAL
EATING MEALS: A LITTLE
CLIMB 3 TO 5 STEPS WITH RAILING: A LOT
MOBILITY SCORE: 17
PERSONAL GROOMING: A LITTLE
DAILY ACTIVITIY SCORE: 17
DRESSING REGULAR LOWER BODY CLOTHING: A LOT
WALKING IN HOSPITAL ROOM: A LITTLE
HELP NEEDED FOR BATHING: A LITTLE
MOVING FROM LYING ON BACK TO SITTING ON SIDE OF FLAT BED WITH BEDRAILS: A LITTLE
TURNING FROM BACK TO SIDE WHILE IN FLAT BAD: A LITTLE
TOILETING: A LITTLE
DRESSING REGULAR UPPER BODY CLOTHING: A LITTLE
MOVING TO AND FROM BED TO CHAIR: A LITTLE
STANDING UP FROM CHAIR USING ARMS: A LITTLE

## 2024-01-11 ASSESSMENT — PAIN SCALES - GENERAL
PAINLEVEL_OUTOF10: 8
PAINLEVEL_OUTOF10: 9
PAINLEVEL_OUTOF10: 4
PAINLEVEL_OUTOF10: 0 - NO PAIN
PAINLEVEL_OUTOF10: 7

## 2024-01-11 ASSESSMENT — PAIN DESCRIPTION - ORIENTATION: ORIENTATION: LOWER

## 2024-01-11 ASSESSMENT — ACTIVITIES OF DAILY LIVING (ADL)
BATHING_LEVEL_OF_ASSISTANCE: MINIMUM ASSISTANCE
HOME_MANAGEMENT_TIME_ENTRY: 23
BATHING_WHERE_ASSESSED: OTHER (COMMENT)

## 2024-01-11 ASSESSMENT — PAIN - FUNCTIONAL ASSESSMENT
PAIN_FUNCTIONAL_ASSESSMENT: 0-10

## 2024-01-11 ASSESSMENT — PAIN DESCRIPTION - LOCATION
LOCATION: BACK
LOCATION: BACK

## 2024-01-11 NOTE — PROGRESS NOTES
Occupational Therapy    Occupational Therapy Treatment    Name: Juli Del Castillo  MRN: [id number]  : 1940  Date: 24  Time Calculation  Start Time: 1020  Stop Time: 1043  Time Calculation (min): 23 min    Assessment:  Prognosis: Good  Barriers to Discharge: None  Evaluation/Treatment Tolerance: Patient tolerated treatment well  End of Session Communication: Bedside nurse  End of Session Patient Position: Bed, 3 rail up, Alarm on  Plan:  Treatment Interventions: ADL retraining, Functional transfer training, UE strengthening/ROM, Endurance training, Patient/family training, Neuromuscular reeducation, Compensatory technique education  OT Frequency: 3 times per week  OT Discharge Recommendations: Moderate intensity level of continued care  Equipment Recommended upon Discharge: Wheeled walker  OT Recommended Transfer Status: Assist of 1  OT - OK to Discharge: Yes    Subjective   Previous Visit Info:  OT Last Visit  OT Received On: 24  General:  General  Prior to Session Communication: Bedside nurse  Patient Position Received: Up in chair  Preferred Learning Style: verbal  General Comment: Patient cleared by nursing for therapy. Patient seated in the chair upon arrival and agreeable to participate  Precautions:  Medical Precautions: Fall precautions, Oxygen therapy device and L/min (3L O2 via NC)  Pain Assessment:  Pain Assessment  Pain Assessment: 0-10  Pain Score: 8  Pain Type: Acute pain  Pain Location: Leg  Pain Orientation: Right, Left  Pain Interventions: Repositioned     Objective   Activities of Daily Living: Grooming  Grooming Level of Assistance: Setup  Grooming Where Assessed: Chair  Grooming Comments: wash face, denture care, comb hair    UE Bathing  UE Bathing Level of Assistance: Setup  UE Bathing Where Assessed: Other (Comment) (Chair)  UE Bathing Comments: UB sponge bath    LE Bathing  LE Bathing Level of Assistance: Minimum assistance  LE Bathing Where Assessed: Other (Comment) (Chair)  LE  Bathing Comments: LB sponge bath, assistance to wash feet    UE Dressing  UE Dressing Level of Assistance: Minimum assistance  UE Dressing Where Assessed: Chair  UE Dressing Comments: doff/don gown, line management    LE Dressing  LE Dressing: Yes  Sock Level of Assistance: Dependent  Adult Briefs Level of Assistance: Minimum assistance  LE Dressing Where Assessed: Chair  LE Dressing Comments: dependent to doff/don socks. Min A to don brief over feet while sitting, CGA in stance to don over hips    Toileting  Toileting Level of Assistance: Contact guard  Where Assessed: Other (Comment) (standing at 2WW)  Toileting Comments: wash anterior and posterior britni area    Functional Standing Tolerance:  Functional Standing Tolerance  Time: ~3 minutes  Activity: ADLs  Functional Standing Tolerance Comments: Fair+ balance with CGA  Bed Mobility/Transfers: Bed Mobility  Bed Mobility: Yes  Bed Mobility 1  Bed Mobility 1: Sitting to supine  Level of Assistance 1: Minimum assistance  Bed Mobility Comments 1: assistance to raise B LE    Transfers  Transfer: Yes  Transfer 1  Transfer From 1: Bed to  Transfer to 1: Stand  Technique 1: Sit to stand  Transfer Device 1: Walker  Transfer Level of Assistance 1: Contact guard  Trials/Comments 1: x3 trials. 3rd attempt, patient turns with 2WW with CGA in order to be seated EOB    Outcome Measures:  [facility] Daily Activity  Putting on and taking off regular lower body clothing: A lot  Bathing (including washing, rinsing, drying): A little  Putting on and taking off regular upper body clothing: A little  Toileting, which includes using toilet, bedpan or urinal: A little  Taking care of personal grooming such as brushing teeth: A little  Eating Meals: A little  Daily Activity - Total Score: 17    Education Documentation  Body Mechanics, taught by Darby Mccoy OT at 1/11/2024 11:00 AM.  Learner: Patient  Readiness: Acceptance  Method: Explanation, Demonstration  Response: Verbalizes  Understanding, Needs Reinforcement    Precautions, taught by Darby Mccoy OT at 1/11/2024 11:00 AM.  Learner: Patient  Readiness: Acceptance  Method: Explanation, Demonstration  Response: Verbalizes Understanding, Needs Reinforcement    ADL Training, taught by Darby Mccoy OT at 1/11/2024 11:00 AM.  Learner: Patient  Readiness: Acceptance  Method: Explanation, Demonstration  Response: Verbalizes Understanding, Needs Reinforcement    Education Comments  No comments found.      Goals:  Encounter Problems       Encounter Problems (Active)       OT Goals       ADLs (Progressing)       Start:  01/04/24    Expected End:  01/25/24       Patient will complete bathing, dressing, and toileting tasks with setup/supervision assist using adaptive aides as needed.         Functional transfers (Progressing)       Start:  01/04/24    Expected End:  01/25/24       Patient will complete bed, toilet, and chair transfers at a modified independent level from elevated seat heights and using bilateral arm supports as needed.         Impaired activity tolerance (Progressing)       Start:  01/04/24    Expected End:  01/25/24       Patient will tolerate 20 minutes of therapeutic activity in order to increase activity tolerance needed for ADLs.

## 2024-01-11 NOTE — PROGRESS NOTES
"Juli Del Castillo is a 83 y.o. female on day 7 of admission presenting with Fall, initial encounter.    Subjective    Pt's precert has been approved to [facility].   Pt will be medically ready for discharge after piccline placement.   Awaiting piccline orders and placement. TCSW will inform [facility] of the above information.       Objective     Physical Exam    Last Recorded Vitals  Blood pressure (!) 123/39, pulse 74, temperature 37 °C (98.6 °F), temperature source Oral, resp. rate 18, height 1.626 m (5' 4\"), weight 82.9 kg (182 lb 12.2 oz), SpO2 97 %.  Intake/Output last 3 Shifts:  I/O last 3 completed shifts:  In: 640 (7.7 mL/kg) [P.O.:640]  Out: 751 (9.1 mL/kg) [Urine:750 (0.3 mL/kg/hr); Stool:1]  Weight: 82.9 kg     Relevant Results                             Assessment/Plan   Principal Problem:    Fall, initial encounter  Active Problems:    Fibromyalgia    Acute congestive heart failure (CMS/HCC)    Hyperlipidemia    Major depressive disorder, single episode, unspecified    Overactive bladder    History of lung cancer    Chronic respiratory failure (CMS/HCC)    Chronic diastolic heart failure (CMS/HCC)    Accidental fall    Acute on chronic kidney failure (CMS/HCC)    Hyperkalemia               SHANE Russo      "

## 2024-01-11 NOTE — PROGRESS NOTES
Physical Therapy    Physical Therapy Treatment    Patient Name: Juli Del Castillo  MRN: [id number]  Today's Date: 1/11/2024  Time Calculation  Start Time: 1545  Stop Time: 1613  Time Calculation (min): 28 min    Pt functionally improving with 6 click score of 17/24 today however states several times how she is worried about her BLE weakness and how she feels her legs can buckle out from under her causing more falls. Fearful of falling       Assessment/Plan   PT Assessment  Evaluation/Treatment Tolerance: Patient tolerated treatment well  Medical Staff Made Aware: Yes  End of Session Communication: Bedside nurse  End of Session Patient Position: Up in chair  PT Plan  Inpatient/Swing Bed or Outpatient: Inpatient  PT Plan  Treatment/Interventions: Bed mobility, Transfer training, Gait training, Balance training, Strengthening, Endurance training, Range of motion, Therapeutic exercise, Therapeutic activity  PT Plan: Skilled PT  PT Frequency: 4 times per week  PT Discharge Recommendations: Moderate intensity level of continued care  Equipment Recommended upon Discharge: Wheeled walker  PT Recommended Transfer Status: Contact guard, Assistive device  PT - OK to Discharge: Yes      General Visit Information:   PT  Visit  PT Received On: 01/11/24  Response to Previous Treatment: Patient with no complaints from previous session.  General  Family/Caregiver Present: No  Prior to Session Communication: Bedside nurse  Patient Position Received: Up in chair  Preferred Learning Style: verbal  General Comment: Agreeable to PT follow up    Subjective   Precautions:  Precautions  Medical Precautions: Fall precautions, Oxygen therapy device and L/min    Objective   Pain:  Pain Assessment  Pain Assessment: 0-10  Pain Score: 0 - No pain  Cognition:  Cognition  Overall Cognitive Status: Within Functional Limits    Activity Tolerance:  Activity Tolerance  Endurance: Decreased tolerance for upright activites  Treatments:  Therapeutic  Exercise  Therapeutic Exercise Performed: Yes  Therapeutic Exercise Activity 2: Pt educated on and completes B ankle pumps x20, Knee Kicks x10, Seated Knee Marches x10, Resisted Hip Abd x10, Resisted Hip Add x10, and Glute sets x10    Ambulation/Gait Training  Ambulation/Gait Training Performed: Yes  Ambulation/Gait Training 1  Surface 1: Level tile  Device 1: Rolling walker  Assistance 1: Close supervision  Comments/Distance (ft) 1: 90'x2---slow laborous, mild unsteadiness. No lob observed but Pt repeatedly stating she feels as if her legs are too weak and are going to buckle out from under her  Transfers  Transfer: Yes  Transfer 1  Transfer From 1: Chair with arms to  Transfer to 1: Stand  Technique 1: Sit to stand  Transfer Device 1: Walker  Transfer Level of Assistance 1: Close supervision  Trials/Comments 1: x3 trials. cues for safety given    Outcome Measures:  [facility] Basic Mobility  Turning from your back to your side while in a flat bed without using bedrails: A little  Moving from lying on your back to sitting on the side of a flat bed without using bedrails: A little  Moving to and from bed to chair (including a wheelchair): A little  Standing up from a chair using your arms (e.g. wheelchair or bedside chair): A little  To walk in hospital room: A little  Climbing 3-5 steps with railing: A lot  Basic Mobility - Total Score: 17    Education Documentation  Mobility Training, taught by Chris Matos, PT at 1/11/2024  4:31 PM.  Learner: Patient  Readiness: Acceptance  Method: Explanation  Response: Verbalizes Understanding  Comment: safe mobility techniques    Education Comments  No comments found.        OP EDUCATION:  Outpatient Education  Individual(s) Educated: Patient  Education Provided: Body Mechanics, Fall Risk, Home Exercise Program, Home Safety, POC, Posture  Risk and Benefits Discussed with Patient/Caregiver/Other: yes  Patient/Caregiver Demonstrated Understanding: yes  Plan of Care Discussed  and Agreed Upon: yes  Patient Response to Education: Patient/Caregiver Verbalized Understanding of Information    Encounter Problems       Encounter Problems (Active)       PT Goals       Patient will amb 100 feet with rolling walker device including two turns on even surface with independent assist to facilitate safe mobility.  (Progressing)       Start:  01/04/24    Expected End:  01/18/24            Patient will transfer sit to stand and stand to sit with  independent assist to facilitate mobility.  (Progressing)       Start:  01/04/24    Expected End:  01/18/24            Patient will improve standing dynamic balance to Good- for safety with standing activities with use of assistive device. (Progressing)       Start:  01/04/24    Expected End:  01/18/24            Patient will transition supine to sit and sit to supine mod Independent (Progressing)       Start:  01/04/24    Expected End:  01/18/24               Pain - Adult

## 2024-01-11 NOTE — CARE PLAN
Problem: Respiratory  Goal: No signs of respiratory distress (eg. Use of accessory muscles. Peds grunting)  Outcome: Progressing     Problem: Respiratory  Goal: Increase self care and/or family involvement in next 24 hours  Outcome: Progressing

## 2024-01-11 NOTE — PROGRESS NOTES
Juli Del Castillo is a 83 y.o. female on day 7 of admission presenting with Fall, initial encounter.      Subjective   Patient seen for acute kidney injury Superimposed on chronic kidney disease stage III also she has a UTI on antibiotics were changed to meropenem    She feels much better she has no complaints function continues to improve    Objective          Vitals 24HR  Heart Rate:  [72-86]   Temp:  [36.5 °C (97.7 °F)-36.8 °C (98.2 °F)]   Resp:  [15-18]   BP: (115-147)/(41-54)   SpO2:  [95 %-99 %]         Intake/Output last 3 Shifts:    Intake/Output Summary (Last 24 hours) at 1/11/2024 1119  Last data filed at 1/10/2024 1822  Gross per 24 hour   Intake 400 ml   Output 551 ml   Net -151 ml         Physical Exam  Constitutional:       General: She is not in acute distress.     Appearance: Normal appearance. She is not toxic-appearing.   HENT:      Head: Normocephalic and atraumatic.      Mouth/Throat:      Mouth: Mucous membranes are moist.   Neck:      Vascular: No carotid bruit.   Cardiovascular:      Rate and Rhythm: Normal rate and regular rhythm.      Pulses: Normal pulses.      Heart sounds: Normal heart sounds. No murmur heard.     No friction rub. No gallop.   Pulmonary:      Effort: Pulmonary effort is normal.      Breath sounds: Normal breath sounds. No wheezing, rhonchi or rales.   Chest:      Chest wall: No tenderness.   Abdominal:      General: Bowel sounds are normal. There is no distension.      Palpations: Abdomen is soft.      Tenderness: There is no abdominal tenderness. There is no right CVA tenderness, left CVA tenderness, guarding or rebound.   Musculoskeletal:         General: No swelling or tenderness.      Cervical back: Neck supple.      Right lower leg: No edema.      Left lower leg: No edema.   Lymphadenopathy:      Cervical: No cervical adenopathy.   Skin:     Capillary Refill: Capillary refill takes less than 2 seconds.   Neurological:      Mental Status: She is alert. Mental status is  at baseline.   Psychiatric:         Mood and Affect: Mood normal.         Behavior: Behavior normal.         Relevant Results  Results for orders placed or performed during the hospital encounter of 01/03/24 (from the past 24 hour(s))   POCT GLUCOSE   Result Value Ref Range    POCT Glucose 128 (H) 74 - 99 mg/dL   POCT GLUCOSE   Result Value Ref Range    POCT Glucose 84 74 - 99 mg/dL   Basic Metabolic Panel   Result Value Ref Range    Glucose 97 65 - 99 mg/dL    Sodium 138 133 - 145 mmol/L    Potassium 4.3 3.4 - 5.1 mmol/L    Chloride 103 97 - 107 mmol/L    Bicarbonate 26 24 - 31 mmol/L    Urea Nitrogen 26 (H) 8 - 25 mg/dL    Creatinine 1.40 0.40 - 1.60 mg/dL    eGFR 37 (L) >60 mL/min/1.73m*2    Calcium 9.3 8.5 - 10.4 mg/dL    Anion Gap 9 <=19 mmol/L      Procedure Component Value Units Date/Time   US renal complete [[id number]] Collected: 01/04/24 1221   Order Status: Completed Updated: 01/04/24 1221   Narrative:     Interpreted By:  Tayler Trujillo,  STUDY:  US RENAL COMPLETE; 1/4/2024 12:11 pm      INDICATION:  Signs/Symptoms:Acute kidney injury superimposed upon chronic kidney  disease. Hyperkalemia.      COMPARISON:  12/14/2023      ACCESSION NUMBER(S):  [id number]      ORDERING CLINICIAN:  JENNY YU      TECHNIQUE:  Grayscale and color Doppler imaging of the kidneys.      FINDINGS:  The right kidney measures 9.5 cm x 6.1 cm x 4.7 cm. There is a 1.1 x  1.4 x 1.4 cm right renal cyst. The left kidney measures 11.0 cm x 4.8  cm x 5.1 cm. There is a 1.7 x 2.3 x 2.4 cm left renal cyst. There is  no shadowing calculus, hydronephrosis, or solid mass identified. The  renal cortical thickness and echogenicity is normal.      Cursory evaluation of the urinary bladder shows no evidence for mass,  wall thickening, or calculus.       Impression:     Simple bilateral renal cyst noted.      No hydronephrosis or renal atrophy.      MACRO:  None.      Signed by: Tayler Trujillo 1/4/2024 12:19 PM  Dictation workstation:   [id number]    CT head wo IV contrast [[id number]] Collected: 01/03/24 2323   Order Status: Completed Updated: 01/03/24 2334   Narrative:     STUDY:  CT Head without IV Contrast; 1/3/2024 at 11:03 PM  INDICATION:  Fall.  COMPARISON:  12/12/2023.  TECHNIQUE:  Unenhanced CT scan of the brain.  Automated mA/kV exposure control was utilized and patient examination  was performed in strict accordance with principles of ALARA.  FINDINGS:  Encephalomalacia of the right frontal and parietal lobes are again  noted.  Periventricular white matter hypodensities are noted  consistent with areas of prior microvascular insults.  An unchanged 5  mm low-density areas noted in the right thalamus consistent with a  prior lacunar infarction.  Otherwise, the gray-white differentiation  is normal.  There is no shift of the midline.  No abnormal masses, mass effect, fluid collections, or recent  hemorrhages are seen.  The gray-white differentiation is normal.  The visualized portions of the paranasal sinuses and mastoid air cells  are clear.  The bony structures are normal.   Impression:     No acute intracerebral changes.  No significant change.  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  White matter changes consistent with areas of prior microvascular  insults.  Signed by Phillip Jensen MD   XR chest 2 views [[id number]] Collected: 01/03/24 2301   Order Status: Completed Updated: 01/03/24 2321   Narrative:     STUDY:  Chest Radiographs;  1/3/24 at 10:55pm  INDICATION:  Fall.  COMPARISON:  10/17/23 XR Chest  ACCESSION NUMBER(S):  [id number]  ORDERING CLINICIAN:  VIRY ROBERT  TECHNIQUE:  Frontal and lateral chest.  FINDINGS:  CARDIOMEDIASTINAL SILHOUETTE:  Cardiomediastinal silhouette is normal in size and configuration.  Calcified plaque is seen in the aorta.     LUNGS:  Increased interstitial markings are seen bilaterally.  Slightly  prominent right hilum is noted similar to prior studies with surgical  clips in the region of the right hilum suggesting  chronic  postoperative change.  Flattening of hemidiaphragms may indicate  chronic changes of COPD.     ABDOMEN:  No remarkable upper abdominal findings.     BONES:  No acute osseous changes.  Median sternotomy changes are noted.   Impression:     Increased interstitial markings bilaterally, likely mild pulmonary  vascular congestion with a trace right pleural effusion and suspected  chronic changes of COPD.  Stable chronic postoperative change in the region of the right hilum.  Signed by Abdirashid Panda            Assessment/Plan    Acute kidney injury creatinine continue to improve down to 1.4 mg/dL okay to discharge from renal point of view if okay with ID  Status post a fall  Mild hyperkalemia  urinary tract infection with Pseudomonas aeruginosa continue with meropenem infectious disease is following    MD Cy Venegas MD

## 2024-01-11 NOTE — PROGRESS NOTES
"Juli Del Castillo is a 83 y.o. female on day 7 of admission presenting with Fall, initial encounter.    Subjective   Patient seen and examined.  No documented concerns/events overnight from nursing.  Afebrile overnight.  Denies chills.  No complaints voiced.    Objective     Physical Exam  Constitutional:       General: She is not in acute distress.     Appearance: She is ill-appearing. She is not toxic-appearing.   Cardiovascular:      Rate and Rhythm: Normal rate and regular rhythm.      Pulses: Normal pulses.      Heart sounds: Normal heart sounds.   Pulmonary:      Effort: No respiratory distress.      Breath sounds: Diminished  Abdominal:      General: Bowel sounds are normal.      Palpations: Abdomen is soft.   Skin:     General: Skin is warm and dry.   Neurological:      General: No focal deficit present.      Mental Status: She is alert and oriented to person, place, and time.   Psychiatric:         Mood and Affect: Mood normal.         Behavior: Behavior normal.     Last Recorded Vitals  Blood pressure (!) 123/39, pulse 74, temperature 37 °C (98.6 °F), temperature source Oral, resp. rate 18, height 1.626 m (5' 4\"), weight 82.9 kg (182 lb 12.2 oz), SpO2 97 %.  Intake/Output last 3 Shifts:  I/O last 3 completed shifts:  In: 640 (7.7 mL/kg) [P.O.:640]  Out: 751 (9.1 mL/kg) [Urine:750 (0.3 mL/kg/hr); Stool:1]  Weight: 82.9 kg     Relevant Results  Scheduled medications  albuterol, 2.5 mg, nebulization, q6h while awake  aspirin, 81 mg, oral, Daily  bisacodyl, 10 mg, rectal, Daily  budesonide, 0.5 mg, nebulization, BID  citalopram, 20 mg, oral, Daily  docusate sodium, 100 mg, oral, BID  DULoxetine, 30 mg, oral, Daily  heparin (porcine), 5,000 Units, subcutaneous, q8h  meropenem, 500 mg, intravenous, q12h  oxybutynin, 5 mg, oral, BID  pantoprazole, 40 mg, oral, Daily before breakfast  patiromer calcium sorbitex, 8.4 g, oral, Daily  primidone, 100 mg, oral, TID  simvastatin, 80 mg, oral, Nightly      Continuous " medications     PRN medications  PRN medications: acetaminophen **OR** acetaminophen **OR** acetaminophen, benzocaine-menthol, bisacodyl, dextromethorphan-guaifenesin, glycerin, guaiFENesin, HYDROcodone-acetaminophen, ipratropium-albuteroL, melatonin, ondansetron, oxygen, polyethylene glycol         following data reviewed      Na-138  K+-4.3  Cl-103  Bicarb-26  BUN-26  creatinine-1.4                      Assessment/Plan   Principal Problem:    Fall, initial encounter  Active Problems:    Fibromyalgia    Acute congestive heart failure (CMS/[facility])    Hyperlipidemia    Major depressive disorder, single episode, unspecified    Overactive bladder    History of lung cancer    Chronic respiratory failure (CMS/[facility])    Chronic diastolic heart failure (CMS/[facility])    Accidental fall    Acute on chronic kidney failure (CMS/[facility])    Hyperkalemia    Falls  -CT brain no acute findings  -PT, OT recommend moderate intensity rehab  -Fall precaution     Acute on chronic renal failure  -Resolved  -Nephrology following  -Monitor  -Hold nephrotoxic agents  -Renally dose medications  -Renal ultrasound no acute finding     UTI  -Culture grew Pseudomonas aeruginosa  -ID following  -IV meropenem  -repeat urine cx today     HTN  -Monitor blood pressure  -Hold lisinopril due to KYLEE     Chronic respiratory failure  -Oxygen as needed, wean as tolerated  -Wears baseline 3 L at home           Plan  Above plan discussed length with patient and she is in agreement   Son at bedside and updated               I spent 30 minutes in the professional and overall care of this patient.      Sapphire Paula, APRN-CNP

## 2024-01-11 NOTE — PROGRESS NOTES
"Nutrition Follow up Note    Nutrition Assessment      Pt continues to eat well. Plan to discharge with IV abx.     Nutrition History:  Energy Intake: Good > 75 %    Anthropometrics:  Ht: 162.6 cm (5' 4\"), Wt: 82.9 kg (182 lb 12.2 oz), BMI: 31.36  IBW/kg (Dietitian Calculated): 54.55 kg  Percent of IBW: 152 %  Adjusted Body Weight (kg): 61.82 kg    Weight Change:  Daily Weight  01/03/24 : 82.9 kg (182 lb 12.2 oz)  12/12/23 : 77.1 kg (170 lb)  12/12/23 : 77.1 kg (170 lb)  09/29/23 : 77 kg (169 lb 12.1 oz)  03/23/23 : 77.6 kg (171 lb)  09/12/22 : 81.6 kg (180 lb)  06/24/22 : 82.6 kg (182 lb)  03/07/22 : 82.1 kg (181 lb)  08/18/21 : 81.6 kg (180 lb)  05/12/21 : 83.5 kg (184 lb)     Weight History / % Weight Change: 12# (7%) wt gain noted from 12/12/23-1/3/24 - possible r/t fluid    Nutrition Focused Physical Exam Findings:   Edema  Edema:  (non-pitting)  Edema Location: BLE    Nutrition Significant Labs:  Lab Results   Component Value Date    WBC 8.6 01/08/2024    HGB 8.6 (L) 01/08/2024    HCT 27.0 (L) 01/08/2024     01/08/2024    CHOL 197 12/12/2023    TRIG 104 12/12/2023    HDL 70.0 12/12/2023    ALT 6 01/03/2024    AST 9 01/03/2024     01/11/2024    K 4.3 01/11/2024     01/11/2024    CREATININE 1.40 01/11/2024    BUN 26 (H) 01/11/2024    CO2 26 01/11/2024    TSH 1.42 04/13/2023    INR 1.0 09/29/2023    HGBA1C 5.3 12/12/2023       Current Facility-Administered Medications:     acetaminophen (Tylenol) tablet 650 mg, 650 mg, oral, q4h PRN, 650 mg at 01/10/24 2114 **OR** acetaminophen (Tylenol) oral liquid 650 mg, 650 mg, nasogastric tube, q4h PRN **OR** acetaminophen (Tylenol) suppository 650 mg, 650 mg, rectal, q4h PRN, Seth Monge MD    albuterol 2.5 mg /3 mL (0.083 %) nebulizer solution 2.5 mg, 2.5 mg, nebulization, q6h while awake, Seth Monge MD, 2.5 mg at 01/11/24 1405    aspirin chewable tablet 81 mg, 81 mg, oral, Daily, Seth Monge MD, 81 mg at 01/11/24 0886    benzocaine-menthol " (Cepastat Sore Throat) 15-3.6 mg lozenge 1 lozenge, 1 lozenge, Mouth/Throat, q2h PRN, Seth Monge MD    bisacodyl (Dulcolax) EC tablet 5 mg, 5 mg, oral, Daily PRN, Heydi A Claus, APRN-CNP, 5 mg at 01/07/24 1132    bisacodyl (Dulcolax) suppository 10 mg, 10 mg, rectal, Daily, Heydi A Claus, APRN-CNP, 10 mg at 01/11/24 0841    budesonide (Pulmicort) 0.5 mg/2 mL nebulizer solution 0.5 mg, 0.5 mg, nebulization, BID, Seth Monge MD, 0.5 mg at 01/11/24 0735    citalopram (CeleXA) tablet 20 mg, 20 mg, oral, Daily, Seth Monge MD, 20 mg at 01/11/24 0827    dextromethorphan-guaifenesin (Robitussin DM)  mg/5 mL oral liquid 5 mL, 5 mL, oral, q4h PRN, Seth Monge MD    docusate sodium (Colace) capsule 100 mg, 100 mg, oral, BID, Heydi Devlin APRN-CNP, 100 mg at 01/11/24 0827    DULoxetine (Cymbalta) DR capsule 30 mg, 30 mg, oral, Daily, Seth Monge MD, 30 mg at 01/11/24 0827    glycerin (Adult) 2 g suppository 1 suppository, 1 suppository, rectal, Daily PRN, Heydi Devlin, APRN-CNP    guaiFENesin (Mucinex) 12 hr tablet 600 mg, 600 mg, oral, q12h PRN, Seth Monge MD    heparin (porcine) injection 5,000 Units, 5,000 Units, subcutaneous, q8h, Seth Monge MD, 5,000 Units at 01/11/24 0828    HYDROcodone-acetaminophen (Norco) 5-325 mg per tablet 1 tablet, 1 tablet, oral, BID PRN, Heydi Devlin, APRN-CNP, 1 tablet at 01/11/24 1057    ipratropium-albuteroL (Duo-Neb) 0.5-2.5 mg/3 mL nebulizer solution 3 mL, 3 mL, nebulization, q2h PRN, Seth Monge MD, 3 mL at 01/11/24 0735    melatonin tablet 3 mg, 3 mg, oral, Nightly PRN, Seth Monge MD, 3 mg at 01/10/24 2114    meropenem (Merrem) in sodium chloride 0.9 % 100 mL 500 mg, 500 mg, intravenous, q12h, BRYANNA Foreman-CNP, 500 mg at 01/11/24 0842    ondansetron (Zofran) injection 4 mg, 4 mg, intravenous, q6h PRN, BRYANNA Malik-CNP, 4 mg at 01/07/24 1236    oxybutynin (Ditropan) tablet 5 mg, 5 mg, oral, BID, Seth ROMO  MD Saleem, 5 mg at 01/11/24 0827    oxygen (O2) therapy, , inhalation, Continuous PRN - O2/gases, Seth Monge MD, 3 L/min at 01/11/24 0737    pantoprazole (ProtoNix) EC tablet 40 mg, 40 mg, oral, Daily before breakfast, Seth Monge MD, 40 mg at 01/11/24 0545    patiromer calcium sorbitex (Veltassa) packet 8.4 g, 8.4 g, oral, Daily, Cy Gamez MD, 8.4 g at 01/11/24 0545    polyethylene glycol (Glycolax, Miralax) packet 17 g, 17 g, oral, Daily PRN, BRYANNA Malik-CNP, 17 g at 01/08/24 2022    primidone (Mysoline) tablet 100 mg, 100 mg, oral, TID, Seth Monge MD, 100 mg at 01/11/24 0827    simvastatin (Zocor) tablet 80 mg, 80 mg, oral, Nightly, Seth Monge MD, 80 mg at 01/10/24 2102    Dietary Orders (From admission, onward)       Start     Ordered    01/06/24 0823  Adult diet Potassium restricted 2 gm (50mEq); 2000 mL fluid  Diet effective now        Question Answer Comment   Diet type Potassium restricted 2 gm (50mEq)    Dietary fluid restriction / 24h: 2000 mL fluid        01/06/24 0822                  Estimated Needs:   Estimated Energy Needs  Total Energy Estimated Needs (kCal): 1540 kCal  Total Estimated Energy Need per Day (kCal/kg): 25 kCal/kg  Method for Estimating Needs: ABW    Estimated Protein Needs  Total Protein Estimated Needs (g): 62 g  Total Protein Estimated Needs (g/kg): 1 g/kg  Method for Estimating Needs: ABW    Estimated Fluid Needs  Total Fluid Estimated Needs (mL): 2000 mL  Method for Estimating Needs: FR        Nutrition Diagnosis   Nutrition Diagnosis:  Malnutrition Diagnosis  Patient has Malnutrition Diagnosis: No    Nutrition Diagnosis  Patient has Nutrition Diagnosis: No       Nutrition Interventions/Recommendations   Nutrition Interventions and Recommendations:    Nutrition Prescription:  Individualized Nutrition Prescription Provided for : 1540 calories, 62gm protein    Nutrition Interventions:   Food and/or Nutrient Delivery Interventions  Interventions: Meals  and snacks  Meals and Snacks: Mineral-modified diet, Fluid-modified diet  Goal: provide as ordered    Education Documentation  No documentation found.           Nutrition Monitoring and Evaluation   Monitoring/Evaluation:   Food/Nutrient Related History Monitoring  Monitoring and Evaluation Plan: Energy intake  Energy Intake: Estimated energy intake  Criteria: pt to consume >/= 75% estimated needs       Time Spent/Follow-up:   Follow Up  Time Spent (min): 20 minutes  Last Date of Nutrition Visit: 01/11/24  Nutrition Follow-Up Needed?: 7-10 days  Follow up Comment: 1/18/24

## 2024-01-11 NOTE — PROGRESS NOTES
"Juli Del Castillo is a 83 y.o. female on day 6 of admission presenting with Fall, initial encounter.    Subjective   Patient seen and examined.  Afebrile overnight.  Denies chills.  No documented concerns/events overnight from nursing.  Patient says she is feeling better today.  She tells me her headache she was complaining of yesterday has resolved.       Objective     Physical Exam  Constitutional:       General: She is not in acute distress.     Appearance: She is ill-appearing. She is not toxic-appearing.   Cardiovascular:      Rate and Rhythm: Normal rate and regular rhythm.      Pulses: Normal pulses.      Heart sounds: Normal heart sounds.   Pulmonary:      Effort: No respiratory distress.      Breath sounds: Diminished  Abdominal:      General: Bowel sounds are normal.      Palpations: Abdomen is soft.   Skin:     General: Skin is warm and dry.   Neurological:      General: No focal deficit present.      Mental Status: She is alert and oriented to person, place, and time.   Psychiatric:         Mood and Affect: Mood normal.         Behavior: Behavior normal.           Last Recorded Vitals  Blood pressure 115/54, pulse 78, temperature 36.7 °C (98.1 °F), temperature source Oral, resp. rate 18, height 1.626 m (5' 4\"), weight 82.9 kg (182 lb 12.2 oz), SpO2 95 %.  Intake/Output last 3 Shifts:  I/O last 3 completed shifts:  In: 990 (11.9 mL/kg) [P.O.:990]  Out: 751 (9.1 mL/kg) [Urine:750 (0.3 mL/kg/hr); Stool:1]  Weight: 82.9 kg     Relevant Results  Scheduled medications  albuterol, 2.5 mg, nebulization, q6h while awake  aspirin, 81 mg, oral, Daily  bisacodyl, 10 mg, rectal, Daily  budesonide, 0.5 mg, nebulization, BID  citalopram, 20 mg, oral, Daily  docusate sodium, 100 mg, oral, BID  DULoxetine, 30 mg, oral, Daily  heparin (porcine), 5,000 Units, subcutaneous, q8h  meropenem, 500 mg, intravenous, q12h  oxybutynin, 5 mg, oral, BID  pantoprazole, 40 mg, oral, Daily before breakfast  patiromer calcium sorbitex, 8.4 " g, oral, Daily  primidone, 100 mg, oral, TID  simvastatin, 80 mg, oral, Nightly      Continuous medications     PRN medications  PRN medications: acetaminophen **OR** acetaminophen **OR** acetaminophen, benzocaine-menthol, bisacodyl, dextromethorphan-guaifenesin, glycerin, guaiFENesin, HYDROcodone-acetaminophen, ipratropium-albuteroL, melatonin, ondansetron, oxygen, polyethylene glycol                              Assessment/Plan   Principal Problem:    Fall, initial encounter  Active Problems:    Fibromyalgia    Acute congestive heart failure (CMS/[facility])    Hyperlipidemia    Major depressive disorder, single episode, unspecified    Overactive bladder    History of lung cancer    Chronic respiratory failure (CMS/[facility])    Chronic diastolic heart failure (CMS/[facility])    Accidental fall    Acute on chronic kidney failure (CMS/[facility])    Hyperkalemia    Falls  -CT brain no acute findings  -PT, OT recommend moderate intensity rehab  -Fall precaution     Acute on chronic renal failure  -Resolved  -Nephrology following  -Monitor  -Hold nephrotoxic agents  -Renally dose medications  -Renal ultrasound no acute finding     UTI  -Culture grew Pseudomonas aeruginosa  -ID following  -IV meropenem    HTN  -Monitor blood pressure  -Hold lisinopril due to KYLEE    Chronic respiratory failure  -Oxygen as needed, wean as tolerated  -Wears baseline 3 L at home           Plan  Above plan discussed length with patient and she is in agreement            I spent 25 minutes in the professional and overall care of this patient.      BRYANNA Nunez-CNP

## 2024-01-12 PROCEDURE — 2500000004 HC RX 250 GENERAL PHARMACY W/ HCPCS (ALT 636 FOR OP/ED): Performed by: INTERNAL MEDICINE

## 2024-01-12 PROCEDURE — 94664 DEMO&/EVAL PT USE INHALER: CPT

## 2024-01-12 PROCEDURE — 2500000001 HC RX 250 WO HCPCS SELF ADMINISTERED DRUGS (ALT 637 FOR MEDICARE OP): Performed by: INTERNAL MEDICINE

## 2024-01-12 PROCEDURE — 1100000001 HC PRIVATE ROOM DAILY

## 2024-01-12 PROCEDURE — 2500000002 HC RX 250 W HCPCS SELF ADMINISTERED DRUGS (ALT 637 FOR MEDICARE OP, ALT 636 FOR OP/ED): Performed by: INTERNAL MEDICINE

## 2024-01-12 PROCEDURE — 9420000001 HC RT PATIENT EDUCATION 5 MIN

## 2024-01-12 PROCEDURE — 94640 AIRWAY INHALATION TREATMENT: CPT

## 2024-01-12 PROCEDURE — 94760 N-INVAS EAR/PLS OXIMETRY 1: CPT

## 2024-01-12 PROCEDURE — 99232 SBSQ HOSP IP/OBS MODERATE 35: CPT | Performed by: NURSE PRACTITIONER

## 2024-01-12 PROCEDURE — 2500000001 HC RX 250 WO HCPCS SELF ADMINISTERED DRUGS (ALT 637 FOR MEDICARE OP): Performed by: NURSE PRACTITIONER

## 2024-01-12 PROCEDURE — 2500000004 HC RX 250 GENERAL PHARMACY W/ HCPCS (ALT 636 FOR OP/ED): Performed by: NURSE PRACTITIONER

## 2024-01-12 RX ORDER — MEROPENEM 500 MG/1
500 INJECTION, POWDER, FOR SOLUTION INTRAVENOUS EVERY 8 HOURS
Status: DISCONTINUED | OUTPATIENT
Start: 2024-01-12 | End: 2024-01-15 | Stop reason: HOSPADM

## 2024-01-12 RX ADMIN — HEPARIN SODIUM 5000 UNITS: 5000 INJECTION, SOLUTION INTRAVENOUS; SUBCUTANEOUS at 00:26

## 2024-01-12 RX ADMIN — PATIROMER 8.4 G: 8.4 POWDER, FOR SUSPENSION ORAL at 06:21

## 2024-01-12 RX ADMIN — HEPARIN SODIUM 5000 UNITS: 5000 INJECTION, SOLUTION INTRAVENOUS; SUBCUTANEOUS at 10:42

## 2024-01-12 RX ADMIN — OXYBUTYNIN CHLORIDE 5 MG: 5 TABLET ORAL at 22:59

## 2024-01-12 RX ADMIN — CITALOPRAM HYDROBROMIDE 20 MG: 20 TABLET ORAL at 10:41

## 2024-01-12 RX ADMIN — HEPARIN SODIUM 5000 UNITS: 5000 INJECTION, SOLUTION INTRAVENOUS; SUBCUTANEOUS at 17:27

## 2024-01-12 RX ADMIN — DOCUSATE SODIUM 100 MG: 100 CAPSULE, LIQUID FILLED ORAL at 10:41

## 2024-01-12 RX ADMIN — HYDROCODONE BITARTRATE AND ACETAMINOPHEN 1 TABLET: 5; 325 TABLET ORAL at 10:40

## 2024-01-12 RX ADMIN — HYDROCODONE BITARTRATE AND ACETAMINOPHEN 1 TABLET: 5; 325 TABLET ORAL at 22:57

## 2024-01-12 RX ADMIN — MEROPENEM 500 MG: 500 INJECTION, POWDER, FOR SOLUTION INTRAVENOUS at 10:39

## 2024-01-12 RX ADMIN — PRIMIDONE 100 MG: 50 TABLET ORAL at 10:39

## 2024-01-12 RX ADMIN — ALBUTEROL SULFATE 2.5 MG: 2.5 SOLUTION RESPIRATORY (INHALATION) at 12:59

## 2024-01-12 RX ADMIN — PRIMIDONE 100 MG: 50 TABLET ORAL at 22:58

## 2024-01-12 RX ADMIN — PRIMIDONE 100 MG: 50 TABLET ORAL at 15:45

## 2024-01-12 RX ADMIN — SIMVASTATIN 80 MG: 40 TABLET, FILM COATED ORAL at 22:59

## 2024-01-12 RX ADMIN — Medication 3 MG: at 22:57

## 2024-01-12 RX ADMIN — DOCUSATE SODIUM 100 MG: 100 CAPSULE, LIQUID FILLED ORAL at 22:59

## 2024-01-12 RX ADMIN — ASPIRIN 81 MG: 81 TABLET, CHEWABLE ORAL at 10:38

## 2024-01-12 RX ADMIN — OXYBUTYNIN CHLORIDE 5 MG: 5 TABLET ORAL at 10:39

## 2024-01-12 RX ADMIN — PANTOPRAZOLE SODIUM 40 MG: 40 TABLET, DELAYED RELEASE ORAL at 06:21

## 2024-01-12 RX ADMIN — DULOXETINE HYDROCHLORIDE 30 MG: 30 CAPSULE, DELAYED RELEASE PELLETS ORAL at 10:41

## 2024-01-12 RX ADMIN — MEROPENEM 500 MG: 500 INJECTION, POWDER, FOR SOLUTION INTRAVENOUS at 19:08

## 2024-01-12 ASSESSMENT — PAIN SCALES - GENERAL
PAINLEVEL_OUTOF10: 5 - MODERATE PAIN
PAINLEVEL_OUTOF10: 5 - MODERATE PAIN
PAINLEVEL_OUTOF10: 9
PAINLEVEL_OUTOF10: 10 - WORST POSSIBLE PAIN

## 2024-01-12 ASSESSMENT — PAIN - FUNCTIONAL ASSESSMENT
PAIN_FUNCTIONAL_ASSESSMENT: 0-10

## 2024-01-12 ASSESSMENT — PAIN DESCRIPTION - LOCATION: LOCATION: OTHER (COMMENT)

## 2024-01-12 NOTE — CARE PLAN
The patient's goals for the shift include      The clinical goals for the shift include safety      Problem: Fall/Injury  Goal: Not fall by end of shift  Outcome: Progressing  Goal: Be free from injury by end of the shift  Outcome: Progressing  Goal: Verbalize understanding of personal risk factors for fall in the hospital  Outcome: Progressing  Goal: Verbalize understanding of risk factor reduction measures to prevent injury from fall in the home  Outcome: Progressing  Goal: Use assistive devices by end of the shift  Outcome: Progressing  Goal: Pace activities to prevent fatigue by end of the shift  Outcome: Progressing     Problem: Respiratory  Goal: Clear secretions with interventions this shift  Outcome: Progressing  Goal: Minimize anxiety/maximize coping throughout shift  Outcome: Progressing  Goal: No signs of respiratory distress (eg. Use of accessory muscles. Peds grunting)  Outcome: Progressing  Goal: Increase self care and/or family involvement in next 24 hours  Outcome: Progressing     Problem: Skin  Goal: Decreased wound size/increased tissue granulation at next dressing change  Outcome: Progressing  Goal: Participates in plan/prevention/treatment measures  Outcome: Progressing     Problem: Pain - Adult  Goal: Verbalizes/displays adequate comfort level or baseline comfort level  Outcome: Progressing     Problem: Safety - Adult  Goal: Free from fall injury  Outcome: Progressing     Problem: Discharge Planning  Goal: Discharge to home or other facility with appropriate resources  Outcome: Progressing     Problem: Chronic Conditions and Co-morbidities  Goal: Patient's chronic conditions and co-morbidity symptoms are monitored and maintained or improved  Outcome: Progressing     Problem: Pain  Goal: Takes deep breaths with improved pain control throughout the shift  Outcome: Progressing  Goal: Turns in bed with improved pain control throughout the shift  Outcome: Progressing     Problem: Heart Failure  Goal:  Reduction in peripheral edema within 24 hours  Outcome: Progressing  Goal: Weight from fluid excess reduced over 2-3 days, then stabilize  Outcome: Progressing

## 2024-01-12 NOTE — PROGRESS NOTES
"Juli Del Casitllo is a 83 y.o. female on day 8 of admission presenting with Fall, initial encounter.    Subjective   Patient seen and examined.  No documented concerns/events overnight from nursing.  Afebrile overnight.  Denies chills.  Patient sitting up in the chair.  Says she is hurting all over today and just not having a good day.  No specific complaints       Objective     Physical Exam  Constitutional:       General: She is not in acute distress.     Appearance: She is ill-appearing. She is not toxic-appearing.   Cardiovascular:      Rate and Rhythm: Normal rate and regular rhythm.      Pulses: Normal pulses.      Heart sounds: Normal heart sounds.   Pulmonary:      Effort: No respiratory distress.      Breath sounds: Diminished  Abdominal:      General: Bowel sounds are normal.      Palpations: Abdomen is soft.   Skin:     General: Skin is warm and dry.   Neurological:      General: No focal deficit present.      Mental Status: She is alert and oriented to person, place, and time.   Psychiatric:         Mood and Affect: Mood normal.         Behavior: Behavior normal.     Last Recorded Vitals  Blood pressure 134/53, pulse 76, temperature 36.7 °C (98.1 °F), temperature source Oral, resp. rate 18, height 1.626 m (5' 4\"), weight 82.9 kg (182 lb 12.2 oz), SpO2 100 %.  Intake/Output last 3 Shifts:  I/O last 3 completed shifts:  In: 100 (1.2 mL/kg) [IV Piggyback:100]  Out: 300 (3.6 mL/kg) [Urine:300 (0.1 mL/kg/hr)]  Weight: 82.9 kg     Relevant Results  Scheduled medications  albuterol, 2.5 mg, nebulization, q6h while awake  aspirin, 81 mg, oral, Daily  bisacodyl, 10 mg, rectal, Daily  budesonide, 0.5 mg, nebulization, BID  citalopram, 20 mg, oral, Daily  docusate sodium, 100 mg, oral, BID  DULoxetine, 30 mg, oral, Daily  heparin (porcine), 5,000 Units, subcutaneous, q8h  meropenem, 500 mg, intravenous, q12h  oxybutynin, 5 mg, oral, BID  pantoprazole, 40 mg, oral, Daily before breakfast  patiromer calcium sorbitex, " 8.4 g, oral, Daily  primidone, 100 mg, oral, TID  simvastatin, 80 mg, oral, Nightly      Continuous medications     PRN medications  PRN medications: acetaminophen **OR** acetaminophen **OR** acetaminophen, benzocaine-menthol, bisacodyl, dextromethorphan-guaifenesin, glycerin, guaiFENesin, HYDROcodone-acetaminophen, ipratropium-albuteroL, melatonin, ondansetron, oxygen, polyethylene glycol     following data reviewed      Na-138  K+-4.3  Cl- 103  Bicarb-26  BUN-26  Creatinine-1.4       Repeat urine culture from 1/11 pending                       Assessment/Plan   Principal Problem:    Fall, initial encounter  Active Problems:    Fibromyalgia    Acute congestive heart failure (CMS/[facility])    Hyperlipidemia    Major depressive disorder, single episode, unspecified    Overactive bladder    History of lung cancer    Chronic respiratory failure (CMS/[facility])    Chronic diastolic heart failure (CMS/[facility])    Accidental fall    Acute on chronic kidney failure (CMS/[facility])    Hyperkalemia    Falls  -CT brain no acute findings  -PT, OT recommend moderate intensity rehab  -Fall precaution     Acute on chronic renal failure  -Resolved  -Nephrology following  -Monitor  -Hold nephrotoxic agents  -Renally dose medications  -Renal ultrasound no acute finding     UTI  -Culture grew Pseudomonas aeruginosa  -ID following  -IV meropenem  -repeat urine cx pending  HTN  -Monitor blood pressure  -Hold lisinopril due to KYLEE     Chronic respiratory failure  -Oxygen as needed, wean as tolerated  -Wears baseline 3 L at home           Plan  Above plan discussed length with patient and she is in agreement       I spent 25 minutes in the professional and overall care of this patient.      Sapphire Paula, APRN-CNP

## 2024-01-12 NOTE — PROGRESS NOTES
"Juli Del Castillo is a 83 y.o. female on day 8 of admission presenting with Fall, initial encounter.    Subjective   Pt has blood cultures pending to determine IV ATBX regimen upon discharge.   [facility] [place] [facility].   Pt's precert will  on  at 11:59pm. If pt does not discharge prior to the above date; precert will need to be resubmitted.        Objective     Physical Exam    Last Recorded Vitals  Blood pressure 127/55, pulse 95, temperature 37 °C (98.6 °F), temperature source Oral, resp. rate 18, height 1.626 m (5' 4\"), weight 82.9 kg (182 lb 12.2 oz), SpO2 96 %.  Intake/Output last 3 Shifts:  I/O last 3 completed shifts:  In: 100 (1.2 mL/kg) [IV Piggyback:100]  Out: 300 (3.6 mL/kg) [Urine:300 (0.1 mL/kg/hr)]  Weight: 82.9 kg     Relevant Results                             Assessment/Plan   Principal Problem:    Fall, initial encounter  Active Problems:    Fibromyalgia    Acute congestive heart failure (CMS/[facility])    Hyperlipidemia    Major depressive disorder, single episode, unspecified    Overactive bladder    History of lung cancer    Chronic respiratory failure (CMS/[facility])    Chronic diastolic heart failure (CMS/[facility])    Accidental fall    Acute on chronic kidney failure (CMS/[facility])    Hyperkalemia               SHANE Russo      "

## 2024-01-12 NOTE — PROGRESS NOTES
"Occupational Therapy                 Therapy Communication Note    Patient Name: Juli Del Castillo  MRN: [id number]  Today's Date: 1/12/2024     Discipline: Occupational Therapy    Missed Visit Reason: Missed Visit Reason: Patient refused    Missed Time: Cancel    Comment: Patient declining OT this date. She states \"I did too much yesterday. My legs and everything hurt. I am not doing therapy today.\" Despite encouragement and education, patient continues to decline. OT tx deferred   "

## 2024-01-12 NOTE — PROGRESS NOTES
Juli Del Castillo is a 83 y.o. female on day 8 of admission presenting with Fall, initial encounter.      Subjective   Patient seen for acute kidney injury Superimposed on chronic kidney disease stage III also she has a UTI clinically she feels fine she is up in chair she has no symptoms and no overnight events noted    Objective          Vitals 24HR  Heart Rate:  [69-95]   Temp:  [36.6 °C (97.9 °F)-37 °C (98.6 °F)]   Resp:  [18-20]   BP: (117-134)/(37-55)   SpO2:  [96 %-100 %]         Intake/Output last 3 Shifts:    Intake/Output Summary (Last 24 hours) at 1/12/2024 1204  Last data filed at 1/12/2024 0954  Gross per 24 hour   Intake 100 ml   Output 575 ml   Net -475 ml         Physical Exam  Constitutional:       General: She is not in acute distress.     Appearance: Normal appearance. She is not toxic-appearing.   HENT:      Head: Normocephalic and atraumatic.      Mouth/Throat:      Mouth: Mucous membranes are moist.   Neck:      Vascular: No carotid bruit.   Cardiovascular:      Rate and Rhythm: Normal rate and regular rhythm.      Pulses: Normal pulses.      Heart sounds: Normal heart sounds. No murmur heard.     No friction rub. No gallop.   Pulmonary:      Effort: Pulmonary effort is normal.      Breath sounds: Normal breath sounds. No wheezing, rhonchi or rales.   Chest:      Chest wall: No tenderness.   Abdominal:      General: Bowel sounds are normal. There is no distension.      Palpations: Abdomen is soft.      Tenderness: There is no abdominal tenderness. There is no right CVA tenderness, left CVA tenderness, guarding or rebound.   Musculoskeletal:         General: No swelling or tenderness.      Cervical back: Neck supple.      Right lower leg: No edema.      Left lower leg: No edema.   Lymphadenopathy:      Cervical: No cervical adenopathy.   Skin:     Capillary Refill: Capillary refill takes less than 2 seconds.   Neurological:      Mental Status: She is alert. Mental status is at baseline.    Psychiatric:         Mood and Affect: Mood normal.         Behavior: Behavior normal.         Relevant Results  Results for orders placed or performed during the hospital encounter of 01/03/24 (from the past 24 hour(s))   Urinalysis with Reflex Culture and Microscopic   Result Value Ref Range    Color, Urine Light-Yellow Light-Yellow, Yellow, Dark-Yellow    Appearance, Urine Clear Clear    Specific Gravity, Urine 1.017 1.005 - 1.035    pH, Urine 6.0 5.0, 5.5, 6.0, 6.5, 7.0, 7.5, 8.0    Protein, Urine 20 (TRACE) NEGATIVE, 10 (TRACE), 20 (TRACE) mg/dL    Glucose, Urine Normal Normal mg/dL    Blood, Urine NEGATIVE NEGATIVE    Ketones, Urine NEGATIVE NEGATIVE mg/dL    Bilirubin, Urine NEGATIVE NEGATIVE    Urobilinogen, Urine Normal Normal mg/dL    Nitrite, Urine NEGATIVE NEGATIVE    Leukocyte Esterase, Urine 75 Jacques/µL (A) NEGATIVE   Microscopic Only, Urine   Result Value Ref Range    WBC, Urine 6-10 (A) 1-5, NONE /HPF    RBC, Urine 1-2 NONE, 1-2, 3-5 /HPF    Squamous Epithelial Cells, Urine 1-9 (SPARSE) Reference range not established. /HPF    Mucus, Urine FEW Reference range not established. /LPF      Procedure Component Value Units Date/Time   US renal complete [[id number]] Collected: 01/04/24 1221   Order Status: Completed Updated: 01/04/24 1221   Narrative:     Interpreted By:  Tayler Trujillo,  STUDY:  US RENAL COMPLETE; 1/4/2024 12:11 pm      INDICATION:  Signs/Symptoms:Acute kidney injury superimposed upon chronic kidney  disease. Hyperkalemia.      COMPARISON:  12/14/2023      ACCESSION NUMBER(S):  [id number]      ORDERING CLINICIAN:  JENNY YU      TECHNIQUE:  Grayscale and color Doppler imaging of the kidneys.      FINDINGS:  The right kidney measures 9.5 cm x 6.1 cm x 4.7 cm. There is a 1.1 x  1.4 x 1.4 cm right renal cyst. The left kidney measures 11.0 cm x 4.8  cm x 5.1 cm. There is a 1.7 x 2.3 x 2.4 cm left renal cyst. There is  no shadowing calculus, hydronephrosis, or solid mass identified. The  renal  cortical thickness and echogenicity is normal.      Cursory evaluation of the urinary bladder shows no evidence for mass,  wall thickening, or calculus.       Impression:     Simple bilateral renal cyst noted.      No hydronephrosis or renal atrophy.      MACRO:  None.      Signed by: Tayler Trujillo 1/4/2024 12:19 PM  Dictation workstation:   [id number]   CT head wo IV contrast [[id number]] Collected: 01/03/24 2323   Order Status: Completed Updated: 01/03/24 2334   Narrative:     STUDY:  CT Head without IV Contrast; 1/3/2024 at 11:03 PM  INDICATION:  Fall.  COMPARISON:  12/12/2023.  TECHNIQUE:  Unenhanced CT scan of the brain.  Automated mA/kV exposure control was utilized and patient examination  was performed in strict accordance with principles of ALARA.  FINDINGS:  Encephalomalacia of the right frontal and parietal lobes are again  noted.  Periventricular white matter hypodensities are noted  consistent with areas of prior microvascular insults.  An unchanged 5  mm low-density areas noted in the right thalamus consistent with a  prior lacunar infarction.  Otherwise, the gray-white differentiation  is normal.  There is no shift of the midline.  No abnormal masses, mass effect, fluid collections, or recent  hemorrhages are seen.  The gray-white differentiation is normal.  The visualized portions of the paranasal sinuses and mastoid air cells  are clear.  The bony structures are normal.   Impression:     No acute intracerebral changes.  No significant change.  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  White matter changes consistent with areas of prior microvascular  insults.  Signed by Phillip Jensen MD   XR chest 2 views [[id number]] Collected: 01/03/24 2301   Order Status: Completed Updated: 01/03/24 2321   Narrative:     STUDY:  Chest Radiographs;  1/3/24 at 10:55pm  INDICATION:  Fall.  COMPARISON:  10/17/23 XR Chest  ACCESSION NUMBER(S):  [id number]  ORDERING CLINICIAN:  VIRY ROBERT  TECHNIQUE:  Frontal and lateral  chest.  FINDINGS:  CARDIOMEDIASTINAL SILHOUETTE:  Cardiomediastinal silhouette is normal in size and configuration.  Calcified plaque is seen in the aorta.     LUNGS:  Increased interstitial markings are seen bilaterally.  Slightly  prominent right hilum is noted similar to prior studies with surgical  clips in the region of the right hilum suggesting chronic  postoperative change.  Flattening of hemidiaphragms may indicate  chronic changes of COPD.     ABDOMEN:  No remarkable upper abdominal findings.     BONES:  No acute osseous changes.  Median sternotomy changes are noted.   Impression:     Increased interstitial markings bilaterally, likely mild pulmonary  vascular congestion with a trace right pleural effusion and suspected  chronic changes of COPD.  Stable chronic postoperative change in the region of the right hilum.  Signed by Abdirashid Panda            Assessment/Plan    Acute kidney injury creatinine continue to improve I think the patient could be discharged to rehab from renal point review continue antibiotics per ID recommendations  Status post a fall  Mild hyperkalemia is resolved  urinary tract infection with Pseudomonas aeruginosa continue with meropenem infectious disease is following    MD Cy Venegas MD

## 2024-01-12 NOTE — PROGRESS NOTES
Juli Del Castillo is a 83 y.o. female on day 8 of admission presenting with Fall, initial encounter.    Subjective   Interval History:   Son at bedside  Afebrile, no chills  Denies abdominal pain, nausea or vomiting  Denies chest pain or shortness of breath      Objective   Range of Vitals (last 24 hours)  Heart Rate:  [69-95]   Temp:  [36.6 °C (97.9 °F)-37 °C (98.6 °F)]   Resp:  [18-20]   BP: (117-134)/(37-55)   SpO2:  [94 %-100 %]   Daily Weight  01/03/24 : 82.9 kg (182 lb 12.2 oz)    Body mass index is 31.37 kg/m².    Physical Exam  Constitutional:       Appearance: Normal appearance.   HENT:      Head: Normocephalic and atraumatic.      Nose: Nose normal.      Mouth/Throat:      Mouth: Mucous membranes are moist.      Pharynx: Oropharynx is clear.   Eyes:      Extraocular Movements: Extraocular movements intact.      Conjunctiva/sclera: Conjunctivae normal.   Cardiovascular:      Rate and Rhythm: Normal rate and regular rhythm.   Pulmonary:      Effort: Pulmonary effort is normal.      Breath sounds: Normal breath sounds.   Abdominal:      General: Bowel sounds are normal.      Palpations: Abdomen is soft.      Tenderness: There is no abdominal tenderness.   Musculoskeletal:         General: No swelling. Normal range of motion.      Cervical back: Normal range of motion and neck supple.   Skin:     General: Skin is warm and dry.   Neurological:      General: No focal deficit present.      Mental Status: She is alert and oriented to person, place, and time.   Psychiatric:         Mood and Affect: Mood normal.         Behavior: Behavior normal.     Antibiotics  sodium chloride 0.9 % bolus 500 mL  albuterol 2.5 mg /3 mL (0.083 %) nebulizer solution 2.5 mg  aspirin chewable tablet 81 mg  budesonide (Pulmicort) 0.5 mg/2 mL nebulizer solution 0.5 mg  citalopram (CeleXA) tablet 20 mg  DULoxetine (Cymbalta) DR capsule 30 mg  pantoprazole (ProtoNix) EC tablet 40 mg  oxybutynin (Ditropan) tablet 5 mg  oxygen (O2)  therapy  primidone (Mysoline) tablet 100 mg  simvastatin (Zocor) tablet 80 mg  torsemide (Demadex) tablet 20 mg  heparin (porcine) injection 5,000 Units  acetaminophen (Tylenol) tablet 650 mg  acetaminophen (Tylenol) oral liquid 650 mg  acetaminophen (Tylenol) suppository 650 mg  ondansetron (Zofran) tablet 4 mg  ondansetron (Zofran) injection 4 mg  melatonin tablet 3 mg  polyethylene glycol (Glycolax, Miralax) packet 17 g  benzocaine-menthol (Cepastat Sore Throat) 15-3.6 mg lozenge 1 lozenge  dextromethorphan-guaifenesin (Robitussin DM)  mg/5 mL oral liquid 5 mL  guaiFENesin (Mucinex) 12 hr tablet 600 mg  ipratropium-albuteroL (Duo-Neb) 0.5-2.5 mg/3 mL nebulizer solution 3 mL  sodium chloride 0.9% infusion  HYDROcodone-acetaminophen (Norco) 5-325 mg per tablet 1 tablet  cefTAZidime (Fortaz) 1 g in dextrose 5 % in water (D5W) 50 mL IV  cefTAZidime (Fortaz) 1 g in dextrose 5 % in water (D5W) 50 mL IV  bisacodyl (Dulcolax) suppository 10 mg      Relevant Results  Labs        Results from last 72 hours   Lab Units 01/11/24  0419 01/10/24  1053   SODIUM mmol/L 138 138   POTASSIUM mmol/L 4.3 4.4   CHLORIDE mmol/L 103 101   CO2 mmol/L 26 27   BUN mg/dL 26* 26*   CREATININE mg/dL 1.40 1.50   GLUCOSE mg/dL 97 94   CALCIUM mg/dL 9.3 9.6   ANION GAP mmol/L 9 10   EGFR mL/min/1.73m*2 37* 34*             Estimated Creatinine Clearance: 31.2 mL/min (by C-G formula based on SCr of 1.4 mg/dL).  CRP   Date Value Ref Range Status   02/15/2019 0.3 0 - 2.0 MG/DL Final     Comment:     Performed at [address] [address]     Microbiology  Susceptibility data from last 14 days.  Collected Specimen Info Organism Aztreonam Cefepime Ceftazidime Ciprofloxacin Gentamicin Meropenem Piperacillin/Tazobactam Tobramycin   01/05/24 Urine from Clean Catch/Voided Pseudomonas aeruginosa R I R S S S R S       Imaging  ECG 12 lead    Result Date: 1/4/2024  Normal sinus rhythm with sinus arrhythmia Inferior infarct (cited on or  before 13-DEC-2023) Abnormal ECG When compared with ECG of 13-DEC-2023 01:02, No significant change was found Confirmed by Luan Monge ([id number]) on 1/4/2024 12:44:09 PM    US renal complete    Result Date: 1/4/2024  Interpreted By:  Tayler Trujillo, STUDY: US RENAL COMPLETE; 1/4/2024 12:11 pm   INDICATION: Signs/Symptoms:Acute kidney injury superimposed upon chronic kidney disease. Hyperkalemia.   COMPARISON: 12/14/2023   ACCESSION NUMBER(S): [id number]   ORDERING CLINICIAN: JENNY YU   TECHNIQUE: Grayscale and color Doppler imaging of the kidneys.   FINDINGS: The right kidney measures 9.5 cm x 6.1 cm x 4.7 cm. There is a 1.1 x 1.4 x 1.4 cm right renal cyst. The left kidney measures 11.0 cm x 4.8 cm x 5.1 cm. There is a 1.7 x 2.3 x 2.4 cm left renal cyst. There is no shadowing calculus, hydronephrosis, or solid mass identified. The renal cortical thickness and echogenicity is normal.   Cursory evaluation of the urinary bladder shows no evidence for mass, wall thickening, or calculus.       Simple bilateral renal cyst noted.   No hydronephrosis or renal atrophy.   MACRO: None.   Signed by: Tayler Trujillo 1/4/2024 12:19 PM Dictation workstation:   [id number]    CT head wo IV contrast    Result Date: 1/3/2024  STUDY: CT Head without IV Contrast; 1/3/2024 at 11:03 PM INDICATION: Fall. COMPARISON: 12/12/2023. TECHNIQUE: Unenhanced CT scan of the brain. Automated mA/kV exposure control was utilized and patient examination was performed in strict accordance with principles of ALARA. FINDINGS: Encephalomalacia of the right frontal and parietal lobes are again noted.  Periventricular white matter hypodensities are noted consistent with areas of prior microvascular insults.  An unchanged 5 mm low-density areas noted in the right thalamus consistent with a prior lacunar infarction.  Otherwise, the gray-white differentiation is normal. There is no shift of the midline. No abnormal masses, mass effect, fluid collections, or recent  hemorrhages are seen.  The gray-white differentiation is normal. The visualized portions of the paranasal sinuses and mastoid air cells are clear. The bony structures are normal.    No acute intracerebral changes. No significant change. 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 White matter changes consistent with areas of prior microvascular insults. Signed by Phillip Jensen MD    XR chest 2 views    Result Date: 1/3/2024  STUDY: Chest Radiographs;  1/3/24 at 10:55pm INDICATION: Fall. COMPARISON: 10/17/23 XR Chest ACCESSION NUMBER(S): [id number] ORDERING CLINICIAN: VIRY ROBERT TECHNIQUE:  Frontal and lateral chest. FINDINGS: CARDIOMEDIASTINAL SILHOUETTE: Cardiomediastinal silhouette is normal in size and configuration. Calcified plaque is seen in the aorta.  LUNGS: Increased interstitial markings are seen bilaterally.  Slightly prominent right hilum is noted similar to prior studies with surgical clips in the region of the right hilum suggesting chronic postoperative change.  Flattening of hemidiaphragms may indicate chronic changes of COPD.  ABDOMEN: No remarkable upper abdominal findings.  BONES: No acute osseous changes.  Median sternotomy changes are noted.    Increased interstitial markings bilaterally, likely mild pulmonary vascular congestion with a trace right pleural effusion and suspected chronic changes of COPD. Stable chronic postoperative change in the region of the right hilum. Signed by Abdirashid Panda    ECG 12 lead    Result Date: 12/20/2023   Poor data quality, interpretation may be adversely affected Normal sinus rhythm with sinus arrhythmia Possible Inferior infarct , age undetermined Abnormal ECG No previous ECGs available Confirmed by Agnieszka Ashby ([id number]) on 12/20/2023 6:19:30 AM    US renal complete    Result Date: 12/14/2023  Interpreted By:  Ed Cano, STUDY: US RENAL COMPLETE; 12/14/2023 11:29 am   INDICATION: Signs/Symptoms:KYLEE.   COMPARISON: None.   ACCESSION NUMBER(S): [id number]    ORDERING CLINICIAN: TAZ IGLESIAS   TECHNIQUE: Grayscale and color Doppler imaging of the kidneys.   FINDINGS: The right kidney measures 9.6 cm x 4.6 cm x 5.2 cm. The left kidney measures 9.4 cm x 5.4 cm x 5.6 cm. There is a lesion consistent with a cyst in the upper-mid peripelvic location measuring 2.3 x 2.3 x 2.5 cm. There is no shadowing calculus, hydronephrosis, or solid mass identified. The renal cortical thickness and echogenicity is normal.   The urinary bladder is minimally distended and otherwise unremarkable.       No calculi or hydronephrosis bilaterally.   2.5 cm cyst in the left peripelvic location, otherwise unremarkable examination.   MACRO: None.   Signed by: Ed Cano 12/14/2023 3:47 PM Dictation workstation:   [id number]    CT hip right wo IV contrast    Result Date: 12/13/2023  Interpreted By:  Alicia Pierce, STUDY: CT HIP RIGHT WO IV CONTRAST; ;  12/13/2023 2:03 pm   INDICATION: Signs/Symptoms:Fall, pain, prostehesis in place, possible occult fracture.   COMPARISON: None.   ACCESSION NUMBER(S): [id number]   ORDERING CLINICIAN: TAZ IGLESIAS   TECHNIQUE: Serial axial CT images obtained of the right hip. Images reformatted in the coronal and sagittal projection   All CT examinations are performed with 1 or more of the following dose reduction techniques: Automated exposure control, adjustment of mA and/or kv according to patient's size, or use of iterative reconstruction techniques.   FINDINGS: Right total hip arthroplasty is in place. Evaluation about the femoral component of the total hip arthroplasty demonstrates no evidence for lucency. No fracture demonstrated. Evaluation about the acetabular component demonstrates no evidence for fracture. Included portions visualized right hemipelvis demonstrates unremarkable iliac wing. Visualized portions of the sacrum are unremarkable.   Musculature about the right hip demonstrates generalized atrophy. Right common hamstring tendon  origin is unremarkable. There is chronic enthesopathy about the right ischial tuberosity.           1. No acute osseous abnormality about the right total hip arthroplasty.     MACRO: None   Signed by: Alicia Pierce 12/13/2023 6:16 PM Dictation workstation:   [id number]    CT cervical spine wo IV contrast    Result Date: 12/13/2023  Interpreted By:  Shad Hernandez, STUDY: CT CERVICAL SPINE WO IV CONTRAST; 12/12/2023 11:34 pm   INDICATION: Signs/Symptoms:fall;   COMPARISON: None   ACCESSION NUMBER(S): [id number]   ORDERING CLINICIAN: BRENDA KNAPP   TECHNIQUE: Contiguous axial images of the cervical spine were performed. PATIENT RADIATION EXPOSURE DATA: CTDI: DLP:   All CT examinations are performed with one or more of the following dose reduction techniques: Automated Exposure Control, adjustment of mA and/or kV according to patient size, or use of iterative reconstruction techniques.   FINDINGS: There is straightening of the normal cervical lordosis.   No acute fracture or spondylolisthesis is identified.   Posterior fusion and laminectomy is noted C3 through C6 with hardware intact. No periprosthetic lucency is identified to suggest loosening.   Multilevel disc space narrowing can be seen. Endplate osteophytosis facet arthropathy is noted.   The neural foramina and spinal canal are grossly patent.   Severe calcific atherosclerosis of the origin of the left subclavian artery can be seen. Retropharyngeal course of the ICAs can be seen bilaterally. Calcification of the carotid bulbs are noted.   Severe left TMJ osteoarthritic changes are noted.   The patient is edentulous.   Limited visualization of the lung apices reveal emphysema.       1. No acute fracture or spondylolisthesis. 2. Degenerative disc disease and spondylosis. 3. Postsurgical changes as above.   . .   This report is in agreement with the preliminary report issued by [facility].   MACRO: None.   Signed by: Shad Hernandez 12/13/2023 8:22 AM  Dictation workstation:   [id number]    CT head wo IV contrast    Result Date: 12/13/2023  Interpreted By:  Shad Hernandez, STUDY: CT HEAD WO IV CONTRAST; 12/12/2023 11:34 pm   INDICATION: fall;   COMPARISON: 05/28/2020   ACCESSION NUMBER(S): [id number]   ORDERING CLINICIAN: BRENDA KNAPP   TECHNIQUE: Contiguous axial images were acquired from the vertex through the posterior fossa without IV contrast.   All CT examinations are performed with one or more of the following dose reduction techniques: Automated Exposure Control, adjustment of mA and/or kV according to patient size, or use of iterative reconstruction techniques.   FINDINGS: No focal mass effect or midline shift is identified. The ventricles and sulci are symmetric and appropriate for the patient's age. However, chronic involutional change of the cerebral hemispheres is noted.   Encephalomalacia of the right frontal and parietal lobes can be seen which may represent the sequela of remote infarcts. Nonspecific hypodensities are identified within the periventricular and subcortical white matter likely due to chronic postischemic white matter disease. Atherosclerotic calcifications are seen within the carotid siphons and vertebral arteries.   No acute intracranial hemorrhage is identified. No intra-axial or extra-axial fluid collection is seen.   The visualized paranasal sinuses and mastoid air cells are clear.       No acute intracranial findings. . .   This report is in agreement with the preliminary report issued by [employer].   MACRO: None   Signed by: Shad Hernandez 12/13/2023 8:18 AM Dictation workstation:   [id number]    XR hip right with pelvis when performed 2 or 3 views    Result Date: 12/13/2023  Interpreted By:  Shad Hernandez, STUDY: XR HIP RIGHT WITH PELVIS WHEN PERFORMED 2 OR 3 VIEWS; 12/12/2023 11:49 pm   INDICATION: Signs/Symptoms:fall.   COMPARISON: 05/29/2022   ACCESSION NUMBER(S): [id number]   ORDERING CLINICIAN: BRENDA  RA   FINDINGS: Right total hip arthroplasty changes are identified. No periprosthetic lucencies are identified.   No fracture or subluxation is identified. Left hip osteoarthritic changes are noted.   The SI joints are unremarkable.   Pelvic sutures are noted.       No acute fracture or dislocation.   MACRO: None.   Signed by: Shad Hernandez 12/13/2023 8:16 AM Dictation workstation:   [id number]    XR shoulder right 2+ views    Result Date: 12/13/2023  Interpreted By:  Shad Hernandez, STUDY: XR SHOULDER RIGHT 2+ VIEWS; 12/12/2023 11:49 pm   INDICATION: Signs/Symptoms:fall;   COMPARISON: None   ACCESSION NUMBER(S): [id number]   ORDERING CLINICIAN: BRENDA KNAPP   TECHNIQUE: AP, scapular and axillary projections.   FINDINGS: No fracture or dislocation is seen. Moderate AC joint osteoarthrosis is noted.   The included intrathoracic structures are reveal sternotomy wires and postsurgical changes seen within the right hilar region and peripheral right lung.       No acute fracture or dislocation.   MACRO: None.   Signed by: Shad Hernandez 12/13/2023 8:07 AM Dictation workstation:   [id number]    XR elbow right 3+ views    Result Date: 12/13/2023  Interpreted By:  Shad Hernandez, STUDY: XR ELBOW RIGHT 3+ VIEWS; 12/12/2023 11:49 pm   INDICATION: Signs/Symptoms:fall.   COMPARISON: None   ACCESSION NUMBER(S): [id number]   ORDERING CLINICIAN: BRENDA KNAPP   FINDINGS: Olecranon fixation plate and screws are seen. The hardware appears intact.   No fracture or subluxation is identified. No significant elbow effusion is seen.       No acute fracture or dislocation.   MACRO: None   Signed by: Shad Hernandez 12/13/2023 7:40 AM Dictation workstation:   [id number]    XR chest 1 view    Result Date: 12/13/2023  Interpreted By:  Shad Hernandez, STUDY: XR CHEST 1 VIEW; 12/12/2023 11:49 pm   INDICATION: Signs/Symptoms:fall   COMPARISON: 10/17/2023   ACCESSION NUMBER(S): [id number]   ORDERING CLINICIAN: BRENDA KNAPP    TECHNIQUE: 1 AP projection.   FINDINGS: Sternotomy wires and right hilar clips are seen.   The cardiomediastinal silhouette is enlarged but stable. Mild vascular congestion is noted. No pleural effusion is identified.   Bilateral rib deformities are noted.       Mild vascular congestion.   MACRO: None   Signed by: Shad Hernandez 12/13/2023 7:38 AM Dictation workstation:   [id number]     Assessment/Plan   Acute on chronic renal failure-nephrology on consult, improving  Pseudomonas urinary tract infection  History of pseudomonas UTI       Continue meropenem-up to 7 days-started 1/8/2024  Monitor WBC and temperature  Monitor renal function  Nephrology following  Follow up Repeat urine culture tomorrow 1/11        Malena Milner, BRYANNA-CNP

## 2024-01-13 LAB — BACTERIA UR CULT: NORMAL

## 2024-01-13 PROCEDURE — 2500000004 HC RX 250 GENERAL PHARMACY W/ HCPCS (ALT 636 FOR OP/ED): Performed by: INTERNAL MEDICINE

## 2024-01-13 PROCEDURE — 99232 SBSQ HOSP IP/OBS MODERATE 35: CPT | Performed by: NURSE PRACTITIONER

## 2024-01-13 PROCEDURE — 94640 AIRWAY INHALATION TREATMENT: CPT

## 2024-01-13 PROCEDURE — 2500000001 HC RX 250 WO HCPCS SELF ADMINISTERED DRUGS (ALT 637 FOR MEDICARE OP): Performed by: NURSE PRACTITIONER

## 2024-01-13 PROCEDURE — 2500000002 HC RX 250 W HCPCS SELF ADMINISTERED DRUGS (ALT 637 FOR MEDICARE OP, ALT 636 FOR OP/ED): Performed by: INTERNAL MEDICINE

## 2024-01-13 PROCEDURE — 97535 SELF CARE MNGMENT TRAINING: CPT | Mod: GO

## 2024-01-13 PROCEDURE — 1100000001 HC PRIVATE ROOM DAILY

## 2024-01-13 PROCEDURE — 2500000001 HC RX 250 WO HCPCS SELF ADMINISTERED DRUGS (ALT 637 FOR MEDICARE OP): Performed by: INTERNAL MEDICINE

## 2024-01-13 PROCEDURE — 9420000001 HC RT PATIENT EDUCATION 5 MIN

## 2024-01-13 RX ADMIN — HYDROCODONE BITARTRATE AND ACETAMINOPHEN 1 TABLET: 5; 325 TABLET ORAL at 22:02

## 2024-01-13 RX ADMIN — OXYBUTYNIN CHLORIDE 5 MG: 5 TABLET ORAL at 22:03

## 2024-01-13 RX ADMIN — HYDROCODONE BITARTRATE AND ACETAMINOPHEN 1 TABLET: 5; 325 TABLET ORAL at 10:00

## 2024-01-13 RX ADMIN — MEROPENEM 500 MG: 500 INJECTION, POWDER, FOR SOLUTION INTRAVENOUS at 01:50

## 2024-01-13 RX ADMIN — OXYBUTYNIN CHLORIDE 5 MG: 5 TABLET ORAL at 09:51

## 2024-01-13 RX ADMIN — CITALOPRAM HYDROBROMIDE 20 MG: 20 TABLET ORAL at 09:51

## 2024-01-13 RX ADMIN — PRIMIDONE 100 MG: 50 TABLET ORAL at 22:03

## 2024-01-13 RX ADMIN — HEPARIN SODIUM 5000 UNITS: 5000 INJECTION, SOLUTION INTRAVENOUS; SUBCUTANEOUS at 10:00

## 2024-01-13 RX ADMIN — PRIMIDONE 100 MG: 50 TABLET ORAL at 15:37

## 2024-01-13 RX ADMIN — DULOXETINE HYDROCHLORIDE 30 MG: 30 CAPSULE, DELAYED RELEASE PELLETS ORAL at 09:51

## 2024-01-13 RX ADMIN — Medication 3 MG: at 22:02

## 2024-01-13 RX ADMIN — MEROPENEM 500 MG: 500 INJECTION, POWDER, FOR SOLUTION INTRAVENOUS at 10:00

## 2024-01-13 RX ADMIN — ALBUTEROL SULFATE 2.5 MG: 2.5 SOLUTION RESPIRATORY (INHALATION) at 19:45

## 2024-01-13 RX ADMIN — DOCUSATE SODIUM 100 MG: 100 CAPSULE, LIQUID FILLED ORAL at 22:02

## 2024-01-13 RX ADMIN — SIMVASTATIN 80 MG: 40 TABLET, FILM COATED ORAL at 22:03

## 2024-01-13 RX ADMIN — PANTOPRAZOLE SODIUM 40 MG: 40 TABLET, DELAYED RELEASE ORAL at 09:51

## 2024-01-13 RX ADMIN — PRIMIDONE 100 MG: 50 TABLET ORAL at 09:51

## 2024-01-13 RX ADMIN — BUDESONIDE INHALATION 0.5 MG: 0.5 SUSPENSION RESPIRATORY (INHALATION) at 19:46

## 2024-01-13 RX ADMIN — DOCUSATE SODIUM 100 MG: 100 CAPSULE, LIQUID FILLED ORAL at 09:51

## 2024-01-13 RX ADMIN — MEROPENEM 500 MG: 500 INJECTION, POWDER, FOR SOLUTION INTRAVENOUS at 18:07

## 2024-01-13 RX ADMIN — ASPIRIN 81 MG: 81 TABLET, CHEWABLE ORAL at 09:51

## 2024-01-13 RX ADMIN — HEPARIN SODIUM 5000 UNITS: 5000 INJECTION, SOLUTION INTRAVENOUS; SUBCUTANEOUS at 18:07

## 2024-01-13 RX ADMIN — PATIROMER 8.4 G: 8.4 POWDER, FOR SUSPENSION ORAL at 06:33

## 2024-01-13 RX ADMIN — HEPARIN SODIUM 5000 UNITS: 5000 INJECTION, SOLUTION INTRAVENOUS; SUBCUTANEOUS at 01:28

## 2024-01-13 ASSESSMENT — ACTIVITIES OF DAILY LIVING (ADL)
HOME_MANAGEMENT_TIME_ENTRY: 25
BATHING_WHERE_ASSESSED: OTHER (COMMENT)
BATHING_LEVEL_OF_ASSISTANCE: MINIMUM ASSISTANCE

## 2024-01-13 ASSESSMENT — COGNITIVE AND FUNCTIONAL STATUS - GENERAL
PERSONAL GROOMING: A LITTLE
EATING MEALS: A LITTLE
DAILY ACTIVITIY SCORE: 17
MOVING TO AND FROM BED TO CHAIR: A LITTLE
DRESSING REGULAR LOWER BODY CLOTHING: A LOT
DRESSING REGULAR UPPER BODY CLOTHING: A LITTLE
WALKING IN HOSPITAL ROOM: A LITTLE
HELP NEEDED FOR BATHING: A LITTLE
DAILY ACTIVITIY SCORE: 19
HELP NEEDED FOR BATHING: A LITTLE
EATING MEALS: A LITTLE
TOILETING: A LITTLE
MOBILITY SCORE: 17
TURNING FROM BACK TO SIDE WHILE IN FLAT BAD: A LITTLE
DRESSING REGULAR UPPER BODY CLOTHING: A LITTLE
CLIMB 3 TO 5 STEPS WITH RAILING: A LOT
MOVING FROM LYING ON BACK TO SITTING ON SIDE OF FLAT BED WITH BEDRAILS: A LITTLE
DRESSING REGULAR LOWER BODY CLOTHING: A LITTLE
STANDING UP FROM CHAIR USING ARMS: A LITTLE
TOILETING: A LITTLE

## 2024-01-13 ASSESSMENT — PAIN - FUNCTIONAL ASSESSMENT
PAIN_FUNCTIONAL_ASSESSMENT: 0-10
PAIN_FUNCTIONAL_ASSESSMENT: 0-10

## 2024-01-13 ASSESSMENT — PAIN SCALES - GENERAL
PAINLEVEL_OUTOF10: 0 - NO PAIN
PAINLEVEL_OUTOF10: 6
PAINLEVEL_OUTOF10: 9

## 2024-01-13 NOTE — PROGRESS NOTES
Juli Del Castillo is a 83 y.o. female on day 9 of admission presenting with Fall, initial encounter.    Subjective   Interval History:   Awake, alert  No dysuria  Afebrile, no chills  No chest pain or shortness of breath    Review of Systems   All other systems reviewed and are negative.      Objective   Range of Vitals (last 24 hours)  Heart Rate:  [70-83]   Temp:  [36.6 °C (97.9 °F)-37 °C (98.6 °F)]   Resp:  [14-18]   BP: (122-152)/(44-58)   SpO2:  [94 %-100 %]   Daily Weight  01/03/24 : 82.9 kg (182 lb 12.2 oz)    Body mass index is 31.37 kg/m².    Physical Exam  Constitutional:       Appearance: Normal appearance.   HENT:      Head: Normocephalic and atraumatic.      Nose: Nose normal.      Mouth/Throat:      Mouth: Mucous membranes are moist.      Pharynx: Oropharynx is clear.   Eyes:      Extraocular Movements: Extraocular movements intact.      Conjunctiva/sclera: Conjunctivae normal.   Cardiovascular:      Rate and Rhythm: Normal rate and regular rhythm.   Pulmonary:      Effort: Pulmonary effort is normal.      Breath sounds: Normal breath sounds.   Abdominal:      General: Bowel sounds are normal.      Palpations: Abdomen is soft.      Tenderness: There is no abdominal tenderness.   Musculoskeletal:         General: No swelling. Normal range of motion.      Cervical back: Normal range of motion and neck supple.   Skin:     General: Skin is warm and dry.   Neurological:      General: No focal deficit present.      Mental Status: She is alert and oriented to person, place, and time.   Psychiatric:         Mood and Affect: Mood normal.         Behavior: Behavior normal.     Antibiotics  sodium chloride 0.9 % bolus 500 mL  albuterol 2.5 mg /3 mL (0.083 %) nebulizer solution 2.5 mg  aspirin chewable tablet 81 mg  budesonide (Pulmicort) 0.5 mg/2 mL nebulizer solution 0.5 mg  citalopram (CeleXA) tablet 20 mg  DULoxetine (Cymbalta) DR capsule 30 mg  pantoprazole (ProtoNix) EC tablet 40 mg  oxybutynin (Ditropan)  tablet 5 mg  oxygen (O2) therapy  primidone (Mysoline) tablet 100 mg  simvastatin (Zocor) tablet 80 mg  torsemide (Demadex) tablet 20 mg  heparin (porcine) injection 5,000 Units  acetaminophen (Tylenol) tablet 650 mg  acetaminophen (Tylenol) oral liquid 650 mg  acetaminophen (Tylenol) suppository 650 mg  ondansetron (Zofran) tablet 4 mg  ondansetron (Zofran) injection 4 mg  melatonin tablet 3 mg  polyethylene glycol (Glycolax, Miralax) packet 17 g  benzocaine-menthol (Cepastat Sore Throat) 15-3.6 mg lozenge 1 lozenge  dextromethorphan-guaifenesin (Robitussin DM)  mg/5 mL oral liquid 5 mL  guaiFENesin (Mucinex) 12 hr tablet 600 mg  ipratropium-albuteroL (Duo-Neb) 0.5-2.5 mg/3 mL nebulizer solution 3 mL  sodium chloride 0.9% infusion  HYDROcodone-acetaminophen (Norco) 5-325 mg per tablet 1 tablet  cefTAZidime (Fortaz) 1 g in dextrose 5 % in water (D5W) 50 mL IV  cefTAZidime (Fortaz) 1 g in dextrose 5 % in water (D5W) 50 mL IV  bisacodyl (Dulcolax) suppository 10 mg  bisacodyl (Dulcolax) EC tablet 5 mg  ondansetron (Zofran) injection 4 mg  docusate sodium (Colace) capsule 100 mg  glycerin (Adult) 2 g suppository 1 suppository  polyethylene glycol (Glycolax, Miralax) packet 17 g  patiromer calcium sorbitex (Veltassa) packet 8.4 g  meropenem (Merrem) in sodium chloride 0.9 % 100 mL 500 mg  meropenem (Merrem) in sodium chloride 0.9 % 100 mL 500 mg      Relevant Results  Labs      Results from last 72 hours   Lab Units 01/11/24  0419   SODIUM mmol/L 138   POTASSIUM mmol/L 4.3   CHLORIDE mmol/L 103   CO2 mmol/L 26   BUN mg/dL 26*   CREATININE mg/dL 1.40   GLUCOSE mg/dL 97   CALCIUM mg/dL 9.3   ANION GAP mmol/L 9   EGFR mL/min/1.73m*2 37*         Estimated Creatinine Clearance: 31.2 mL/min (by C-G formula based on SCr of 1.4 mg/dL).  CRP   Date Value Ref Range Status   02/15/2019 0.3 0 - 2.0 MG/DL Final     Comment:     Performed at [place] [address] [address] [facility] [id number]     Microbiology  Susceptibility data from  last 14 days.  Collected Specimen Info Organism Aztreonam Cefepime Ceftazidime Ciprofloxacin Gentamicin Meropenem Piperacillin/Tazobactam Tobramycin   01/05/24 Urine from Clean Catch/Voided Pseudomonas aeruginosa R I R S S S R S       Imaging  XR chest 2 views    Result Date: 1/8/2024  Interpreted By:  Rafael Ames, STUDY: XR CHEST 2 VIEWS; 1/8/2024 6:39 pm   INDICATION: Hypoxia;   COMPARISON: January 3, 2024   ACCESSION NUMBER(S): [id number]   ORDERING CLINICIAN: PAULA WAN   TECHNIQUE: Views: AP and Lateral   FINDINGS: RESULTS: Midline sternotomy wires and mediastinal surgical clips are noted. Right hilar surgical clips are again noted. The cardiac silhouette is within normal limits. There are calcifications involving the thoracic aorta. Left basilar opacity obscures the left hemidiaphragm. There is blunting of the right costophrenic angle. There is diffuse interstitial thickening. The osseous structures are unremarkable.       Findings suggesting pulmonary edema     Signed by: Rafael Ames 1/8/2024 7:35 PM Dictation workstation:   [id number]    ECG 12 lead    Result Date: 1/4/2024  Normal sinus rhythm with sinus arrhythmia Inferior infarct (cited on or before 13-DEC-2023) Abnormal ECG When compared with ECG of 13-DEC-2023 01:02, No significant change was found Confirmed by Luan Monge ([id number]) on 1/4/2024 12:44:09 PM    US renal complete    Result Date: 1/4/2024  Interpreted By:  Tayler Trujillo, STUDY: US RENAL COMPLETE; 1/4/2024 12:11 pm   INDICATION: Signs/Symptoms:Acute kidney injury superimposed upon chronic kidney disease. Hyperkalemia.   COMPARISON: 12/14/2023   ACCESSION NUMBER(S): [id number]   ORDERING CLINICIAN: JENNY YU   TECHNIQUE: Grayscale and color Doppler imaging of the kidneys.   FINDINGS: The right kidney measures 9.5 cm x 6.1 cm x 4.7 cm. There is a 1.1 x 1.4 x 1.4 cm right renal cyst. The left kidney measures 11.0 cm x 4.8 cm x 5.1 cm. There is a 1.7 x 2.3 x 2.4 cm left renal  cyst. There is no shadowing calculus, hydronephrosis, or solid mass identified. The renal cortical thickness and echogenicity is normal.   Cursory evaluation of the urinary bladder shows no evidence for mass, wall thickening, or calculus.       Simple bilateral renal cyst noted.   No hydronephrosis or renal atrophy.   MACRO: None.   Signed by: Tayler Trujillo 1/4/2024 12:19 PM Dictation workstation:   [id number]    CT head wo IV contrast    Result Date: 1/3/2024  STUDY: CT Head without IV Contrast; 1/3/2024 at 11:03 PM INDICATION: Fall. COMPARISON: 12/12/2023. TECHNIQUE: Unenhanced CT scan of the brain. Automated mA/kV exposure control was utilized and patient examination was performed in strict accordance with principles of ALARA. FINDINGS: Encephalomalacia of the right frontal and parietal lobes are again noted.  Periventricular white matter hypodensities are noted consistent with areas of prior microvascular insults.  An unchanged 5 mm low-density areas noted in the right thalamus consistent with a prior lacunar infarction.  Otherwise, the gray-white differentiation is normal. There is no shift of the midline. No abnormal masses, mass effect, fluid collections, or recent hemorrhages are seen.  The gray-white differentiation is normal. The visualized portions of the paranasal sinuses and mastoid air cells are clear. The bony structures are normal.    No acute intracerebral changes. No significant change. 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 White matter changes consistent with areas of prior microvascular insults. Signed by Phillip Jensen MD    XR chest 2 views    Result Date: 1/3/2024  STUDY: Chest Radiographs;  1/3/24 at 10:55pm INDICATION: Fall. COMPARISON: 10/17/23 XR Chest ACCESSION NUMBER(S): [id number] ORDERING CLINICIAN: VIRY ROBERT TECHNIQUE:  Frontal and lateral chest. FINDINGS: CARDIOMEDIASTINAL SILHOUETTE: Cardiomediastinal silhouette is normal in size and configuration. Calcified plaque is seen in the aorta.   LUNGS: Increased interstitial markings are seen bilaterally.  Slightly prominent right hilum is noted similar to prior studies with surgical clips in the region of the right hilum suggesting chronic postoperative change.  Flattening of hemidiaphragms may indicate chronic changes of COPD.  ABDOMEN: No remarkable upper abdominal findings.  BONES: No acute osseous changes.  Median sternotomy changes are noted.    Increased interstitial markings bilaterally, likely mild pulmonary vascular congestion with a trace right pleural effusion and suspected chronic changes of COPD. Stable chronic postoperative change in the region of the right hilum. Signed by Abdirashid Panda     Assessment/Plan   Acute on chronic renal failure-nephrology on consult, improving  Pseudomonas urinary tract infection-repeat cultures negative  History of pseudomonas UTI        Continue meropenem-up to 7 days-started 1/8/2024  Monitor WBC and temperature  Monitor renal function  Nephrology following  Discontinue meropenem upon discharge orl on 1/15/2024    Blue Mccarty MD

## 2024-01-13 NOTE — PROGRESS NOTES
"Juli Del Castillo is a 83 y.o. female on day 9 of admission presenting with Fall, initial encounter.    Subjective   Patient seen and examined.  No documented concerns/events overnight from nursing.  Afebrile overnight.  Denies chills.       Objective     Physical Exam  Constitutional:       General: She is not in acute distress.     Appearance: She is ill-appearing. She is not toxic-appearing.   Cardiovascular:      Rate and Rhythm: Normal rate and regular rhythm.      Pulses: Normal pulses.      Heart sounds: Normal heart sounds.   Pulmonary:      Effort: No respiratory distress.      Breath sounds: Diminished  Abdominal:      General: Bowel sounds are normal.      Palpations: Abdomen is soft.   Skin:     General: Skin is warm and dry.   Neurological:      General: No focal deficit present.      Mental Status: She is alert and oriented to person, place, and time.   Psychiatric:         Mood and Affect: Mood normal.         Behavior: Behavior normal.     Last Recorded Vitals  Blood pressure (!) 125/44, pulse 81, temperature 37 °C (98.6 °F), temperature source Oral, resp. rate 16, height 1.626 m (5' 4\"), weight 82.9 kg (182 lb 12.2 oz), SpO2 100 %.  Intake/Output last 3 Shifts:  I/O last 3 completed shifts:  In: 400 (4.8 mL/kg) [P.O.:200; IV Piggyback:200]  Out: 1175 (14.2 mL/kg) [Urine:1175 (0.4 mL/kg/hr)]  Weight: 82.9 kg     Relevant Results  Scheduled medications  albuterol, 2.5 mg, nebulization, q6h while awake  aspirin, 81 mg, oral, Daily  bisacodyl, 10 mg, rectal, Daily  budesonide, 0.5 mg, nebulization, BID  citalopram, 20 mg, oral, Daily  docusate sodium, 100 mg, oral, BID  DULoxetine, 30 mg, oral, Daily  heparin (porcine), 5,000 Units, subcutaneous, q8h  meropenem, 500 mg, intravenous, q8h  oxybutynin, 5 mg, oral, BID  pantoprazole, 40 mg, oral, Daily before breakfast  patiromer calcium sorbitex, 8.4 g, oral, Daily  primidone, 100 mg, oral, TID  simvastatin, 80 mg, oral, Nightly      Continuous medications   "   PRN medications  PRN medications: acetaminophen **OR** acetaminophen **OR** acetaminophen, benzocaine-menthol, bisacodyl, dextromethorphan-guaifenesin, glycerin, guaiFENesin, HYDROcodone-acetaminophen, ipratropium-albuteroL, melatonin, ondansetron, oxygen, polyethylene glycol                Assessment/Plan   Principal Problem:    Fall, initial encounter  Active Problems:    Fibromyalgia    Acute congestive heart failure (CMS/[facility])    Hyperlipidemia    Major depressive disorder, single episode, unspecified    Overactive bladder    History of lung cancer    Chronic respiratory failure (CMS/[facility])    Chronic diastolic heart failure (CMS/[facility])    Accidental fall    Acute on chronic kidney failure (CMS/[facility])    Hyperkalemia    Falls  -CT brain no acute findings  -PT, OT recommend moderate intensity rehab  -Fall precaution     Acute on chronic renal failure  -Resolved  -Nephrology following  -Monitor  -Hold nephrotoxic agents  -Renally dose medications  -Renal ultrasound no acute finding     UTI  -Culture grew Pseudomonas aeruginosa  -ID following  -IV meropenem  -repeat urine cx pending  HTN  -Monitor blood pressure  -Hold lisinopril due to KYLEE     Chronic respiratory failure  -Oxygen as needed, wean as tolerated  -Wears baseline 3 L at home           Plan  Above plan discussed length with patient and she is in agreement       I spent 30 minutes in the professional and overall care of this patient.      BRYANNA Nunez-CNP

## 2024-01-13 NOTE — PROGRESS NOTES
Juli Del Castillo is a 83 y.o. female on day 9 of admission presenting with Fall, initial encounter.      Subjective   Patient seen for acute kidney injury Superimposed on chronic kidney disease stage III creatinine is much improved down to 1.4 mg/dL she feels fine she is up in chair awaiting placement    Objective          Vitals 24HR  Heart Rate:  [70-83]   Temp:  [36.6 °C (97.9 °F)-37 °C (98.6 °F)]   Resp:  [14-18]   BP: (122-152)/(46-58)   SpO2:  [94 %-100 %]         Intake/Output last 3 Shifts:    Intake/Output Summary (Last 24 hours) at 1/13/2024 1104  Last data filed at 1/13/2024 0500  Gross per 24 hour   Intake 100 ml   Output 600 ml   Net -500 ml         Physical Exam  Constitutional:       General: She is not in acute distress.     Appearance: Normal appearance. She is not toxic-appearing.   HENT:      Head: Normocephalic and atraumatic.      Mouth/Throat:      Mouth: Mucous membranes are moist.   Neck:      Vascular: No carotid bruit.   Cardiovascular:      Rate and Rhythm: Normal rate and regular rhythm.      Pulses: Normal pulses.      Heart sounds: Normal heart sounds. No murmur heard.     No friction rub. No gallop.   Pulmonary:      Effort: Pulmonary effort is normal.      Breath sounds: Normal breath sounds. No wheezing, rhonchi or rales.   Chest:      Chest wall: No tenderness.   Abdominal:      General: Bowel sounds are normal. There is no distension.      Palpations: Abdomen is soft.      Tenderness: There is no abdominal tenderness. There is no right CVA tenderness, left CVA tenderness, guarding or rebound.   Musculoskeletal:         General: No swelling or tenderness.      Cervical back: Neck supple.      Right lower leg: No edema.      Left lower leg: No edema.   Lymphadenopathy:      Cervical: No cervical adenopathy.   Skin:     Capillary Refill: Capillary refill takes less than 2 seconds.   Neurological:      Mental Status: She is alert. Mental status is at baseline.   Psychiatric:         Mood  and Affect: Mood normal.         Behavior: Behavior normal.         Relevant Results  No results found for this or any previous visit (from the past 24 hour(s)).     Procedure Component Value Units Date/Time   US renal complete [[id number]] Collected: 01/04/24 1221   Order Status: Completed Updated: 01/04/24 1221   Narrative:     Interpreted By:  Tayler Trujillo,  STUDY:  US RENAL COMPLETE; 1/4/2024 12:11 pm      INDICATION:  Signs/Symptoms:Acute kidney injury superimposed upon chronic kidney  disease. Hyperkalemia.      COMPARISON:  12/14/2023      ACCESSION NUMBER(S):  [id number]      ORDERING CLINICIAN:  JENNY YU      TECHNIQUE:  Grayscale and color Doppler imaging of the kidneys.      FINDINGS:  The right kidney measures 9.5 cm x 6.1 cm x 4.7 cm. There is a 1.1 x  1.4 x 1.4 cm right renal cyst. The left kidney measures 11.0 cm x 4.8  cm x 5.1 cm. There is a 1.7 x 2.3 x 2.4 cm left renal cyst. There is  no shadowing calculus, hydronephrosis, or solid mass identified. The  renal cortical thickness and echogenicity is normal.      Cursory evaluation of the urinary bladder shows no evidence for mass,  wall thickening, or calculus.       Impression:     Simple bilateral renal cyst noted.      No hydronephrosis or renal atrophy.      MACRO:  None.      Signed by: Tayler Trujillo 1/4/2024 12:19 PM  Dictation workstation:   [id number]   CT head wo IV contrast [[id number]] Collected: 01/03/24 2323   Order Status: Completed Updated: 01/03/24 2334   Narrative:     STUDY:  CT Head without IV Contrast; 1/3/2024 at 11:03 PM  INDICATION:  Fall.  COMPARISON:  12/12/2023.  TECHNIQUE:  Unenhanced CT scan of the brain.  Automated mA/kV exposure control was utilized and patient examination  was performed in strict accordance with principles of ALARA.  FINDINGS:  Encephalomalacia of the right frontal and parietal lobes are again  noted.  Periventricular white matter hypodensities are noted  consistent with areas of prior microvascular  insults.  An unchanged 5  mm low-density areas noted in the right thalamus consistent with a  prior lacunar infarction.  Otherwise, the gray-white differentiation  is normal.  There is no shift of the midline.  No abnormal masses, mass effect, fluid collections, or recent  hemorrhages are seen.  The gray-white differentiation is normal.  The visualized portions of the paranasal sinuses and mastoid air cells  are clear.  The bony structures are normal.   Impression:     No acute intracerebral changes.  No significant change.  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  White matter changes consistent with areas of prior microvascular  insults.  Signed by Phillip Jensen MD   XR chest 2 views [[id number]] Collected: 01/03/24 2301   Order Status: Completed Updated: 01/03/24 2321   Narrative:     STUDY:  Chest Radiographs;  1/3/24 at 10:55pm  INDICATION:  Fall.  COMPARISON:  10/17/23 XR Chest  ACCESSION NUMBER(S):  [id number]  ORDERING CLINICIAN:  VIRY ROBERT  TECHNIQUE:  Frontal and lateral chest.  FINDINGS:  CARDIOMEDIASTINAL SILHOUETTE:  Cardiomediastinal silhouette is normal in size and configuration.  Calcified plaque is seen in the aorta.     LUNGS:  Increased interstitial markings are seen bilaterally.  Slightly  prominent right hilum is noted similar to prior studies with surgical  clips in the region of the right hilum suggesting chronic  postoperative change.  Flattening of hemidiaphragms may indicate  chronic changes of COPD.     ABDOMEN:  No remarkable upper abdominal findings.     BONES:  No acute osseous changes.  Median sternotomy changes are noted.   Impression:     Increased interstitial markings bilaterally, likely mild pulmonary  vascular congestion with a trace right pleural effusion and suspected  chronic changes of COPD.  Stable chronic postoperative change in the region of the right hilum.  Signed by Abdirashid Panda            Assessment/Plan    Acute kidney injury renal function continue to improve we will  continue to monitor waiting placement in the rehab  Status post a fall  Mild hyperkalemia is resolved  urinary tract infection with Pseudomonas aeruginosa continue with meropenem infectious disease is following    MD Cy Venegas MD

## 2024-01-13 NOTE — NURSING NOTE
Assumed care of patient, pt ambulating to bathroom with help and walker, no needs at this time, call light and possessions within reach

## 2024-01-13 NOTE — PROGRESS NOTES
Occupational Therapy    OT Treatment    Patient Name: Juli Del Castillo  MRN: [id number]  Today's Date: 1/13/2024  Time Calculation  Start Time: 1415  Stop Time: 1445  Time Calculation (min): 30 min         Assessment:  Prognosis: Good  Barriers to Discharge: None  Evaluation/Treatment Tolerance: Patient tolerated treatment well  End of Session Communication: Bedside nurse  End of Session Patient Position: Up in chair  OT Assessment Results: Decreased ADL status, Decreased endurance, Decreased functional mobility, Decreased IADLs  Prognosis: Good  Barriers to Discharge: None  Evaluation/Treatment Tolerance: Patient tolerated treatment well  Plan:  Treatment Interventions: ADL retraining, Functional transfer training, UE strengthening/ROM, Endurance training, Patient/family training, Neuromuscular reeducation, Compensatory technique education  OT Frequency: 3 times per week  OT Discharge Recommendations: Moderate intensity level of continued care  Equipment Recommended upon Discharge: Wheeled walker  OT Recommended Transfer Status: Assist of 1  OT - OK to Discharge: Yes  Treatment Interventions: ADL retraining, Functional transfer training, UE strengthening/ROM, Endurance training, Patient/family training, Neuromuscular reeducation, Compensatory technique education    Subjective   Previous Visit Info:     General:  General  Reason for Referral: impaired mobility  Referred By: Dr. Monge  Past Medical History Relevant to Rehab: fibromyalgia, HTN, CHF, HLD, anxiety, Depression, atrial fib, elevated BNP, CHF  Prior to Session Communication: Bedside nurse  Patient Position Received: Up in chair  Preferred Learning Style: verbal  General Comment: Pt. was agreeable to participate in skilled OT treatment  Precautions:     Vital Signs:     Pain:  Pain Assessment  Pain Assessment: 0-10  Pain Score: 0 - No pain    Objective         Activities of Daily Living: Grooming  Grooming Level of Assistance: Setup  Grooming Where Assessed:  Chair    UE Bathing  UE Bathing Level of Assistance: Setup  UE Bathing Where Assessed: Other (Comment) (seated in bedside chair)    LE Bathing  LE Bathing Level of Assistance: Minimum assistance  LE Bathing Where Assessed: Other (Comment)  LE Bathing Comments:  (Pt. required assist with BLE)    LE Dressing  Adult Briefs Level of Assistance: Contact guard  LE Dressing Where Assessed: Chair  LE Dressing Comments: Pt. was able to thread BLE into pants this date and manage clothing over hips in standing  Functional Standing Tolerance:  Time: 2 minutes  Activity: front/back britni hygiene during sponge bathing tasks  Bed Mobility/Transfers: Transfer 1  Technique 1: Sit to stand  Transfer Device 1: Walker  Transfer Level of Assistance 1: Close supervision      Outcome Measures:[facility] Daily Activity  Putting on and taking off regular lower body clothing: A little  Bathing (including washing, rinsing, drying): A little  Putting on and taking off regular upper body clothing: A little  Toileting, which includes using toilet, bedpan or urinal: A little  Taking care of personal grooming such as brushing teeth: None  Eating Meals: A little  Daily Activity - Total Score: 19        Education Documentation  ADL Training, taught by Lubna Powell, OT at 1/13/2024  2:55 PM.  Learner: Patient  Readiness: Acceptance  Method: Explanation  Response: Verbalizes Understanding    Education Comments  No comments found.        OP EDUCATION:       Goals:  Encounter Problems       Encounter Problems (Active)       OT Goals       ADLs (Progressing)       Start:  01/04/24    Expected End:  01/25/24       Patient will complete bathing, dressing, and toileting tasks with setup/supervision assist using adaptive aides as needed.         Functional transfers (Progressing)       Start:  01/04/24    Expected End:  01/25/24       Patient will complete bed, toilet, and chair transfers at a modified independent level from elevated seat heights and using  bilateral arm supports as needed.         Impaired activity tolerance (Progressing)       Start:  01/04/24    Expected End:  01/25/24       Patient will tolerate 20 minutes of therapeutic activity in order to increase activity tolerance needed for ADLs.

## 2024-01-13 NOTE — CARE PLAN
The patient's goals for the shift include      The clinical goals for the shift include rest    Problem: Fall/Injury  Goal: Not fall by end of shift  Outcome: Progressing  Goal: Be free from injury by end of the shift  Outcome: Progressing  Goal: Verbalize understanding of personal risk factors for fall in the hospital  Outcome: Progressing  Goal: Verbalize understanding of risk factor reduction measures to prevent injury from fall in the home  Outcome: Progressing  Goal: Use assistive devices by end of the shift  Outcome: Progressing  Goal: Pace activities to prevent fatigue by end of the shift  Outcome: Progressing     Problem: Respiratory  Goal: Clear secretions with interventions this shift  Outcome: Progressing  Goal: Minimize anxiety/maximize coping throughout shift  Outcome: Progressing  Goal: No signs of respiratory distress (eg. Use of accessory muscles. Peds grunting)  Outcome: Progressing  Goal: Increase self care and/or family involvement in next 24 hours  Outcome: Progressing     Problem: Skin  Goal: Decreased wound size/increased tissue granulation at next dressing change  Outcome: Progressing  Goal: Participates in plan/prevention/treatment measures  Outcome: Progressing     Problem: Pain - Adult  Goal: Verbalizes/displays adequate comfort level or baseline comfort level  Outcome: Progressing     Problem: Safety - Adult  Goal: Free from fall injury  Outcome: Progressing     Problem: Discharge Planning  Goal: Discharge to home or other facility with appropriate resources  Outcome: Progressing     Problem: Chronic Conditions and Co-morbidities  Goal: Patient's chronic conditions and co-morbidity symptoms are monitored and maintained or improved  Outcome: Progressing     Problem: Pain  Goal: Takes deep breaths with improved pain control throughout the shift  Outcome: Progressing  Goal: Turns in bed with improved pain control throughout the shift  Outcome: Progressing     Problem: Heart Failure  Goal:  Reduction in peripheral edema within 24 hours  Outcome: Progressing  Goal: Weight from fluid excess reduced over 2-3 days, then stabilize  Outcome: Progressing

## 2024-01-14 LAB
ALBUMIN SERPL-MCNC: 3.9 G/DL (ref 3.5–5)
ANION GAP SERPL CALC-SCNC: 11 MMOL/L
BUN SERPL-MCNC: 25 MG/DL (ref 8–25)
CALCIUM SERPL-MCNC: 10.2 MG/DL (ref 8.5–10.4)
CHLORIDE SERPL-SCNC: 101 MMOL/L (ref 97–107)
CO2 SERPL-SCNC: 26 MMOL/L (ref 24–31)
CREAT SERPL-MCNC: 1.2 MG/DL (ref 0.4–1.6)
EGFRCR SERPLBLD CKD-EPI 2021: 45 ML/MIN/1.73M*2
GLUCOSE SERPL-MCNC: 91 MG/DL (ref 65–99)
PHOSPHATE SERPL-MCNC: 3.7 MG/DL (ref 2.5–4.5)
POTASSIUM SERPL-SCNC: 4.1 MMOL/L (ref 3.4–5.1)
SODIUM SERPL-SCNC: 138 MMOL/L (ref 133–145)

## 2024-01-14 PROCEDURE — 80069 RENAL FUNCTION PANEL: CPT | Performed by: INTERNAL MEDICINE

## 2024-01-14 PROCEDURE — 2500000004 HC RX 250 GENERAL PHARMACY W/ HCPCS (ALT 636 FOR OP/ED): Performed by: INTERNAL MEDICINE

## 2024-01-14 PROCEDURE — 9420000001 HC RT PATIENT EDUCATION 5 MIN

## 2024-01-14 PROCEDURE — 1100000001 HC PRIVATE ROOM DAILY

## 2024-01-14 PROCEDURE — 94640 AIRWAY INHALATION TREATMENT: CPT

## 2024-01-14 PROCEDURE — 99231 SBSQ HOSP IP/OBS SF/LOW 25: CPT | Performed by: NURSE PRACTITIONER

## 2024-01-14 PROCEDURE — 94760 N-INVAS EAR/PLS OXIMETRY 1: CPT

## 2024-01-14 PROCEDURE — 2500000002 HC RX 250 W HCPCS SELF ADMINISTERED DRUGS (ALT 637 FOR MEDICARE OP, ALT 636 FOR OP/ED): Performed by: INTERNAL MEDICINE

## 2024-01-14 PROCEDURE — 2500000005 HC RX 250 GENERAL PHARMACY W/O HCPCS: Performed by: INTERNAL MEDICINE

## 2024-01-14 PROCEDURE — 36415 COLL VENOUS BLD VENIPUNCTURE: CPT | Performed by: INTERNAL MEDICINE

## 2024-01-14 PROCEDURE — 2500000001 HC RX 250 WO HCPCS SELF ADMINISTERED DRUGS (ALT 637 FOR MEDICARE OP): Performed by: NURSE PRACTITIONER

## 2024-01-14 PROCEDURE — 2500000001 HC RX 250 WO HCPCS SELF ADMINISTERED DRUGS (ALT 637 FOR MEDICARE OP): Performed by: INTERNAL MEDICINE

## 2024-01-14 RX ADMIN — BUDESONIDE INHALATION 0.5 MG: 0.5 SUSPENSION RESPIRATORY (INHALATION) at 19:48

## 2024-01-14 RX ADMIN — PRIMIDONE 100 MG: 50 TABLET ORAL at 15:49

## 2024-01-14 RX ADMIN — PRIMIDONE 100 MG: 50 TABLET ORAL at 22:18

## 2024-01-14 RX ADMIN — PATIROMER 8.4 G: 8.4 POWDER, FOR SUSPENSION ORAL at 06:26

## 2024-01-14 RX ADMIN — HEPARIN SODIUM 5000 UNITS: 5000 INJECTION, SOLUTION INTRAVENOUS; SUBCUTANEOUS at 02:01

## 2024-01-14 RX ADMIN — OXYBUTYNIN CHLORIDE 5 MG: 5 TABLET ORAL at 09:47

## 2024-01-14 RX ADMIN — HYDROCODONE BITARTRATE AND ACETAMINOPHEN 1 TABLET: 5; 325 TABLET ORAL at 22:17

## 2024-01-14 RX ADMIN — OXYBUTYNIN CHLORIDE 5 MG: 5 TABLET ORAL at 22:18

## 2024-01-14 RX ADMIN — HYDROCODONE BITARTRATE AND ACETAMINOPHEN 1 TABLET: 5; 325 TABLET ORAL at 09:48

## 2024-01-14 RX ADMIN — PRIMIDONE 100 MG: 50 TABLET ORAL at 09:47

## 2024-01-14 RX ADMIN — ALBUTEROL SULFATE 2.5 MG: 2.5 SOLUTION RESPIRATORY (INHALATION) at 19:47

## 2024-01-14 RX ADMIN — SIMVASTATIN 80 MG: 40 TABLET, FILM COATED ORAL at 22:18

## 2024-01-14 RX ADMIN — Medication: at 12:18

## 2024-01-14 RX ADMIN — HEPARIN SODIUM 5000 UNITS: 5000 INJECTION, SOLUTION INTRAVENOUS; SUBCUTANEOUS at 09:00

## 2024-01-14 RX ADMIN — DULOXETINE HYDROCHLORIDE 30 MG: 30 CAPSULE, DELAYED RELEASE PELLETS ORAL at 09:47

## 2024-01-14 RX ADMIN — DOCUSATE SODIUM 100 MG: 100 CAPSULE, LIQUID FILLED ORAL at 09:47

## 2024-01-14 RX ADMIN — HEPARIN SODIUM 5000 UNITS: 5000 INJECTION, SOLUTION INTRAVENOUS; SUBCUTANEOUS at 15:50

## 2024-01-14 RX ADMIN — CITALOPRAM HYDROBROMIDE 20 MG: 20 TABLET ORAL at 09:47

## 2024-01-14 RX ADMIN — MEROPENEM 500 MG: 500 INJECTION, POWDER, FOR SOLUTION INTRAVENOUS at 17:00

## 2024-01-14 RX ADMIN — DOCUSATE SODIUM 100 MG: 100 CAPSULE, LIQUID FILLED ORAL at 22:18

## 2024-01-14 RX ADMIN — PANTOPRAZOLE SODIUM 40 MG: 40 TABLET, DELAYED RELEASE ORAL at 09:47

## 2024-01-14 RX ADMIN — MEROPENEM 500 MG: 500 INJECTION, POWDER, FOR SOLUTION INTRAVENOUS at 02:00

## 2024-01-14 RX ADMIN — Medication 3 MG: at 22:18

## 2024-01-14 RX ADMIN — ALBUTEROL SULFATE 2.5 MG: 2.5 SOLUTION RESPIRATORY (INHALATION) at 12:16

## 2024-01-14 RX ADMIN — ASPIRIN 81 MG: 81 TABLET, CHEWABLE ORAL at 09:47

## 2024-01-14 RX ADMIN — MEROPENEM 500 MG: 500 INJECTION, POWDER, FOR SOLUTION INTRAVENOUS at 10:02

## 2024-01-14 ASSESSMENT — COGNITIVE AND FUNCTIONAL STATUS - GENERAL
MOVING TO AND FROM BED TO CHAIR: A LITTLE
DAILY ACTIVITIY SCORE: 20
DRESSING REGULAR LOWER BODY CLOTHING: A LITTLE
MOVING FROM LYING ON BACK TO SITTING ON SIDE OF FLAT BED WITH BEDRAILS: A LITTLE
TOILETING: A LITTLE
HELP NEEDED FOR BATHING: A LITTLE
TOILETING: A LITTLE
MOBILITY SCORE: 17
DAILY ACTIVITIY SCORE: 20
CLIMB 3 TO 5 STEPS WITH RAILING: A LOT
DRESSING REGULAR LOWER BODY CLOTHING: A LITTLE
TURNING FROM BACK TO SIDE WHILE IN FLAT BAD: A LITTLE
WALKING IN HOSPITAL ROOM: A LITTLE
STANDING UP FROM CHAIR USING ARMS: A LITTLE
CLIMB 3 TO 5 STEPS WITH RAILING: A LOT
STANDING UP FROM CHAIR USING ARMS: A LITTLE
WALKING IN HOSPITAL ROOM: A LITTLE
TURNING FROM BACK TO SIDE WHILE IN FLAT BAD: A LITTLE
HELP NEEDED FOR BATHING: A LITTLE
MOVING FROM LYING ON BACK TO SITTING ON SIDE OF FLAT BED WITH BEDRAILS: A LITTLE
MOBILITY SCORE: 17
MOVING TO AND FROM BED TO CHAIR: A LITTLE
DRESSING REGULAR UPPER BODY CLOTHING: A LITTLE
DRESSING REGULAR UPPER BODY CLOTHING: A LITTLE

## 2024-01-14 ASSESSMENT — PAIN SCALES - GENERAL
PAINLEVEL_OUTOF10: 6
PAINLEVEL_OUTOF10: 3

## 2024-01-14 NOTE — PROGRESS NOTES
Acute kidney injury resolved, creatinine back to baseline.  Potassium remains normal and stable, decrease Veltassa frequency to 3 days a week rather than daily. Continue low potassium diet. Will sign off at this time, please call back with any questions, thank you. Patient can follow-up with us with a renal function panel in 1 week and office visit in 2 weeks.     Jemma Gamez MD

## 2024-01-14 NOTE — CARE PLAN
The patient's goals for the shift include  pain control    The clinical goals for the shift include control pain

## 2024-01-14 NOTE — PROGRESS NOTES
"Juli Del Castillo is a 83 y.o. female on day 10 of admission presenting with Fall, initial encounter.    Subjective   Patient seen and examined.  No documented concerns/events overnight from nursing.  Afebrile overnight.  Denies chills. No complaints voiced       Objective     Physical Exam  Constitutional:       General: She is not in acute distress.     Appearance: She is ill-appearing. She is not toxic-appearing.   Cardiovascular:      Rate and Rhythm: Normal rate and regular rhythm.      Pulses: Normal pulses.      Heart sounds: Normal heart sounds.   Pulmonary:      Effort: No respiratory distress.      Breath sounds: Diminished  Abdominal:      General: Bowel sounds are normal.      Palpations: Abdomen is soft.   Skin:     General: Skin is warm and dry.   Neurological:      General: No focal deficit present.      Mental Status: She is alert and oriented to person, place, and time.   Psychiatric:         Mood and Affect: Mood normal.         Behavior: Behavior normal.        Last Recorded Vitals  Blood pressure (!) 115/43, pulse 72, temperature 36.6 °C (97.9 °F), temperature source Oral, resp. rate 16, height 1.626 m (5' 4\"), weight 82.9 kg (182 lb 12.2 oz), SpO2 95 %.  Intake/Output last 3 Shifts:  I/O last 3 completed shifts:  In: 650 (7.8 mL/kg) [P.O.:350; IV Piggyback:300]  Out: 2153 (26 mL/kg) [Urine:2150 (0.7 mL/kg/hr); Stool:3]  Weight: 82.9 kg     Relevant Results  Scheduled medications  albuterol, 2.5 mg, nebulization, q6h while awake  aspirin, 81 mg, oral, Daily  bisacodyl, 10 mg, rectal, Daily  budesonide, 0.5 mg, nebulization, BID  citalopram, 20 mg, oral, Daily  docusate sodium, 100 mg, oral, BID  DULoxetine, 30 mg, oral, Daily  heparin (porcine), 5,000 Units, subcutaneous, q8h  meropenem, 500 mg, intravenous, q8h  oxybutynin, 5 mg, oral, BID  pantoprazole, 40 mg, oral, Daily before breakfast  [START ON 1/15/2024] patiromer calcium sorbitex, 8.4 g, oral, Once per day on Mon Wed Fri  primidone, 100 mg, " oral, TID  simvastatin, 80 mg, oral, Nightly      Continuous medications     PRN medications  PRN medications: acetaminophen **OR** acetaminophen **OR** acetaminophen, benzocaine-menthol, bisacodyl, dextromethorphan-guaifenesin, glycerin, guaiFENesin, HYDROcodone-acetaminophen, ipratropium-albuteroL, melatonin, ondansetron, oxygen, polyethylene glycol                         Assessment/Plan   Principal Problem:    Fall, initial encounter  Active Problems:    Fibromyalgia    Acute congestive heart failure (CMS/[facility])    Hyperlipidemia    Major depressive disorder, single episode, unspecified    Overactive bladder    History of lung cancer    Chronic respiratory failure (CMS/[facility])    Chronic diastolic heart failure (CMS/[facility])    Accidental fall    Acute on chronic kidney failure (CMS/[facility])    Hyperkalemia    Falls  -CT brain no acute findings  -PT, OT recommend moderate intensity rehab  -Fall precaution     Acute on chronic renal failure  -Resolved  -Nephrology signed off, follow up in 2 weeks, RFP in 1 week  -Monitor  -Hold nephrotoxic agents  -Renally dose medications  -Renal ultrasound no acute finding     UTI  -Culture grew Pseudomonas aeruginosa  -ID following  -IV meropenem unitl 1/15/24  -repeat urine cx no growth  HTN  -Monitor blood pressure  -Hold lisinopril due to KYLEE     Chronic respiratory failure  -Oxygen as needed, wean as tolerated  -Wears baseline 3 L at home           Plan  Above plan discussed length with patient and she is in agreement  Discharge to SNF likely tomorrow after last dose of Merrem       I spent 25 minutes in the professional and overall care of this patient.      Sapphire Paula, APRN-CNP

## 2024-01-15 VITALS
HEIGHT: 64 IN | SYSTOLIC BLOOD PRESSURE: 151 MMHG | RESPIRATION RATE: 18 BRPM | BODY MASS INDEX: 31.2 KG/M2 | OXYGEN SATURATION: 100 % | WEIGHT: 182.76 LBS | HEART RATE: 67 BPM | TEMPERATURE: 98.2 F | DIASTOLIC BLOOD PRESSURE: 51 MMHG

## 2024-01-15 PROCEDURE — 97110 THERAPEUTIC EXERCISES: CPT | Mod: GO

## 2024-01-15 PROCEDURE — 2500000002 HC RX 250 W HCPCS SELF ADMINISTERED DRUGS (ALT 637 FOR MEDICARE OP, ALT 636 FOR OP/ED): Performed by: INTERNAL MEDICINE

## 2024-01-15 PROCEDURE — 97110 THERAPEUTIC EXERCISES: CPT | Mod: GP

## 2024-01-15 PROCEDURE — 2500000004 HC RX 250 GENERAL PHARMACY W/ HCPCS (ALT 636 FOR OP/ED): Performed by: INTERNAL MEDICINE

## 2024-01-15 PROCEDURE — 97535 SELF CARE MNGMENT TRAINING: CPT | Mod: GO

## 2024-01-15 PROCEDURE — 94640 AIRWAY INHALATION TREATMENT: CPT

## 2024-01-15 PROCEDURE — 97116 GAIT TRAINING THERAPY: CPT | Mod: GP

## 2024-01-15 PROCEDURE — 94760 N-INVAS EAR/PLS OXIMETRY 1: CPT

## 2024-01-15 PROCEDURE — 99232 SBSQ HOSP IP/OBS MODERATE 35: CPT | Performed by: NURSE PRACTITIONER

## 2024-01-15 PROCEDURE — 2500000001 HC RX 250 WO HCPCS SELF ADMINISTERED DRUGS (ALT 637 FOR MEDICARE OP): Performed by: INTERNAL MEDICINE

## 2024-01-15 PROCEDURE — 2500000001 HC RX 250 WO HCPCS SELF ADMINISTERED DRUGS (ALT 637 FOR MEDICARE OP): Performed by: NURSE PRACTITIONER

## 2024-01-15 RX ORDER — ACETAMINOPHEN 325 MG/1
650 TABLET ORAL EVERY 4 HOURS PRN
Qty: 30 TABLET | Refills: 0 | Status: SHIPPED | OUTPATIENT
Start: 2024-01-15

## 2024-01-15 RX ORDER — POLYETHYLENE GLYCOL 3350 17 G/17G
17 POWDER, FOR SOLUTION ORAL DAILY PRN
Start: 2024-01-15

## 2024-01-15 RX ORDER — HYDROCODONE BITARTRATE AND ACETAMINOPHEN 5; 325 MG/1; MG/1
1 TABLET ORAL 2 TIMES DAILY PRN
Qty: 6 TABLET | Refills: 0 | Status: SHIPPED | OUTPATIENT
Start: 2024-01-15

## 2024-01-15 RX ORDER — BISACODYL 5 MG
5 TABLET, DELAYED RELEASE (ENTERIC COATED) ORAL DAILY PRN
Qty: 30 TABLET | Refills: 0
Start: 2024-01-15

## 2024-01-15 RX ADMIN — DULOXETINE HYDROCHLORIDE 30 MG: 30 CAPSULE, DELAYED RELEASE PELLETS ORAL at 08:23

## 2024-01-15 RX ADMIN — ALBUTEROL SULFATE 2.5 MG: 2.5 SOLUTION RESPIRATORY (INHALATION) at 13:00

## 2024-01-15 RX ADMIN — CITALOPRAM HYDROBROMIDE 20 MG: 20 TABLET ORAL at 08:23

## 2024-01-15 RX ADMIN — PANTOPRAZOLE SODIUM 40 MG: 40 TABLET, DELAYED RELEASE ORAL at 08:23

## 2024-01-15 RX ADMIN — DOCUSATE SODIUM 100 MG: 100 CAPSULE, LIQUID FILLED ORAL at 08:23

## 2024-01-15 RX ADMIN — PATIROMER 8.4 G: 8.4 POWDER, FOR SUSPENSION ORAL at 12:02

## 2024-01-15 RX ADMIN — OXYBUTYNIN CHLORIDE 5 MG: 5 TABLET ORAL at 08:23

## 2024-01-15 RX ADMIN — HEPARIN SODIUM 5000 UNITS: 5000 INJECTION, SOLUTION INTRAVENOUS; SUBCUTANEOUS at 01:19

## 2024-01-15 RX ADMIN — MEROPENEM 500 MG: 500 INJECTION, POWDER, FOR SOLUTION INTRAVENOUS at 02:45

## 2024-01-15 RX ADMIN — HYDROCODONE BITARTRATE AND ACETAMINOPHEN 1 TABLET: 5; 325 TABLET ORAL at 08:22

## 2024-01-15 RX ADMIN — ASPIRIN 81 MG: 81 TABLET, CHEWABLE ORAL at 08:23

## 2024-01-15 RX ADMIN — PRIMIDONE 100 MG: 50 TABLET ORAL at 08:23

## 2024-01-15 RX ADMIN — MEROPENEM 500 MG: 500 INJECTION, POWDER, FOR SOLUTION INTRAVENOUS at 10:26

## 2024-01-15 RX ADMIN — PRIMIDONE 100 MG: 50 TABLET ORAL at 15:18

## 2024-01-15 ASSESSMENT — COGNITIVE AND FUNCTIONAL STATUS - GENERAL
MOBILITY SCORE: 17
CLIMB 3 TO 5 STEPS WITH RAILING: A LOT
TOILETING: A LITTLE
DRESSING REGULAR LOWER BODY CLOTHING: A LITTLE
CLIMB 3 TO 5 STEPS WITH RAILING: A LOT
MOVING TO AND FROM BED TO CHAIR: A LITTLE
STANDING UP FROM CHAIR USING ARMS: A LITTLE
DRESSING REGULAR LOWER BODY CLOTHING: A LITTLE
TOILETING: A LITTLE
HELP NEEDED FOR BATHING: A LITTLE
DRESSING REGULAR UPPER BODY CLOTHING: A LITTLE
MOVING FROM LYING ON BACK TO SITTING ON SIDE OF FLAT BED WITH BEDRAILS: A LITTLE
MOBILITY SCORE: 17
HELP NEEDED FOR BATHING: A LITTLE
WALKING IN HOSPITAL ROOM: A LITTLE
DAILY ACTIVITIY SCORE: 18
WALKING IN HOSPITAL ROOM: A LITTLE
MOVING TO AND FROM BED TO CHAIR: A LITTLE
DRESSING REGULAR UPPER BODY CLOTHING: A LITTLE
TURNING FROM BACK TO SIDE WHILE IN FLAT BAD: A LITTLE
MOVING FROM LYING ON BACK TO SITTING ON SIDE OF FLAT BED WITH BEDRAILS: A LITTLE
PERSONAL GROOMING: A LITTLE
STANDING UP FROM CHAIR USING ARMS: A LITTLE
DAILY ACTIVITIY SCORE: 20
EATING MEALS: A LITTLE
TURNING FROM BACK TO SIDE WHILE IN FLAT BAD: A LITTLE

## 2024-01-15 ASSESSMENT — PAIN SCALES - GENERAL
PAINLEVEL_OUTOF10: 0 - NO PAIN
PAINLEVEL_OUTOF10: 6
PAINLEVEL_OUTOF10: 4
PAINLEVEL_OUTOF10: 9

## 2024-01-15 ASSESSMENT — ACTIVITIES OF DAILY LIVING (ADL)
BATHING_WHERE_ASSESSED: STANDING SINKSIDE
HOME_MANAGEMENT_TIME_ENTRY: 12
BATHING_LEVEL_OF_ASSISTANCE: MINIMUM ASSISTANCE

## 2024-01-15 ASSESSMENT — PAIN - FUNCTIONAL ASSESSMENT
PAIN_FUNCTIONAL_ASSESSMENT: 0-10
PAIN_FUNCTIONAL_ASSESSMENT: FLACC (FACE, LEGS, ACTIVITY, CRY, CONSOLABILITY)

## 2024-01-15 ASSESSMENT — PAIN DESCRIPTION - DESCRIPTORS: DESCRIPTORS: ACHING

## 2024-01-15 NOTE — PROGRESS NOTES
"Juli Del Castillo is a 83 y.o. female on day 11 of admission presenting with Fall, initial encounter.    Subjective   No overnight issues. Wants to go to SNF.      Objective     Physical Exam  HENT:      Head: Normocephalic and atraumatic.      Nose: Nose normal.      Mouth/Throat:      Mouth: Mucous membranes are moist.      Pharynx: Oropharynx is clear.   Eyes:      Extraocular Movements: Extraocular movements intact.      Pupils: Pupils are equal, round, and reactive to light.   Cardiovascular:      Rate and Rhythm: Normal rate and regular rhythm.      Pulses: Normal pulses.   Pulmonary:      Effort: Pulmonary effort is normal.      Breath sounds: Normal breath sounds.   Abdominal:      General: Abdomen is flat. Bowel sounds are normal.      Palpations: Abdomen is soft.   Musculoskeletal:         General: Normal range of motion.   Skin:     General: Skin is warm and dry.      Capillary Refill: Capillary refill takes less than 2 seconds.   Neurological:      General: No focal deficit present.      Mental Status: She is oriented to person, place, and time.   Psychiatric:         Mood and Affect: Mood normal.           Last Recorded Vitals  Blood pressure 151/51, pulse 67, temperature 36.8 °C (98.2 °F), temperature source Oral, resp. rate 18, height 1.626 m (5' 4\"), weight 82.9 kg (182 lb 12.2 oz), SpO2 100 %.  Intake/Output last 3 Shifts:  I/O last 3 completed shifts:  In: 100 (1.2 mL/kg) [IV Piggyback:100]  Out: 1800 (21.7 mL/kg) [Urine:1800 (0.6 mL/kg/hr)]  Weight: 82.9 kg     Relevant Results  No results found for this or any previous visit (from the past 24 hour(s)).         Assessment/Plan   Falls/Asthenia  -CT brain no acute findings  -PT, OT recommend moderate intensity rehab  -Fall precautions     Acute on chronic renal failure  -Resolved  -Nephrology signed off, follow up in 2 weeks, RFP in 1 week  -Monitor  -Hold nephrotoxic agents  -Renally dose medications  -Renal ultrasound no acute finding   "   UTI  -Culture grew Pseudomonas aeruginosa  -ID following  -IV meropenem unitl 1/15/24  -Repeat urine cx no growth    HTN  -Hold lisinopril due to KYLEE  -BP stable, monitor      Chronic respiratory failure  -Oxygen as needed, wean as tolerated  -Wears baseline 3 L at home      Plan  To SNF once precert obtained      Stefanie Chong, BRYANNA-CNP

## 2024-01-15 NOTE — PROGRESS NOTES
Occupational Therapy    Occupational Therapy Treatment    Name: Juli Del Castillo  MRN: [id number]  : 1940  Date: 01/15/24  Time Calculation  Start Time: 1056  Stop Time: 1120  Time Calculation (min): 24 min    Assessment:  End of Session Communication: Bedside nurse  End of Session Patient Position: Up in chair  Plan:  Treatment Interventions: ADL retraining, Functional transfer training, UE strengthening/ROM, Endurance training, Cognitive reorientation, Patient/family training, Equipment evaluation/education, Compensatory technique education  OT Frequency: 3 times per week  OT Discharge Recommendations: Moderate intensity level of continued care  Equipment Recommended upon Discharge: Wheeled walker  OT Recommended Transfer Status: Assist of 1, Minimal assist  OT - OK to Discharge: Yes    Subjective   Previous Visit Info:  OT Last Visit  OT Received On: 01/15/24  General:  General  Reason for Referral: Decreased ADLs  Referred By: Dr. Monge  Past Medical History Relevant to Rehab: fibromyalgia, HTN, CHF, HLD, anxiety, Depression, atrial fib, elevated BNP, CHF  Family/Caregiver Present: Yes  Caregiver Feedback: son and DIL present at start of session  Prior to Session Communication: Bedside nurse  Patient Position Received: Up in chair  Preferred Learning Style: verbal  General Comment: Cleared by nsg, pt met OOB in chair, agreeable to OT session.    Precautions:  Medical Precautions: Fall precautions, Oxygen therapy device and L/min (on 3L O2 via NC)    Pain Assessment:  Pain Assessment  Pain Assessment: 0-10  Pain Score: 0 - No pain     Objective   Activities of Daily Living:    Grooming  Grooming Level of Assistance: Close supervision  Grooming Where Assessed: Standing sinkside  Grooming Comments: standing sinkside oral care and hand hygiene, tolerates dynamic standing for ~3 minutes with fair+ tolerance, cued for walker mgmt and safety techniques    LE Bathing  LE Bathing Level of Assistance: Minimum  assistance  LE Bathing Where Assessed: Standing sinkside  LE Bathing Comments: completes proximal LE bathing with set up assist while seated, requires min A in stance for clothing mgmt in order to complete britni area and posterior hygiene acts    Toileting  Toileting Level of Assistance: Minimum assistance  Where Assessed: Toilet  Toileting Comments: min A for brief mgmt in standing phase    Bed Mobility/Transfers:   Bed Mobility  Bed Mobility: No    Transfers  Transfer: Yes  Transfer 1  Technique 1: Sit to stand, Stand to sit  Transfer Device 1: Walker  Transfer Level of Assistance 1: Contact guard  Trials/Comments 1: STS from bedside chair with cues for hand placement and walker mgmt, CGA for stability  Transfers 2  Technique 2:  (functional mobility)  Transfer Device 2: Walker  Transfer Level of Assistance 2: Contact guard  Trials/Comments 2: ambulates short household distance mobility to/from restroom with FWW and CGA for stability, cued for walker mgmt    Toilet Transfers  Toilet Transfer From: Walker  Toilet Transfer Type: To and from  Toilet Transfer to: Standard toilet  Toilet Transfer Technique: Ambulating  Toilet Transfers: Supervision  Toilet Transfers Comments: cues for hand placement and proper grab bar use    Therapy/Activity: Therapeutic Exercise  Therapeutic Exercise Performed: Yes  Therapeutic Exercise Activity 1: 1 set x 10 reps shoulder flexion/extension to pt's available ROM  Therapeutic Exercise Activity 2: 1 set x 15 reps elbow flexion/extension with mod tactile cues to faciliate proper joint alignment and pacing  Therapeutic Exercise Activity 3: 1 set x 10 reps scapular retraction/protraction cues for pacing and joint alignment    Outcome Measures:  [facility] Daily Activity  Putting on and taking off regular lower body clothing: A little  Bathing (including washing, rinsing, drying): A little  Putting on and taking off regular upper body clothing: A little  Toileting, which includes using toilet,  bedpan or urinal: A little  Taking care of personal grooming such as brushing teeth: A little  Eating Meals: A little  Daily Activity - Total Score: 18      Education Documentation  No documentation found.  Education Comments  No comments found.      Goals:  Encounter Problems       Encounter Problems (Active)       OT Goals       ADLs (Progressing)       Start:  01/04/24    Expected End:  01/25/24       Patient will complete bathing, dressing, and toileting tasks with setup/supervision assist using adaptive aides as needed.         Functional transfers (Progressing)       Start:  01/04/24    Expected End:  01/25/24       Patient will complete bed, toilet, and chair transfers at a modified independent level from elevated seat heights and using bilateral arm supports as needed.         Impaired activity tolerance (Progressing)       Start:  01/04/24    Expected End:  01/25/24       Patient will tolerate 20 minutes of therapeutic activity in order to increase activity tolerance needed for ADLs.

## 2024-01-15 NOTE — PROGRESS NOTES
"Juli Del Castillo is a 83 y.o. female on day 11 of admission presenting with Fall, initial encounter.    Subjective   TCSW will resubmit precert to [facility]. Upon receipt of precert, pt will be medically ready.        Objective     Physical Exam    Last Recorded Vitals  Blood pressure 151/51, pulse 67, temperature 36.8 °C (98.2 °F), temperature source Oral, resp. rate 18, height 1.626 m (5' 4\"), weight 82.9 kg (182 lb 12.2 oz), SpO2 100 %.  Intake/Output last 3 Shifts:  I/O last 3 completed shifts:  In: 100 (1.2 mL/kg) [IV Piggyback:100]  Out: 1800 (21.7 mL/kg) [Urine:1800 (0.6 mL/kg/hr)]  Weight: 82.9 kg     Relevant Results                             Assessment/Plan   Principal Problem:    Fall, initial encounter  Active Problems:    Fibromyalgia    Acute congestive heart failure (CMS/HCC)    Hyperlipidemia    Major depressive disorder, single episode, unspecified    Overactive bladder    History of lung cancer    Chronic respiratory failure (CMS/HCC)    Chronic diastolic heart failure (CMS/HCC)    Accidental fall    Acute on chronic kidney failure (CMS/HCC)    Hyperkalemia               SHANE Russo      "

## 2024-01-15 NOTE — PROGRESS NOTES
Juli Del Castillo is a 83 y.o. female on day 11 of admission presenting with Fall, initial encounter.    Subjective   Interval History:   Awake, alert  No dysuria  Afebrile, no chills  Denies chest pain or shortness of breath        Objective   Range of Vitals (last 24 hours)  Heart Rate:  []   Temp:  [36.7 °C (98.1 °F)-36.8 °C (98.2 °F)]   Resp:  [16-18]   BP: (110-151)/(34-77)   SpO2:  [100 %]   Daily Weight  01/03/24 : 82.9 kg (182 lb 12.2 oz)    Body mass index is 31.37 kg/m².    Physical Exam  Constitutional:       Appearance: Normal appearance.   HENT:      Head: Normocephalic and atraumatic.      Nose: Nose normal.      Mouth/Throat:      Mouth: Mucous membranes are moist.      Pharynx: Oropharynx is clear.   Eyes:      Extraocular Movements: Extraocular movements intact.      Conjunctiva/sclera: Conjunctivae normal.   Cardiovascular:      Rate and Rhythm: Normal rate and regular rhythm.   Pulmonary:      Effort: Pulmonary effort is normal.      Breath sounds: Normal breath sounds.   Abdominal:      General: Bowel sounds are normal.      Palpations: Abdomen is soft.      Tenderness: There is no abdominal tenderness.   Musculoskeletal:         General: No swelling. Normal range of motion.      Cervical back: Normal range of motion and neck supple.   Skin:     General: Skin is warm and dry.   Neurological:      General: No focal deficit present.      Mental Status: She is alert and oriented to person, place, and time.   Psychiatric:         Mood and Affect: Mood normal.         Behavior: Behavior normal.     Antibiotics  sodium chloride 0.9 % bolus 500 mL  albuterol 2.5 mg /3 mL (0.083 %) nebulizer solution 2.5 mg  aspirin chewable tablet 81 mg  budesonide (Pulmicort) 0.5 mg/2 mL nebulizer solution 0.5 mg  citalopram (CeleXA) tablet 20 mg  DULoxetine (Cymbalta) DR capsule 30 mg  pantoprazole (ProtoNix) EC tablet 40 mg  oxybutynin (Ditropan) tablet 5 mg  oxygen (O2) therapy  primidone (Mysoline) tablet 100  mg  simvastatin (Zocor) tablet 80 mg  torsemide (Demadex) tablet 20 mg  heparin (porcine) injection 5,000 Units  acetaminophen (Tylenol) tablet 650 mg  acetaminophen (Tylenol) oral liquid 650 mg  acetaminophen (Tylenol) suppository 650 mg  ondansetron (Zofran) tablet 4 mg  ondansetron (Zofran) injection 4 mg  melatonin tablet 3 mg  polyethylene glycol (Glycolax, Miralax) packet 17 g  benzocaine-menthol (Cepastat Sore Throat) 15-3.6 mg lozenge 1 lozenge  dextromethorphan-guaifenesin (Robitussin DM)  mg/5 mL oral liquid 5 mL  guaiFENesin (Mucinex) 12 hr tablet 600 mg  ipratropium-albuteroL (Duo-Neb) 0.5-2.5 mg/3 mL nebulizer solution 3 mL  sodium chloride 0.9% infusion  HYDROcodone-acetaminophen (Norco) 5-325 mg per tablet 1 tablet  cefTAZidime (Fortaz) 1 g in dextrose 5 % in water (D5W) 50 mL IV  cefTAZidime (Fortaz) 1 g in dextrose 5 % in water (D5W) 50 mL IV  bisacodyl (Dulcolax) suppository 10 mg  bisacodyl (Dulcolax) EC tablet 5 mg  ondansetron (Zofran) injection 4 mg  docusate sodium (Colace) capsule 100 mg  glycerin (Adult) 2 g suppository 1 suppository  polyethylene glycol (Glycolax, Miralax) packet 17 g  patiromer calcium sorbitex (Veltassa) packet 8.4 g  meropenem (Merrem) in sodium chloride 0.9 % 100 mL 500 mg  meropenem (Merrem) in sodium chloride 0.9 % 100 mL 500 mg      Relevant Results  Labs      Results from last 72 hours   Lab Units 01/14/24  1001   SODIUM mmol/L 138   POTASSIUM mmol/L 4.1   CHLORIDE mmol/L 101   CO2 mmol/L 26   BUN mg/dL 25   CREATININE mg/dL 1.20   GLUCOSE mg/dL 91   CALCIUM mg/dL 10.2   ANION GAP mmol/L 11   EGFR mL/min/1.73m*2 45*   PHOSPHORUS mg/dL 3.7       Results from last 72 hours   Lab Units 01/14/24  1001   ALBUMIN g/dL 3.9     Estimated Creatinine Clearance: 36.4 mL/min (by C-G formula based on SCr of 1.2 mg/dL).  CRP   Date Value Ref Range Status   02/15/2019 0.3 0 - 2.0 MG/DL Final     Comment:     [facility] [place] [address] [facility] [address]      Microbiology  Susceptibility data from last 14 days.  Collected Specimen Info Organism Aztreonam Cefepime Ceftazidime Ciprofloxacin Gentamicin Meropenem Piperacillin/Tazobactam Tobramycin   01/05/24 Urine from Clean Catch/Voided Pseudomonas aeruginosa R I R S S S R S         Imaging  XR chest 2 views    Result Date: 1/8/2024  Interpreted By:  Rafael Ames, STUDY: XR CHEST 2 VIEWS; 1/8/2024 6:39 pm   INDICATION: Hypoxia;   COMPARISON: January 3, 2024   ACCESSION NUMBER(S): [id number]   ORDERING CLINICIAN: PAULA WAN   TECHNIQUE: Views: AP and Lateral   FINDINGS: RESULTS: Midline sternotomy wires and mediastinal surgical clips are noted. Right hilar surgical clips are again noted. The cardiac silhouette is within normal limits. There are calcifications involving the thoracic aorta. Left basilar opacity obscures the left hemidiaphragm. There is blunting of the right costophrenic angle. There is diffuse interstitial thickening. The osseous structures are unremarkable.       Findings suggesting pulmonary edema     Signed by: Rafael Ames 1/8/2024 7:35 PM Dictation workstation:   [id number]    ECG 12 lead    Result Date: 1/4/2024  Normal sinus rhythm with sinus arrhythmia Inferior infarct (cited on or before 13-DEC-2023) Abnormal ECG When compared with ECG of 13-DEC-2023 01:02, No significant change was found Confirmed by Luan Monge ([id number]) on 1/4/2024 12:44:09 PM    US renal complete    Result Date: 1/4/2024  Interpreted By:  Tayler Trujillo, STUDY: US RENAL COMPLETE; 1/4/2024 12:11 pm   INDICATION: Signs/Symptoms:Acute kidney injury superimposed upon chronic kidney disease. Hyperkalemia.   COMPARISON: 12/14/2023   ACCESSION NUMBER(S): [id number]   ORDERING CLINICIAN: JENNY YU   TECHNIQUE: Grayscale and color Doppler imaging of the kidneys.   FINDINGS: The right kidney measures 9.5 cm x 6.1 cm x 4.7 cm. There is a 1.1 x 1.4 x 1.4 cm right renal cyst. The left kidney measures 11.0 cm x 4.8 cm x 5.1  cm. There is a 1.7 x 2.3 x 2.4 cm left renal cyst. There is no shadowing calculus, hydronephrosis, or solid mass identified. The renal cortical thickness and echogenicity is normal.   Cursory evaluation of the urinary bladder shows no evidence for mass, wall thickening, or calculus.       Simple bilateral renal cyst noted.   No hydronephrosis or renal atrophy.   MACRO: None.   Signed by: Tayler Trujillo 1/4/2024 12:19 PM Dictation workstation:   [id number]    CT head wo IV contrast    Result Date: 1/3/2024  STUDY: CT Head without IV Contrast; 1/3/2024 at 11:03 PM INDICATION: Fall. COMPARISON: 12/12/2023. TECHNIQUE: Unenhanced CT scan of the brain. Automated mA/kV exposure control was utilized and patient examination was performed in strict accordance with principles of ALARA. FINDINGS: Encephalomalacia of the right frontal and parietal lobes are again noted.  Periventricular white matter hypodensities are noted consistent with areas of prior microvascular insults.  An unchanged 5 mm low-density areas noted in the right thalamus consistent with a prior lacunar infarction.  Otherwise, the gray-white differentiation is normal. There is no shift of the midline. No abnormal masses, mass effect, fluid collections, or recent hemorrhages are seen.  The gray-white differentiation is normal. The visualized portions of the paranasal sinuses and mastoid air cells are clear. The bony structures are normal.    No acute intracerebral changes. No significant change. 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 White matter changes consistent with areas of prior microvascular insults. Signed by Phillip Jensen MD    XR chest 2 views    Result Date: 1/3/2024  STUDY: Chest Radiographs;  1/3/24 at 10:55pm INDICATION: Fall. COMPARISON: 10/17/23 XR Chest ACCESSION NUMBER(S): [id number] ORDERING CLINICIAN: VIRY ROBERT TECHNIQUE:  Frontal and lateral chest. FINDINGS: CARDIOMEDIASTINAL SILHOUETTE: Cardiomediastinal silhouette is normal in size and  configuration. Calcified plaque is seen in the aorta.  LUNGS: Increased interstitial markings are seen bilaterally.  Slightly prominent right hilum is noted similar to prior studies with surgical clips in the region of the right hilum suggesting chronic postoperative change.  Flattening of hemidiaphragms may indicate chronic changes of COPD.  ABDOMEN: No remarkable upper abdominal findings.  BONES: No acute osseous changes.  Median sternotomy changes are noted.    Increased interstitial markings bilaterally, likely mild pulmonary vascular congestion with a trace right pleural effusion and suspected chronic changes of COPD. Stable chronic postoperative change in the region of the right hilum. Signed by Abdirashid Panda     Assessment/Plan   Acute on chronic renal failure-nephrology on consult, improving  Pseudomonas urinary tract infection-repeat cultures negative  History of pseudomonas UTI        Continue meropenem-up to 7 days-started 1/8/2024-discontinue 1/15/2024  Monitor WBC and temperature  Monitor renal function  Nephrology following  Discontinue planning    Malena Milner, BRYANNA-CNP

## 2024-01-15 NOTE — PROGRESS NOTES
Physical Therapy    Physical Therapy Treatment    Patient Name: Juli Del Castillo  MRN: [id number]  Today's Date: 1/15/2024  Time Calculation  Start Time: 1117  Stop Time: 1140  Time Calculation (min): 23 min       Assessment/Plan   PT Assessment  PT Assessment Results: Decreased strength, Decreased endurance, Impaired balance, Decreased mobility, Impaired hearing  Rehab Prognosis: Good  Medical Staff Made Aware: Yes  End of Session Communication: Bedside nurse  End of Session Patient Position: Up in chair    Assessment Comment: Able to tolerate ambulation trials with wheeled walker and seated rest break due to decreased activity tolerance. Progressing well. Lives alone at home.    PT Plan  Inpatient/Swing Bed or Outpatient: Inpatient  PT Plan  Treatment/Interventions: Transfer training, Gait training, Balance training, Strengthening, Endurance training  PT Plan: Skilled PT  PT Frequency: 4 times per week  PT Discharge Recommendations: Moderate intensity level of continued care  Equipment Recommended upon Discharge: Wheeled walker  PT Recommended Transfer Status: Assist x1, Assistive device  PT - OK to Discharge: Yes      General Visit Information:   PT  Visit  PT Received On: 01/15/24  General  Reason for Referral: impaired mobility  Prior to Session Communication: Bedside nurse  Patient Position Received: Up in chair  Preferred Learning Style: verbal  General Comment: Pt sitting up in chair with OT at bedside, upon PT arrival. Pt agreeable to particpate. PIV, 3 L O2.    Subjective   Precautions:  Precautions  Medical Precautions: Fall precautions, Oxygen therapy device and L/min  Vital Signs:       Objective   Pain:  Pain Assessment  Pain Assessment: FLACC (Face, Legs, Activity, Cry, Consolability)  Pain Score: 4  Pain Type: Chronic pain  Pain Location: Neck  Cognition:  Cognition  Orientation Level: Oriented X4  Postural Control:  Static Standing Balance  Static Standing-Level of Assistance: Close supervision  Static  Standing-Comment/Number of Minutes: UE support of walker  Dynamic Standing Balance  Dynamic Standing-Comments: CGA with UE support of walker during ambulation. No major LOB.  Extremity/Trunk Assessments:    Activity Tolerance:  Activity Tolerance  Endurance: Decreased tolerance for upright activites  Activity Tolerance Comments: instructed on pursed-lip breathing during activities  Treatments:  Therapeutic Exercise  Therapeutic Exercise Activity 1: B ankle pumps, x 15 B LAQs, x 15 B seeted marches.         Bed Mobility  Bed Mobility:  (n/a; pt up in chair pre/post PT session)    Ambulation/Gait Training 1  Surface 1: Level tile  Device 1: Rolling walker  Assistance 1: Close supervision  Quality of Gait 1:  (decreased shreyas, minimal foot clearance. Occasionally required assist iwth walker management, as pt used to rollator.)  Comments/Distance (ft) 1: about 50 ft x 2 trials with seated rest break in between trials.  Transfer 1  Technique 1: Sit to stand, Stand to sit  Transfer Device 1: Walker  Transfer Level of Assistance 1: Close supervision  Trials/Comments 1: x2 trials from low chair with use of arm rests for support         Outcome Measures:  [facility] Basic Mobility  Turning from your back to your side while in a flat bed without using bedrails: A little  Moving from lying on your back to sitting on the side of a flat bed without using bedrails: A little  Moving to and from bed to chair (including a wheelchair): A little  Standing up from a chair using your arms (e.g. wheelchair or bedside chair): A little  To walk in hospital room: A little  Climbing 3-5 steps with railing: A lot  Basic Mobility - Total Score: 17    Education Documentation  Body Mechanics, taught by Jami Salas PT at 1/15/2024 11:48 AM.  Learner: Patient  Readiness: Acceptance  Method: Explanation  Response: Verbalizes Understanding    Home Exercise Program, taught by Jami Salas PT at 1/15/2024 11:48 AM.  Learner: Patient  Readiness:  Acceptance  Method: Explanation  Response: Verbalizes Understanding    Mobility Training, taught by Jami Salas PT at 1/15/2024 11:48 AM.  Learner: Patient  Readiness: Acceptance  Method: Explanation  Response: Verbalizes Understanding    Education Comments  No comments found.        OP EDUCATION:       Encounter Problems       Encounter Problems (Active)       PT Goals       Patient will amb 100 feet with rolling walker device including two turns on even surface with independent assist to facilitate safe mobility.  (Progressing)       Start:  01/04/24    Expected End:  01/18/24            Patient will transfer sit to stand and stand to sit with  independent assist to facilitate mobility.  (Progressing)       Start:  01/04/24    Expected End:  01/18/24            Patient will improve standing dynamic balance to Good- for safety with standing activities with use of assistive device. (Progressing)       Start:  01/04/24    Expected End:  01/18/24            Patient will transition supine to sit and sit to supine mod Independent (Progressing)       Start:  01/04/24    Expected End:  01/18/24               Pain - Adult

## 2024-01-15 NOTE — PROGRESS NOTES
Juli Del Castillo is a 83 y.o. female on day 11 of admission presenting with Fall, initial encounter.     Precert Status: Approved  Formerly [facility]. ID: [id number]  Date: 01/15/2024 - 1/17/2024    PLAN: Discharge to [facility]. Precert approved.

## 2024-01-15 NOTE — DISCHARGE SUMMARY
Discharge Diagnosis  Fall, initial encounter    Issues Requiring Follow-Up      Discharge Meds     Your medication list        START taking these medications        Instructions Last Dose Given Next Dose Due   bisacodyl 5 mg EC tablet  Commonly known as: Dulcolax      Take 1 tablet (5 mg) by mouth once daily as needed for constipation. Do not crush, chew, or split.       HYDROcodone-acetaminophen 5-325 mg tablet  Commonly known as: Norco      Take 1 tablet by mouth 2 times a day as needed for moderate pain (4 - 6).       patiromer calcium sorbitex 8.4 gram powder in packet  Commonly known as: Veltassa  Start taking on: January 17, 2024      Take 8.4 g by mouth 3 times a week. Do not start before January 17, 2024.       polyethylene glycol 17 gram packet  Commonly known as: Glycolax, Miralax      Take 17 g by mouth once daily as needed (Constipation).              CHANGE how you take these medications        Instructions Last Dose Given Next Dose Due   acetaminophen 325 mg tablet  Commonly known as: Tylenol  What changed:   when to take this  reasons to take this      Take 2 tablets (650 mg) by mouth every 4 hours if needed for fever (temp greater than 38.0 C) (greater than or equal to 38 C).       albuterol 2.5 mg /3 mL (0.083 %) nebulizer solution  What changed: Another medication with the same name was removed. Continue taking this medication, and follow the directions you see here.      Take 3 mL (2.5 mg) by nebulization every 6 hours while awake.              CONTINUE taking these medications        Instructions Last Dose Given Next Dose Due   aspirin 81 mg chewable tablet      Chew 1 tablet (81 mg) once daily.       budesonide 0.5 mg/2 mL nebulizer solution  Commonly known as: Pulmicort      Take 2 mL (0.5 mg) by nebulization 2 times a day. Rinse mouth with water after use to reduce aftertaste and incidence of candidiasis. Do not swallow.       citalopram 20 mg tablet  Commonly known as: CeleXA      Take 1  tablet (20 mg) by mouth once daily.       DULoxetine 30 mg DR capsule  Commonly known as: Cymbalta      Take 1 capsule (30 mg) by mouth once daily.       Home Nebulizer Plus Sidestream device  Generic drug: nebulizer and compressor      use as directed with nebulizer treatments       omeprazole 40 mg DR capsule  Commonly known as: PriLOSEC           oxybutynin XL 5 mg 24 hr tablet  Commonly known as: Ditropan-XL      Take 1 tablet (5 mg) by mouth once daily.       oxygen gas therapy  Commonly known as: O2           primidone 50 mg tablet  Commonly known as: Mysoline      Take 2 tablets (100 mg) by mouth 3 times a day.       simvastatin 80 mg tablet  Commonly known as: Zocor      Take 1 tablet (80 mg) by mouth once daily at bedtime.              STOP taking these medications      ALPRAZolam 0.25 mg tablet  Commonly known as: Xanax        fluticasone furoate-vilanteroL 200-25 mcg/dose inhaler  Commonly known as: Breo Ellipta        gabapentin 400 mg capsule  Commonly known as: Neurontin        nitroglycerin 0.4 mg SL tablet  Commonly known as: Nitrostat        oxyCODONE 5 mg immediate release tablet  Commonly known as: Roxicodone        temazepam 15 mg capsule  Commonly known as: Restoril        torsemide 20 mg tablet  Commonly known as: Demadex        traMADol 50 mg tablet  Commonly known as: Ultram        VITAMIN D3 ORAL                  Where to Get Your Medications        These medications were sent to [facility]  [address], [place] [address], [facility] [id number]      Hours: 9 AM to 6 PM Mon-Fri, 9 AM to 1 PM Sat Phone: [phone]   acetaminophen 325 mg tablet       You can get these medications from any pharmacy    Bring a paper prescription for each of these medications  HYDROcodone-acetaminophen 5-325 mg tablet       Information about where to get these medications is not yet available    Ask your nurse or doctor about these medications  bisacodyl 5 mg EC tablet  patiromer calcium sorbitex 8.4  gram powder in packet  polyethylene glycol 17 gram packet         Test Results Pending At Discharge  Pending Labs       Order Current Status    Extra Urine Gray Tube Collected (01/11/24 1810)    Urinalysis with Reflex Culture and Microscopic In process            Hospital Course     Admitted for Falls/Asthenia. CT brain no acute findings. PT, OT recommend moderate intensity rehab. Pt found to have Acute on chronic renal failure which is now resolved. She was evaluated by Nephrology and to follow up in 2 weeks. Repeat BMP ordered in 1 week. Lisinopril has been held for KYLEE and to be resumed per Nephrology at follow up appt. BP is stable. Pt with UTI. Culture grew Pseudomonas aeruginosa. She has completed course of IV Meropenem. Pt medically stable for discharge.         Pertinent Physical Exam At Time of Discharge  Physical Exam  HENT:      Head: Normocephalic and atraumatic.      Nose: Nose normal.      Mouth/Throat:      Mouth: Mucous membranes are moist.      Pharynx: Oropharynx is clear.   Eyes:      Extraocular Movements: Extraocular movements intact.      Pupils: Pupils are equal, round, and reactive to light.   Cardiovascular:      Rate and Rhythm: Normal rate and regular rhythm.      Pulses: Normal pulses.   Pulmonary:      Effort: Pulmonary effort is normal.      Breath sounds: Normal breath sounds.   Abdominal:      General: Abdomen is flat. Bowel sounds are normal.      Palpations: Abdomen is soft.   Musculoskeletal:         General: Normal range of motion.   Skin:     General: Skin is warm and dry.      Capillary Refill: Capillary refill takes less than 2 seconds.   Neurological:      General: No focal deficit present.      Mental Status: She is oriented to person, place, and time.   Psychiatric:         Mood and Affect: Mood normal.         Outpatient Follow-Up  Future Appointments   Date Time Provider Department Center   1/31/2024  1:00 PM TRI CT 1 [facility]   6/18/2024 10:30 AM Jennifer NORRIS  Brianna, APRN-CNP [id number] [facility]         Stefanie Chong, APRN-CNP

## 2024-01-15 NOTE — CARE PLAN
The patient's goals for the shift include be discharged.    The clinical goals for the shift include discharge!      Problem: Fall/Injury  Goal: Not fall by end of shift  Outcome: Progressing  Goal: Be free from injury by end of the shift  Outcome: Progressing  Goal: Verbalize understanding of personal risk factors for fall in the hospital  Outcome: Progressing  Goal: Verbalize understanding of risk factor reduction measures to prevent injury from fall in the home  Outcome: Progressing  Goal: Use assistive devices by end of the shift  Outcome: Progressing  Goal: Pace activities to prevent fatigue by end of the shift  Outcome: Progressing     Problem: Respiratory  Goal: Clear secretions with interventions this shift  Outcome: Progressing  Goal: Minimize anxiety/maximize coping throughout shift  Outcome: Progressing  Goal: No signs of respiratory distress (eg. Use of accessory muscles. Peds grunting)  Outcome: Progressing  Goal: Increase self care and/or family involvement in next 24 hours  Outcome: Progressing     Problem: Skin  Goal: Decreased wound size/increased tissue granulation at next dressing change  Outcome: Progressing  Goal: Participates in plan/prevention/treatment measures  Outcome: Progressing     Problem: Pain - Adult  Goal: Verbalizes/displays adequate comfort level or baseline comfort level  Outcome: Progressing     Problem: Safety - Adult  Goal: Free from fall injury  Outcome: Progressing     Problem: Discharge Planning  Goal: Discharge to home or other facility with appropriate resources  Outcome: Progressing     Problem: Chronic Conditions and Co-morbidities  Goal: Patient's chronic conditions and co-morbidity symptoms are monitored and maintained or improved  Outcome: Progressing     Problem: Pain  Goal: Takes deep breaths with improved pain control throughout the shift  Outcome: Progressing  Goal: Turns in bed with improved pain control throughout the shift  Outcome: Progressing     Problem:  Heart Failure  Goal: Reduction in peripheral edema within 24 hours  Outcome: Progressing  Goal: Weight from fluid excess reduced over 2-3 days, then stabilize  Outcome: Progressing

## 2024-01-19 PROCEDURE — 9990 CHARGE CONVERSION

## 2024-01-24 NOTE — DOCUMENTATION CLARIFICATION NOTE
"    PATIENT:               JOSH PALMER  ACCT #:                  [id number]  MRN:                       [id number]  :                       1940  ADMIT DATE:       1/3/2024 9:51 PM  DISCH DATE:        1/15/2024 4:37 PM  RESPONDING PROVIDER #:        [id number]          PROVIDER RESPONSE TEXT:    Refer tp chart. Looks like i documented acute    CDI QUERY TEXT:    UH_CV CHF        Instruction:    Based on your assessment of the patient and the clinical information, please provide the requested documentation by clicking on the appropriate radio button and enter any additional information if prompted.    Question: Please clarify the diagnosis of Congestive Heart Failure    When answering this query, please exercise your independent professional judgment. The fact that a question is being asked, does not imply that any particular answer is desired or expected.    The patient's clinical indicators include:  Clinical Information:    Documented Diagnosis: Acute congestive heart failure    Clinical Indicators and Documentation:  -Vital Signs: initial: 94/36, 36.4, 78hr, 18rr, 97 percent pox 2 to 3 l nc  -ECHO: N/A  -CXR: \"Impression: Increased interstitial markings bilaterally, likely mild pulmonary, vascular congestion with a trace right pleural effusion and suspected chronic changes of COPD.\"  -BNP: 1554 pg/ml  -Physical exam findings:  -Lung Sounds: CTAB  -JVD: N/A  -Peripheral Edema: No edema  -Supplemental Oxygen: 3 liters per NC  -Cardiac Consult N/A    Progress notes: , , , : \"HFpEF\" \"Acute congestive heart failure, Chronic diastolic heart failure\" VIRGEN Devlin CNP    Progress notes: , 01/10, , , , : \"Acute congestive heart failure, Chronic diastolic heart failure\" VIRGEN Paula CNP    Treatment: Torsemide 20mg, supplemental oxygen    Risk Factors: Chronic diastolic heart failure  Options provided:  -- Congestive Heart Failure was ruled out after study  -- Acute/ chronic " diastolic CHF  -- Chronic diastolic CHF  -- Other - I will add my own diagnosis  -- Refer to Clinical Documentation Reviewer    Query created by: Hebert Smith on 1/22/2024 7:46 AM      Electronically signed by:  JENNIE LEONARD 1/24/2024 7:10 AM

## 2024-01-24 NOTE — DOCUMENTATION CLARIFICATION NOTE
"    PATIENT:               JOSH PALMER  [id number]  MRN:                       [id number]  :                       1940  ADMIT DATE:       1/3/2024 9:51 PM  DISCH DATE:        1/15/2024 4:37 PM  RESPONDING PROVIDER #:        [id number]          PROVIDER RESPONSE TEXT:    NSTEMI ruled out after workup    CDI QUERY TEXT:    UH_Diagnosis Ruled Out In        Instruction:    Based on your assessment of the patient and the clinical information, please provide the requested documentation by clicking on the appropriate radio button and enter any additional information if prompted.    Question: Please further clarify the diagnosis of Acute NSTEMI as    When answering this query, please exercise your independent professional judgment. The fact that a question is being asked, does not imply that any particular answer is desired or expected.    The patient's clinical indicators include:  Clinical Information: 83 y.o. female presenting with Mechanical Fall.    Clinical Indicators:    : Troponin: 28    : ECG: \"Study Result  Normal sinus rhythm with sinus arrhythmia  Inferior infarct (cited on or before 13-DEC-2023)  Abnormal ECG  When compared with ECG of 13-DEC-2023 01:02,  No significant change was found  Confirmed by Luan Monge      : ED documentation: \"Principle problem includes:   Acute congestive heart failure\"  \"The above laboratory studies were interpreted by me and reveal dino on cld, nstemi, BNP elevated\"  \"Labs obtained here in the ER revealed a mild NSTEMI and an elevated BNP to 1500.\"    Treatment: Telemetry monitoring    Risk Factors:  Chronic diastolic heart failure, CKD stage 3, HTN, CAD,  Options provided:  -- NSTEMI ruled out after workup  -- NSTEMI ruled in for this admission  -- Other - I will add my own diagnosis  -- Refer to Clinical Documentation Reviewer    Query created by: Hebert Smith on 2024 7:25 AM      Electronically signed by:  JENNIE MADISON " APRN-CNP 1/24/2024 7:10 AM

## 2024-01-25 ENCOUNTER — HOME HEALTH ADMISSION (OUTPATIENT)
Dept: HOME HEALTH SERVICES | Facility: HOME HEALTH | Age: 84
End: 2024-01-25
Payer: MEDICARE

## 2024-01-25 ENCOUNTER — DOCUMENTATION (OUTPATIENT)
Dept: HOME HEALTH SERVICES | Facility: HOME HEALTH | Age: 84
End: 2024-01-25
Payer: MEDICARE

## 2024-01-25 NOTE — HH CARE COORDINATION
Home Care received a Referral for Physical Therapy and Occupational Therapy. We have processed the referral for a Start of Care on 01/28.     If you have any questions or concerns, please feel free to contact us at [phone]. Follow the prompts, enter your five digit zip code, and you will be directed to your care team on EAST 1.     Banner Transposition Flap Text: The defect edges were debeveled with a #15 scalpel blade.  Given the location of the defect and the proximity to free margins a Banner transposition flap was deemed most appropriate.  Using a sterile surgical marker, an appropriate flap drawn around the defect. The area thus outlined was incised deep to adipose tissue with a #15 scalpel blade.  The skin margins were undermined to an appropriate distance in all directions utilizing iris scissors.

## 2024-01-27 ENCOUNTER — HOME CARE VISIT (OUTPATIENT)
Dept: HOME HEALTH SERVICES | Facility: HOME HEALTH | Age: 84
End: 2024-01-27
Payer: MEDICARE

## 2024-01-27 VITALS
WEIGHT: 166 LBS | BODY MASS INDEX: 28.34 KG/M2 | OXYGEN SATURATION: 99 % | HEIGHT: 64 IN | DIASTOLIC BLOOD PRESSURE: 70 MMHG | SYSTOLIC BLOOD PRESSURE: 155 MMHG | HEART RATE: 69 BPM | RESPIRATION RATE: 18 BRPM | TEMPERATURE: 96.4 F

## 2024-01-27 PROCEDURE — 0023 HH SOC

## 2024-01-27 PROCEDURE — G0151 HHCP-SERV OF PT,EA 15 MIN: HCPCS

## 2024-01-27 SDOH — ECONOMIC STABILITY: HOUSING INSECURITY: EVIDENCE OF SMOKING MATERIAL: 0

## 2024-01-27 SDOH — HEALTH STABILITY: PHYSICAL HEALTH: EXERCISE TYPE: B LOWER STRENGTHENING

## 2024-01-27 SDOH — HEALTH STABILITY: PHYSICAL HEALTH
EXERCISE COMMENTS: ISSUED WRITTEN HEP AND PERFORMED THE FOLLOWING X 10 REPS;  SUPINE  ISOMETRIC QUADS  ISOMETRIC GLUTS  ANKLE PUMPS  SAQ  SLR  HIP ABDUCTION  HEEL SLIDES    SITTING  ANKLE PUMPS  LAQ  MARCHING  ISOM HIP ADDUCTION

## 2024-01-27 SDOH — HEALTH STABILITY: MENTAL HEALTH: SMOKING IN HOME: 0

## 2024-01-27 SDOH — ECONOMIC STABILITY: HOUSING INSECURITY: HOME SAFETY: DISCUSSED O2 SAFETY WITH GAS STOVE

## 2024-01-27 ASSESSMENT — ENCOUNTER SYMPTOMS
HYPERTENSION: 1
PAIN LOCATION - PAIN QUALITY: ARTHRITIC
LOWEST PAIN SEVERITY IN PAST 24 HOURS: 1/10
HIGHEST PAIN SEVERITY IN PAST 24 HOURS: 4/10
PAIN SEVERITY GOAL: 1/10
SUBJECTIVE PAIN PROGRESSION: WAXING AND WANING
PAIN LOCATION - PAIN FREQUENCY: WITH ACTIVITY
PAIN: 1
PAIN LOCATION: GENERALIZED
MUSCLE WEAKNESS: 1
PAIN LOCATION - RELIEVING FACTORS: REST
PAIN LOCATION - PAIN DURATION: SHORT
SHORTNESS OF BREATH: T
PAIN LOCATION - PAIN SEVERITY: 4/10
TREMORS: 1
PERSON REPORTING PAIN: PATIENT

## 2024-01-27 ASSESSMENT — BALANCE ASSESSMENTS
STANDING BALANCE: 1 - STEADY BUT WIDE STANCE AND USES CANE OR OTHER SUPPORT
NUDGED SCORE: 1
EYES CLOSED AT MAXIMUM POSITION NUDGED: 0 - UNSTEADY
SITTING BALANCE: 1 - STEADY, SAFE
TURNING 360 DEGREES STEPS: 1 - CONTINUOUS STEPS
NUDGED: 1 - STAGGERS, GRABS, CATCHES SELF
BALANCE SCORE: 11
ARISING SCORE: 1
ARISES: 1 - ABLE, USES ARMS TO HELP
SITTING DOWN: 1 - USES ARMS OR NOT SMOOTH MOTION
IMMEDIATE STANDING BALANCE FIRST 5 SECONDS: 2 - STEADY WITHOUT WALKER OR OTHER SUPPORT
ATTEMPTS TO ARISE: 2 - ABLE TO RISE, ONE ATTEMPT

## 2024-01-27 ASSESSMENT — GAIT ASSESSMENTS
PATH SCORE: 1
TRUNK SCORE: 0
GAIT SCORE: 9
BALANCE AND GAIT SCORE: 20
STEP CONTINUITY: 1 - STEPS APPEAR CONTINUOUS
PATH: 1 - MILD/MODERATE DEVIATION OR USES WALKING AID
INITIATION OF GAIT IMMEDIATELY AFTER GO: 1 - NO HESITANCY
TRUNK: 0 - MARKED SWAY OR USES WALKING AID
WALKING STANCE: 1 - HEELS ALMOST TOUCHING WHILE WALKING
STEP SYMMETRY: 1 - RIGHT AND LEFT STEP LENGTH APPEAR EQUAL

## 2024-01-27 ASSESSMENT — ACTIVITIES OF DAILY LIVING (ADL)
AMBULATION ASSISTANCE: 1
SHOPPING: DEPENDENT
AMBULATION ASSISTANCE ON FLAT SURFACES: 1
AMBULATION ASSISTANCE: STAND BY ASSIST
ENTERING_EXITING_HOME: ONE PERSON
AMBULATION ASSISTANCE: ONE PERSON
SHOPPING ASSESSED: 1
DRESSING_LB_CURRENT_FUNCTION: SUPERVISION
DRESSING_UB_CURRENT_FUNCTION: SUPERVISION

## 2024-01-29 ENCOUNTER — HOME CARE VISIT (OUTPATIENT)
Dept: HOME HEALTH SERVICES | Facility: HOME HEALTH | Age: 84
End: 2024-01-29
Payer: MEDICARE

## 2024-01-29 VITALS
OXYGEN SATURATION: 99 % | RESPIRATION RATE: 18 BRPM | DIASTOLIC BLOOD PRESSURE: 60 MMHG | TEMPERATURE: 98 F | HEART RATE: 85 BPM | SYSTOLIC BLOOD PRESSURE: 118 MMHG

## 2024-01-29 PROCEDURE — G0152 HHCP-SERV OF OT,EA 15 MIN: HCPCS

## 2024-01-29 SDOH — ECONOMIC STABILITY: HOUSING INSECURITY: EVIDENCE OF SMOKING MATERIAL: 0

## 2024-01-29 SDOH — HEALTH STABILITY: MENTAL HEALTH: SMOKING IN HOME: 0

## 2024-01-29 ASSESSMENT — ACTIVITIES OF DAILY LIVING (ADL)
BATHING ASSESSED: 1
DRESSING_LB_CURRENT_FUNCTION: INDEPENDENT
BATHING_CURRENT_FUNCTION: INDEPENDENT

## 2024-01-29 ASSESSMENT — PAIN SCALES - PAIN ASSESSMENT IN ADVANCED DEMENTIA (PAINAD)
BODYLANGUAGE: 0
NEGVOCALIZATION: 0
FACIALEXPRESSION: 0
TOTALSCORE: 0
CONSOLABILITY: 0 - NO NEED TO CONSOLE.
CONSOLABILITY: 0
BODYLANGUAGE: 0 - RELAXED.
BREATHING: 0
NEGVOCALIZATION: 0 - NONE.
FACIALEXPRESSION: 0 - SMILING OR INEXPRESSIVE.

## 2024-01-29 ASSESSMENT — ENCOUNTER SYMPTOMS
SUBJECTIVE PAIN PROGRESSION: UNCHANGED
LOWEST PAIN SEVERITY IN PAST 24 HOURS: 0/10
DENIES PAIN: 1
HIGHEST PAIN SEVERITY IN PAST 24 HOURS: 0/10
PAIN SEVERITY GOAL: 0/10

## 2024-01-30 ENCOUNTER — HOME CARE VISIT (OUTPATIENT)
Dept: HOME HEALTH SERVICES | Facility: HOME HEALTH | Age: 84
End: 2024-01-30
Payer: MEDICARE

## 2024-01-30 VITALS
SYSTOLIC BLOOD PRESSURE: 170 MMHG | DIASTOLIC BLOOD PRESSURE: 70 MMHG | HEART RATE: 70 BPM | RESPIRATION RATE: 18 BRPM | TEMPERATURE: 98.1 F

## 2024-01-30 PROCEDURE — G0157 HHC PT ASSISTANT EA 15: HCPCS

## 2024-01-30 ASSESSMENT — ENCOUNTER SYMPTOMS
HIGHEST PAIN SEVERITY IN PAST 24 HOURS: 8/10
PAIN: 1
PERSON REPORTING PAIN: PATIENT
LOWEST PAIN SEVERITY IN PAST 24 HOURS: 0/10

## 2024-01-31 ENCOUNTER — HOSPITAL ENCOUNTER (OUTPATIENT)
Dept: RADIOLOGY | Facility: HOSPITAL | Age: 84
Discharge: HOME | End: 2024-01-31
Payer: MEDICARE

## 2024-01-31 DIAGNOSIS — R91.1 SOLITARY PULMONARY NODULE: ICD-10-CM

## 2024-01-31 PROCEDURE — 71250 CT THORAX DX C-: CPT

## 2024-02-02 ENCOUNTER — HOME CARE VISIT (OUTPATIENT)
Dept: HOME HEALTH SERVICES | Facility: HOME HEALTH | Age: 84
End: 2024-02-02
Payer: MEDICARE

## 2024-02-02 PROCEDURE — G0157 HHC PT ASSISTANT EA 15: HCPCS

## 2024-02-05 ENCOUNTER — APPOINTMENT (OUTPATIENT)
Dept: HOME HEALTH SERVICES | Facility: HOME HEALTH | Age: 84
End: 2024-02-05
Payer: MEDICARE

## 2024-02-05 ASSESSMENT — ACTIVITIES OF DAILY LIVING (ADL): OASIS_M1830: 03

## 2024-02-06 PROCEDURE — G0180 MD CERTIFICATION HHA PATIENT: HCPCS | Performed by: NURSE PRACTITIONER

## 2024-02-09 ENCOUNTER — APPOINTMENT (OUTPATIENT)
Dept: HOME HEALTH SERVICES | Facility: HOME HEALTH | Age: 84
End: 2024-02-09
Payer: MEDICARE

## 2024-02-12 ENCOUNTER — HOME CARE VISIT (OUTPATIENT)
Dept: HOME HEALTH SERVICES | Facility: HOME HEALTH | Age: 84
End: 2024-02-12
Payer: MEDICARE

## 2024-02-12 VITALS
TEMPERATURE: 98 F | DIASTOLIC BLOOD PRESSURE: 80 MMHG | RESPIRATION RATE: 18 BRPM | OXYGEN SATURATION: 100 % | SYSTOLIC BLOOD PRESSURE: 130 MMHG

## 2024-02-12 PROCEDURE — G0157 HHC PT ASSISTANT EA 15: HCPCS

## 2024-02-12 ASSESSMENT — BALANCE ASSESSMENTS
ARISES: 1 - ABLE, USES ARMS TO HELP
ARISING SCORE: 1
SITTING DOWN: 1 - USES ARMS OR NOT SMOOTH MOTION
NUDGED SCORE: 2
EYES CLOSED AT MAXIMUM POSITION NUDGED: 1 - STEADY
NUDGED: 2 - STEADY
SITTING BALANCE: 1 - STEADY, SAFE
BALANCE SCORE: 13
IMMEDIATE STANDING BALANCE FIRST 5 SECONDS: 1 - STEADY BUT USES WALKER OR OTHER SUPPORT
ATTEMPTS TO ARISE: 2 - ABLE TO RISE, ONE ATTEMPT
TURNING 360 DEGREES STEPS: 1 - CONTINUOUS STEPS
STANDING BALANCE: 2 - NARROW STANCE WITHOUT SUPPORT

## 2024-02-12 ASSESSMENT — GAIT ASSESSMENTS
STEP SYMMETRY: 1 - RIGHT AND LEFT STEP LENGTH APPEAR EQUAL
TRUNK SCORE: 0
TRUNK: 0 - MARKED SWAY OR USES WALKING AID
INITIATION OF GAIT IMMEDIATELY AFTER GO: 1 - NO HESITANCY
PATH: 1 - MILD/MODERATE DEVIATION OR USES WALKING AID
STEP CONTINUITY: 1 - STEPS APPEAR CONTINUOUS
WALKING STANCE: 1 - HEELS ALMOST TOUCHING WHILE WALKING
PATH SCORE: 1
BALANCE AND GAIT SCORE: 22
GAIT SCORE: 9

## 2024-02-14 ENCOUNTER — HOME CARE VISIT (OUTPATIENT)
Dept: HOME HEALTH SERVICES | Facility: HOME HEALTH | Age: 84
End: 2024-02-14
Payer: MEDICARE

## 2024-02-14 VITALS
HEART RATE: 72 BPM | SYSTOLIC BLOOD PRESSURE: 152 MMHG | RESPIRATION RATE: 18 BRPM | OXYGEN SATURATION: 100 % | DIASTOLIC BLOOD PRESSURE: 69 MMHG | TEMPERATURE: 96.6 F

## 2024-02-14 PROCEDURE — G0151 HHCP-SERV OF PT,EA 15 MIN: HCPCS

## 2024-02-14 SDOH — HEALTH STABILITY: MENTAL HEALTH: SMOKING IN HOME: 0

## 2024-02-14 SDOH — ECONOMIC STABILITY: HOUSING INSECURITY: EVIDENCE OF SMOKING MATERIAL: 0

## 2024-02-14 SDOH — HEALTH STABILITY: PHYSICAL HEALTH: EXERCISE TYPE: B LOWER SEATED THERAEX

## 2024-02-14 ASSESSMENT — ENCOUNTER SYMPTOMS
PAIN LOCATION - PAIN SEVERITY: 7/10
PAIN LOCATION - EXACERBATING FACTORS: MOVEMENT
PAIN LOCATION: BACK
SUBJECTIVE PAIN PROGRESSION: WAXING AND WANING
HIGHEST PAIN SEVERITY IN PAST 24 HOURS: 7/10
LOWEST PAIN SEVERITY IN PAST 24 HOURS: 3/10
PERSON REPORTING PAIN: PATIENT
PAIN LOCATION - RELIEVING FACTORS: TYLENOL
PAIN LOCATION - PAIN FREQUENCY: CONSTANT
PAIN SEVERITY GOAL: 2/10
PAIN LOCATION - PAIN QUALITY: ACHY
PAIN: 1

## 2024-02-14 ASSESSMENT — ACTIVITIES OF DAILY LIVING (ADL)
FEEDING ASSESSED: 1
OASIS_M1830: 00
BATHING_CURRENT_FUNCTION: INDEPENDENT
DRESSING_LB_CURRENT_FUNCTION: INDEPENDENT
DRESSING_UB_CURRENT_FUNCTION: INDEPENDENT
GROOMING ASSESSED: 1
GROOMING_CURRENT_FUNCTION: INDEPENDENT
CURRENT_FUNCTION: INDEPENDENT
AMBULATION ASSISTANCE: INDEPENDENT
FEEDING: INDEPENDENT
AMBULATION ASSISTANCE ON FLAT SURFACES: 1
AMBULATION_DISTANCE/DURATION_TOLERATED: 100 X 2
HOME_HEALTH_OASIS: 00
BATHING ASSESSED: 1
AMBULATION ASSISTANCE: 1
PHYSICAL TRANSFERS ASSESSED: 1

## 2024-02-14 ASSESSMENT — GAIT ASSESSMENTS
STEP CONTINUITY: 1 - STEPS APPEAR CONTINUOUS
PATH: 1 - MILD/MODERATE DEVIATION OR USES WALKING AID
STEP SYMMETRY: 1 - RIGHT AND LEFT STEP LENGTH APPEAR EQUAL
BALANCE AND GAIT SCORE: 22
INITIATION OF GAIT IMMEDIATELY AFTER GO: 1 - NO HESITANCY
GAIT SCORE: 9
TRUNK SCORE: 0
TRUNK: 0 - MARKED SWAY OR USES WALKING AID
PATH SCORE: 1
WALKING STANCE: 1 - HEELS ALMOST TOUCHING WHILE WALKING

## 2024-02-14 ASSESSMENT — BALANCE ASSESSMENTS
NUDGED: 2 - STEADY
EYES CLOSED AT MAXIMUM POSITION NUDGED: 1 - STEADY
SITTING DOWN: 1 - USES ARMS OR NOT SMOOTH MOTION
TURNING 360 DEGREES STEPS: 1 - CONTINUOUS STEPS
ARISING SCORE: 1
STANDING BALANCE: 2 - NARROW STANCE WITHOUT SUPPORT
SITTING BALANCE: 1 - STEADY, SAFE
ATTEMPTS TO ARISE: 2 - ABLE TO RISE, ONE ATTEMPT
BALANCE SCORE: 13
ARISES: 1 - ABLE, USES ARMS TO HELP
NUDGED SCORE: 2
IMMEDIATE STANDING BALANCE FIRST 5 SECONDS: 1 - STEADY BUT USES WALKER OR OTHER SUPPORT

## 2024-02-21 DIAGNOSIS — K21.9 GASTRO-ESOPHAGEAL REFLUX DISEASE WITHOUT ESOPHAGITIS: ICD-10-CM

## 2024-02-22 RX ORDER — OMEPRAZOLE 40 MG/1
40 CAPSULE, DELAYED RELEASE ORAL DAILY
Qty: 90 CAPSULE | Refills: 0 | Status: SHIPPED | OUTPATIENT
Start: 2024-02-22 | End: 2024-04-03

## 2024-02-27 ENCOUNTER — LAB (OUTPATIENT)
Dept: LAB | Facility: LAB | Age: 84
End: 2024-02-27
Payer: MEDICARE

## 2024-02-27 DIAGNOSIS — N17.9 ACUTE KIDNEY FAILURE, UNSPECIFIED (CMS-HCC): Primary | ICD-10-CM

## 2024-02-27 LAB
ALBUMIN SERPL-MCNC: 4.1 G/DL (ref 3.5–5)
ANION GAP SERPL CALC-SCNC: 12 MMOL/L
BUN SERPL-MCNC: 17 MG/DL (ref 8–25)
CALCIUM SERPL-MCNC: 9.4 MG/DL (ref 8.5–10.4)
CHLORIDE SERPL-SCNC: 97 MMOL/L (ref 97–107)
CO2 SERPL-SCNC: 25 MMOL/L (ref 24–31)
CREAT SERPL-MCNC: 1.3 MG/DL (ref 0.4–1.6)
EGFRCR SERPLBLD CKD-EPI 2021: 41 ML/MIN/1.73M*2
GLUCOSE SERPL-MCNC: 91 MG/DL (ref 65–99)
PHOSPHATE SERPL-MCNC: 4 MG/DL (ref 2.5–4.5)
POTASSIUM SERPL-SCNC: 5.1 MMOL/L (ref 3.4–5.1)
SODIUM SERPL-SCNC: 134 MMOL/L (ref 133–145)

## 2024-02-27 PROCEDURE — 80069 RENAL FUNCTION PANEL: CPT

## 2024-02-27 PROCEDURE — 36415 COLL VENOUS BLD VENIPUNCTURE: CPT

## 2024-04-03 DIAGNOSIS — J90 PLEURAL EFFUSION: ICD-10-CM

## 2024-04-03 DIAGNOSIS — K21.9 GASTRO-ESOPHAGEAL REFLUX DISEASE WITHOUT ESOPHAGITIS: ICD-10-CM

## 2024-04-03 RX ORDER — OMEPRAZOLE 40 MG/1
40 CAPSULE, DELAYED RELEASE ORAL DAILY
Qty: 90 CAPSULE | Refills: 0 | Status: SHIPPED | OUTPATIENT
Start: 2024-04-03

## 2024-04-03 RX ORDER — CITALOPRAM 20 MG/1
20 TABLET, FILM COATED ORAL DAILY
Qty: 90 TABLET | Refills: 3 | Status: SHIPPED | OUTPATIENT
Start: 2024-04-03

## 2024-05-02 ENCOUNTER — LAB (OUTPATIENT)
Dept: LAB | Facility: LAB | Age: 84
End: 2024-05-02
Payer: MEDICARE

## 2024-05-02 DIAGNOSIS — N18.4 CHRONIC KIDNEY DISEASE, STAGE 4 (SEVERE) (MULTI): Primary | ICD-10-CM

## 2024-05-02 LAB — POTASSIUM SERPL-SCNC: 5.2 MMOL/L (ref 3.4–5.1)

## 2024-05-02 PROCEDURE — 36415 COLL VENOUS BLD VENIPUNCTURE: CPT

## 2024-05-02 PROCEDURE — 84132 ASSAY OF SERUM POTASSIUM: CPT

## 2024-06-18 ENCOUNTER — OFFICE VISIT (OUTPATIENT)
Dept: PRIMARY CARE | Facility: CLINIC | Age: 84
End: 2024-06-18
Payer: MEDICARE

## 2024-06-18 ENCOUNTER — TELEPHONE (OUTPATIENT)
Dept: PRIMARY CARE | Facility: CLINIC | Age: 84
End: 2024-06-18

## 2024-06-18 VITALS
TEMPERATURE: 98.6 F | BODY MASS INDEX: 26.46 KG/M2 | DIASTOLIC BLOOD PRESSURE: 64 MMHG | HEART RATE: 63 BPM | OXYGEN SATURATION: 93 % | WEIGHT: 155 LBS | HEIGHT: 64 IN | SYSTOLIC BLOOD PRESSURE: 134 MMHG

## 2024-06-18 DIAGNOSIS — I10 DIASTOLIC HYPERTENSION: ICD-10-CM

## 2024-06-18 DIAGNOSIS — J96.10 CHRONIC RESPIRATORY FAILURE, UNSPECIFIED WHETHER WITH HYPOXIA OR HYPERCAPNIA (MULTI): Primary | ICD-10-CM

## 2024-06-18 DIAGNOSIS — F32.9 MAJOR DEPRESSIVE DISORDER WITH SINGLE EPISODE, REMISSION STATUS UNSPECIFIED: ICD-10-CM

## 2024-06-18 DIAGNOSIS — K21.9 GASTROESOPHAGEAL REFLUX DISEASE WITHOUT ESOPHAGITIS: ICD-10-CM

## 2024-06-18 DIAGNOSIS — Z01.89 ENCOUNTER FOR ROUTINE LABORATORY TESTING: ICD-10-CM

## 2024-06-18 DIAGNOSIS — E78.2 MIXED HYPERLIPIDEMIA: ICD-10-CM

## 2024-06-18 DIAGNOSIS — G25.0 ESSENTIAL TREMOR: ICD-10-CM

## 2024-06-18 DIAGNOSIS — N32.81 OVERACTIVE BLADDER: ICD-10-CM

## 2024-06-18 PROCEDURE — 99214 OFFICE O/P EST MOD 30 MIN: CPT | Performed by: NURSE PRACTITIONER

## 2024-06-18 PROCEDURE — 1036F TOBACCO NON-USER: CPT | Performed by: NURSE PRACTITIONER

## 2024-06-18 PROCEDURE — 1125F AMNT PAIN NOTED PAIN PRSNT: CPT | Performed by: NURSE PRACTITIONER

## 2024-06-18 PROCEDURE — 1159F MED LIST DOCD IN RCRD: CPT | Performed by: NURSE PRACTITIONER

## 2024-06-18 PROCEDURE — 1160F RVW MEDS BY RX/DR IN RCRD: CPT | Performed by: NURSE PRACTITIONER

## 2024-06-18 PROCEDURE — 3078F DIAST BP <80 MM HG: CPT | Performed by: NURSE PRACTITIONER

## 2024-06-18 PROCEDURE — 3075F SYST BP GE 130 - 139MM HG: CPT | Performed by: NURSE PRACTITIONER

## 2024-06-18 PROCEDURE — 1157F ADVNC CARE PLAN IN RCRD: CPT | Performed by: NURSE PRACTITIONER

## 2024-06-18 RX ORDER — OMEPRAZOLE 40 MG/1
40 CAPSULE, DELAYED RELEASE ORAL DAILY
Qty: 90 CAPSULE | Refills: 3 | Status: SHIPPED | OUTPATIENT
Start: 2024-06-18

## 2024-06-18 RX ORDER — CARVEDILOL 6.25 MG/1
9.38 TABLET ORAL
COMMUNITY

## 2024-06-18 ASSESSMENT — LIFESTYLE VARIABLES
HOW MANY STANDARD DRINKS CONTAINING ALCOHOL DO YOU HAVE ON A TYPICAL DAY: PATIENT DOES NOT DRINK
HOW OFTEN DURING THE LAST YEAR HAVE YOU FOUND THAT YOU WERE NOT ABLE TO STOP DRINKING ONCE YOU HAD STARTED: NEVER
AUDIT-C TOTAL SCORE: 0
HOW OFTEN DURING THE LAST YEAR HAVE YOU HAD A FEELING OF GUILT OR REMORSE AFTER DRINKING: NEVER
HOW OFTEN DURING THE LAST YEAR HAVE YOU BEEN UNABLE TO REMEMBER WHAT HAPPENED THE NIGHT BEFORE BECAUSE YOU HAD BEEN DRINKING: NEVER
SKIP TO QUESTIONS 9-10: 1
HOW OFTEN DURING THE LAST YEAR HAVE YOU FAILED TO DO WHAT WAS NORMALLY EXPECTED FROM YOU BECAUSE OF DRINKING: NEVER
HOW OFTEN DO YOU HAVE SIX OR MORE DRINKS ON ONE OCCASION: NEVER
HOW OFTEN DURING THE LAST YEAR HAVE YOU NEEDED AN ALCOHOLIC DRINK FIRST THING IN THE MORNING TO GET YOURSELF GOING AFTER A NIGHT OF HEAVY DRINKING: NEVER
HAVE YOU OR SOMEONE ELSE BEEN INJURED AS A RESULT OF YOUR DRINKING: NO
HAS A RELATIVE, FRIEND, DOCTOR, OR ANOTHER HEALTH PROFESSIONAL EXPRESSED CONCERN ABOUT YOUR DRINKING OR SUGGESTED YOU CUT DOWN: NO
AUDIT TOTAL SCORE: 0
HOW OFTEN DO YOU HAVE A DRINK CONTAINING ALCOHOL: NEVER

## 2024-06-18 ASSESSMENT — ENCOUNTER SYMPTOMS
FATIGUE: 0
CHEST TIGHTNESS: 0
OCCASIONAL FEELINGS OF UNSTEADINESS: 0
NAUSEA: 0
PALPITATIONS: 0
HEMATURIA: 0
DYSURIA: 0
VOMITING: 0
DIAPHORESIS: 0
ABDOMINAL PAIN: 0
CHILLS: 0
SHORTNESS OF BREATH: 1
COUGH: 0
DEPRESSION: 0
LOSS OF SENSATION IN FEET: 1
FEVER: 0

## 2024-06-18 ASSESSMENT — PATIENT HEALTH QUESTIONNAIRE - PHQ9
2. FEELING DOWN, DEPRESSED OR HOPELESS: NOT AT ALL
1. LITTLE INTEREST OR PLEASURE IN DOING THINGS: NOT AT ALL
SUM OF ALL RESPONSES TO PHQ9 QUESTIONS 1 AND 2: 0

## 2024-06-18 ASSESSMENT — PAIN SCALES - GENERAL: PAINLEVEL: 7

## 2024-06-18 NOTE — TELEPHONE ENCOUNTER
Pt left visit summary 6/18/24 in [facility] and another pt brought them into the office. I mailed them out to pt if she calls looking for it.

## 2024-06-18 NOTE — PROGRESS NOTES
"[facility]: [facility]  PROGRESS NOTE      Juli Del Castillo is a 83 y.o. female that is presenting today for Follow-up (Complains of shooting pain on right side under breast - on and off).    Reports taking antihypertensive as directed. Not monitoring home BP. Trying to follow a low sodium diet. Denies CP, SOB, dizziness, HA. Followed by Cardiology, Dr. Darnell.    Ms. Del Castillo reports stable on Celexa and Cymbalta. Denies panic attack or bouts of depression.  She had a \"rough patch\" last February where she lost her Best Friend and Son in a matter of 2 months. She reports she is better now.    Pulmonary status stable. Followed by Pulmnologist, Dr. Walker.  Has upcoming walk test in July.   She is wearing 3Lnc continuously with pulse ox at 93%.      Tolerating statin. Denies statin related abdominal pain, arthralgias or myalgias. Trying to follow a low cholesterol diet.    OAB managed well with anticholinergic. She reports reduction in urgency but urine leakage at night. She wears depends all the time.    GERD managed with PPI daily. Denies epigastric pain, heartburn, acid reflux or blood in stool.      Assessment/Plan   Diagnoses and all orders for this visit:    Chronic respiratory failure, unspecified whether with hypoxia or hypercapnia (Multi)        -     Continue established follow up with Pulmonology, Dr. Walker        -     Continue current respiratory medication regimen    Mixed hyperlipidemia  -     Tolerating statin  -     Continue established follow up with Cardiology, Dr. Elizabeth  -     Continue Simvastatin 80 mg daily  -     Lipid Panel; Future    Major depressive disorder with single episode, remission status unspecified        -     Stable        -     Continue Citalopram 20 mg daily        -     Continue Duloxetine 30 mg daily    Diastolic hypertension  -     Acceptable control   -    Continue established follow up with Cardiology  -     Continue Carvedilol 6.25 mg twice daily  -    "  Continue ASA 81 mg daily  -     CBC and Auto Differential; Future  -     Basic Metabolic Panel; Future    Overactive bladder        -     Continue Oxybutynin XL 5 mg daily    Essential tremor        -     Continue primidone 50 mg three times daily    Gastroesophageal reflux disease without esophagitis  -     omeprazole (PriLOSEC) 40 mg DR capsule; Take 1 capsule (40 mg) by mouth once daily.    Encounter for routine laboratory testing  -     CBC and Auto Differential; Future  -     Basic Metabolic Panel; Future  -     Lipid Panel; Future    Other orders  -     Follow Up In Primary Care - Medicare Annual; Future    Subjective   HPI  Review of Systems   Constitutional:  Negative for chills, diaphoresis, fatigue and fever.   Respiratory:  Positive for shortness of breath. Negative for cough and chest tightness.    Cardiovascular:  Negative for chest pain, palpitations and leg swelling.   Gastrointestinal:  Negative for abdominal pain, nausea and vomiting.   Genitourinary:  Negative for dysuria and hematuria.      Objective   Vitals:    06/18/24 1027   BP: 134/64   Pulse: 63   Temp: 37 °C (98.6 °F)   SpO2: 93%      Body mass index is 26.61 kg/m².  Physical Exam  Constitutional:       General: She is not in acute distress.     Appearance: She is normal weight. She is not ill-appearing.   Neck:      Vascular: No carotid bruit.   Cardiovascular:      Rate and Rhythm: Normal rate and regular rhythm.      Heart sounds: Normal heart sounds.   Pulmonary:      Comments: Lung sounds diminished. Wearing supplemental O2 at 3Lnc  Abdominal:      Palpations: Abdomen is soft.      Tenderness: There is no abdominal tenderness.   Musculoskeletal:      Cervical back: No tenderness.   Lymphadenopathy:      Cervical: No cervical adenopathy.   Skin:     General: Skin is warm and dry.   Neurological:      Mental Status: She is alert. Mental status is at baseline.   Psychiatric:         Mood and Affect: Mood normal.         Behavior:  "Behavior normal.       Diagnostic Results   Lab Results   Component Value Date    GLUCOSE 91 02/27/2024    CALCIUM 9.4 02/27/2024     02/27/2024    K 5.2 (H) 05/02/2024    CO2 25 02/27/2024    CL 97 02/27/2024    BUN 17 02/27/2024    CREATININE 1.30 02/27/2024     Lab Results   Component Value Date    ALT 6 01/03/2024    AST 9 01/03/2024    ALKPHOS 69 01/03/2024    BILITOT <0.2 01/03/2024     Lab Results   Component Value Date    WBC 8.6 01/08/2024    HGB 8.6 (L) 01/08/2024    HCT 27.0 (L) 01/08/2024    MCV 98 01/08/2024     01/08/2024     Lab Results   Component Value Date    CHOL 197 12/12/2023    CHOL 211 (H) 04/13/2023    CHOL 182 06/02/2022     Lab Results   Component Value Date    HDL 70.0 12/12/2023    HDL 73 04/13/2023    HDL 55 06/02/2022     Lab Results   Component Value Date    LDLCALC 106 12/12/2023    LDLCALC 113 04/13/2023    LDLCALC 96 06/02/2022     Lab Results   Component Value Date    TRIG 104 12/12/2023    TRIG 127 04/13/2023    TRIG 155 (H) 06/02/2022     No components found for: \"CHOLHDL\"  Lab Results   Component Value Date    HGBA1C 5.3 12/12/2023     Other labs not included in the list above were reviewed either before or during this encounter.    History    Past Medical History:   Diagnosis Date    Cancer (Multi)     CHF (congestive heart failure) (Multi)     COPD (chronic obstructive pulmonary disease) (Multi)     Depression      Past Surgical History:   Procedure Laterality Date    MR HEAD ANGIO WO IV CONTRAST  10/31/2015    MR HEAD ANGIO WO IV CONTRAST [facility]    MR HEAD ANGIO WO IV CONTRAST  2/15/2019    MR HEAD ANGIO WO IV CONTRAST [facility]    MR NECK ANGIO WO IV CONTRAST  10/31/2015    MR NECK ANGIO WO IV CONTRAST [facility]     No family history on file.  Social History     Socioeconomic History    Marital status:      Spouse name: Not on file    Number of children: Not on file    Years of education: Not on file    Highest " education level: Not on file   Occupational History    Not on file   Tobacco Use    Smoking status: Former     Types: Cigarettes    Smokeless tobacco: Never   Vaping Use    Vaping status: Never Used   Substance and Sexual Activity    Alcohol use: Never    Drug use: Never    Sexual activity: Not on file   Other Topics Concern    Not on file   Social History Narrative    Not on file     Social Determinants of Health     Financial Resource Strain: Low Risk  (1/6/2024)    Overall Financial Resource Strain (CARDIA)     Difficulty of Paying Living Expenses: Not very hard   Food Insecurity: No Food Insecurity (12/13/2023)    Hunger Vital Sign     Worried About Running Out of Food in the Last Year: Never true     Ran Out of Food in the Last Year: Never true   Transportation Needs: No Transportation Needs (2/14/2024)    OASIS : Transportation     Lack of Transportation (Medical): No     Lack of Transportation (Non-Medical): No     Patient Unable or Declines to Respond: No   Physical Activity: Not on file   Stress: Not on file   Social Connections: Feeling Socially Integrated (2/14/2024)    OASIS : Social Isolation     Frequency of experiencing loneliness or isolation: Never   Intimate Partner Violence: Not At Risk (1/4/2024)    Humiliation, Afraid, Rape, and Kick questionnaire     Fear of Current or Ex-Partner: No     Emotionally Abused: No     Physically Abused: No     Sexually Abused: No   Housing Stability: Low Risk  (1/6/2024)    Housing Stability Vital Sign     Unable to Pay for Housing in the Last Year: No     Number of Places Lived in the Last Year: 1     Unstable Housing in the Last Year: No     Allergies   Allergen Reactions    Cortisone Swelling and Rash    Other Swelling and Rash     ACTH    Acth Hives    Haemoph B Poly Conj-Tet Tox-Pf Other    Codeine Nausea/vomiting, Nausea Only and Rash    Hydrochlorothiazide Rash and Swelling     Current Outpatient Medications on File Prior to Visit   Medication Sig  Dispense Refill    acetaminophen (Tylenol) 325 mg tablet Take 2 tablets (650 mg) by mouth every 4 hours if needed for fever (temp greater than 38.0 C) (greater than or equal to 38 C). (Patient taking differently: Take 2 tablets (650 mg) by mouth every 4 hours if needed for headaches.) 30 tablet 0    albuterol 2.5 mg /3 mL (0.083 %) nebulizer solution Take 3 mL (2.5 mg) by nebulization every 6 hours while awake. 180 mL 2    aspirin 81 mg chewable tablet Chew 1 tablet (81 mg) once daily. 30 tablet 3    budesonide (Pulmicort) 0.5 mg/2 mL nebulizer solution Take 2 mL (0.5 mg) by nebulization 2 times a day. Rinse mouth with water after use to reduce aftertaste and incidence of candidiasis. Do not swallow. 120 mL 3    carvedilol (Coreg) 6.25 mg tablet Take 1.5 tablets (9.375 mg) by mouth 2 times daily (morning and late afternoon).      citalopram (CeleXA) 20 mg tablet Take 1 tablet (20 mg) by mouth once daily. 90 tablet 3    DULoxetine (Cymbalta) 30 mg DR capsule Take 1 capsule (30 mg) by mouth once daily. 90 capsule 3    nebulizer and compressor device use as directed with nebulizer treatments 1 each 0    oxybutynin XL (Ditropan-XL) 5 mg 24 hr tablet Take 1 tablet (5 mg) by mouth once daily. 90 tablet 3    oxygen (O2) gas therapy 3 lpm      patiromer calcium sorbitex (Veltassa) 8.4 gram powder in packet Take 8.4 g by mouth 3 times a week. Do not start before January 17, 2024.      polyethylene glycol (Glycolax, Miralax) 17 gram packet Take 17 g by mouth once daily as needed (Constipation).      primidone (Mysoline) 50 mg tablet Take 2 tablets (100 mg) by mouth 3 times a day.      simvastatin (Zocor) 80 mg tablet Take 1 tablet (80 mg) by mouth once daily at bedtime. 30 tablet 3    [DISCONTINUED] omeprazole (PriLOSEC) 40 mg DR capsule TAKE 1 CAPSULE BY MOUTH EVERY DAY 90 capsule 0    [DISCONTINUED] bisacodyl (Dulcolax) 5 mg EC tablet Take 1 tablet (5 mg) by mouth once daily as needed for constipation. Do not crush, chew,  or split. (Patient not taking: Reported on 6/18/2024) 30 tablet 0    [DISCONTINUED] HYDROcodone-acetaminophen (Norco) 5-325 mg tablet Take 1 tablet by mouth 2 times a day as needed for moderate pain (4 - 6). (Patient not taking: Reported on 6/18/2024) 6 tablet 0     No current facility-administered medications on file prior to visit.     Immunization History   Administered Date(s) Administered    Flu vaccine, quadrivalent, high-dose, preservative free, age 65y+ (FLUZONE) 11/06/2020, 11/06/2020, 12/01/2022, 12/12/2023    Influenza, High Dose Seasonal, Preservative Free 10/12/2017, 10/02/2018, 12/03/2019    Influenza, seasonal, injectable 11/13/2010, 10/03/2011, 10/02/2012, 10/01/2013, 10/01/2014, 10/01/2015    Moderna COVID-19 vaccine, bivalent, blue cap/gray label *Check age/dose* 12/01/2022    PPD Test 01/11/2019, 01/11/2020, 01/18/2020    Pneumococcal conjugate vaccine, 13-valent (PREVNAR 13) 12/21/2015    Pneumococcal polysaccharide vaccine, 23-valent, age 2 years and older (PNEUMOVAX 23) 10/13/2006, 10/22/2018    Td vaccine, age 7 years and older (TDVAX) 07/13/2001    Tdap vaccine, age 7 year and older (BOOSTRIX, ADACEL) 08/25/2015     Patient's medical history was reviewed and updated either before or during this encounter.       Jennifer Reed, APRN-CNP

## 2024-07-01 ENCOUNTER — LAB (OUTPATIENT)
Dept: LAB | Facility: LAB | Age: 84
End: 2024-07-01
Payer: MEDICARE

## 2024-07-01 DIAGNOSIS — E78.2 MIXED HYPERLIPIDEMIA: ICD-10-CM

## 2024-07-01 DIAGNOSIS — N17.9 ACUTE RENAL FAILURE, UNSPECIFIED ACUTE RENAL FAILURE TYPE (CMS-HCC): ICD-10-CM

## 2024-07-01 DIAGNOSIS — N18.9 ACUTE RENAL FAILURE SUPERIMPOSED ON CHRONIC KIDNEY DISEASE, UNSPECIFIED ACUTE RENAL FAILURE TYPE, UNSPECIFIED CKD STAGE (CMS-HCC): ICD-10-CM

## 2024-07-01 DIAGNOSIS — I10 DIASTOLIC HYPERTENSION: ICD-10-CM

## 2024-07-01 DIAGNOSIS — N17.9 ACUTE RENAL FAILURE SUPERIMPOSED ON CHRONIC KIDNEY DISEASE, UNSPECIFIED ACUTE RENAL FAILURE TYPE, UNSPECIFIED CKD STAGE (CMS-HCC): ICD-10-CM

## 2024-07-01 DIAGNOSIS — Z01.89 ENCOUNTER FOR ROUTINE LABORATORY TESTING: ICD-10-CM

## 2024-07-01 DIAGNOSIS — N18.4 CHRONIC KIDNEY DISEASE, STAGE 4 (SEVERE) (MULTI): Primary | ICD-10-CM

## 2024-07-01 LAB
ALBUMIN SERPL-MCNC: 3.8 G/DL (ref 3.5–5)
ANION GAP SERPL CALC-SCNC: 10 MMOL/L
BASOPHILS # BLD AUTO: 0.03 X10*3/UL (ref 0–0.1)
BASOPHILS NFR BLD AUTO: 0.4 %
BUN SERPL-MCNC: 12 MG/DL (ref 8–25)
CALCIUM SERPL-MCNC: 8.8 MG/DL (ref 8.5–10.4)
CHLORIDE SERPL-SCNC: 105 MMOL/L (ref 97–107)
CHOLEST SERPL-MCNC: 188 MG/DL (ref 133–200)
CHOLEST/HDLC SERPL: 3.5 {RATIO}
CO2 SERPL-SCNC: 27 MMOL/L (ref 24–31)
CREAT SERPL-MCNC: 1 MG/DL (ref 0.4–1.6)
EGFRCR SERPLBLD CKD-EPI 2021: 56 ML/MIN/1.73M*2
EOSINOPHIL # BLD AUTO: 0.17 X10*3/UL (ref 0–0.4)
EOSINOPHIL NFR BLD AUTO: 2.3 %
ERYTHROCYTE [DISTWIDTH] IN BLOOD BY AUTOMATED COUNT: 13.1 % (ref 11.5–14.5)
GLUCOSE SERPL-MCNC: 91 MG/DL (ref 65–99)
HCT VFR BLD AUTO: 33.5 % (ref 36–46)
HDLC SERPL-MCNC: 54 MG/DL
HGB BLD-MCNC: 10.8 G/DL (ref 12–16)
IMM GRANULOCYTES # BLD AUTO: 0.03 X10*3/UL (ref 0–0.5)
IMM GRANULOCYTES NFR BLD AUTO: 0.4 % (ref 0–0.9)
LDLC SERPL CALC-MCNC: 100 MG/DL (ref 65–130)
LYMPHOCYTES # BLD AUTO: 1.58 X10*3/UL (ref 0.8–3)
LYMPHOCYTES NFR BLD AUTO: 21.2 %
MCH RBC QN AUTO: 32 PG (ref 26–34)
MCHC RBC AUTO-ENTMCNC: 32.2 G/DL (ref 32–36)
MCV RBC AUTO: 99 FL (ref 80–100)
MONOCYTES # BLD AUTO: 0.73 X10*3/UL (ref 0.05–0.8)
MONOCYTES NFR BLD AUTO: 9.8 %
NEUTROPHILS # BLD AUTO: 4.91 X10*3/UL (ref 1.6–5.5)
NEUTROPHILS NFR BLD AUTO: 65.9 %
NRBC BLD-RTO: 0 /100 WBCS (ref 0–0)
PHOSPHATE SERPL-MCNC: 3.3 MG/DL (ref 2.5–4.5)
PLATELET # BLD AUTO: 238 X10*3/UL (ref 150–450)
POTASSIUM SERPL-SCNC: 4.4 MMOL/L (ref 3.4–5.1)
RBC # BLD AUTO: 3.37 X10*6/UL (ref 4–5.2)
SODIUM SERPL-SCNC: 142 MMOL/L (ref 133–145)
TRIGL SERPL-MCNC: 171 MG/DL (ref 40–150)
WBC # BLD AUTO: 7.5 X10*3/UL (ref 4.4–11.3)

## 2024-07-01 PROCEDURE — 85025 COMPLETE CBC W/AUTO DIFF WBC: CPT

## 2024-07-01 PROCEDURE — 80061 LIPID PANEL: CPT

## 2024-07-01 PROCEDURE — 36415 COLL VENOUS BLD VENIPUNCTURE: CPT

## 2024-07-01 PROCEDURE — 80069 RENAL FUNCTION PANEL: CPT

## 2024-08-15 ENCOUNTER — LAB (OUTPATIENT)
Dept: LAB | Facility: LAB | Age: 84
End: 2024-08-15
Payer: MEDICARE

## 2024-08-15 ENCOUNTER — PHARMACY VISIT (OUTPATIENT)
Dept: PHARMACY | Facility: CLINIC | Age: 84
End: 2024-08-15
Payer: COMMERCIAL

## 2024-08-15 ENCOUNTER — TELEPHONE (OUTPATIENT)
Dept: PRIMARY CARE | Facility: CLINIC | Age: 84
End: 2024-08-15
Payer: MEDICARE

## 2024-08-15 DIAGNOSIS — R30.0 DYSURIA: ICD-10-CM

## 2024-08-15 DIAGNOSIS — R30.0 DYSURIA: Primary | ICD-10-CM

## 2024-08-15 LAB
APPEARANCE UR: ABNORMAL
BACTERIA #/AREA URNS AUTO: ABNORMAL /HPF
BILIRUB UR STRIP.AUTO-MCNC: NEGATIVE MG/DL
COLOR UR: ABNORMAL
GLUCOSE UR STRIP.AUTO-MCNC: NORMAL MG/DL
KETONES UR STRIP.AUTO-MCNC: NEGATIVE MG/DL
LEUKOCYTE ESTERASE UR QL STRIP.AUTO: ABNORMAL
NITRITE UR QL STRIP.AUTO: ABNORMAL
PH UR STRIP.AUTO: 6 [PH]
PROT UR STRIP.AUTO-MCNC: NEGATIVE MG/DL
RBC # UR STRIP.AUTO: NEGATIVE /UL
RBC #/AREA URNS AUTO: ABNORMAL /HPF
SP GR UR STRIP.AUTO: 1.01
UROBILINOGEN UR STRIP.AUTO-MCNC: NORMAL MG/DL
WBC #/AREA URNS AUTO: >50 /HPF

## 2024-08-15 PROCEDURE — 87186 SC STD MICRODIL/AGAR DIL: CPT

## 2024-08-15 PROCEDURE — RXMED WILLOW AMBULATORY MEDICATION CHARGE

## 2024-08-15 PROCEDURE — 87086 URINE CULTURE/COLONY COUNT: CPT

## 2024-08-15 PROCEDURE — 81001 URINALYSIS AUTO W/SCOPE: CPT

## 2024-08-15 RX ORDER — NITROFURANTOIN 25; 75 MG/1; MG/1
100 CAPSULE ORAL 2 TIMES DAILY
Qty: 14 CAPSULE | Refills: 0 | Status: SHIPPED | OUTPATIENT
Start: 2024-08-15 | End: 2024-08-22

## 2024-08-15 NOTE — TELEPHONE ENCOUNTER
No openings but I did place orders for UA with reflex to culture. I also sent Rx for Macrobid to  retail pharmacy to get her started.

## 2024-08-15 NOTE — TELEPHONE ENCOUNTER
Daughter states home test for UTI - positive today.  Slight urgency to urinate, fatigued. Concerned due to being hospitalized last UTI.    Any appts to fit her in today?

## 2024-08-16 LAB — HOLD SPECIMEN: NORMAL

## 2024-08-18 LAB — BACTERIA UR CULT: ABNORMAL

## 2024-08-30 ENCOUNTER — APPOINTMENT (OUTPATIENT)
Dept: CARDIOLOGY | Facility: CLINIC | Age: 84
End: 2024-08-30
Payer: MEDICARE

## 2024-08-30 ENCOUNTER — TELEPHONE (OUTPATIENT)
Dept: CARDIOLOGY | Facility: CLINIC | Age: 84
End: 2024-08-30

## 2024-09-09 ENCOUNTER — APPOINTMENT (OUTPATIENT)
Dept: CARDIOLOGY | Facility: HOSPITAL | Age: 84
End: 2024-09-09
Payer: MEDICARE

## 2024-09-09 ENCOUNTER — HOSPITAL ENCOUNTER (INPATIENT)
Facility: HOSPITAL | Age: 84
LOS: 1 days | Discharge: HOME | End: 2024-09-10
Attending: STUDENT IN AN ORGANIZED HEALTH CARE EDUCATION/TRAINING PROGRAM | Admitting: STUDENT IN AN ORGANIZED HEALTH CARE EDUCATION/TRAINING PROGRAM
Payer: MEDICARE

## 2024-09-09 ENCOUNTER — APPOINTMENT (OUTPATIENT)
Dept: RADIOLOGY | Facility: HOSPITAL | Age: 84
End: 2024-09-09
Payer: MEDICARE

## 2024-09-09 DIAGNOSIS — J90 PLEURAL EFFUSION: ICD-10-CM

## 2024-09-09 DIAGNOSIS — R07.9 CHEST PAIN, UNSPECIFIED TYPE: ICD-10-CM

## 2024-09-09 DIAGNOSIS — I25.85 CHRONIC CORONARY MICROVASCULAR DYSFUNCTION: ICD-10-CM

## 2024-09-09 DIAGNOSIS — R07.9 CHEST PAIN: Primary | ICD-10-CM

## 2024-09-09 LAB
ALBUMIN SERPL-MCNC: 4 G/DL (ref 3.5–5)
ALP BLD-CCNC: 76 U/L (ref 35–125)
ALT SERPL-CCNC: 18 U/L (ref 5–40)
ANION GAP SERPL CALC-SCNC: 9 MMOL/L
AORTIC VALVE MEAN GRADIENT: 11 MMHG
AORTIC VALVE PEAK VELOCITY: 2.37 M/S
AST SERPL-CCNC: 18 U/L (ref 5–40)
AV PEAK GRADIENT: 22.5 MMHG
AVA (PEAK VEL): 1.05 CM2
AVA (VTI): 1.14 CM2
BASOPHILS # BLD AUTO: 0.03 X10*3/UL (ref 0–0.1)
BASOPHILS NFR BLD AUTO: 0.4 %
BILIRUB SERPL-MCNC: <0.2 MG/DL (ref 0.1–1.2)
BUN SERPL-MCNC: 14 MG/DL (ref 8–25)
CALCIUM SERPL-MCNC: 8.7 MG/DL (ref 8.5–10.4)
CHLORIDE SERPL-SCNC: 97 MMOL/L (ref 97–107)
CO2 SERPL-SCNC: 30 MMOL/L (ref 24–31)
CREAT SERPL-MCNC: 1.1 MG/DL (ref 0.4–1.6)
EGFRCR SERPLBLD CKD-EPI 2021: 50 ML/MIN/1.73M*2
EJECTION FRACTION: 63 %
EOSINOPHIL # BLD AUTO: 0.16 X10*3/UL (ref 0–0.4)
EOSINOPHIL NFR BLD AUTO: 2 %
ERYTHROCYTE [DISTWIDTH] IN BLOOD BY AUTOMATED COUNT: 12.1 % (ref 11.5–14.5)
GLUCOSE SERPL-MCNC: 87 MG/DL (ref 65–99)
HCT VFR BLD AUTO: 36.7 % (ref 36–46)
HGB BLD-MCNC: 11.9 G/DL (ref 12–16)
IMM GRANULOCYTES # BLD AUTO: 0.02 X10*3/UL (ref 0–0.5)
IMM GRANULOCYTES NFR BLD AUTO: 0.2 % (ref 0–0.9)
LEFT ATRIUM VOLUME AREA LENGTH INDEX BSA: 24.3 ML/M2
LEFT VENTRICULAR OUTFLOW TRACT DIAMETER: 1.6 CM
LYMPHOCYTES # BLD AUTO: 2.29 X10*3/UL (ref 0.8–3)
LYMPHOCYTES NFR BLD AUTO: 28.2 %
MCH RBC QN AUTO: 31.5 PG (ref 26–34)
MCHC RBC AUTO-ENTMCNC: 32.4 G/DL (ref 32–36)
MCV RBC AUTO: 97 FL (ref 80–100)
MITRAL VALVE E/A RATIO: 1.62
MONOCYTES # BLD AUTO: 0.53 X10*3/UL (ref 0.05–0.8)
MONOCYTES NFR BLD AUTO: 6.5 %
NEUTROPHILS # BLD AUTO: 5.08 X10*3/UL (ref 1.6–5.5)
NEUTROPHILS NFR BLD AUTO: 62.7 %
NRBC BLD-RTO: 0 /100 WBCS (ref 0–0)
PLATELET # BLD AUTO: 283 X10*3/UL (ref 150–450)
POTASSIUM SERPL-SCNC: 4.2 MMOL/L (ref 3.4–5.1)
PROT SERPL-MCNC: 6.7 G/DL (ref 5.9–7.9)
RBC # BLD AUTO: 3.78 X10*6/UL (ref 4–5.2)
RIGHT VENTRICLE FREE WALL PEAK S': 8.27 CM/S
RIGHT VENTRICLE PEAK SYSTOLIC PRESSURE: 39.7 MMHG
SODIUM SERPL-SCNC: 136 MMOL/L (ref 133–145)
TRICUSPID ANNULAR PLANE SYSTOLIC EXCURSION: 1.8 CM
TROPONIN T SERPL-MCNC: 17 NG/L
TROPONIN T SERPL-MCNC: 18 NG/L
TROPONIN T SERPL-MCNC: 19 NG/L
WBC # BLD AUTO: 8.1 X10*3/UL (ref 4.4–11.3)

## 2024-09-09 PROCEDURE — 93005 ELECTROCARDIOGRAM TRACING: CPT

## 2024-09-09 PROCEDURE — 2500000001 HC RX 250 WO HCPCS SELF ADMINISTERED DRUGS (ALT 637 FOR MEDICARE OP)

## 2024-09-09 PROCEDURE — 94760 N-INVAS EAR/PLS OXIMETRY 1: CPT

## 2024-09-09 PROCEDURE — 97165 OT EVAL LOW COMPLEX 30 MIN: CPT | Mod: GO

## 2024-09-09 PROCEDURE — 36415 COLL VENOUS BLD VENIPUNCTURE: CPT | Performed by: STUDENT IN AN ORGANIZED HEALTH CARE EDUCATION/TRAINING PROGRAM

## 2024-09-09 PROCEDURE — 94664 DEMO&/EVAL PT USE INHALER: CPT

## 2024-09-09 PROCEDURE — 2500000004 HC RX 250 GENERAL PHARMACY W/ HCPCS (ALT 636 FOR OP/ED): Performed by: STUDENT IN AN ORGANIZED HEALTH CARE EDUCATION/TRAINING PROGRAM

## 2024-09-09 PROCEDURE — 93306 TTE W/DOPPLER COMPLETE: CPT | Performed by: INTERNAL MEDICINE

## 2024-09-09 PROCEDURE — G0378 HOSPITAL OBSERVATION PER HR: HCPCS

## 2024-09-09 PROCEDURE — 2500000002 HC RX 250 W HCPCS SELF ADMINISTERED DRUGS (ALT 637 FOR MEDICARE OP, ALT 636 FOR OP/ED): Performed by: STUDENT IN AN ORGANIZED HEALTH CARE EDUCATION/TRAINING PROGRAM

## 2024-09-09 PROCEDURE — 85025 COMPLETE CBC W/AUTO DIFF WBC: CPT | Performed by: STUDENT IN AN ORGANIZED HEALTH CARE EDUCATION/TRAINING PROGRAM

## 2024-09-09 PROCEDURE — 99233 SBSQ HOSP IP/OBS HIGH 50: CPT | Performed by: STUDENT IN AN ORGANIZED HEALTH CARE EDUCATION/TRAINING PROGRAM

## 2024-09-09 PROCEDURE — 1200000002 HC GENERAL ROOM WITH TELEMETRY DAILY

## 2024-09-09 PROCEDURE — 93306 TTE W/DOPPLER COMPLETE: CPT

## 2024-09-09 PROCEDURE — 84484 ASSAY OF TROPONIN QUANT: CPT | Performed by: STUDENT IN AN ORGANIZED HEALTH CARE EDUCATION/TRAINING PROGRAM

## 2024-09-09 PROCEDURE — 93010 ELECTROCARDIOGRAM REPORT: CPT | Performed by: INTERNAL MEDICINE

## 2024-09-09 PROCEDURE — 80053 COMPREHEN METABOLIC PANEL: CPT | Performed by: STUDENT IN AN ORGANIZED HEALTH CARE EDUCATION/TRAINING PROGRAM

## 2024-09-09 PROCEDURE — 97161 PT EVAL LOW COMPLEX 20 MIN: CPT | Mod: GP

## 2024-09-09 PROCEDURE — 71045 X-RAY EXAM CHEST 1 VIEW: CPT

## 2024-09-09 PROCEDURE — 94640 AIRWAY INHALATION TREATMENT: CPT

## 2024-09-09 PROCEDURE — 9420000001 HC RT PATIENT EDUCATION 5 MIN

## 2024-09-09 PROCEDURE — 71045 X-RAY EXAM CHEST 1 VIEW: CPT | Performed by: STUDENT IN AN ORGANIZED HEALTH CARE EDUCATION/TRAINING PROGRAM

## 2024-09-09 PROCEDURE — 2500000001 HC RX 250 WO HCPCS SELF ADMINISTERED DRUGS (ALT 637 FOR MEDICARE OP): Performed by: STUDENT IN AN ORGANIZED HEALTH CARE EDUCATION/TRAINING PROGRAM

## 2024-09-09 PROCEDURE — 99222 1ST HOSP IP/OBS MODERATE 55: CPT

## 2024-09-09 PROCEDURE — 2500000005 HC RX 250 GENERAL PHARMACY W/O HCPCS: Performed by: STUDENT IN AN ORGANIZED HEALTH CARE EDUCATION/TRAINING PROGRAM

## 2024-09-09 PROCEDURE — 99285 EMERGENCY DEPT VISIT HI MDM: CPT | Mod: 25

## 2024-09-09 RX ORDER — NAPROXEN SODIUM 220 MG/1
81 TABLET, FILM COATED ORAL DAILY
Status: DISCONTINUED | OUTPATIENT
Start: 2024-09-09 | End: 2024-09-10 | Stop reason: HOSPADM

## 2024-09-09 RX ORDER — POLYETHYLENE GLYCOL 3350 17 G/17G
17 POWDER, FOR SOLUTION ORAL DAILY
Status: DISCONTINUED | OUTPATIENT
Start: 2024-09-09 | End: 2024-09-10 | Stop reason: HOSPADM

## 2024-09-09 RX ORDER — ACETAMINOPHEN 500 MG
1000 TABLET ORAL ONCE
Status: COMPLETED | OUTPATIENT
Start: 2024-09-09 | End: 2024-09-09

## 2024-09-09 RX ORDER — DULOXETIN HYDROCHLORIDE 30 MG/1
30 CAPSULE, DELAYED RELEASE ORAL DAILY
Status: DISCONTINUED | OUTPATIENT
Start: 2024-09-09 | End: 2024-09-10 | Stop reason: HOSPADM

## 2024-09-09 RX ORDER — PRIMIDONE 50 MG/1
100 TABLET ORAL 3 TIMES DAILY
Status: DISCONTINUED | OUTPATIENT
Start: 2024-09-09 | End: 2024-09-10 | Stop reason: HOSPADM

## 2024-09-09 RX ORDER — HEPARIN SODIUM 5000 [USP'U]/ML
5000 INJECTION, SOLUTION INTRAVENOUS; SUBCUTANEOUS EVERY 8 HOURS
Status: DISCONTINUED | OUTPATIENT
Start: 2024-09-09 | End: 2024-09-10 | Stop reason: HOSPADM

## 2024-09-09 RX ORDER — FUROSEMIDE 10 MG/ML
20 INJECTION INTRAMUSCULAR; INTRAVENOUS DAILY
Status: DISCONTINUED | OUTPATIENT
Start: 2024-09-09 | End: 2024-09-09

## 2024-09-09 RX ORDER — FUROSEMIDE 10 MG/ML
20 INJECTION INTRAMUSCULAR; INTRAVENOUS DAILY
Status: DISCONTINUED | OUTPATIENT
Start: 2024-09-10 | End: 2024-09-10 | Stop reason: HOSPADM

## 2024-09-09 RX ORDER — ATORVASTATIN CALCIUM 40 MG/1
40 TABLET, FILM COATED ORAL NIGHTLY
Status: DISCONTINUED | OUTPATIENT
Start: 2024-09-09 | End: 2024-09-10 | Stop reason: HOSPADM

## 2024-09-09 RX ORDER — NITROGLYCERIN 0.4 MG/1
0.4 TABLET SUBLINGUAL EVERY 5 MIN PRN
Status: DISCONTINUED | OUTPATIENT
Start: 2024-09-09 | End: 2024-09-10 | Stop reason: HOSPADM

## 2024-09-09 RX ORDER — FENTANYL CITRATE 50 UG/ML
50 INJECTION, SOLUTION INTRAMUSCULAR; INTRAVENOUS ONCE
Status: COMPLETED | OUTPATIENT
Start: 2024-09-09 | End: 2024-09-09

## 2024-09-09 RX ORDER — TALC
3 POWDER (GRAM) TOPICAL NIGHTLY PRN
Status: DISCONTINUED | OUTPATIENT
Start: 2024-09-09 | End: 2024-09-10 | Stop reason: HOSPADM

## 2024-09-09 RX ORDER — FUROSEMIDE 10 MG/ML
20 INJECTION INTRAMUSCULAR; INTRAVENOUS ONCE
Status: COMPLETED | OUTPATIENT
Start: 2024-09-09 | End: 2024-09-09

## 2024-09-09 RX ORDER — PANTOPRAZOLE SODIUM 40 MG/1
40 TABLET, DELAYED RELEASE ORAL
Status: DISCONTINUED | OUTPATIENT
Start: 2024-09-09 | End: 2024-09-10 | Stop reason: HOSPADM

## 2024-09-09 RX ORDER — ONDANSETRON 4 MG/1
4 TABLET, ORALLY DISINTEGRATING ORAL ONCE
Status: DISCONTINUED | OUTPATIENT
Start: 2024-09-09 | End: 2024-09-10 | Stop reason: HOSPADM

## 2024-09-09 RX ORDER — SIMVASTATIN 40 MG/1
40 TABLET, FILM COATED ORAL NIGHTLY
Status: DISCONTINUED | OUTPATIENT
Start: 2024-09-09 | End: 2024-09-09

## 2024-09-09 RX ORDER — AMLODIPINE BESYLATE 5 MG/1
5 TABLET ORAL DAILY
Status: DISCONTINUED | OUTPATIENT
Start: 2024-09-09 | End: 2024-09-10 | Stop reason: HOSPADM

## 2024-09-09 RX ORDER — CITALOPRAM 20 MG/1
20 TABLET, FILM COATED ORAL DAILY
Status: DISCONTINUED | OUTPATIENT
Start: 2024-09-09 | End: 2024-09-10 | Stop reason: HOSPADM

## 2024-09-09 RX ORDER — ALBUTEROL SULFATE 0.83 MG/ML
2.5 SOLUTION RESPIRATORY (INHALATION)
Status: DISCONTINUED | OUTPATIENT
Start: 2024-09-09 | End: 2024-09-10 | Stop reason: HOSPADM

## 2024-09-09 RX ORDER — ONDANSETRON HYDROCHLORIDE 2 MG/ML
4 INJECTION, SOLUTION INTRAVENOUS ONCE
Status: DISCONTINUED | OUTPATIENT
Start: 2024-09-09 | End: 2024-09-10 | Stop reason: HOSPADM

## 2024-09-09 RX ORDER — ALBUTEROL SULFATE 0.83 MG/ML
2.5 SOLUTION RESPIRATORY (INHALATION) EVERY 2 HOUR PRN
Status: DISCONTINUED | OUTPATIENT
Start: 2024-09-09 | End: 2024-09-10 | Stop reason: HOSPADM

## 2024-09-09 RX ORDER — PANTOPRAZOLE SODIUM 40 MG/10ML
40 INJECTION, POWDER, LYOPHILIZED, FOR SOLUTION INTRAVENOUS
Status: DISCONTINUED | OUTPATIENT
Start: 2024-09-09 | End: 2024-09-10 | Stop reason: HOSPADM

## 2024-09-09 RX ORDER — BUDESONIDE 0.5 MG/2ML
0.5 INHALANT ORAL
Status: DISCONTINUED | OUTPATIENT
Start: 2024-09-09 | End: 2024-09-10 | Stop reason: HOSPADM

## 2024-09-09 RX ORDER — AMINOPHYLLINE 25 MG/ML
125 INJECTION, SOLUTION INTRAVENOUS AS NEEDED
Status: CANCELLED | OUTPATIENT
Start: 2024-09-09

## 2024-09-09 RX ADMIN — FENTANYL CITRATE 50 MCG: 50 INJECTION INTRAMUSCULAR; INTRAVENOUS at 05:33

## 2024-09-09 RX ADMIN — BUDESONIDE INHALATION 0.5 MG: 0.5 SUSPENSION RESPIRATORY (INHALATION) at 19:01

## 2024-09-09 RX ADMIN — PRIMIDONE 100 MG: 50 TABLET ORAL at 22:06

## 2024-09-09 RX ADMIN — ALBUTEROL SULFATE 2.5 MG: 2.5 SOLUTION RESPIRATORY (INHALATION) at 07:51

## 2024-09-09 RX ADMIN — CITALOPRAM HYDROBROMIDE 20 MG: 20 TABLET ORAL at 08:39

## 2024-09-09 RX ADMIN — CARVEDILOL 9.38 MG: 3.12 TABLET, FILM COATED ORAL at 18:04

## 2024-09-09 RX ADMIN — ATORVASTATIN CALCIUM 40 MG: 40 TABLET, FILM COATED ORAL at 22:06

## 2024-09-09 RX ADMIN — ALBUTEROL SULFATE 2.5 MG: 2.5 SOLUTION RESPIRATORY (INHALATION) at 13:21

## 2024-09-09 RX ADMIN — ALBUTEROL SULFATE 2.5 MG: 2.5 SOLUTION RESPIRATORY (INHALATION) at 19:01

## 2024-09-09 RX ADMIN — Medication 3 L/MIN: at 22:36

## 2024-09-09 RX ADMIN — HEPARIN SODIUM 5000 UNITS: 5000 INJECTION, SOLUTION INTRAVENOUS; SUBCUTANEOUS at 22:06

## 2024-09-09 RX ADMIN — HEPARIN SODIUM 5000 UNITS: 5000 INJECTION, SOLUTION INTRAVENOUS; SUBCUTANEOUS at 06:40

## 2024-09-09 RX ADMIN — Medication 4 L/MIN: at 06:39

## 2024-09-09 RX ADMIN — Medication 3 L/MIN: at 07:58

## 2024-09-09 RX ADMIN — PANTOPRAZOLE SODIUM 40 MG: 40 TABLET, DELAYED RELEASE ORAL at 06:40

## 2024-09-09 RX ADMIN — BUDESONIDE INHALATION 0.5 MG: 0.5 SUSPENSION RESPIRATORY (INHALATION) at 07:51

## 2024-09-09 RX ADMIN — PRIMIDONE 100 MG: 50 TABLET ORAL at 14:45

## 2024-09-09 RX ADMIN — ACETAMINOPHEN 1000 MG: 500 TABLET ORAL at 05:33

## 2024-09-09 RX ADMIN — HEPARIN SODIUM 5000 UNITS: 5000 INJECTION, SOLUTION INTRAVENOUS; SUBCUTANEOUS at 14:45

## 2024-09-09 RX ADMIN — AMLODIPINE BESYLATE 5 MG: 5 TABLET ORAL at 12:50

## 2024-09-09 RX ADMIN — PRIMIDONE 100 MG: 50 TABLET ORAL at 08:39

## 2024-09-09 RX ADMIN — DULOXETINE HYDROCHLORIDE 30 MG: 30 CAPSULE, DELAYED RELEASE ORAL at 08:39

## 2024-09-09 RX ADMIN — ASPIRIN 81 MG: 81 TABLET, CHEWABLE ORAL at 08:39

## 2024-09-09 RX ADMIN — FUROSEMIDE 20 MG: 10 INJECTION, SOLUTION INTRAMUSCULAR; INTRAVENOUS at 08:41

## 2024-09-09 RX ADMIN — Medication 3 MG: at 22:06

## 2024-09-09 RX ADMIN — Medication 3 L/MIN: at 15:50

## 2024-09-09 SDOH — HEALTH STABILITY: MENTAL HEALTH: HOW OFTEN DO YOU HAVE SIX OR MORE DRINKS ON ONE OCCASION?: NEVER

## 2024-09-09 SDOH — ECONOMIC STABILITY: TRANSPORTATION INSECURITY: IN THE PAST 12 MONTHS, HAS LACK OF TRANSPORTATION KEPT YOU FROM MEDICAL APPOINTMENTS OR FROM GETTING MEDICATIONS?: NO

## 2024-09-09 SDOH — SOCIAL STABILITY: SOCIAL NETWORK
IN A TYPICAL WEEK, HOW MANY TIMES DO YOU TALK ON THE PHONE WITH FAMILY, FRIENDS, OR NEIGHBORS?: MORE THAN THREE TIMES A WEEK

## 2024-09-09 SDOH — HEALTH STABILITY: MENTAL HEALTH: HOW OFTEN DO YOU HAVE 6 OR MORE DRINKS ON ONE OCCASION?: NEVER

## 2024-09-09 SDOH — SOCIAL STABILITY: SOCIAL INSECURITY: DO YOU FEEL UNSAFE GOING BACK TO THE PLACE WHERE YOU ARE LIVING?: NO

## 2024-09-09 SDOH — SOCIAL STABILITY: SOCIAL INSECURITY: WITHIN THE LAST YEAR, HAVE YOU BEEN HUMILIATED OR EMOTIONALLY ABUSED IN OTHER WAYS BY YOUR PARTNER OR EX-PARTNER?: NO

## 2024-09-09 SDOH — HEALTH STABILITY: MENTAL HEALTH
DO YOU FEEL STRESS - TENSE, RESTLESS, NERVOUS, OR ANXIOUS, OR UNABLE TO SLEEP AT NIGHT BECAUSE YOUR MIND IS TROUBLED ALL THE TIME - THESE DAYS?: TO SOME EXTENT

## 2024-09-09 SDOH — HEALTH STABILITY: PHYSICAL HEALTH: ON AVERAGE, HOW MANY MINUTES DO YOU ENGAGE IN EXERCISE AT THIS LEVEL?: 0 MIN

## 2024-09-09 SDOH — SOCIAL STABILITY: SOCIAL NETWORK: HOW OFTEN DO YOU ATTEND CHURCH OR RELIGIOUS SERVICES?: NEVER

## 2024-09-09 SDOH — HEALTH STABILITY: MENTAL HEALTH
STRESS IS WHEN SOMEONE FEELS TENSE, NERVOUS, ANXIOUS, OR CAN'T SLEEP AT NIGHT BECAUSE THEIR MIND IS TROUBLED. HOW STRESSED ARE YOU?: TO SOME EXTENT

## 2024-09-09 SDOH — ECONOMIC STABILITY: FOOD INSECURITY: WITHIN THE PAST 12 MONTHS, YOU WORRIED THAT YOUR FOOD WOULD RUN OUT BEFORE YOU GOT THE MONEY TO BUY MORE.: NEVER TRUE

## 2024-09-09 SDOH — HEALTH STABILITY: PHYSICAL HEALTH: ON AVERAGE, HOW MANY DAYS PER WEEK DO YOU ENGAGE IN MODERATE TO STRENUOUS EXERCISE (LIKE A BRISK WALK)?: 0 DAYS

## 2024-09-09 SDOH — ECONOMIC STABILITY: INCOME INSECURITY: HOW HARD IS IT FOR YOU TO PAY FOR THE VERY BASICS LIKE FOOD, HOUSING, MEDICAL CARE, AND HEATING?: NOT HARD AT ALL

## 2024-09-09 SDOH — SOCIAL STABILITY: SOCIAL NETWORK: HOW OFTEN DO YOU ATTENT MEETINGS OF THE CLUB OR ORGANIZATION YOU BELONG TO?: NEVER

## 2024-09-09 SDOH — ECONOMIC STABILITY: HOUSING INSECURITY: IN THE LAST 12 MONTHS, WAS THERE A TIME WHEN YOU WERE NOT ABLE TO PAY THE MORTGAGE OR RENT ON TIME?: NO

## 2024-09-09 SDOH — SOCIAL STABILITY: SOCIAL NETWORK: HOW OFTEN DO YOU GET TOGETHER WITH FRIENDS OR RELATIVES?: MORE THAN THREE TIMES A WEEK

## 2024-09-09 SDOH — ECONOMIC STABILITY: FOOD INSECURITY: WITHIN THE PAST 12 MONTHS, THE FOOD YOU BOUGHT JUST DIDN'T LAST AND YOU DIDN'T HAVE MONEY TO GET MORE.: NEVER TRUE

## 2024-09-09 SDOH — ECONOMIC STABILITY: HOUSING INSECURITY: IN THE PAST 12 MONTHS, HOW MANY TIMES HAVE YOU MOVED WHERE YOU WERE LIVING?: 0

## 2024-09-09 SDOH — ECONOMIC STABILITY: HOUSING INSECURITY: AT ANY TIME IN THE PAST 12 MONTHS, WERE YOU HOMELESS OR LIVING IN A SHELTER (INCLUDING NOW)?: NO

## 2024-09-09 SDOH — HEALTH STABILITY: MENTAL HEALTH: HOW OFTEN DO YOU HAVE A DRINK CONTAINING ALCOHOL?: NEVER

## 2024-09-09 SDOH — SOCIAL STABILITY: SOCIAL INSECURITY: ARE YOU MARRIED, WIDOWED, DIVORCED, SEPARATED, NEVER MARRIED, OR LIVING WITH A PARTNER?: WIDOWED

## 2024-09-09 SDOH — SOCIAL STABILITY: SOCIAL NETWORK
DO YOU BELONG TO ANY CLUBS OR ORGANIZATIONS SUCH AS CHURCH GROUPS UNIONS, FRATERNAL OR ATHLETIC GROUPS, OR SCHOOL GROUPS?: NO

## 2024-09-09 SDOH — ECONOMIC STABILITY: FOOD INSECURITY: HOW HARD IS IT FOR YOU TO PAY FOR THE VERY BASICS LIKE FOOD, HOUSING, MEDICAL CARE, AND HEATING?: NOT HARD AT ALL

## 2024-09-09 SDOH — SOCIAL STABILITY: SOCIAL NETWORK
DO YOU BELONG TO ANY CLUBS OR ORGANIZATIONS SUCH AS CHURCH GROUPS, UNIONS, FRATERNAL OR ATHLETIC GROUPS, OR SCHOOL GROUPS?: NO

## 2024-09-09 SDOH — HEALTH STABILITY: PHYSICAL HEALTH
HOW OFTEN DO YOU NEED TO HAVE SOMEONE HELP YOU WHEN YOU READ INSTRUCTIONS, PAMPHLETS, OR OTHER WRITTEN MATERIAL FROM YOUR DOCTOR OR PHARMACY?: NEVER

## 2024-09-09 SDOH — SOCIAL STABILITY: SOCIAL NETWORK: ARE YOU MARRIED, WIDOWED, DIVORCED, SEPARATED, NEVER MARRIED, OR LIVING WITH A PARTNER?: WIDOWED

## 2024-09-09 SDOH — SOCIAL STABILITY: SOCIAL INSECURITY: WITHIN THE LAST YEAR, HAVE YOU BEEN AFRAID OF YOUR PARTNER OR EX-PARTNER?: NO

## 2024-09-09 SDOH — ECONOMIC STABILITY: INCOME INSECURITY: IN THE PAST 12 MONTHS HAS THE ELECTRIC, GAS, OIL, OR WATER COMPANY THREATENED TO SHUT OFF SERVICES IN YOUR HOME?: NO

## 2024-09-09 SDOH — ECONOMIC STABILITY: INCOME INSECURITY: IN THE LAST 12 MONTHS, WAS THERE A TIME WHEN YOU WERE NOT ABLE TO PAY THE MORTGAGE OR RENT ON TIME?: NO

## 2024-09-09 SDOH — HEALTH STABILITY: MENTAL HEALTH: HOW MANY DRINKS CONTAINING ALCOHOL DO YOU HAVE ON A TYPICAL DAY WHEN YOU ARE DRINKING?: PATIENT DOES NOT DRINK

## 2024-09-09 SDOH — SOCIAL STABILITY: SOCIAL INSECURITY: DOES ANYONE TRY TO KEEP YOU FROM HAVING/CONTACTING OTHER FRIENDS OR DOING THINGS OUTSIDE YOUR HOME?: NO

## 2024-09-09 SDOH — ECONOMIC STABILITY: FOOD INSECURITY: WITHIN THE PAST 12 MONTHS, YOU WORRIED THAT YOUR FOOD WOULD RUN OUT BEFORE YOU GOT MONEY TO BUY MORE.: NEVER TRUE

## 2024-09-09 SDOH — SOCIAL STABILITY: SOCIAL NETWORK: HOW OFTEN DO YOU ATTEND MEETINGS OF THE CLUBS OR ORGANIZATIONS YOU BELONG TO?: NEVER

## 2024-09-09 SDOH — ECONOMIC STABILITY: INCOME INSECURITY: IN THE PAST 12 MONTHS, HAS THE ELECTRIC, GAS, OIL, OR WATER COMPANY THREATENED TO SHUT OFF SERVICE IN YOUR HOME?: NO

## 2024-09-09 SDOH — SOCIAL STABILITY: SOCIAL INSECURITY: ARE THERE ANY APPARENT SIGNS OF INJURIES/BEHAVIORS THAT COULD BE RELATED TO ABUSE/NEGLECT?: NO

## 2024-09-09 SDOH — HEALTH STABILITY: MENTAL HEALTH: HOW MANY STANDARD DRINKS CONTAINING ALCOHOL DO YOU HAVE ON A TYPICAL DAY?: PATIENT DOES NOT DRINK

## 2024-09-09 SDOH — SOCIAL STABILITY: SOCIAL INSECURITY: DO YOU FEEL ANYONE HAS EXPLOITED OR TAKEN ADVANTAGE OF YOU FINANCIALLY OR OF YOUR PERSONAL PROPERTY?: NO

## 2024-09-09 SDOH — SOCIAL STABILITY: SOCIAL INSECURITY: ARE YOU OR HAVE YOU BEEN THREATENED OR ABUSED PHYSICALLY, EMOTIONALLY, OR SEXUALLY BY ANYONE?: NO

## 2024-09-09 SDOH — SOCIAL STABILITY: SOCIAL INSECURITY: HAVE YOU HAD THOUGHTS OF HARMING ANYONE ELSE?: NO

## 2024-09-09 SDOH — SOCIAL STABILITY: SOCIAL INSECURITY: WERE YOU ABLE TO COMPLETE ALL THE BEHAVIORAL HEALTH SCREENINGS?: YES

## 2024-09-09 SDOH — SOCIAL STABILITY: SOCIAL INSECURITY: HAVE YOU HAD ANY THOUGHTS OF HARMING ANYONE ELSE?: NO

## 2024-09-09 SDOH — SOCIAL STABILITY: SOCIAL INSECURITY: ABUSE: ADULT

## 2024-09-09 SDOH — SOCIAL STABILITY: SOCIAL INSECURITY: HAS ANYONE EVER THREATENED TO HURT YOUR FAMILY OR YOUR PETS?: NO

## 2024-09-09 ASSESSMENT — COLUMBIA-SUICIDE SEVERITY RATING SCALE - C-SSRS
1. IN THE PAST MONTH, HAVE YOU WISHED YOU WERE DEAD OR WISHED YOU COULD GO TO SLEEP AND NOT WAKE UP?: NO
2. HAVE YOU ACTUALLY HAD ANY THOUGHTS OF KILLING YOURSELF?: NO
6. HAVE YOU EVER DONE ANYTHING, STARTED TO DO ANYTHING, OR PREPARED TO DO ANYTHING TO END YOUR LIFE?: NO

## 2024-09-09 ASSESSMENT — COGNITIVE AND FUNCTIONAL STATUS - GENERAL
DRESSING REGULAR UPPER BODY CLOTHING: A LITTLE
DRESSING REGULAR UPPER BODY CLOTHING: A LITTLE
CLIMB 3 TO 5 STEPS WITH RAILING: A LOT
WALKING IN HOSPITAL ROOM: A LITTLE
MOVING TO AND FROM BED TO CHAIR: A LITTLE
PATIENT BASELINE BEDBOUND: NO
STANDING UP FROM CHAIR USING ARMS: A LITTLE
MOBILITY SCORE: 17
PERSONAL GROOMING: A LITTLE
MOVING FROM LYING ON BACK TO SITTING ON SIDE OF FLAT BED WITH BEDRAILS: A LITTLE
MOVING FROM LYING ON BACK TO SITTING ON SIDE OF FLAT BED WITH BEDRAILS: A LITTLE
DRESSING REGULAR LOWER BODY CLOTHING: A LITTLE
WALKING IN HOSPITAL ROOM: A LITTLE
CLIMB 3 TO 5 STEPS WITH RAILING: A LITTLE
EATING MEALS: A LITTLE
DAILY ACTIVITIY SCORE: 18
PERSONAL GROOMING: A LITTLE
TOILETING: A LITTLE
MOBILITY SCORE: 18
STANDING UP FROM CHAIR USING ARMS: A LITTLE
HELP NEEDED FOR BATHING: A LITTLE
TOILETING: A LITTLE
EATING MEALS: A LITTLE
DRESSING REGULAR LOWER BODY CLOTHING: A LITTLE
MOVING TO AND FROM BED TO CHAIR: A LITTLE
HELP NEEDED FOR BATHING: A LITTLE
TURNING FROM BACK TO SIDE WHILE IN FLAT BAD: A LITTLE
TURNING FROM BACK TO SIDE WHILE IN FLAT BAD: A LITTLE
DAILY ACTIVITIY SCORE: 18

## 2024-09-09 ASSESSMENT — PAIN SCALES - GENERAL
PAINLEVEL_OUTOF10: 7
PAINLEVEL_OUTOF10: 7
PAINLEVEL_OUTOF10: 0 - NO PAIN
PAINLEVEL_OUTOF10: 8

## 2024-09-09 ASSESSMENT — HEART SCORE
AGE: 65+
HISTORY: SLIGHTLY SUSPICIOUS
RISK FACTORS: >2 RISK FACTORS OR HX OF ATHEROSCLEROTIC DISEASE
HEART SCORE: 5
ECG: NORMAL
TROPONIN: 1-3 TIMES NORMAL LIMIT

## 2024-09-09 ASSESSMENT — PATIENT HEALTH QUESTIONNAIRE - PHQ9
2. FEELING DOWN, DEPRESSED OR HOPELESS: SEVERAL DAYS
SUM OF ALL RESPONSES TO PHQ9 QUESTIONS 1 & 2: 1
1. LITTLE INTEREST OR PLEASURE IN DOING THINGS: NOT AT ALL

## 2024-09-09 ASSESSMENT — PAIN DESCRIPTION - DESCRIPTORS
DESCRIPTORS: PRESSURE
DESCRIPTORS: PRESSURE

## 2024-09-09 ASSESSMENT — ENCOUNTER SYMPTOMS
SHORTNESS OF BREATH: 1
SPEECH DIFFICULTY: 0
VOMITING: 0
ABDOMINAL PAIN: 0
DIZZINESS: 0
DIAPHORESIS: 1
WEAKNESS: 1
CHEST TIGHTNESS: 1
LIGHT-HEADEDNESS: 0
HEADACHES: 1
CHILLS: 0
LIGHT-HEADEDNESS: 1
CONSTIPATION: 0
ALLERGIC/IMMUNOLOGIC NEGATIVE: 1
NECK PAIN: 1
FEVER: 0
PALPITATIONS: 0
DIZZINESS: 1
NAUSEA: 1
MUSCULOSKELETAL NEGATIVE: 1
ENDOCRINE NEGATIVE: 1
NAUSEA: 0
NUMBNESS: 1
FREQUENCY: 0
EYES NEGATIVE: 1
HEMATOLOGIC/LYMPHATIC NEGATIVE: 1

## 2024-09-09 ASSESSMENT — ACTIVITIES OF DAILY LIVING (ADL)
LACK_OF_TRANSPORTATION: NO
ADL_ASSISTANCE: INDEPENDENT
HEARING - RIGHT EAR: FUNCTIONAL
DRESSING YOURSELF: INDEPENDENT
ASSISTIVE_DEVICE: DENTURES LOWER;DENTURES UPPER;WALKER
GROOMING: INDEPENDENT
HEARING - LEFT EAR: FUNCTIONAL
ADEQUATE_TO_COMPLETE_ADL: YES
WALKS IN HOME: INDEPENDENT
PATIENT'S MEMORY ADEQUATE TO SAFELY COMPLETE DAILY ACTIVITIES?: YES
LACK_OF_TRANSPORTATION: NO
BATHING_ASSISTANCE: STAND BY
TOILETING: INDEPENDENT
LACK_OF_TRANSPORTATION: NO
JUDGMENT_ADEQUATE_SAFELY_COMPLETE_DAILY_ACTIVITIES: YES
BATHING: INDEPENDENT
FEEDING YOURSELF: INDEPENDENT

## 2024-09-09 ASSESSMENT — LIFESTYLE VARIABLES
AUDIT-C TOTAL SCORE: 0
AUDIT-C TOTAL SCORE: 0
HOW MANY STANDARD DRINKS CONTAINING ALCOHOL DO YOU HAVE ON A TYPICAL DAY: PATIENT DOES NOT DRINK
HOW OFTEN DO YOU HAVE A DRINK CONTAINING ALCOHOL: NEVER
HOW OFTEN DO YOU HAVE 6 OR MORE DRINKS ON ONE OCCASION: NEVER
AUDIT-C TOTAL SCORE: 0
SKIP TO QUESTIONS 9-10: 1
SKIP TO QUESTIONS 9-10: 1

## 2024-09-09 ASSESSMENT — PAIN DESCRIPTION - ORIENTATION: ORIENTATION: MID;LEFT

## 2024-09-09 ASSESSMENT — PAIN DESCRIPTION - LOCATION
LOCATION: CHEST
LOCATION: CHEST

## 2024-09-09 ASSESSMENT — PAIN - FUNCTIONAL ASSESSMENT
PAIN_FUNCTIONAL_ASSESSMENT: 0-10

## 2024-09-09 ASSESSMENT — PAIN DESCRIPTION - PAIN TYPE
TYPE: ACUTE PAIN
TYPE: ACUTE PAIN

## 2024-09-09 NOTE — PROGRESS NOTES
Physical Therapy Evaluation    Patient Name: Juli Del Castillo  MRN: [id number]  Today's Date: 9/9/2024   Time Calculation  Start Time: 1028  Stop Time: 1039  Time Calculation (min): 11 min    Assessment/Plan   PT Assessment  PT Assessment Results: Decreased strength, Decreased endurance, Impaired balance, Decreased mobility, Pain  Rehab Prognosis: Excellent  Evaluation/Treatment Tolerance: Patient tolerated treatment well  Medical Staff Made Aware: Yes  End of Session Communication: Bedside nurse  Assessment Comment: 83 year old female admits with CP, HF, CAD, and Chronic Respiratory Failure.On eval, she demonstrates weakness and decreased activity tolerance warranting skilled PT care. At NH, low intensity rehab is recommended.  End of Session Patient Position: Bed, 3 rail up, Alarm on  IP OR SWING BED PT PLAN  Inpatient or Swing Bed: Inpatient  PT Plan  Treatment/Interventions: Bed mobility, Transfer training, Gait training, Balance training, Strengthening, Endurance training, Therapeutic exercise, Therapeutic activity, Home exercise program  PT Plan: Ongoing PT  PT Frequency: 3 times per week  PT Discharge Recommendations: Low intensity level of continued care  Equipment Recommended upon Discharge: Wheeled walker  PT Recommended Transfer Status: Contact guard, Assistive device  PT - OK to Discharge: Yes      Subjective   General Visit Information:  General  Reason for Referral: Impaired Mobility  Referred By: Dr. Delatorre  Past Medical History Relevant to Rehab: diastolic HF, chr. resp. failure on 4L O2 2/2 COPD, CAD s/p stent , CABG, STEMI, CKD, fibromyalgia, TIA  Family/Caregiver Present: Yes  Caregiver Feedback: dtr  Prior to Session Communication: Bedside nurse  Patient Position Received:  (In bathroom agreeable to PT follow up)  Preferred Learning Style: verbal, visual  General Comment: 83 year old female admits with CP, HF, CAD, and Chronic Respiratory Failure.  Home Living:  Home Living  Type of Home:  "House  Lives With: Alone  Home Adaptive Equipment:  (Rollator)  Home Layout: One level  Home Access: No concerns  Bathroom Shower/Tub: Walk-in shower  Bathroom Equipment: Grab bars in shower  Prior Level of Function:  Prior Function Per Pt/Caregiver Report  Level of [place]: Needs assistance with ADLs, Needs assistance with homemaking  Receives Help From: Family (Family lives next door)  ADL Assistance:  (DIL assists with showers, cleans)  Homemaking Assistance: Needs assistance (DIL assist)  Ambulatory Assistance: Independent (ind with Rollator)  Prior Function Comments: denies recent fall hx  Precautions:  Precautions  Medical Precautions: Fall precautions    Objective   Pain:  Pain Assessment  Pain Assessment: 0-10  0-10 (Numeric) Pain Score: 0 - No pain (adamently denies pain but reports a dull sensation in mid chest that is \"mild\" and unchanged throughout session)  Cognition:  Cognition  Overall Cognitive Status: Within Functional Limits    General Assessments:  Activity Tolerance  Endurance: Decreased tolerance for upright activites    Sensation  Light Touch: No apparent deficits (denies abn)    Strength  Strength Comments: Grossly 4/5 BLEs on MMT. Mild weakness  Functional Assessments:  Bed Mobility  Bed Mobility: Yes  Bed Mobility 1  Bed Mobility 1: Sitting to supine  Level of Assistance 1: Close supervision  Bed Mobility Comments 1: laborous but self compelted with increased time. Effortful to bring BLEs back into bed    Transfers  Transfer: Yes  Transfer 1  Transfer From 1: Toilet to  Transfer to 1: Stand  Technique 1: Sit to stand, Stand to sit  Transfer Device 1: Walker  Transfer Level of Assistance 1: Close supervision  Trials/Comments 1: For safety  Transfers 2  Transfer From 2: Chair with arms to  Transfer to 2: Stand  Technique 2: Sit to stand, Stand to sit  Transfer Device 2: Walker  Transfer Level of Assistance 2: Contact guard  Trials/Comments 2: Initial CS needs but Pt is posterior leaning " and begins to push chair posterior. CGA given for safety    Ambulation/Gait Training  Ambulation/Gait Training Performed: Yes  Ambulation/Gait Training 1  Surface 1: Level tile  Device 1: Rolling walker  Assistance 1: Close supervision  Quality of Gait 1:  (slow, does intermittently catch foot on swing, kyphotic, laborous in appearance)  Comments/Distance (ft) 1: 30+10    Outcome Measures:  [facility] Basic Mobility  Turning from your back to your side while in a flat bed without using bedrails: A little  Moving from lying on your back to sitting on the side of a flat bed without using bedrails: A little  Moving to and from bed to chair (including a wheelchair): A little  Standing up from a chair using your arms (e.g. wheelchair or bedside chair): A little  To walk in hospital room: A little  Climbing 3-5 steps with railing: A little  Basic Mobility - Total Score: 18    Encounter Problems       Encounter Problems (Active)       Balance       Standing Balance (Progressing)       Start:  09/09/24    Expected End:  09/23/24       Pt will demonstrate good static standing balance to promote safe participation with out of bed activity, transfers, and mobility              Mobility       Ambulation (Progressing)       Start:  09/09/24    Expected End:  09/23/24       Pt will ambulate 100' modified independent assist with walker to promote safe home mobility              PT Transfers       Supine to sit (Progressing)       Start:  09/09/24    Expected End:  09/23/24       Pt will transfer supine to sitting at edge of bed with modified independent assist to promote acute care out of bed activity           Sit to stand (Progressing)       Start:  09/09/24    Expected End:  09/23/24       Pt will transfer sit to standing position with modified independent assist and walker to promote safe out of bed activity              Pain - Adult          Safety       Safe Mobility Techniques (Progressing)       Start:  09/09/24    Expected  End:  09/23/24       Pt will correctly identify and demonstrate safe mobility techniques to reduce their risks for falls during their acute care stay                   Education Documentation  Mobility Training, taught by Chris Matos, PT at 9/9/2024 10:56 AM.  Learner: Patient  Readiness: Acceptance  Method: Explanation, Demonstration  Response: Verbalizes Understanding  Comment: safe mobility techniques    Education Comments  No comments found.

## 2024-09-09 NOTE — PROGRESS NOTES
09/09/24 1443   Physical Activity   On average, how many days per week do you engage in moderate to strenuous exercise (like a brisk walk)? 0 days   On average, how many minutes do you engage in exercise at this level? 0 min   Financial Resource Strain   How hard is it for you to pay for the very basics like food, housing, medical care, and heating? Not hard   Housing Stability   In the last 12 months, was there a time when you were not able to pay the mortgage or rent on time? N   In the past 12 months, how many times have you moved where you were living? 0   At any time in the past 12 months, were you homeless or living in a shelter (including now)? N   Transportation Needs   In the past 12 months, has lack of transportation kept you from medical appointments or from getting medications? no   In the past 12 months, has lack of transportation kept you from meetings, work, or from getting things needed for daily living? No   Food Insecurity   Within the past 12 months, you worried that your food would run out before you got the money to buy more. Never true   Within the past 12 months, the food you bought just didn't last and you didn't have money to get more. Never true   Stress   Do you feel stress - tense, restless, nervous, or anxious, or unable to sleep at night because your mind is troubled all the time - these days? To some exte   Social Connections   In a typical week, how many times do you talk on the phone with family, friends, or neighbors? More than 3   How often do you get together with friends or relatives? More than 3   How often do you attend [employer] or [employer] services? Never   Do you belong to any clubs or organizations such as Baptist groups, unions, fraternal or athletic groups, or school groups? No   How often do you attend meetings of the clubs or organizations you belong to? Never   Are you , , , , never , or living with a partner?    Intimate  S/W patient.  He needs the freestyle cecilio 2 sensors.  He needs the rx to for 6 with 3 refills.  This get him through the whole year.  What was sent in was incorrect.  Please resubmit rx.      Patient has been out of sensors for 10 days    Please discontinue the cecilio 3 in his chart.   Partner Violence   Within the last year, have you been afraid of your partner or ex-partner? No   Within the last year, have you been humiliated or emotionally abused in other ways by your partner or ex-partner? No   Within the last year, have you been kicked, hit, slapped, or otherwise physically hurt by your partner or ex-partner? No   Within the last year, have you been raped or forced to have any kind of sexual activity by your partner or ex-partner? No   Alcohol Use   Q1: How often do you have a drink containing alcohol? Never   Q2: How many drinks containing alcohol do you have on a typical day when you are drinking? None   Q3: How often do you have six or more drinks on one occasion? Never   Utilities   In the past 12 months has the electric, gas, oil, or water company threatened to shut off services in your home? No   Health Literacy   How often do you need to have someone help you when you read instructions, pamphlets, or other written material from your doctor or pharmacy? Never

## 2024-09-09 NOTE — ASSESSMENT & PLAN NOTE
History of COPD, not in exacerbation.  Takes 4 L of nasal cannula at home, currently on 4 L nasal cannula continue to monitor.  Continue bronchodilators  Respiratory care consulted

## 2024-09-09 NOTE — H&P
History Of Present Illness  Juli Del Castillo is a 83 y.o. female with past medical history of diastolic heart failure, chronic respiratory failure on 4 L of home oxygen secondary to COPD, coronary artery disease status post stent and CABG, STEMI, chronic kidney disease, Hx colon cancer, carotid artery stenosis, cigarette smoker, fibromyalgia, TIA, hypokalemia, HLD, and multiple of other comorbidities presented to the ED with complaints of chest pain.  Patient reports doing her morning routine yesterday morning when she developed severe headache temporal in nature, later that day prior to admission she developed worsening shortness of breath, and retrosternal chest pain which was constant grabbing and fluctuating in pain.  She reports associated left arm and shoulder pain and numbness, including worsening shortness of breath.  This prompted her to call EMS, she was given 4 baby aspirin 81, nitro and Zofran and route to ED.  Patient reports chest pain is improved, she denies fever, chills, nausea, vomiting, diarrhea, abdominal pain.    In the ED on admission vital signs stable, labs notable for GFR 50, hemoglobin 11.9, troponin is 19->17, chest x-ray trace right-sided pleural effusion with associated atelectasis and mild pulmonary vascular congestion without consolidation.    Patient admitted for chest pain requiring further workup.     Past Medical History  Past Medical History:   Diagnosis Date    Cancer (Multi)     CHF (congestive heart failure) (Multi)     COPD (chronic obstructive pulmonary disease) (Multi)     Depression     Myocardial infarct, old        Surgical History  Past Surgical History:   Procedure Laterality Date    CORONARY ARTERY BYPASS GRAFT      MR HEAD ANGIO WO IV CONTRAST  10/31/2015    MR HEAD ANGIO WO IV CONTRAST [facility]GENCY LEGACY    MR HEAD ANGIO WO IV CONTRAST  02/15/2019    MR HEAD ANGIO WO IV CONTRAST [facility] EMERGENCY LEGACY    MR NECK ANGIO WO IV CONTRAST  10/31/2015    MR NECK ANGIO WO IV  [facility]        Social History  She reports that she has quit smoking. Her smoking use included cigarettes. She has never used smokeless tobacco. She reports that she does not drink alcohol and does not use drugs.    Family History  No family history on file.     Allergies  Cortisone, Other, Acth, Haemoph b poly conj-tet tox-pf, Codeine, and Hydrochlorothiazide    Review of Systems   Constitutional:  Negative for chills and fever.   HENT:  Negative for congestion.    Respiratory:  Positive for chest tightness and shortness of breath.    Cardiovascular:  Positive for chest pain. Negative for palpitations and leg swelling.   Gastrointestinal:  Negative for abdominal pain, constipation, nausea and vomiting.   Genitourinary:  Negative for frequency.   Musculoskeletal:  Positive for neck pain.   Neurological:  Positive for weakness, numbness and headaches. Negative for dizziness, speech difficulty and light-headedness.        Physical Exam  Constitutional:       General: She is not in acute distress.     Appearance: She is ill-appearing. She is not toxic-appearing or diaphoretic.   HENT:      Head: Normocephalic and atraumatic.      Mouth/Throat:      Mouth: Mucous membranes are moist.   Eyes:      Extraocular Movements: Extraocular movements intact.   Cardiovascular:      Rate and Rhythm: Normal rate and regular rhythm.      Pulses: Normal pulses.      Heart sounds: No murmur heard.     No gallop.   Pulmonary:      Effort: No respiratory distress.      Breath sounds: No wheezing or rales.      Comments: Chronic respiratory failure on 4 L of oxygen nasal cannula.  Abdominal:      General: There is no distension.      Tenderness: There is no abdominal tenderness. There is no guarding.   Musculoskeletal:      Cervical back: Normal range of motion and neck supple.      Right lower leg: Edema present.      Left lower leg: Edema present.   Neurological:      Mental Status: She is alert.          Last  "Recorded Vitals  Blood pressure 156/58, pulse 64, temperature 36.4 °C (97.5 °F), temperature source Oral, resp. rate 17, height 1.626 m (5' 4\"), weight 70.5 kg (155 lb 6.8 oz), SpO2 100%.    Relevant Results  Scheduled medications  albuterol, 2.5 mg, nebulization, q6h while awake  aspirin, 81 mg, oral, Daily  budesonide, 0.5 mg, nebulization, BID  carvedilol, 9.375 mg, oral, BID  citalopram, 20 mg, oral, Daily  DULoxetine, 30 mg, oral, Daily  heparin (porcine), 5,000 Units, subcutaneous, q8h  ondansetron ODT, 4 mg, oral, Once   Or  ondansetron, 4 mg, intravenous, Once  oxygen, , inhalation, Continuous - Inhalation  pantoprazole, 40 mg, oral, Daily before breakfast   Or  pantoprazole, 40 mg, intravenous, Daily before breakfast  polyethylene glycol, 17 g, oral, Daily  primidone, 100 mg, oral, TID  simvastatin, 40 mg, oral, Nightly      Continuous medications     PRN medications  PRN medications: melatonin, nitroglycerin  Results for orders placed or performed during the hospital encounter of 09/09/24 (from the past 24 hour(s))   CBC with Differential   Result Value Ref Range    WBC 8.1 4.4 - 11.3 x10*3/uL    nRBC 0.0 0.0 - 0.0 /100 WBCs    RBC 3.78 (L) 4.00 - 5.20 x10*6/uL    Hemoglobin 11.9 (L) 12.0 - 16.0 g/dL    Hematocrit 36.7 36.0 - 46.0 %    MCV 97 80 - 100 fL    MCH 31.5 26.0 - 34.0 pg    MCHC 32.4 32.0 - 36.0 g/dL    RDW 12.1 11.5 - 14.5 %    Platelets 283 150 - 450 x10*3/uL    Neutrophils % 62.7 40.0 - 80.0 %    Immature Granulocytes %, Automated 0.2 0.0 - 0.9 %    Lymphocytes % 28.2 13.0 - 44.0 %    Monocytes % 6.5 2.0 - 10.0 %    Eosinophils % 2.0 0.0 - 6.0 %    Basophils % 0.4 0.0 - 2.0 %    Neutrophils Absolute 5.08 1.60 - 5.50 x10*3/uL    Immature Granulocytes Absolute, Automated 0.02 0.00 - 0.50 x10*3/uL    Lymphocytes Absolute 2.29 0.80 - 3.00 x10*3/uL    Monocytes Absolute 0.53 0.05 - 0.80 x10*3/uL    Eosinophils Absolute 0.16 0.00 - 0.40 x10*3/uL    Basophils Absolute 0.03 0.00 - 0.10 x10*3/uL "   Comprehensive Metabolic Panel   Result Value Ref Range    Glucose 87 65 - 99 mg/dL    Sodium 136 133 - 145 mmol/L    Potassium 4.2 3.4 - 5.1 mmol/L    Chloride 97 97 - 107 mmol/L    Bicarbonate 30 24 - 31 mmol/L    Urea Nitrogen 14 8 - 25 mg/dL    Creatinine 1.10 0.40 - 1.60 mg/dL    eGFR 50 (L) >60 mL/min/1.73m*2    Calcium 8.7 8.5 - 10.4 mg/dL    Albumin 4.0 3.5 - 5.0 g/dL    Alkaline Phosphatase 76 35 - 125 U/L    Total Protein 6.7 5.9 - 7.9 g/dL    AST 18 5 - 40 U/L    Bilirubin, Total <0.2 0.1 - 1.2 mg/dL    ALT 18 5 - 40 U/L    Anion Gap 9 <=19 mmol/L   Serial Troponin, Initial ([facility])   Result Value Ref Range    Troponin T, High Sensitivity 19 (HH) <=14 ng/L   Serial Troponin, 2 Hour ([facility])   Result Value Ref Range    Troponin T, High Sensitivity 17 ([facility]) <=14 ng/L     XR chest 1 view    Result Date: 9/9/2024  Interpreted By:  Louise Mark, STUDY: XR CHEST 1 VIEW;  9/9/2024 3:00 am   INDICATION: Signs/Symptoms:Chest Pain.     COMPARISON: Radiographs of the chest dated 01/08/2024   ACCESSION NUMBER(S): [id number]   ORDERING CLINICIAN: BIMAL WINN   FINDINGS: AP radiograph of the chest was provided.       CARDIOMEDIASTINAL SILHOUETTE: Cardiomediastinal silhouette is mildly enlarged, similar to prior exam. Patient is status post median sternotomy.   LUNGS: Left-sided effusion seen on previous exam in June of 2024 has improved, although there is trace layering right-sided pleural effusion. Mild pulmonary vascular congestion is present without consolidation.   ABDOMEN: No remarkable upper abdominal findings.   BONES: No acute osseous changes.       1.  Trace right-sided pleural effusion with associated atelectasis. Mild pulmonary vascular congestion without consolidation.       MACRO: None   Signed by: Louise Mark 9/9/2024 3:38 AM Dictation workstation:   [id number]        Assessment/Plan   Assessment & Plan  Chest pain  History of coronary artery disease with 2 stents, history of  CABG reported 4 vessels, had an appointment with Dr. Monge on Tuesday.  Last echo 9/29/2023 showing LVEF 55 to 60%.  EKG showing sinus rhythm, PVC  Flat troponins 19->17, likely type II MI demand ischemia.  Telemetry monitoring  Continue nitro for chest pain  Continue aspirin 81  Echocardiogram ordered and pending  Cardiology consulted    Chronic diastolic heart failure (Multi)  Likely acute diastolic heart failure exacerbation.  Echo from 9/29/2023 showing LVEF 55 to 60%. Chest x-ray showing mild pulmonary vascular congestion without consolidation.  20 Lasix IV  Monitor ins and outs, salt restriction, weight checks    Coronary artery disease  History of stent and CABG  Continue Carvedilol 9.375 twice daily,   Continue simvastatin 40 mg  Continue aspirin 81    Chronic respiratory failure (Multi)  History of COPD, not in exacerbation.  Takes 4 L of nasal cannula at home, currently on 4 L nasal cannula continue to monitor.  Continue bronchodilators  Respiratory care consulted      DVT prophylaxis: Heparin and SCDs  GI prophylaxis: PPI    CODE STATUS full code    I spent 75 minutes in the professional and overall care of this patient.      Flo Delatorre MD

## 2024-09-09 NOTE — PROGRESS NOTES
Occupational Therapy    Evaluation    Patient Name: Juli Del Castillo  MRN: [id number]  Today's Date: 9/9/2024  Time Calculation  Start Time: 0910  Stop Time: 0939  Time Calculation (min): 29 min    Assessment  IP OT Assessment  OT Assessment: Pt is 82 y/o female admitted for chest pain.  Pt presents w/ decreased ADL skills/ functional mobility and decreased activity tolerance. Recommend OT services to address above deficits.  Prognosis: Good  Evaluation/Treatment Tolerance: Patient limited by fatigue  End of Session Communication: Bedside nurse  End of Session Patient Position: Up in chair, Alarm off, not on at start of session  Plan:  Treatment Interventions: ADL retraining, Functional transfer training, Endurance training, Patient/family training  OT Frequency: 3 times per week  OT Discharge Recommendations: Low intensity level of continued care  Equipment Recommended upon Discharge: Wheeled walker  OT - OK to Discharge: Yes    Subjective   Current Problem:  1. Chest pain  Transthoracic Echo (TTE) Complete    Transthoracic Echo (TTE) Complete    Nuclear Stress Test    Nuclear Stress Test      2. Chronic coronary microvascular dysfunction  Transthoracic Echo (TTE) Complete    Transthoracic Echo (TTE) Complete      3. Chest pain, unspecified type          General:  General  Reason for Referral: decreased ADL's  Referred By: Dr. Delatorre  Past Medical History Relevant to Rehab: diastolic HF, chr. resp. failure on 4L O2 2/2 COPD, CAD s/p stent , CABG, STEMI, CKD, fibromyalgia, TIA  Family/Caregiver Present: No  Prior to Session Communication: Bedside nurse  Patient Position Received: Up in bathroom  Preferred Learning Style: verbal, visual  General Comment: Cleared to see for eval, pt agreeable to therapy  Precautions:       Vital Sign (Past 2hrs)                Pain:  Pain Assessment  Pain Assessment: 0-10  0-10 (Numeric) Pain Score: 0 - No pain (pt reports dull throb in chest)    Objective   Cognition:  Overall Cognitive  Status: Within Functional Limits  Orientation Level: Oriented X4           Home Living:  Type of Home: House  Lives With: Alone  Home Adaptive Equipment: Walker rolling or standard, Cane, Reacher (rollator)  Home Layout: One level  Home Access: Stairs to enter without rails  Entrance Stairs-Rails: None  Entrance Stairs-Number of Steps: 1  Bathroom Shower/Tub: Walk-in shower  Bathroom Toilet: Adaptive toilet seating  Bathroom Equipment: Grab bars in shower, Shower chair with back   Prior Function:  Level of [place]: Independent with ADLs and functional transfers, Needs assistance with homemaking  Receives Help From: Family (son and DIL)  ADL Assistance: Independent  Homemaking Assistance: Needs assistance (DIL assists w/ heavier cleaning ie. mopping floors, pt cleanins kitchen, does own laundry, cooking)  Driving/Transportation:  (DIL/son provide transportation)  Homemaking Assistance Comments: DIL takes pt shopping, to dr appboris  Ambulatory Assistance: Independent (rollator)  Hand Dominance: Right  IADL History:     ADL:  Eating Assistance: Stand by  Eating Deficit: Setup (for breakfast)  Grooming Assistance: Stand by  Grooming Deficit: Setup (per clinical judgement)  Bathing Assistance: Stand by  Bathing Deficit: Steadying, Supervision/safety, Buttocks, Right lower leg including foot, Left lower leg including foot (per clinical judgement)  UE Dressing Assistance: Stand by  UE Dressing Deficit: Pull around back, Pull down in back, Supervision/safety (per clinical judgement)  LE Dressing Assistance: Stand by  LE Dressing Deficit: Steadying, Supervision/safety, Pull up over hips (per clinicsal judgement)  Toileting Assistance with Device: Stand by  Toileting Deficit: Supervison/safety (per clinical judgement)  Functional Assistance: Stand by  Functional Deficit: Supervision/safety, Toilet transfer (per clinicsal judgement, pt standing in bathroom on arrival)  Activity Tolerance:  Endurance: Decreased tolerance for  upright activites  Bed Mobility/Transfers: Bed Mobility  Bed Mobility: Yes  Bed Mobility 1  Bed Mobility 1: Sitting to supine  Level of Assistance 1: Close supervision  Bed Mobility 2  Bed Mobility  2: Supine to sitting  Level of Assistance 2: Minimum assistance  Bed Mobility Comments 2: for trunk up    Transfers  Transfer: Yes  Transfer 1  Transfer From 1: Stand to  Transfer to 1: Sit, Bed  Technique 1: Stand to sit  Transfer Device 1: Walker  Transfer Level of Assistance 1: Close supervision  Trials/Comments 1: Verbal cues for hand placement  Transfers 2  Transfer From 2: Bed to  Transfer to 2: Stand  Technique 2: Sit to stand  Transfer Device 2: Walker  Transfer Level of Assistance 2: Close supervision  Trials/Comments 2: Verbal cues for hand placement  Transfers 3  Transfer From 3: Bed to  Transfer to 3: Chair with arms  Technique 3: To right  Transfer Device 3: Walker  Transfer Level of Assistance 3: Contact guard  Transfers 4  Transfer From 4: Stand to  Transfer to 4: Sit, Chair with arms  Technique 4: Stand to sit  Transfer Device 4: Walker  Transfer Level of Assistance 4: Close supervision  Trials/Comments 4: Verbal cues for hand placement      Functional Mobility:  Functional Mobility  Functional Mobility Performed: Yes  Functional Mobility 1  Surface 1: Level tile  Device 1: Rolling walker  Assistance 1: Contact guard  Comments 1: Pt ambulated from bathroom to bed w/ CGA and FWW.  Modalities:     IADL's:      Vision: Vision - Basic Assessment  Current Vision: No visual deficits  Sensation:  Light Touch: No apparent deficits  Strength:  Strength Comments: BUE's 4/5  Perception:     Coordination:  Movements are Fluid and Coordinated: No  Coordination Comment: Pt w/ essential tremors noted in hands, arms   Hand Function:  Hand Function  Gross Grasp: Functional  Coordination: Functional  Extremities: FIDENCIO NICOLAS :  (impaired shld, WFL rest) and SAMM QUIJANO:  (impaired shld, WFL rest)      Outcome Measures: [facility]  Daily Activity  Putting on and taking off regular lower body clothing: A little  Bathing (including washing, rinsing, drying): A little  Putting on and taking off regular upper body clothing: A little  Toileting, which includes using toilet, bedpan or urinal: A little  Taking care of personal grooming such as brushing teeth: A little  Eating Meals: A little  Daily Activity - Total Score: 18      Education Documentation  ADL Training, taught by Layla Minaya OT at 9/9/2024 11:14 AM.  Learner: Patient  Readiness: Acceptance  Method: Explanation  Response: Demonstrated Understanding, Needs Reinforcement    Education Comments  No comments found.      Goals:   Encounter Problems       Encounter Problems (Active)       OT Goals       ADL's (Progressing)       Start:  09/09/24    Expected End:  09/23/24       Pt will complete ADL tasks w/ [place] w/ AE/ compensatory tech as needed for safety and increased independence in self care tasks         Functional transfers (Progressing)       Start:  09/09/24    Expected End:  09/23/24       Pt will demon bed, chair and toilet transfers w/ Mod I w/ LRD, elev seat heights using B arm supports for safety and increased independence in daily tasks         Functional endurance (Progressing)       Start:  09/09/24    Expected End:  09/23/24       Pt wilkl demon BUE exercises to improve functional endurance for daily tasks and functional mobility.

## 2024-09-09 NOTE — CONSULTS
Reason For Consult  Chest pain, elevated troponin     History Of Present Illness  Juli Del Castillo is a 83 y.o. female presenting with past medical history significant for diastolic heart failure, COPD, she is on 4 L of oxygen at home, coronary artery disease S/p CABG ( 2009),and PCI, both remote, CKD, colon cancer, carotid artery stenosis, fibromyalgia, TIA, hyperlipidemia.  Patient presented to the hospital for complaints of chest pain and headache.  Apparently she was doing her usual morning routine yesterday when she developed a severe headache she then developed shortness of breath and chest pain, which was accompanied by left arm and shoulder pain and numbness.  She was brought in via EMS.  She was given 4 baby aspirin nitroglycerin and Zofran before ED arrival.  In the ED her workup revealed sodium of 136, potassium 4.2, creatinine 1.10, WBC 8.1, hemoglobin 11.9, hematocrit 36.7.  She had an EKG which demonstrated normal sinus rhythm at 71 bpm, no evidence of ST elevation, or acute ischemia.  High-sensitivity troponins were found to be 19, 17, 18.  Her chest x-ray revealed trace right-sided pleural effusion with atelectasis and mild pulmonary vascular congestion.  In the ED she was given Tylenol, fentanyl and 20 mg IV Lasix.  She was admitted for further evaluation of chest pain.    Patient describes chest pain as substernal that radiates into her back as well as down her left arm.  She reports arm tingling and numbness especially in her fingers.  She reports associated diaphoresis and nausea with the chest pain.     Past Medical History  She has a past medical history of Cancer (Multi), CHF (congestive heart failure) (Multi), COPD (chronic obstructive pulmonary disease) (Multi), Depression, and Myocardial infarct, old.    Surgical History  She has a past surgical history that includes MR angio neck wo IV contrast (10/31/2015); MR angio head wo IV contrast (10/31/2015); MR angio head wo IV contrast  "(02/15/2019); and Coronary artery bypass graft.     Social History  She reports that she has quit smoking. Her smoking use included cigarettes. She has never used smokeless tobacco. She reports that she does not drink alcohol and does not use drugs.    Family History  No family history on file.     Allergies  Cortisone, Other, Acth, Haemoph b poly conj-tet tox-pf, Codeine, and Hydrochlorothiazide    Review of Systems  Review of Systems   Constitutional:  Positive for diaphoresis.   HENT: Negative.     Eyes: Negative.    Respiratory:  Positive for shortness of breath.    Cardiovascular:  Positive for chest pain. Negative for leg swelling.   Gastrointestinal:  Positive for nausea.   Endocrine: Negative.    Genitourinary: Negative.    Musculoskeletal: Negative.    Allergic/Immunologic: Negative.    Neurological:  Positive for dizziness and light-headedness.   Hematological: Negative.          Physical Exam  Physical Exam  Constitutional:       General: She is not in acute distress.     Appearance: Normal appearance.   Eyes:      Pupils: Pupils are equal, round, and reactive to light.   Cardiovascular:      Rate and Rhythm: Normal rate and regular rhythm.      Pulses: Normal pulses.      Heart sounds: Murmur heard.   Pulmonary:      Effort: Pulmonary effort is normal. No respiratory distress.      Breath sounds: No wheezing or rales.   Abdominal:      General: Abdomen is flat.      Palpations: Abdomen is soft.   Musculoskeletal:      Right lower leg: Edema present.      Left lower leg: Edema present.      Comments: Trace bilateral lower extremity edema   Skin:     Capillary Refill: Capillary refill takes less than 2 seconds.   Neurological:      General: No focal deficit present.      Mental Status: She is alert and oriented to person, place, and time.           Last Recorded Vitals  Blood pressure 156/58, pulse 64, temperature 36.4 °C (97.5 °F), temperature source Oral, resp. rate 17, height 1.626 m (5' 4\"), weight " 70.5 kg (155 lb 6.8 oz), SpO2 99%.    Relevant Results  Results for orders placed or performed during the hospital encounter of 09/09/24 (from the past 24 hour(s))   CBC with Differential   Result Value Ref Range    WBC 8.1 4.4 - 11.3 x10*3/uL    nRBC 0.0 0.0 - 0.0 /100 WBCs    RBC 3.78 (L) 4.00 - 5.20 x10*6/uL    Hemoglobin 11.9 (L) 12.0 - 16.0 g/dL    Hematocrit 36.7 36.0 - 46.0 %    MCV 97 80 - 100 fL    MCH 31.5 26.0 - 34.0 pg    MCHC 32.4 32.0 - 36.0 g/dL    RDW 12.1 11.5 - 14.5 %    Platelets 283 150 - 450 x10*3/uL    Neutrophils % 62.7 40.0 - 80.0 %    Immature Granulocytes %, Automated 0.2 0.0 - 0.9 %    Lymphocytes % 28.2 13.0 - 44.0 %    Monocytes % 6.5 2.0 - 10.0 %    Eosinophils % 2.0 0.0 - 6.0 %    Basophils % 0.4 0.0 - 2.0 %    Neutrophils Absolute 5.08 1.60 - 5.50 x10*3/uL    Immature Granulocytes Absolute, Automated 0.02 0.00 - 0.50 x10*3/uL    Lymphocytes Absolute 2.29 0.80 - 3.00 x10*3/uL    Monocytes Absolute 0.53 0.05 - 0.80 x10*3/uL    Eosinophils Absolute 0.16 0.00 - 0.40 x10*3/uL    Basophils Absolute 0.03 0.00 - 0.10 x10*3/uL   Comprehensive Metabolic Panel   Result Value Ref Range    Glucose 87 65 - 99 mg/dL    Sodium 136 133 - 145 mmol/L    Potassium 4.2 3.4 - 5.1 mmol/L    Chloride 97 97 - 107 mmol/L    Bicarbonate 30 24 - 31 mmol/L    Urea Nitrogen 14 8 - 25 mg/dL    Creatinine 1.10 0.40 - 1.60 mg/dL    eGFR 50 (L) >60 mL/min/1.73m*2    Calcium 8.7 8.5 - 10.4 mg/dL    Albumin 4.0 3.5 - 5.0 g/dL    Alkaline Phosphatase 76 35 - 125 U/L    Total Protein 6.7 5.9 - 7.9 g/dL    AST 18 5 - 40 U/L    Bilirubin, Total <0.2 0.1 - 1.2 mg/dL    ALT 18 5 - 40 U/L    Anion Gap 9 <=19 mmol/L   Serial Troponin, Initial ([facility])   Result Value Ref Range    Troponin T, High Sensitivity 19 (HH) <=14 ng/L   ECG 12 lead   Result Value Ref Range    Ventricular Rate 71 BPM    Atrial Rate 71 BPM    MD Interval 134 ms    QRS Duration 88 ms    QT Interval 438 ms    QTC Calculation(Bazett) 475 ms    P Axis 64 degrees     R Axis 49 degrees    T Axis 79 degrees    QRS Count 12 beats    Q Onset 212 ms    P Onset 145 ms    P Offset 189 ms    T Offset 431 ms    QTC Fredericia 463 ms   Serial Troponin, 2 Hour ([facility])   Result Value Ref Range    Troponin T, High Sensitivity 17 (HH) <=14 ng/L   Serial Troponin, 6 Hour ([facility])   Result Value Ref Range    Troponin T, High Sensitivity 18 (HH) <=14 ng/L          Assessment/Plan     CAD S/p PCI with 2 stents and  4 vessel bypass   Diastolic Heart Failure   COPD   Chronic respiratory failure on 4 L of oxygen at baseline   Chronic kidney disease  TIA  Carotid artery stenosis  Fibromyalgia  Former smoker     Overall impression/plan:    9/9: 83-year-old female that presents for chest pain with associated left arm pain and numbness in her fingers.  She has a significant cardiac history with four-vessel CABG in 2009.  She followed with Dr. Jacobson until a few months ago.  She has an appointment with Dr. Monge tomorrow to establish care.  She describes typical anginal symptoms.  Her EKG is normal without evidence for ischemia.  Her high-sensitivity troponins are elevated however there is no trend.  Given her history and nature of her symptoms I think it would be reasonable to undergo a nuclear stress test for risk stratification.  Will arrange for her to have a pharmacologic stress test tomorrow.  Please keep n.p.o. after midnight.   She is rather hypertensive most recent blood pressure 156/58.  She is on 9.375 mg of carvedilol twice daily will add 5 mg amlodipine daily to help better control her blood pressures.  On telemetry she remains in normal sinus rhythm without significant ectopy, occasional PVC.  She remains on her baseline 4 L of oxygen with pulse oximetry at 99%.  She does have evidence of right-sided pleural effusion on chest x-ray she was given 1 dose of 20 mg IV Lasix this morning I agree with this, will continue for tomorrow as well.  Will monitor kidney function as she does have a  history of KYLEE on CKD.  She already has an echocardiogram ordered per primary team.  Will arrange for stress testing and continue to follow with you.    I spent 20 minutes in the professional and overall care of this patient.      Tiffany Funk PA-C

## 2024-09-09 NOTE — ASSESSMENT & PLAN NOTE
History of coronary artery disease with 2 stents, history of CABG reported 4 vessels, had an appointment with Dr. Monge on Tuesday.  Last echo 9/29/2023 showing LVEF 55 to 60%.  EKG showing sinus rhythm, PVC  Flat troponins 19->17, likely type II MI demand ischemia.  Telemetry monitoring  Continue nitro for chest pain  Continue aspirin 81  Echocardiogram ordered and pending  Cardiology consulted

## 2024-09-09 NOTE — PROGRESS NOTES
09/09/24 1431   [facility] [address] Disability Status   Are you deaf or do you have serious difficulty hearing? N   Are you blind or do you have serious difficulty seeing, even when wearing glasses? N   Because of a physical, mental, or emotional condition, do you have serious difficulty concentrating, remembering, or making decisions? (5 years old or older) N   Do you have serious difficulty walking or climbing stairs? Y   Do you have serious difficulty dressing or bathing? N   Because of a physical, mental, or emotional condition, do you have serious difficulty doing errands alone such as visiting the doctor? N

## 2024-09-09 NOTE — ASSESSMENT & PLAN NOTE
Likely acute diastolic heart failure exacerbation.  Echo from 9/29/2023 showing LVEF 55 to 60%. Chest x-ray showing mild pulmonary vascular congestion without consolidation.  20 Lasix IV  Monitor ins and outs, salt restriction, weight checks

## 2024-09-09 NOTE — ASSESSMENT & PLAN NOTE
History of stent and CABG  Continue Carvedilol 9.375 twice daily,   Continue simvastatin 40 mg  Continue aspirin 81

## 2024-09-09 NOTE — ED NOTES
Abnormal Labs Reviewed   CBC WITH AUTO DIFFERENTIAL - Abnormal; Notable for the following components:       Result Value    RBC 3.78 (*)     Hemoglobin 11.9 (*)     All other components within normal limits   COMPREHENSIVE METABOLIC PANEL - Abnormal; Notable for the following components:    eGFR 50 (*)     All other components within normal limits   SERIAL TROPONIN, INITIAL ([facility]) - Abnormal; Notable for the following components:    Troponin T, High Sensitivity 19 (*)     All other components within normal limits    ED Attending notified of Troponin of 19       Yifan Ferguson RN  09/09/24 0258

## 2024-09-09 NOTE — PROGRESS NOTES
09/09/24 1431   Discharge Planning   Living Arrangements Alone   Support Systems Children;Family members   Assistance Needed Level of [place]: Needs assistance with ADLs, Needs assistance with homemaking  Receives Help From: Family (Family lives next door)  ADL Assistance:  (DIL assists with showers, cleans)  Homemaking Assistance: Needs assistance (DIL assist)  Ambulatory Assistance: Independent (ind with Rollator)  Prior Function Comments: denies recent fall hx   Type of Residence Private residence  (One level home)   Number of Stairs to Enter Residence 2   Number of Stairs Within Residence 0   Do you have animals or pets at home? No   Who is requesting discharge planning? Provider   Home or Post Acute Services None   Type of Home Care Services DME or oxygen   Expected Discharge Disposition Home   Does the patient need discharge transport arranged? No   Financial Resource Strain   How hard is it for you to pay for the very basics like food, housing, medical care, and heating? Not hard   Housing Stability   In the last 12 months, was there a time when you were not able to pay the mortgage or rent on time? N   In the past 12 months, how many times have you moved where you were living? 0   At any time in the past 12 months, were you homeless or living in a shelter (including now)? N   Transportation Needs   In the past 12 months, has lack of transportation kept you from medical appointments or from getting medications? no   In the past 12 months, has lack of transportation kept you from meetings, work, or from getting things needed for daily living? No     Juli Del Castillo is a 83 y.o. female with past medical history of diastolic heart failure, chronic respiratory failure on 4 L of home oxygen secondary to COPD, coronary artery disease status post stent and CABG, STEMI, chronic kidney disease, Hx colon cancer, carotid artery stenosis, cigarette smoker, fibromyalgia, TIA, hypokalemia, HLD, and multiple of other  comorbidities presented to the ED with complaints of chest pain.     Lives alone in a one level home, has a walk-in shower with grab bars in shower, Son and daughter-in-law live near by and assists with IADLs such as providing transportation, shopping, errands and assist with ADLs as needed. Patient does wear home oxygen at 4 lpm at home . Does have anxiety.   Level of [place]: Needs assistance with ADLs, Needs assistance with homemaking  Receives Help From: Family (Family lives next door)  ADL Assistance:  (DIL assists with showers, cleans)  Homemaking Assistance: Needs assistance (DIL assist)  Ambulatory Assistance: Independent (ind with Rollator)  Prior Function Comments: denies recent fall hx  Patient will have an echo and stress test today.   No needs at this time    PLAN: Discharge to home with no needs

## 2024-09-10 ENCOUNTER — APPOINTMENT (OUTPATIENT)
Dept: RADIOLOGY | Facility: HOSPITAL | Age: 84
End: 2024-09-10
Payer: MEDICARE

## 2024-09-10 ENCOUNTER — APPOINTMENT (OUTPATIENT)
Dept: CARDIOLOGY | Facility: CLINIC | Age: 84
End: 2024-09-10
Payer: MEDICARE

## 2024-09-10 ENCOUNTER — PHARMACY VISIT (OUTPATIENT)
Dept: PHARMACY | Facility: CLINIC | Age: 84
End: 2024-09-10
Payer: COMMERCIAL

## 2024-09-10 ENCOUNTER — APPOINTMENT (OUTPATIENT)
Dept: CARDIOLOGY | Facility: HOSPITAL | Age: 84
End: 2024-09-10
Payer: MEDICARE

## 2024-09-10 VITALS
WEIGHT: 155.42 LBS | HEART RATE: 69 BPM | TEMPERATURE: 97.7 F | BODY MASS INDEX: 26.53 KG/M2 | OXYGEN SATURATION: 99 % | SYSTOLIC BLOOD PRESSURE: 148 MMHG | RESPIRATION RATE: 18 BRPM | HEIGHT: 64 IN | DIASTOLIC BLOOD PRESSURE: 63 MMHG

## 2024-09-10 LAB
ANION GAP SERPL CALC-SCNC: 8 MMOL/L
ATRIAL RATE: 71 BPM
BUN SERPL-MCNC: 17 MG/DL (ref 8–25)
CALCIUM SERPL-MCNC: 9.2 MG/DL (ref 8.5–10.4)
CHLORIDE SERPL-SCNC: 103 MMOL/L (ref 97–107)
CO2 SERPL-SCNC: 28 MMOL/L (ref 24–31)
CREAT SERPL-MCNC: 1.1 MG/DL (ref 0.4–1.6)
EGFRCR SERPLBLD CKD-EPI 2021: 50 ML/MIN/1.73M*2
ERYTHROCYTE [DISTWIDTH] IN BLOOD BY AUTOMATED COUNT: 12.1 % (ref 11.5–14.5)
GLUCOSE SERPL-MCNC: 93 MG/DL (ref 65–99)
HCT VFR BLD AUTO: 33.9 % (ref 36–46)
HGB BLD-MCNC: 11 G/DL (ref 12–16)
MCH RBC QN AUTO: 31.6 PG (ref 26–34)
MCHC RBC AUTO-ENTMCNC: 32.4 G/DL (ref 32–36)
MCV RBC AUTO: 97 FL (ref 80–100)
NRBC BLD-RTO: 0 /100 WBCS (ref 0–0)
P AXIS: 64 DEGREES
P OFFSET: 189 MS
P ONSET: 145 MS
PLATELET # BLD AUTO: 261 X10*3/UL (ref 150–450)
POTASSIUM SERPL-SCNC: 4.1 MMOL/L (ref 3.4–5.1)
PR INTERVAL: 134 MS
Q ONSET: 212 MS
QRS COUNT: 12 BEATS
QRS DURATION: 88 MS
QT INTERVAL: 438 MS
QTC CALCULATION(BAZETT): 475 MS
QTC FREDERICIA: 463 MS
R AXIS: 49 DEGREES
RBC # BLD AUTO: 3.48 X10*6/UL (ref 4–5.2)
SODIUM SERPL-SCNC: 139 MMOL/L (ref 133–145)
T AXIS: 79 DEGREES
T OFFSET: 431 MS
VENTRICULAR RATE: 71 BPM
WBC # BLD AUTO: 6.9 X10*3/UL (ref 4.4–11.3)

## 2024-09-10 PROCEDURE — 3430000001 HC RX 343 DIAGNOSTIC RADIOPHARMACEUTICALS

## 2024-09-10 PROCEDURE — 97530 THERAPEUTIC ACTIVITIES: CPT | Mod: GO

## 2024-09-10 PROCEDURE — 78452 HT MUSCLE IMAGE SPECT MULT: CPT

## 2024-09-10 PROCEDURE — 97535 SELF CARE MNGMENT TRAINING: CPT | Mod: GO

## 2024-09-10 PROCEDURE — G0378 HOSPITAL OBSERVATION PER HR: HCPCS

## 2024-09-10 PROCEDURE — 87086 URINE CULTURE/COLONY COUNT: CPT | Mod: TRILAB,WESLAB | Performed by: NURSE PRACTITIONER

## 2024-09-10 PROCEDURE — 78452 HT MUSCLE IMAGE SPECT MULT: CPT | Performed by: STUDENT IN AN ORGANIZED HEALTH CARE EDUCATION/TRAINING PROGRAM

## 2024-09-10 PROCEDURE — 2500000005 HC RX 250 GENERAL PHARMACY W/O HCPCS: Performed by: STUDENT IN AN ORGANIZED HEALTH CARE EDUCATION/TRAINING PROGRAM

## 2024-09-10 PROCEDURE — 36415 COLL VENOUS BLD VENIPUNCTURE: CPT | Performed by: STUDENT IN AN ORGANIZED HEALTH CARE EDUCATION/TRAINING PROGRAM

## 2024-09-10 PROCEDURE — 9420000001 HC RT PATIENT EDUCATION 5 MIN

## 2024-09-10 PROCEDURE — 2500000004 HC RX 250 GENERAL PHARMACY W/ HCPCS (ALT 636 FOR OP/ED): Performed by: NURSE PRACTITIONER

## 2024-09-10 PROCEDURE — 80048 BASIC METABOLIC PNL TOTAL CA: CPT | Performed by: STUDENT IN AN ORGANIZED HEALTH CARE EDUCATION/TRAINING PROGRAM

## 2024-09-10 PROCEDURE — 85027 COMPLETE CBC AUTOMATED: CPT | Performed by: STUDENT IN AN ORGANIZED HEALTH CARE EDUCATION/TRAINING PROGRAM

## 2024-09-10 PROCEDURE — 94640 AIRWAY INHALATION TREATMENT: CPT

## 2024-09-10 PROCEDURE — 99238 HOSP IP/OBS DSCHRG MGMT 30/<: CPT | Performed by: NURSE PRACTITIONER

## 2024-09-10 PROCEDURE — 2500000002 HC RX 250 W HCPCS SELF ADMINISTERED DRUGS (ALT 637 FOR MEDICARE OP, ALT 636 FOR OP/ED): Performed by: STUDENT IN AN ORGANIZED HEALTH CARE EDUCATION/TRAINING PROGRAM

## 2024-09-10 PROCEDURE — 93017 CV STRESS TEST TRACING ONLY: CPT

## 2024-09-10 PROCEDURE — 94760 N-INVAS EAR/PLS OXIMETRY 1: CPT

## 2024-09-10 PROCEDURE — A9502 TC99M TETROFOSMIN: HCPCS

## 2024-09-10 PROCEDURE — RXMED WILLOW AMBULATORY MEDICATION CHARGE

## 2024-09-10 PROCEDURE — 2500000004 HC RX 250 GENERAL PHARMACY W/ HCPCS (ALT 636 FOR OP/ED): Performed by: STUDENT IN AN ORGANIZED HEALTH CARE EDUCATION/TRAINING PROGRAM

## 2024-09-10 PROCEDURE — 2500000004 HC RX 250 GENERAL PHARMACY W/ HCPCS (ALT 636 FOR OP/ED)

## 2024-09-10 RX ORDER — AMLODIPINE BESYLATE 5 MG/1
5 TABLET ORAL DAILY
Qty: 30 TABLET | Refills: 0 | Status: SHIPPED | OUTPATIENT
Start: 2024-09-11

## 2024-09-10 RX ORDER — REGADENOSON 0.08 MG/ML
0.4 INJECTION, SOLUTION INTRAVENOUS
Status: COMPLETED | OUTPATIENT
Start: 2024-09-10 | End: 2024-09-10

## 2024-09-10 RX ORDER — FUROSEMIDE 40 MG/1
40 TABLET ORAL DAILY
Qty: 30 TABLET | Refills: 0 | Status: SHIPPED | OUTPATIENT
Start: 2024-09-10

## 2024-09-10 RX ORDER — CEFTRIAXONE 1 G/50ML
1 INJECTION, SOLUTION INTRAVENOUS EVERY 24 HOURS
Status: DISCONTINUED | OUTPATIENT
Start: 2024-09-10 | End: 2024-09-10 | Stop reason: HOSPADM

## 2024-09-10 RX ORDER — BUDESONIDE 0.5 MG/2ML
0.5 INHALANT ORAL
Start: 2024-09-10

## 2024-09-10 RX ADMIN — HEPARIN SODIUM 5000 UNITS: 5000 INJECTION, SOLUTION INTRAVENOUS; SUBCUTANEOUS at 06:26

## 2024-09-10 RX ADMIN — HEPARIN SODIUM 5000 UNITS: 5000 INJECTION, SOLUTION INTRAVENOUS; SUBCUTANEOUS at 14:48

## 2024-09-10 RX ADMIN — TETROFOSMIN 11.3 MILLICURIE: 0.23 INJECTION, POWDER, LYOPHILIZED, FOR SOLUTION INTRAVENOUS at 10:10

## 2024-09-10 RX ADMIN — REGADENOSON 0.4 MG: 0.08 INJECTION, SOLUTION INTRAVENOUS at 11:54

## 2024-09-10 RX ADMIN — PANTOPRAZOLE SODIUM 40 MG: 40 INJECTION, POWDER, FOR SOLUTION INTRAVENOUS at 06:26

## 2024-09-10 RX ADMIN — PRIMIDONE 100 MG: 50 TABLET ORAL at 14:48

## 2024-09-10 RX ADMIN — Medication 3 L/MIN: at 16:39

## 2024-09-10 RX ADMIN — CEFTRIAXONE SODIUM 1 G: 1 INJECTION, SOLUTION INTRAVENOUS at 14:10

## 2024-09-10 RX ADMIN — BUDESONIDE INHALATION 0.5 MG: 0.5 SUSPENSION RESPIRATORY (INHALATION) at 07:09

## 2024-09-10 RX ADMIN — Medication 32 L/MIN: at 07:10

## 2024-09-10 RX ADMIN — TETROFOSMIN 30 MILLICURIE: 0.23 INJECTION, POWDER, LYOPHILIZED, FOR SOLUTION INTRAVENOUS at 12:30

## 2024-09-10 RX ADMIN — ALBUTEROL SULFATE 2.5 MG: 2.5 SOLUTION RESPIRATORY (INHALATION) at 07:08

## 2024-09-10 RX ADMIN — FUROSEMIDE 20 MG: 10 INJECTION, SOLUTION INTRAMUSCULAR; INTRAVENOUS at 10:19

## 2024-09-10 ASSESSMENT — COGNITIVE AND FUNCTIONAL STATUS - GENERAL
DAILY ACTIVITIY SCORE: 19
HELP NEEDED FOR BATHING: A LITTLE
DRESSING REGULAR UPPER BODY CLOTHING: A LITTLE
DRESSING REGULAR LOWER BODY CLOTHING: A LITTLE
PERSONAL GROOMING: A LITTLE
TOILETING: A LITTLE

## 2024-09-10 ASSESSMENT — ACTIVITIES OF DAILY LIVING (ADL)
HOME_MANAGEMENT_TIME_ENTRY: 31
BATHING_WHERE_ASSESSED: STANDING SINKSIDE
BATHING_LEVEL_OF_ASSISTANCE: CLOSE SUPERVISION

## 2024-09-10 ASSESSMENT — PAIN SCALES - GENERAL: PAINLEVEL_OUTOF10: 0 - NO PAIN

## 2024-09-10 ASSESSMENT — PAIN - FUNCTIONAL ASSESSMENT: PAIN_FUNCTIONAL_ASSESSMENT: 0-10

## 2024-09-10 NOTE — PROGRESS NOTES
Therapy Communication Note    Patient Name: Juli Del Castillo  MRN: [id number]  Today's Date: 9/10/2024    Discipline: Physical Therapy    Missed Visit Reason:      Missed Time: Cancel    Comment: PT has attempted follow up several times today. Pt off floor for test, then back and wanted time to eat post test, and then finally refuses follow up now stating she is discharging and just wants to get home.

## 2024-09-10 NOTE — PROGRESS NOTES
Occupational Therapy    Occupational Therapy Treatment    Name: Juli Del Castillo  MRN: [id number]  : 1940  Date: 09/10/24  Time Calculation  Start Time: 931  Stop Time:   Time Calculation (min): 51 min    Assessment:  OT Assessment: Pt is making good progress toward OT goals. Pt would benefit from continued OT services to increase independence in ADL tasks and functional mobility.  Prognosis: Good  Evaluation/Treatment Tolerance: Patient tolerated treatment well  End of Session Communication: Bedside nurse  End of Session Patient Position: Up in chair, Alarm off, not on at start of session (dtr present)  Plan:  Treatment Interventions: ADL retraining, Functional transfer training, Endurance training, Patient/family training  OT Frequency: 3 times per week  OT Discharge Recommendations: Low intensity level of continued care  Equipment Recommended upon Discharge: Wheeled walker  OT - OK to Discharge: Yes    Subjective   Previous Visit Info:  OT Last Visit  OT Received On: 09/10/24  General:  General  Reason for Referral: decreased ADL's  Referred By: Dr. Delatorre  Past Medical History Relevant to Rehab: diastolic HF, chr. resp. failure on 4L O2 2/2 COPD, CAD s/p stent , CABG, STEMI, CKD, fibromyalgia, TIA  Family/Caregiver Present: Yes  Caregiver Feedback: Dtr  Prior to Session Communication: Bedside nurse  Patient Position Received: Bed, 3 rail up, Alarm off, not on at start of session  Preferred Learning Style: verbal, visual  General Comment: Clearrd to see for rx session, pt agreeable to therapy  Precautions:  Medical Precautions: Fall precautions             Pain Assessment:  Pain Assessment  Pain Assessment: 0-10  0-10 (Numeric) Pain Score: 0 - No pain     Objective   Cognition:  Overall Cognitive Status: Within Functional Limits  Orientation Level: Oriented X4  Activities of Daily Living: Grooming  Grooming Level of Assistance: Close supervision  Grooming Where Assessed: Standing sinkside  Grooming  Comments: to brush teeth, wash face, comb hair    UE Bathing  UE Bathing Level of Assistance: Close supervision  UE Bathing Where Assessed: Standing sinkside  UE Bathing Comments: to bathe chest, arms    LE Bathing  LE Bathing Level of Assistance: Close supervision  LE Bathing Where Assessed: Standing sinkside  LE Bathing Comments: to bathe periarea, bottom    UE Dressing  UE Dressing Level of Assistance: Minimum assistance, Close supervision  UE Dressing Where Assessed: Other (Comment) (close supervision standing sinkside to doff nightgown, sitting sinkside to don hospital gown, assist for telemetry, O2)    LE Dressing  LE Dressing: Yes  Sock Level of Assistance: Setup (to don/ doff socks)  Adult Briefs Level of Assistance: Close supervision (to don/ doff depends standing/ sitting sinkside)  LE Dressing Where Assessed: Chair, Other (Comment) (standing / sitting sinkside to don/ doff depends, seated in chair to don/ doff socks.)  LE Dressing Comments: Pt educated in benefit of use of Ae for ease in LB dressing and for EC.    Functional Standing Tolerance:  Functional Standing Tolerance  Time: ~15 minutes  Activity: for ADL tasks  Functional Standing Tolerance Comments: tolerated well  Bed Mobility/Transfers: Bed Mobility  Bed Mobility: Yes  Bed Mobility 1  Bed Mobility 1: Supine to sitting  Level of Assistance 1: Close supervision  Bed Mobility Comments 1: Effortful but able to self complete    Transfers  Transfer: Yes  Transfer 1  Transfer From 1: Bed to  Transfer to 1: Stand  Technique 1: Sit to stand  Transfer Device 1: Walker  Transfer Level of Assistance 1: Close supervision  Trials/Comments 1: Verbal cues for hand placement  Transfers 2  Transfer From 2: Stand to  Transfer to 2: Sit, Chair without arms  Technique 2: Stand to sit, Sit to stand  Transfer Device 2: Walker  Transfer Level of Assistance 2: Contact guard  Trials/Comments 2: x3 trials, verbal cues for hand placement, not to pull up on  walker  Transfers 3  Transfer From 3: Stand to  Transfer to 3: Sit, Chair with arms  Technique 3: Stand to sit  Transfer Device 3: Walker  Transfer Level of Assistance 3: Close supervision  Trials/Comments 3: Verbal cues for hand placement      Functional Mobility:  Functional Mobility  Functional Mobility Performed: Yes  Functional Mobility 1  Surface 1: Level tile  Device 1: Rolling walker  Assistance 1: Contact guard  Comments 1: Pt ambulated from bed to bathroom to chair w/ CGA w/ FWW, assist for management of O2 line  IADL's:   Communication:     Splinting:     Therapy/Activity: Therapeutic Activity  Therapeutic Activity Performed: Yes  Therapeutic Activity 1: Pt instructed in use of AE for ease in LB dressing and EC. Pt aware of AE and has reacher from Hip replacement sx.  Sensory:     Cognitive Skill Development:     Vision:     Strength:     Other Activity:               Outcome Measures:  [facility] Daily Activity  Putting on and taking off regular lower body clothing: A little  Bathing (including washing, rinsing, drying): A little  Putting on and taking off regular upper body clothing: A little  Toileting, which includes using toilet, bedpan or urinal: A little  Taking care of personal grooming such as brushing teeth: A little  Eating Meals: None  Daily Activity - Total Score: 19        Education Documentation  ADL Training, taught by Layla Minaya OT at 9/10/2024 12:19 PM.  Learner: Patient  Readiness: Acceptance  Method: Explanation  Response: Demonstrated Understanding, Needs Reinforcement    Education Comments  No comments found.      Goals:  Encounter Problems       Encounter Problems (Active)       OT Goals       ADL's (Progressing)       Start:  09/09/24    Expected End:  09/23/24       Pt will complete ADL tasks w/ [place] w/ AE/ compensatory tech as needed for safety and increased independence in self care tasks         Functional transfers (Progressing)       Start:  09/09/24    Expected End:   09/23/24       Pt will demon bed, chair and toilet transfers w/ Mod I w/ LRD, elev seat heights using B arm supports for safety and increased independence in daily tasks         Functional endurance (Progressing)       Start:  09/09/24    Expected End:  09/23/24       Pt ray dobson BUE exercises to improve functional endurance for daily tasks and functional mobility.

## 2024-09-10 NOTE — CARE PLAN
The patient's goals for the shift include      The clinical goals for the shift include safety      Problem: Pain  Goal: Performs ADL's with improved pain control throughout shift  Outcome: Progressing     Problem: Pain - Adult  Goal: Verbalizes/displays adequate comfort level or baseline comfort level  Outcome: Progressing     Problem: Safety - Adult  Goal: Free from fall injury  Outcome: Progressing     Problem: Discharge Planning  Goal: Discharge to home or other facility with appropriate resources  Outcome: Progressing     Problem: Chronic Conditions and Co-morbidities  Goal: Patient's chronic conditions and co-morbidity symptoms are monitored and maintained or improved  Outcome: Progressing     Problem: Pain  Goal: Takes deep breaths with improved pain control throughout the shift  Outcome: Met  Goal: Turns in bed with improved pain control throughout the shift  Outcome: Met  Goal: Walks with improved pain control throughout the shift  Outcome: Met     Problem: Heart Failure  Goal: Improved gas exchange this shift  Outcome: Met  Goal: Improved urinary output this shift  Outcome: Met  Goal: Reduction in peripheral edema within 24 hours  Outcome: Met

## 2024-09-10 NOTE — PROGRESS NOTES
09/10/24 1619   Discharge Planning   Living Arrangements Alone   Support Systems Children;Family members   Type of Residence Private residence   Who is requesting discharge planning? Provider   Home or Post Acute Services None   Type of Home Care Services DME or oxygen   Expected Discharge Disposition Home   Does the patient need discharge transport arranged? No     PLAN: Discharge to home with no needs

## 2024-09-10 NOTE — PROGRESS NOTES
Juli Del Castillo is a 83 y.o. female on day 1 of admission presenting with Chest pain.      Subjective   No acute issues. Denies chest pain. Awaiting stress test. Pt with pulmonary edema on admission. On IV Lasix and diuresing with negative fluid balance 1.8 liters.         Objective     Last Recorded Vitals  /63 (BP Location: Right arm, Patient Position: Lying)   Pulse 69   Temp 36.5 °C (97.7 °F) (Oral)   Resp 18   Wt 70.5 kg (155 lb 6.8 oz)   SpO2 99%   Intake/Output last 3 Shifts:    Intake/Output Summary (Last 24 hours) at 9/10/2024 0856  Last data filed at 9/10/2024 0356  Gross per 24 hour   Intake 120 ml   Output 2000 ml   Net -1880 ml       Admission Weight  Weight: 68 kg (150 lb) (09/09/24 0215)    Daily Weight  09/09/24 : 70.5 kg (155 lb 6.8 oz)    Image Results      Physical Exam  HENT:      Head: Normocephalic and atraumatic.      Nose: Nose normal.      Mouth/Throat:      Mouth: Mucous membranes are moist.      Pharynx: Oropharynx is clear.   Eyes:      Extraocular Movements: Extraocular movements intact.      Pupils: Pupils are equal, round, and reactive to light.   Cardiovascular:      Rate and Rhythm: Normal rate and regular rhythm.      Pulses: Normal pulses.   Pulmonary:      Effort: Pulmonary effort is normal.      Breath sounds: Normal breath sounds.   Abdominal:      General: Abdomen is flat. Bowel sounds are normal.      Palpations: Abdomen is soft.   Musculoskeletal:         General: Normal range of motion.   Skin:     General: Skin is warm and dry.      Capillary Refill: Capillary refill takes less than 2 seconds.   Neurological:      General: No focal deficit present.      Mental Status: She is oriented to person, place, and time.   Psychiatric:         Mood and Affect: Mood normal.         Relevant Results  Results for orders placed or performed during the hospital encounter of 09/09/24 (from the past 24 hour(s))   Transthoracic Echo (TTE) Complete   Result Value Ref Range    AV pk  caitie 2.37 m/s    LVOT diam 1.60 cm    AV mn grad 11.0 mmHg    MV E/A ratio 1.62     Tricuspid annular plane systolic excursion 1.8 cm    LA vol index A/L 24.3 ml/m2    LV EF 63 %    RV free wall pk S' 8.27 cm/s    RVSP 39.7 mmHg    AV pk grad 22.5 mmHg    Aortic Valve Area by Continuity of VTI 1.14 cm2    Aortic Valve Area by Continuity of Peak Velocity 1.05 cm2   CBC   Result Value Ref Range    WBC 6.9 4.4 - 11.3 x10*3/uL    nRBC 0.0 0.0 - 0.0 /100 WBCs    RBC 3.48 (L) 4.00 - 5.20 x10*6/uL    Hemoglobin 11.0 (L) 12.0 - 16.0 g/dL    Hematocrit 33.9 (L) 36.0 - 46.0 %    MCV 97 80 - 100 fL    MCH 31.6 26.0 - 34.0 pg    MCHC 32.4 32.0 - 36.0 g/dL    RDW 12.1 11.5 - 14.5 %    Platelets 261 150 - 450 x10*3/uL   Basic metabolic panel   Result Value Ref Range    Glucose 93 65 - 99 mg/dL    Sodium 139 133 - 145 mmol/L    Potassium 4.1 3.4 - 5.1 mmol/L    Chloride 103 97 - 107 mmol/L    Bicarbonate 28 24 - 31 mmol/L    Urea Nitrogen 17 8 - 25 mg/dL    Creatinine 1.10 0.40 - 1.60 mg/dL    eGFR 50 (L) >60 mL/min/1.73m*2    Calcium 9.2 8.5 - 10.4 mg/dL    Anion Gap 8 <=19 mmol/L     Assessment/Plan     Chest pain  -Troponins flat. EKG shows no ischemia  -Cardiology follows, to do stress test today  -Monitor on tele  -ECHO shows normal EF 60-65%, mild hypokinesis with scarring of basal inferior wall  -Continue ASA/statin    CAD  -S/p stent and CABG    UTI  -IV Rocephin pending culture     Chronic Diastolic Heart Failure  -CXR shows pulmonary edema  -Continue IV Lasix per Cards. Negative fluid balance 1.8 liters  -Strict I&O  -Fluid restriction  -Cardiology follows    Chronic Respiratory Failure /COPD  -On baseline O2 4 liters  -Stable, monitor     DVT Prophylaxis  -Heparin subcutaneous    Dispo  To home on discharge with no needs when cleared by Cardiology.      Stefanie Chong, APRN-CNP

## 2024-09-10 NOTE — DISCHARGE SUMMARY
Discharge Diagnosis  Chest pain    Issues Requiring Follow-Up  Chest pain    Discharge Meds     Medication List      START taking these medications     amLODIPine 5 mg tablet; Commonly known as: Norvasc; Take 1 tablet (5 mg)   by mouth once daily.; Start taking on: September 11, 2024   furosemide 40 mg tablet; Commonly known as: Lasix; Take 1 tablet (40 mg)   by mouth once daily.     CHANGE how you take these medications     acetaminophen 325 mg tablet; Commonly known as: Tylenol; Take 2 tablets   (650 mg) by mouth every 4 hours if needed for fever (temp greater than   38.0 C) (greater than or equal to 38 C).; What changed: reasons to take   this   patiromer calcium sorbitex 8.4 gram powder in packet; Commonly known as:   Veltassa; Take 8.4 g by mouth 3 times a week. Do not start before January 17, 2024.; What changed: when to take this, additional instructions     CONTINUE taking these medications     albuterol 2.5 mg /3 mL (0.083 %) nebulizer solution; Take 3 mL (2.5 mg)   by nebulization every 6 hours while awake.   aspirin 81 mg chewable tablet; Chew 1 tablet (81 mg) once daily.   budesonide 0.5 mg/2 mL nebulizer solution; Commonly known as: Pulmicort;   Take 2 mL (0.5 mg) by nebulization 2 times a day. Rinse mouth with water   after use to reduce aftertaste and incidence of candidiasis. Do not   swallow.   carvedilol 6.25 mg tablet; Commonly known as: Coreg   citalopram 20 mg tablet; Commonly known as: CeleXA; Take 1 tablet (20   mg) by mouth once daily.   DULoxetine 30 mg DR capsule; Commonly known as: Cymbalta; Take 1 capsule   (30 mg) by mouth once daily.   Home Nebulizer Plus Sidestream device; Generic drug: nebulizer and   compressor; use as directed with nebulizer treatments   omeprazole 40 mg DR capsule; Commonly known as: PriLOSEC; Take 1 capsule   (40 mg) by mouth once daily.   oxybutynin XL 5 mg 24 hr tablet; Commonly known as: Ditropan-XL; Take 1   tablet (5 mg) by mouth once daily.   oxygen gas  therapy; Commonly known as: O2   polyethylene glycol 17 gram packet; Commonly known as: Glycolax,   Miralax; Take 17 g by mouth once daily as needed (Constipation).   primidone 50 mg tablet; Commonly known as: Mysoline; Take 2 tablets (100   mg) by mouth 3 times a day.   simvastatin 80 mg tablet; Commonly known as: Zocor; Take 1 tablet (80   mg) by mouth once daily at bedtime.       Test Results Pending At Discharge  Pending Labs       Order Current Status    Urine Culture In process            Hospital Course   Admitted for chest pain. Was evaluated by Cardiology. Underwent nuclear stress test which was negative for acute ischemia. To follow-up with her Cardiologist, Dr. Monge. She has had no chest pain since admission. Medically stable for discharge.      Pertinent Physical Exam At Time of Discharge  Physical Exam  HENT:      Head: Normocephalic and atraumatic.      Nose: Nose normal.      Mouth/Throat:      Mouth: Mucous membranes are moist.      Pharynx: Oropharynx is clear.   Eyes:      Extraocular Movements: Extraocular movements intact.      Pupils: Pupils are equal, round, and reactive to light.   Cardiovascular:      Rate and Rhythm: Normal rate and regular rhythm.      Pulses: Normal pulses.   Pulmonary:      Effort: Pulmonary effort is normal.      Breath sounds: Normal breath sounds.   Abdominal:      General: Abdomen is flat. Bowel sounds are normal.      Palpations: Abdomen is soft.   Musculoskeletal:         General: Normal range of motion.   Skin:     General: Skin is warm and dry.      Capillary Refill: Capillary refill takes less than 2 seconds.   Neurological:      General: No focal deficit present.      Mental Status: She is oriented to person, place, and time.   Psychiatric:         Mood and Affect: Mood normal.         Outpatient Follow-Up  Future Appointments   Date Time Provider Department [facility]   9/23/2024  2:15 PM Luan Monge MD [id number] [facility]   12/26/2024 10:30 AM Jennifer NORRIS  Brianna, APRN-CNP [id number] [facility]         Stefanie Chong, APRN-CNP

## 2024-09-11 ENCOUNTER — PATIENT OUTREACH (OUTPATIENT)
Dept: PRIMARY CARE | Facility: CLINIC | Age: 84
End: 2024-09-11
Payer: MEDICARE

## 2024-09-11 NOTE — PROGRESS NOTES
Discharge Facility: [facility]  Discharge Diagnosis: Chest pain  Admission Date: 9/9/2024  Discharge Date: 9/10/2024    PCP Appointment Date: Declined to schedule - daughter in law will call  Specialist Appointment Date: 9/23/2024 2:15 PM Dr. Luan Monge, Cardiology  Hospital Encounter and Summary Linked: Yes  See discharge assessment below for further details    Medications  Medications reviewed with patient/caregiver?: Yes (Patient) (9/11/2024  1:41 PM)  Is the patient having any side effects they believe may be caused by any medication additions or changes?: No (9/11/2024  1:41 PM)  Does the patient have all medications ordered at discharge?: Yes (9/11/2024  1:41 PM)  Care Management Interventions: No intervention needed (9/11/2024  1:41 PM)  Prescription Comments: NEW: amlodipine and lasix (9/11/2024  1:41 PM)  Is the patient taking all medications as directed (includes completed medication regime)?: Yes (9/11/2024  1:41 PM)  Care Management Interventions: Provided patient education (9/11/2024  1:41 PM)  Medication Comments: Stated her daughter in law picked up her new meds and added them to her pill box. (9/11/2024  1:41 PM)    Appointments  Does the patient have a primary care provider?: Yes (9/11/2024  1:41 PM)  Care Management Interventions: -- (Declined to schedule at this time. DIL to call to schedule.) (9/11/2024  1:41 PM)  Has the patient kept scheduled appointments due by today?: Not applicable (9/11/2024  1:41 PM)    Self Management  What is the home health agency?: N/A (9/11/2024  1:41 PM)  What Durable Medical Equipment (DME) was ordered?: N/A (9/11/2024  1:41 PM)    Patient Teaching  Does the patient have access to their discharge instructions?: Yes (9/11/2024  1:41 PM)  Care Management Interventions: Reviewed instructions with patient (9/11/2024  1:41 PM)  What is the patient's perception of their health status since discharge?: Improving (Stated is fatigued today. Ate breakfast  and took a late morning nap. Denied CP or SOB.) (9/11/2024  1:41 PM)  Is the patient/caregiver able to teach back the hierarchy of who to call/visit for symptoms/problems? PCP, Specialist, Home Health nurse, Urgent Care, ED, 911: Yes (9/11/2024  1:41 PM)  Patient/Caregiver Education Comments: Patient verbalized understanding of discharge instructions. Provided contact information for nonurgent questions or concerns. (9/11/2024  1:41 PM)    Wrap Up  Wrap Up Additional Comments: 83yoF with PMHx of diastolic heart failure, chronic respiratory failure on 4 L of home oxygen secondary to COPD, coronary artery disease status post stent and CABG, STEMI, chronic kidney disease, Hx colon cancer, carotid artery stenosis, cigarette smoker, fibromyalgia, TIA, hypokalemia, HLD, and multiple of other comorbidities presented to the ED with complaints of chest pain. Cardiology consulted. Patient underwent nuclear stress test which was negative for acute ischemia. Patient discharged to home with family support. Rx's for amlodipine and furosemide given. Cardiology follow up scheduled. (9/11/2024  1:41 PM)

## 2024-09-12 PROCEDURE — 93018 CV STRESS TEST I&R ONLY: CPT | Performed by: INTERNAL MEDICINE

## 2024-09-12 PROCEDURE — 93016 CV STRESS TEST SUPVJ ONLY: CPT | Performed by: INTERNAL MEDICINE

## 2024-09-13 LAB — BACTERIA UR CULT: ABNORMAL

## 2024-09-18 DIAGNOSIS — Z86.73 HISTORY OF TRANSIENT ISCHEMIC ATTACK: ICD-10-CM

## 2024-09-18 DIAGNOSIS — N39.0 URINARY TRACT INFECTION WITHOUT HEMATURIA, SITE UNSPECIFIED: Primary | ICD-10-CM

## 2024-09-18 DIAGNOSIS — J44.1 ACUTE EXACERBATION OF CHRONIC OBSTRUCTIVE AIRWAYS DISEASE (MULTI): ICD-10-CM

## 2024-09-18 DIAGNOSIS — I50.9 ACUTE CONGESTIVE HEART FAILURE, UNSPECIFIED HEART FAILURE TYPE: Primary | ICD-10-CM

## 2024-09-18 DIAGNOSIS — Z86.79 HISTORY OF HEART FAILURE: ICD-10-CM

## 2024-09-18 DIAGNOSIS — I50.32 CHRONIC DIASTOLIC HEART FAILURE: ICD-10-CM

## 2024-09-18 RX ORDER — SULFAMETHOXAZOLE AND TRIMETHOPRIM 800; 160 MG/1; MG/1
1 TABLET ORAL 2 TIMES DAILY
Qty: 14 TABLET | Refills: 0 | Status: SHIPPED | OUTPATIENT
Start: 2024-09-18 | End: 2024-09-27

## 2024-09-19 ENCOUNTER — TELEPHONE (OUTPATIENT)
Dept: PRIMARY CARE | Facility: CLINIC | Age: 84
End: 2024-09-19
Payer: MEDICARE

## 2024-09-19 RX ORDER — SULFAMETHOXAZOLE AND TRIMETHOPRIM 800; 160 MG/1; MG/1
1 TABLET ORAL 2 TIMES DAILY
Qty: 14 TABLET | Refills: 0 | Status: SHIPPED | OUTPATIENT
Start: 2024-09-19 | End: 2024-09-26

## 2024-09-19 NOTE — TELEPHONE ENCOUNTER
Pt's daughter,Amanda, informed of the above information, Amanda states she would like to have the Bactrim sent to [facility], she is mostly concerned with the UTI and feels for this short time it will be ok

## 2024-09-19 NOTE — TELEPHONE ENCOUNTER
Amanda pt's daughter states the antibiotic was not at pharmacy, [facility].  Pt's daughter informed that rx sent to [facility].

## 2024-09-19 NOTE — TELEPHONE ENCOUNTER
Pt's daughter, Amanda called, states pt's nephrologist Dr Cy Gamez told her that pt should not take the antibiotic Bactrim due to her kidney failure, she is requesting a different antibiotic be sent to [facility] pharmacy for pt.

## 2024-09-20 ENCOUNTER — PHARMACY VISIT (OUTPATIENT)
Dept: PHARMACY | Facility: CLINIC | Age: 84
End: 2024-09-20
Payer: COMMERCIAL

## 2024-09-20 PROCEDURE — RXMED WILLOW AMBULATORY MEDICATION CHARGE

## 2024-09-22 NOTE — ED PROVIDER NOTES
History of Present Illness     History provided by: Patient  Limitations to History: None  External Records Reviewed with Brief Summary: None    HPI:  Juli Del Castillo is a 83 y.o. female presents with chest pain.  Past medical history of COPD and coronary artery disease status post stent and CABG.  Woke up from sleep with chest pain, midsternal in location.  Radiates into left shoulder and down left arm.  EMS provided full dose aspirin, sublingual nitro, and 4 mg of Zofran.  Chest pain now improved from prior.     Physical Exam   Triage vitals:  T 36.6 °C (97.9 °F)  HR 68  /53  RR 18  O2 99 % Supplemental oxygen    Physical Exam  Vitals and nursing note reviewed.   Constitutional:       General: She is not in acute distress.     Appearance: She is not ill-appearing.   HENT:      Head: Normocephalic and atraumatic.      Mouth/Throat:      Mouth: Mucous membranes are moist.      Pharynx: Oropharynx is clear.   Eyes:      Extraocular Movements: Extraocular movements intact.      Conjunctiva/sclera: Conjunctivae normal.      Pupils: Pupils are equal, round, and reactive to light.   Cardiovascular:      Rate and Rhythm: Normal rate and regular rhythm.   Pulmonary:      Effort: Pulmonary effort is normal. No respiratory distress.      Breath sounds: Normal breath sounds.   Abdominal:      General: There is no distension.      Palpations: Abdomen is soft.      Tenderness: There is no abdominal tenderness.   Musculoskeletal:         General: No swelling or deformity. Normal range of motion.      Cervical back: Normal range of motion and neck supple.      Right lower leg: No edema.      Left lower leg: No edema.   Skin:     General: Skin is warm and dry.      Capillary Refill: Capillary refill takes less than 2 seconds.   Neurological:      General: No focal deficit present.      Mental Status: She is alert and oriented to person, place, and time. Mental status is at baseline.   Psychiatric:         Mood and Affect:  Mood normal.         Behavior: Behavior normal.          Medical Decision Making & ED Course   Medical Decision Makin y.o. female who presents to the ED with chest pain.  Considered wide ddx for pt's presentation, including aortic dissection, pneumothorax, pneumonia, pleurisy, COPD/asthma exacerbation, pleural effusion, CHF, pericarditis, myocarditis, endocarditis, pericardial effusion/tamponade, musculoskeletal chest pain, peptic ulcer disease.  No STEMI on initial EKG.   Troponin mildly elevated at 19, repeat 17.   Chest x-ray with trace pleural effusion on the right side but no significant cardiomegaly or pulmonary edema.  No evidence of pneumonia.    HEART SCORE: 5    No STEMI or NSTEMI at this time, however given pt high risk, warrants admit for complete r/o ACS.  Patient admitted in stable condition.   ----  Social Determinants of Health which Significantly Impact Care: None identified     EKG Independent Interpretation: EKG interpreted by myself. Please see ED Course for full interpretation.    Independent Result Review and Interpretation: Relevant laboratory and radiographic results were reviewed and independently interpreted by myself.  As necessary, they are commented on in the ED Course.    Chronic conditions affecting the patient's care: As documented above in [facility]    The patient was discussed with the following consultants/services: Hospitalist/Admitting Provider who accepted the patient for admission    Care Considerations: As documented above in [facility]    ED Course:  ED Course as of 09/22/24 0822   Mon Sep 09, 2024   0223 ECG 12 lead  Performed at  0210, HR of 71, NSR, NAD, QTc 475, no sign of STEMI, no Q wave or T wave abnormality noted.    Reviewed and interpreted by me at time performed   [JM]      ED Course User Index  [JM] Rossi Henry MD         Diagnoses as of 24   Chest pain       Procedures   Procedures    Rossi Henry MD  Emergency Medicine     Rossi CASTRO  Alon Henry MD  09/22/24 0833

## 2024-09-23 ENCOUNTER — APPOINTMENT (OUTPATIENT)
Dept: CARDIOLOGY | Facility: CLINIC | Age: 84
End: 2024-09-23
Payer: MEDICARE

## 2024-09-23 VITALS
RESPIRATION RATE: 18 BRPM | WEIGHT: 158.8 LBS | SYSTOLIC BLOOD PRESSURE: 145 MMHG | HEART RATE: 94 BPM | DIASTOLIC BLOOD PRESSURE: 66 MMHG | BODY MASS INDEX: 27.11 KG/M2 | OXYGEN SATURATION: 90 % | HEIGHT: 64 IN

## 2024-09-23 DIAGNOSIS — N18.31 STAGE 3A CHRONIC KIDNEY DISEASE (MULTI): ICD-10-CM

## 2024-09-23 DIAGNOSIS — E78.2 MIXED HYPERLIPIDEMIA: ICD-10-CM

## 2024-09-23 DIAGNOSIS — Z86.79 HISTORY OF HEART FAILURE: ICD-10-CM

## 2024-09-23 DIAGNOSIS — I25.10 CORONARY ARTERY DISEASE INVOLVING NATIVE CORONARY ARTERY OF NATIVE HEART, UNSPECIFIED WHETHER ANGINA PRESENT: ICD-10-CM

## 2024-09-23 DIAGNOSIS — I65.29 OCCLUSION AND STENOSIS OF UNSPECIFIED CAROTID ARTERY: ICD-10-CM

## 2024-09-23 DIAGNOSIS — I50.32 CHRONIC DIASTOLIC HEART FAILURE: ICD-10-CM

## 2024-09-23 DIAGNOSIS — Z91.81 AT MODERATE RISK FOR FALL: Primary | ICD-10-CM

## 2024-09-23 DIAGNOSIS — I10 DIASTOLIC HYPERTENSION: ICD-10-CM

## 2024-09-23 PROBLEM — R55 SYNCOPE AND COLLAPSE: Status: ACTIVE | Noted: 2020-01-10

## 2024-09-23 PROBLEM — R52 GENERALIZED PAIN: Status: ACTIVE | Noted: 2024-09-23

## 2024-09-23 PROBLEM — I27.20 PULMONARY HYPERTENSION, UNSPECIFIED (MULTI): Status: ACTIVE | Noted: 2024-01-16

## 2024-09-23 PROBLEM — N18.9 CHRONIC KIDNEY DISEASE, UNSPECIFIED: Status: ACTIVE | Noted: 2024-01-16

## 2024-09-23 PROBLEM — I48.91 UNSPECIFIED ATRIAL FIBRILLATION (MULTI): Status: ACTIVE | Noted: 2024-01-16

## 2024-09-23 PROBLEM — I63.239: Status: ACTIVE | Noted: 2024-01-16

## 2024-09-23 PROBLEM — K21.00 GASTRO-ESOPHAGEAL REFLUX DISEASE WITH ESOPHAGITIS, WITHOUT BLEEDING: Status: ACTIVE | Noted: 2024-01-16

## 2024-09-23 PROBLEM — R06.00 DYSPNEA: Status: ACTIVE | Noted: 2024-09-23

## 2024-09-23 PROCEDURE — 1160F RVW MEDS BY RX/DR IN RCRD: CPT | Performed by: INTERNAL MEDICINE

## 2024-09-23 PROCEDURE — 1111F DSCHRG MED/CURRENT MED MERGE: CPT | Performed by: INTERNAL MEDICINE

## 2024-09-23 PROCEDURE — 1036F TOBACCO NON-USER: CPT | Performed by: INTERNAL MEDICINE

## 2024-09-23 PROCEDURE — 1159F MED LIST DOCD IN RCRD: CPT | Performed by: INTERNAL MEDICINE

## 2024-09-23 PROCEDURE — 1157F ADVNC CARE PLAN IN RCRD: CPT | Performed by: INTERNAL MEDICINE

## 2024-09-23 PROCEDURE — 3078F DIAST BP <80 MM HG: CPT | Performed by: INTERNAL MEDICINE

## 2024-09-23 PROCEDURE — G2211 COMPLEX E/M VISIT ADD ON: HCPCS | Performed by: INTERNAL MEDICINE

## 2024-09-23 PROCEDURE — 3077F SYST BP >= 140 MM HG: CPT | Performed by: INTERNAL MEDICINE

## 2024-09-23 PROCEDURE — 99214 OFFICE O/P EST MOD 30 MIN: CPT | Performed by: INTERNAL MEDICINE

## 2024-09-23 RX ORDER — FLUTICASONE FUROATE AND VILANTEROL 200; 25 UG/1; UG/1
POWDER RESPIRATORY (INHALATION) EVERY 24 HOURS
COMMUNITY

## 2024-09-23 RX ORDER — OXYCODONE HCL 10 MG/1
TABLET, FILM COATED, EXTENDED RELEASE ORAL
COMMUNITY

## 2024-09-23 RX ORDER — ALPRAZOLAM 0.25 MG/1
TABLET ORAL
COMMUNITY
Start: 2023-02-27

## 2024-09-23 ASSESSMENT — PATIENT HEALTH QUESTIONNAIRE - PHQ9
2. FEELING DOWN, DEPRESSED OR HOPELESS: NOT AT ALL
SUM OF ALL RESPONSES TO PHQ9 QUESTIONS 1 AND 2: 0
1. LITTLE INTEREST OR PLEASURE IN DOING THINGS: NOT AT ALL

## 2024-09-23 ASSESSMENT — LIFESTYLE VARIABLES
AUDIT TOTAL SCORE: 0
AUDIT-C TOTAL SCORE: 0
SKIP TO QUESTIONS 9-10: 1
HAVE YOU OR SOMEONE ELSE BEEN INJURED AS A RESULT OF YOUR DRINKING: NO
HOW MANY STANDARD DRINKS CONTAINING ALCOHOL DO YOU HAVE ON A TYPICAL DAY: PATIENT DOES NOT DRINK
HAS A RELATIVE, FRIEND, DOCTOR, OR ANOTHER HEALTH PROFESSIONAL EXPRESSED CONCERN ABOUT YOUR DRINKING OR SUGGESTED YOU CUT DOWN: NO
HOW OFTEN DO YOU HAVE SIX OR MORE DRINKS ON ONE OCCASION: NEVER
HOW OFTEN DO YOU HAVE A DRINK CONTAINING ALCOHOL: NEVER

## 2024-09-23 ASSESSMENT — ENCOUNTER SYMPTOMS
DEPRESSION: 0
OCCASIONAL FEELINGS OF UNSTEADINESS: 0
LOSS OF SENSATION IN FEET: 0

## 2024-09-23 NOTE — PROGRESS NOTES
Subjective   Chief Complaint   Patient presents with    Hospital Follow-up    Establish Care     Pt here today for follow up post hospital discharge 2nd to chest pains. Pt establishing care with provider. Previous Pt. Of Dr. Elizabeth.      83-year-old patient with a multiple comorbid condition for history of heart tension hyperlipidemia history of diastolic CHF.  History of COPD on 4 L nasal oxygen.  History of CABG in the past 2009 with a PCI in the past.  History of CKD and colon cancer.  Also peripheral vascular disease.  Came with a short of breath chest pain and headache.  Patient was seeing Dr. Kilpatrick any until few months ago.  History of CABG four-vessel 2009.  Now has upcoming appointment.  EKG was normal sinus rhythm with a nonspecific ST patient seen.  Troponins are flat.  Mild elevated.  Patient nuclear stress test done about 2 weeks ago essentially showed no evidence of ischemia.  Fixed defect seen to the apical and inferior wall.  Normal ejection fraction seen.  Patient currently walking with a walker but home oxygen 4 L home oxygen.  Past Medical History:   Diagnosis Date    Cancer (Multi)     CHF (congestive heart failure) (Multi)     COPD (chronic obstructive pulmonary disease) (Multi)     Depression     Myocardial infarct, old      Past Surgical History:   Procedure Laterality Date    CORONARY ARTERY BYPASS GRAFT      MR HEAD ANGIO WO IV CONTRAST  10/31/2015    MR HEAD ANGIO WO IV CONTRAST [facility]    MR HEAD ANGIO WO IV CONTRAST  02/15/2019    MR HEAD ANGIO WO IV CONTRAST [facility]    MR NECK ANGIO WO IV CONTRAST  10/31/2015    MR NECK ANGIO WO IV CONTRAST [facility]     No relevant family history has been documented for this patient.  Current Outpatient Medications   Medication Sig Dispense Refill    acetaminophen (Tylenol) 325 mg tablet Take 2 tablets (650 mg) by mouth every 4 hours if needed for fever (temp greater than 38.0 C) (greater than or equal to 38 C). 30  tablet 0    ALPRAZolam (Xanax) 0.25 mg tablet Take by mouth.      amLODIPine (Norvasc) 5 mg tablet Take 1 tablet (5 mg) by mouth once daily. 30 tablet 0    aspirin 81 mg chewable tablet Chew 1 tablet (81 mg) once daily. 30 tablet 3    carvedilol (Coreg) 6.25 mg tablet Take 1.5 tablets (9.375 mg) by mouth 2 times daily (morning and late afternoon).      citalopram (CeleXA) 20 mg tablet Take 1 tablet (20 mg) by mouth once daily. 90 tablet 3    DULoxetine (Cymbalta) 30 mg DR capsule Take 1 capsule (30 mg) by mouth once daily. 90 capsule 3    furosemide (Lasix) 40 mg tablet Take 1 tablet (40 mg) by mouth once daily. 30 tablet 0    omeprazole (PriLOSEC) 40 mg DR capsule Take 1 capsule (40 mg) by mouth once daily. 90 capsule 3    oxybutynin XL (Ditropan-XL) 5 mg 24 hr tablet Take 1 tablet (5 mg) by mouth once daily. 90 tablet 3    oxygen (O2) gas therapy 3 lpm      patiromer calcium sorbitex (Veltassa) 8.4 gram powder in packet Take 8.4 g by mouth 3 times a week. Do not start before January 17, 2024.      polyethylene glycol (Glycolax, Miralax) 17 gram packet Take 17 g by mouth once daily as needed (Constipation).      primidone (Mysoline) 50 mg tablet Take 2 tablets (100 mg) by mouth 3 times a day.      simvastatin (Zocor) 80 mg tablet Take 1 tablet (80 mg) by mouth once daily at bedtime. 30 tablet 3    sulfamethoxazole-trimethoprim (Bactrim DS) 800-160 mg tablet Take 1 tablet by mouth 2 times a day for 7 days. 14 tablet 0    sulfamethoxazole-trimethoprim (Bactrim DS) 800-160 mg tablet Take 1 tablet by mouth 2 times a day for 7 days. 14 tablet 0    albuterol 2.5 mg /3 mL (0.083 %) nebulizer solution Take 3 mL (2.5 mg) by nebulization every 6 hours while awake. (Patient not taking: Reported on 9/23/2024) 180 mL 2    budesonide (Pulmicort) 0.5 mg/2 mL nebulizer solution Take 2 mL (0.5 mg) by nebulization 2 times a day. Rinse mouth with water after use to reduce aftertaste and incidence of candidiasis. Do not swallow.  "(Patient not taking: Reported on 9/23/2024)      fluticasone furoate-vilanteroL (Breo Ellipta) 200-25 mcg/dose inhaler Inhale once every 24 hours.      nebulizer and compressor device use as directed with nebulizer treatments (Patient not taking: Reported on 9/9/2024) 1 each 0    oxyCODONE ER (OxyCONTIN) 10 mg 12 hr tablet Take by mouth.       No current facility-administered medications for this visit.      reports that she has quit smoking. Her smoking use included cigarettes. She has never used smokeless tobacco. She reports that she does not drink alcohol and does not use drugs.  Allergies:  Cortisone, Other, Acth, Haemoph b poly conj-tet tox-pf, Codeine, and Hydrochlorothiazide    ROS: See HPI  CONSTITUTIONAL: Chills- none. Fever- none. Weight change appropriate for age.  HEENT: Headache- Negative.  Change in vision- none.  Ear pain- none. Nasal congestion- none. Post-nasal drip-none.  Sore throat-none.  CARDIOLOGY: Chest pain- none.  Leg edema-trace.  Murmurs-soft systolic.  Palpitation- none.  RESPIRATORY: Denies any shortness of breath.  GI: Abdominal pain- none.  Change in bowel habits- none.  Constipation- none.  Diarrhea- none.  Nausea- none.  Vomiting- none.  MUSCULOSKELETAL: Joint pain- none.  Muscle aches- none.  DERMATOLOGY: Rash- none.  NEUROLOGY: Dizziness- none.   Headache- none.  PSYCHIATRY: Denies any depression or anxiety     Vitals:    09/23/24 1421   BP: 145/66   Pulse: 94   Resp: 18   SpO2: 90%  Comment: 4l O2   Weight: 72 kg (158 lb 12.8 oz)   Height: 1.626 m (5' 4\")      BMI:Body mass index is 27.26 kg/m².   General Cardiology:  General Appearance: Alert, oriented and in no acute distress.  HEENT: extra ocular movements intact (EOMI), pupils equal,  round, reactive to light and accommodation (PERRLA).  Carotid Upstroke: no bruit, normal.  Jugular Venous Distention (JVD): flat.  Chest: normal.  Lungs: Clear to auscultation,   Heart Sounds: no S3 or S4, normal S1, S2, regular rate.  Murmur, " Click, Gallop: soft systolic murmur.  Abdomen: no hepatomegaly, no masses felt, soft.  Extremities: trace leg edema.  Peripheral pulses: 2 plus bilateral.  NEUROLOGY Cranial nerves II-XII grossly intact.     Patient Active Problem List   Diagnosis    Lung mass    Pleural effusion    Fibromyalgia    History of coronary artery stent placement    Acute congestive heart failure (Multi)    History of transient ischemic attack    Hyperlipidemia    Major depressive disorder, single episode, unspecified    Weakness    Vitamin D deficiency    Urinary incontinence    Trochanteric bursitis of right hip    Spinal stenosis of lumbar region    Piriformis syndrome    Diastolic hypertension    Hip pain    Overactive bladder    Other sequelae following unspecified cerebrovascular disease    Other peripheral vertigo, unspecified ear    Osteopenia    Occlusion and stenosis of unspecified carotid artery    Myocardial infarction (Multi)    Low back pain    Insomnia    Injury of head    Inflammation of sacroiliac joint (CMS-HCC)    Hypoxia    History of lung cancer    History of heart failure    History of colon cancer    Gastroenteritis    Essential tremor    Dizziness and giddiness    Difficulty walking    Degenerative spondylolisthesis    Coronary artery disease    Contusion of elbow    Contusion of knee    Constipation    Compression fracture of lumbar vertebra (Multi)    Common bile duct dilatation    Cigarette smoker    Chronic respiratory failure (Multi)    Acute exacerbation of chronic obstructive airways disease (Multi)    Chronic diastolic heart failure (Multi)    Cervicogenic headache    Cervical spondylosis without myelopathy    Carotid artery disease (CMS-HCC)    Carcinoma of colon (Multi)    Anxiety disorder, unspecified    Amaurosis fugax    Acute ST segment elevation myocardial infarction (Multi)    Accidental fall    Medical home patient    At moderate risk for fall    Fall, initial encounter    Acute on chronic kidney  failure (CMS-[facility])    Hyperkalemia    Chest pain    Cerebral infarction due to unspecified occlusion or stenosis of unspecified carotid artery (Multi)    Chronic kidney disease, unspecified    Dyspnea    Gastro-esophageal reflux disease with esophagitis, without bleeding    Generalized pain    Pulmonary hypertension, unspecified (Multi)    Syncope and collapse    Unspecified atrial fibrillation (Multi)       Problem List Items Addressed This Visit       Hyperlipidemia    Diastolic hypertension    Occlusion and stenosis of unspecified carotid artery    History of heart failure    Coronary artery disease    Chronic diastolic heart failure (Multi)    At moderate risk for fall - Primary    Chronic kidney disease, unspecified      83-year-old female patient with history of chronic diastolic CHF, hypertension hyper history of CAD.  Currently on oxygen 4 L.  Recently angina symptoms.  Negative nuclear stress test for ischemia.  1.  Chronic diastolic CHF: Continue follow low-sodium diet.  Continue Lasix 40 mg daily.  May drop it to 20 mg if need to.  2.  Hypertension: Continue current vertically from the daily blood carvedilol.  3.  CAD: Current aspirin 80 mg once a day.  Continue beta-blocker.  4.  Hyperlipidemia: Continue current simvastatin milligram 20 per daily.  5.  COPD: Continue current oxygen about 4 L daily.    Advised patient to avoid lunch meats, canned soups, pizzas, bread rolls, and sandwiches. Advised patient to limit salt intake 1,500 mg daily. Advised patient to exercise 30 mins/3 times a week including treadmill or aerobic type, Goal to achieve 65% target HR.  Cut back on salt, sugar and flour.    Pt. care time is spent includes review of diagnostic tests, labs, radiographs, EKGs, old echoes, cardiac work-up and coordination of care. Assessment, impression and plans are reflected in the note above as well as the orders.    Luan Monge MD

## 2024-09-25 ENCOUNTER — PATIENT OUTREACH (OUTPATIENT)
Dept: PRIMARY CARE | Facility: CLINIC | Age: 84
End: 2024-09-25
Payer: MEDICARE

## 2024-09-25 DIAGNOSIS — I25.85 CHRONIC CORONARY MICROVASCULAR DYSFUNCTION: ICD-10-CM

## 2024-09-25 DIAGNOSIS — R07.9 CHEST PAIN: ICD-10-CM

## 2024-09-25 RX ORDER — AMLODIPINE BESYLATE 5 MG/1
5 TABLET ORAL DAILY
Qty: 30 TABLET | Refills: 0 | Status: SHIPPED | OUTPATIENT
Start: 2024-09-25

## 2024-09-25 RX ORDER — CARVEDILOL 6.25 MG/1
6.25 TABLET ORAL
Qty: 180 TABLET | Refills: 3 | Status: SHIPPED | OUTPATIENT
Start: 2024-09-25

## 2024-09-25 RX ORDER — FUROSEMIDE 40 MG/1
40 TABLET ORAL DAILY
Qty: 30 TABLET | Refills: 0 | Status: SHIPPED | OUTPATIENT
Start: 2024-09-25

## 2024-09-25 NOTE — TELEPHONE ENCOUNTER
Requested Prescriptions     Pending Prescriptions Disp Refills    amLODIPine (Norvasc) 5 mg tablet 30 tablet 0     Sig: Take 1 tablet (5 mg) by mouth once daily.    carvedilol (Coreg) 6.25 mg tablet 180 tablet 3     Sig: Take 1 tablet (6.25 mg) by mouth 2 times daily (morning and late afternoon).    furosemide (Lasix) 40 mg tablet 30 tablet 0     Sig: Take 1 tablet (40 mg) by mouth once daily.

## 2024-09-25 NOTE — PROGRESS NOTES
Unable to reach patient for call back after recent hospitalization. Patient did not schedule a hospital follow up with primary care, but has been in contact with the office. Patient had a hospital follow up with cardiology. [facility] with call back number for patient to call if needed.

## 2024-10-14 ENCOUNTER — HOSPITAL ENCOUNTER (OUTPATIENT)
Dept: RADIOLOGY | Facility: CLINIC | Age: 84
Discharge: HOME | End: 2024-10-14
Payer: MEDICARE

## 2024-10-14 ENCOUNTER — TELEPHONE (OUTPATIENT)
Dept: PRIMARY CARE | Facility: CLINIC | Age: 84
End: 2024-10-14

## 2024-10-14 DIAGNOSIS — R09.89 CHEST CONGESTION: Primary | ICD-10-CM

## 2024-10-14 DIAGNOSIS — R09.89 CHEST CONGESTION: ICD-10-CM

## 2024-10-14 PROCEDURE — 71046 X-RAY EXAM CHEST 2 VIEWS: CPT

## 2024-10-14 PROCEDURE — 71046 X-RAY EXAM CHEST 2 VIEWS: CPT | Performed by: RADIOLOGY

## 2024-10-14 NOTE — TELEPHONE ENCOUNTER
Pt's daughter, Amanda states pt c/o lung pain, slight amt of wheezing improved very small amout with breathing treatment, which pt rarely uses, states releatives that are healthcare workers listened to pt's lungs and there is some congestion on left side, no cough, symptoms since Sat.  Pt's daughter asking for chest xray.

## 2024-10-29 ENCOUNTER — LAB (OUTPATIENT)
Dept: LAB | Facility: LAB | Age: 84
End: 2024-10-29
Payer: MEDICARE

## 2024-10-29 DIAGNOSIS — N18.30 CHRONIC KIDNEY DISEASE, STAGE 3 UNSPECIFIED (MULTI): Primary | ICD-10-CM

## 2024-10-29 LAB
ALBUMIN SERPL BCP-MCNC: 3.9 G/DL (ref 3.4–5)
ANION GAP SERPL CALC-SCNC: 13 MMOL/L (ref 10–20)
BUN SERPL-MCNC: 16 MG/DL (ref 6–23)
CALCIUM SERPL-MCNC: 9.3 MG/DL (ref 8.6–10.3)
CHLORIDE SERPL-SCNC: 97 MMOL/L (ref 98–107)
CO2 SERPL-SCNC: 32 MMOL/L (ref 21–32)
CREAT SERPL-MCNC: 1.11 MG/DL (ref 0.5–1.05)
EGFRCR SERPLBLD CKD-EPI 2021: 49 ML/MIN/1.73M*2
ERYTHROCYTE [DISTWIDTH] IN BLOOD BY AUTOMATED COUNT: 12.8 % (ref 11.5–14.5)
GLUCOSE SERPL-MCNC: 81 MG/DL (ref 74–99)
HCT VFR BLD AUTO: 38 % (ref 36–46)
HGB BLD-MCNC: 12.1 G/DL (ref 12–16)
MCH RBC QN AUTO: 31.2 PG (ref 26–34)
MCHC RBC AUTO-ENTMCNC: 31.8 G/DL (ref 32–36)
MCV RBC AUTO: 98 FL (ref 80–100)
NRBC BLD-RTO: 0 /100 WBCS (ref 0–0)
PHOSPHATE SERPL-MCNC: 4.2 MG/DL (ref 2.5–4.9)
PLATELET # BLD AUTO: 258 X10*3/UL (ref 150–450)
POTASSIUM SERPL-SCNC: 4.6 MMOL/L (ref 3.5–5.3)
PTH-INTACT SERPL-MCNC: 36.8 PG/ML (ref 18.5–88)
RBC # BLD AUTO: 3.88 X10*6/UL (ref 4–5.2)
SODIUM SERPL-SCNC: 137 MMOL/L (ref 136–145)
WBC # BLD AUTO: 6.8 X10*3/UL (ref 4.4–11.3)

## 2024-10-29 PROCEDURE — 36415 COLL VENOUS BLD VENIPUNCTURE: CPT

## 2024-10-29 PROCEDURE — 80069 RENAL FUNCTION PANEL: CPT

## 2024-10-29 PROCEDURE — 85027 COMPLETE CBC AUTOMATED: CPT

## 2024-10-29 PROCEDURE — 83970 ASSAY OF PARATHORMONE: CPT

## 2024-11-02 DIAGNOSIS — I25.85 CHRONIC CORONARY MICROVASCULAR DYSFUNCTION: ICD-10-CM

## 2024-11-02 DIAGNOSIS — R07.9 CHEST PAIN: ICD-10-CM

## 2024-11-06 RX ORDER — FUROSEMIDE 40 MG/1
40 TABLET ORAL DAILY
Qty: 30 TABLET | Refills: 11 | Status: SHIPPED | OUTPATIENT
Start: 2024-11-06

## 2024-11-06 RX ORDER — AMLODIPINE BESYLATE 5 MG/1
5 TABLET ORAL DAILY
Qty: 30 TABLET | Refills: 11 | Status: SHIPPED | OUTPATIENT
Start: 2024-11-06

## 2024-11-06 NOTE — TELEPHONE ENCOUNTER
Please send the attached prescription to the patient's pharmacy as soon as possible. Thank you!    Requested Prescriptions     Pending Prescriptions Disp Refills    amLODIPine (Norvasc) 5 mg tablet [Pharmacy Med Name: AMLODIPINE BESYLATE 5 MG TAB] 30 tablet 11     Sig: TAKE 1 TABLET BY MOUTH EVERY DAY    furosemide (Lasix) 40 mg tablet [Pharmacy Med Name: FUROSEMIDE 40 MG TABLET] 30 tablet 11     Sig: TAKE 1 TABLET BY MOUTH EVERY DAY

## 2024-11-22 ENCOUNTER — APPOINTMENT (OUTPATIENT)
Dept: RADIOLOGY | Facility: HOSPITAL | Age: 84
End: 2024-11-22
Payer: MEDICARE

## 2024-11-22 ENCOUNTER — HOSPITAL ENCOUNTER (EMERGENCY)
Facility: HOSPITAL | Age: 84
Discharge: HOME | End: 2024-11-22
Attending: EMERGENCY MEDICINE
Payer: MEDICARE

## 2024-11-22 ENCOUNTER — OFFICE VISIT (OUTPATIENT)
Dept: URGENT CARE | Age: 84
End: 2024-11-22
Payer: MEDICARE

## 2024-11-22 ENCOUNTER — APPOINTMENT (OUTPATIENT)
Dept: CARDIOLOGY | Facility: HOSPITAL | Age: 84
End: 2024-11-22
Payer: MEDICARE

## 2024-11-22 VITALS
OXYGEN SATURATION: 97 % | RESPIRATION RATE: 16 BRPM | HEART RATE: 104 BPM | WEIGHT: 150 LBS | TEMPERATURE: 98.5 F | BODY MASS INDEX: 25.75 KG/M2 | DIASTOLIC BLOOD PRESSURE: 61 MMHG | SYSTOLIC BLOOD PRESSURE: 119 MMHG

## 2024-11-22 VITALS
TEMPERATURE: 98.3 F | HEIGHT: 64 IN | OXYGEN SATURATION: 98 % | HEART RATE: 99 BPM | WEIGHT: 150 LBS | SYSTOLIC BLOOD PRESSURE: 155 MMHG | BODY MASS INDEX: 25.61 KG/M2 | RESPIRATION RATE: 17 BRPM | DIASTOLIC BLOOD PRESSURE: 51 MMHG

## 2024-11-22 DIAGNOSIS — R07.9 CHEST PAIN, UNSPECIFIED TYPE: Primary | ICD-10-CM

## 2024-11-22 DIAGNOSIS — R06.02 SHORTNESS OF BREATH: Primary | ICD-10-CM

## 2024-11-22 DIAGNOSIS — J18.9 PNEUMONIA OF RIGHT LOWER LOBE DUE TO INFECTIOUS ORGANISM: ICD-10-CM

## 2024-11-22 DIAGNOSIS — R05.1 ACUTE COUGH: ICD-10-CM

## 2024-11-22 DIAGNOSIS — R06.02 SHORTNESS OF BREATH: ICD-10-CM

## 2024-11-22 LAB
ALBUMIN SERPL BCP-MCNC: 3.7 G/DL (ref 3.4–5)
ALP SERPL-CCNC: 74 U/L (ref 33–136)
ALT SERPL W P-5'-P-CCNC: 6 U/L (ref 7–45)
ANION GAP SERPL CALCULATED.3IONS-SCNC: 12 MMOL/L (ref 10–20)
AST SERPL W P-5'-P-CCNC: 7 U/L (ref 9–39)
BASOPHILS # BLD AUTO: 0.03 X10*3/UL (ref 0–0.1)
BASOPHILS NFR BLD AUTO: 0.2 %
BILIRUB SERPL-MCNC: 0.3 MG/DL (ref 0–1.2)
BNP SERPL-MCNC: 424 PG/ML (ref 0–99)
BUN SERPL-MCNC: 21 MG/DL (ref 6–23)
CALCIUM SERPL-MCNC: 9.3 MG/DL (ref 8.6–10.3)
CARDIAC TROPONIN I PNL SERPL HS: 11 NG/L (ref 0–13)
CARDIAC TROPONIN I PNL SERPL HS: 13 NG/L (ref 0–13)
CHLORIDE SERPL-SCNC: 98 MMOL/L (ref 98–107)
CO2 SERPL-SCNC: 31 MMOL/L (ref 21–32)
CREAT SERPL-MCNC: 1.11 MG/DL (ref 0.5–1.05)
EGFRCR SERPLBLD CKD-EPI 2021: 49 ML/MIN/1.73M*2
EOSINOPHIL # BLD AUTO: 0.22 X10*3/UL (ref 0–0.4)
EOSINOPHIL NFR BLD AUTO: 1.8 %
ERYTHROCYTE [DISTWIDTH] IN BLOOD BY AUTOMATED COUNT: 13.6 % (ref 11.5–14.5)
FLUAV RNA RESP QL NAA+PROBE: NOT DETECTED
FLUBV RNA RESP QL NAA+PROBE: NOT DETECTED
GLUCOSE SERPL-MCNC: 109 MG/DL (ref 74–99)
HCT VFR BLD AUTO: 33.1 % (ref 36–46)
HGB BLD-MCNC: 10.6 G/DL (ref 12–16)
IMM GRANULOCYTES # BLD AUTO: 0.07 X10*3/UL (ref 0–0.5)
IMM GRANULOCYTES NFR BLD AUTO: 0.6 % (ref 0–0.9)
LYMPHOCYTES # BLD AUTO: 1.01 X10*3/UL (ref 0.8–3)
LYMPHOCYTES NFR BLD AUTO: 8.4 %
MCH RBC QN AUTO: 31.2 PG (ref 26–34)
MCHC RBC AUTO-ENTMCNC: 32 G/DL (ref 32–36)
MCV RBC AUTO: 97 FL (ref 80–100)
MONOCYTES # BLD AUTO: 1.21 X10*3/UL (ref 0.05–0.8)
MONOCYTES NFR BLD AUTO: 10 %
NEUTROPHILS # BLD AUTO: 9.52 X10*3/UL (ref 1.6–5.5)
NEUTROPHILS NFR BLD AUTO: 79 %
NRBC BLD-RTO: 0 /100 WBCS (ref 0–0)
PLATELET # BLD AUTO: 290 X10*3/UL (ref 150–450)
POTASSIUM SERPL-SCNC: 4 MMOL/L (ref 3.5–5.3)
PROT SERPL-MCNC: 7.4 G/DL (ref 6.4–8.2)
RBC # BLD AUTO: 3.4 X10*6/UL (ref 4–5.2)
SARS-COV-2 RNA RESP QL NAA+PROBE: NOT DETECTED
SODIUM SERPL-SCNC: 137 MMOL/L (ref 136–145)
WBC # BLD AUTO: 12.1 X10*3/UL (ref 4.4–11.3)

## 2024-11-22 PROCEDURE — 87636 SARSCOV2 & INF A&B AMP PRB: CPT

## 2024-11-22 PROCEDURE — 96375 TX/PRO/DX INJ NEW DRUG ADDON: CPT

## 2024-11-22 PROCEDURE — 94640 AIRWAY INHALATION TREATMENT: CPT

## 2024-11-22 PROCEDURE — 71046 X-RAY EXAM CHEST 2 VIEWS: CPT | Performed by: STUDENT IN AN ORGANIZED HEALTH CARE EDUCATION/TRAINING PROGRAM

## 2024-11-22 PROCEDURE — 2500000004 HC RX 250 GENERAL PHARMACY W/ HCPCS (ALT 636 FOR OP/ED)

## 2024-11-22 PROCEDURE — 84484 ASSAY OF TROPONIN QUANT: CPT

## 2024-11-22 PROCEDURE — 71046 X-RAY EXAM CHEST 2 VIEWS: CPT

## 2024-11-22 PROCEDURE — 36415 COLL VENOUS BLD VENIPUNCTURE: CPT

## 2024-11-22 PROCEDURE — 96365 THER/PROPH/DIAG IV INF INIT: CPT

## 2024-11-22 PROCEDURE — 80053 COMPREHEN METABOLIC PANEL: CPT

## 2024-11-22 PROCEDURE — 99284 EMERGENCY DEPT VISIT MOD MDM: CPT | Mod: 25

## 2024-11-22 PROCEDURE — 93005 ELECTROCARDIOGRAM TRACING: CPT

## 2024-11-22 PROCEDURE — 85025 COMPLETE CBC W/AUTO DIFF WBC: CPT

## 2024-11-22 PROCEDURE — 2500000002 HC RX 250 W HCPCS SELF ADMINISTERED DRUGS (ALT 637 FOR MEDICARE OP, ALT 636 FOR OP/ED): Mod: MUE

## 2024-11-22 PROCEDURE — 83880 ASSAY OF NATRIURETIC PEPTIDE: CPT

## 2024-11-22 RX ORDER — BENZONATATE 100 MG/1
100 CAPSULE ORAL 3 TIMES DAILY PRN
Qty: 30 CAPSULE | Refills: 0 | Status: SHIPPED | OUTPATIENT
Start: 2024-11-22

## 2024-11-22 RX ORDER — AZITHROMYCIN 250 MG/1
250 TABLET, FILM COATED ORAL DAILY
Qty: 6 TABLET | Refills: 0 | Status: SHIPPED | OUTPATIENT
Start: 2024-11-22 | End: 2024-11-27

## 2024-11-22 RX ORDER — IPRATROPIUM BROMIDE AND ALBUTEROL SULFATE 2.5; .5 MG/3ML; MG/3ML
3 SOLUTION RESPIRATORY (INHALATION) ONCE
Status: COMPLETED | OUTPATIENT
Start: 2024-11-22 | End: 2024-11-22

## 2024-11-22 ASSESSMENT — ENCOUNTER SYMPTOMS
SHORTNESS OF BREATH: 1
NECK PAIN: 0
FEVER: 0
NAUSEA: 1
HEADACHES: 1
NECK STIFFNESS: 0
WEAKNESS: 0
BACK PAIN: 0
VOMITING: 0
CHILLS: 0
NUMBNESS: 0
COUGH: 1
ABDOMINAL PAIN: 0
SORE THROAT: 0
DIZZINESS: 1
LIGHT-HEADEDNESS: 0
PALPITATIONS: 0

## 2024-11-22 ASSESSMENT — PAIN SCALES - GENERAL: PAINLEVEL_OUTOF10: 0 - NO PAIN

## 2024-11-22 ASSESSMENT — COLUMBIA-SUICIDE SEVERITY RATING SCALE - C-SSRS
2. HAVE YOU ACTUALLY HAD ANY THOUGHTS OF KILLING YOURSELF?: NO
1. IN THE PAST MONTH, HAVE YOU WISHED YOU WERE DEAD OR WISHED YOU COULD GO TO SLEEP AND NOT WAKE UP?: NO
6. HAVE YOU EVER DONE ANYTHING, STARTED TO DO ANYTHING, OR PREPARED TO DO ANYTHING TO END YOUR LIFE?: NO

## 2024-11-22 ASSESSMENT — PAIN - FUNCTIONAL ASSESSMENT: PAIN_FUNCTIONAL_ASSESSMENT: 0-10

## 2024-11-23 NOTE — ED PROVIDER NOTES
HPI   Chief Complaint   Patient presents with    Flu Symptoms     Cough, congestion, sob nx 1 week. Pt states she feels ill, denies any chest pain. Pt denies all pain. Pt states she is getting exhausted after simple tasks. Pt is aox4, stable.        Patient is an 83-year-old female with past medical history of congestive heart failure, COPD on 4 L nasal cannula presenting with concerns for shortness of breath.  She states that over the last several days she been getting worsening shortness of breath.  States that 1 week ago, she had for members at home and believes she got a sickness from one of them.  She states she has a nonproductive cough.  No fevers.  States that her shortness of breath is typical when she tries to ambulate.  Patient denies fevers, chills, cough, sore throat, runny nose, chest pain, abdominal pain, nausea, vomiting, diarrhea or urinary complaints.              Patient History   Past Medical History:   Diagnosis Date    Cancer (Multi)     CHF (congestive heart failure) (Multi)     COPD (chronic obstructive pulmonary disease) (Multi)     Depression     Myocardial infarct, old      Past Surgical History:   Procedure Laterality Date    CORONARY ARTERY BYPASS GRAFT      MR HEAD ANGIO WO IV CONTRAST  10/31/2015    MR HEAD ANGIO WO IV CONTRAST [facility]    MR HEAD ANGIO WO IV CONTRAST  02/15/2019    MR HEAD ANGIO WO IV CONTRAST [facility]    MR NECK ANGIO WO IV CONTRAST  10/31/2015    MR NECK ANGIO WO IV CONTRAST [facility]     No family history on file.  Social History     Tobacco Use    Smoking status: Former     Types: Cigarettes    Smokeless tobacco: Never   Vaping Use    Vaping status: Never Used   Substance Use Topics    Alcohol use: Never    Drug use: Never       Physical Exam   ED Triage Vitals [11/22/24 1943]   Temperature Heart Rate Respirations BP   36.8 °C (98.3 °F) 99 17 155/51      Pulse Ox Temp Source Heart Rate Source Patient Position   98 % Oral Monitor  Sitting      BP Location FiO2 (%)     Right arm --       Physical Exam  Vitals and nursing note reviewed.   Constitutional:       Appearance: She is well-developed.   HENT:      Head: Normocephalic and atraumatic.      Nose: Nose normal.      Mouth/Throat:      Mouth: Mucous membranes are moist.      Pharynx: Oropharynx is clear.   Eyes:      Extraocular Movements: Extraocular movements intact.      Conjunctiva/sclera: Conjunctivae normal.      Pupils: Pupils are equal, round, and reactive to light.   Cardiovascular:      Rate and Rhythm: Normal rate and regular rhythm.      Pulses: Normal pulses.      Heart sounds: Normal heart sounds. No murmur heard.  Pulmonary:      Effort: Pulmonary effort is normal. No respiratory distress.      Breath sounds: Wheezing present.      Comments: Wheezing heard on auscultation bilaterally  Abdominal:      General: Abdomen is flat.      Palpations: Abdomen is soft.      Tenderness: There is no abdominal tenderness.   Musculoskeletal:         General: No swelling. Normal range of motion.      Cervical back: Normal range of motion and neck supple.   Skin:     General: Skin is warm and dry.      Capillary Refill: Capillary refill takes less than 2 seconds.   Neurological:      General: No focal deficit present.      Mental Status: She is alert and oriented to person, place, and time.   Psychiatric:         Mood and Affect: Mood normal.         Behavior: Behavior normal.           ED Course & MDM   ED Course as of 11/22/24 2221 Fri Nov 22, 2024 2052 EKG personally interpreted by me performed at 2013  Sinus rhythm with a ventricular rate 92 normal axis and intervals no acute ischemic changes [EF]   2111 Patient does endorse significant improvement after Solu-Medrol and breathing treatments. [AR]      ED Course User Index  [AR] Ludwig Styles PA-C  [EF] Areli Vasquez DO         Diagnoses as of 11/22/24 2221   Shortness of breath   Pneumonia of right lower lobe due to infectious  organism   Acute cough                 No data recorded                                 Medical Decision Making  Patient is an 83-year-old female with past medical history of congestive heart failure, COPD on 4 L nasal cannula presented with concerns for shortness of breath.  Lab work, swabs, imaging ordered.  Conditions considered include but are not limited to: CHF exacerbation, COPD exacerbation, pneumonia, viral illness.    I saw this patient in conjunction with Dr. Vasquez.  DuoNebs and Solu-Medrol ordered.  DBC does show leukocytosis with WBCs of 12.1 as well as anemia with hemoglobin of 10.6.  BNP is elevated at 424 but previously 10 months ago was elevated over 1000.  CMP without significant electrolyte abnormality or renal impairment.  Troponin within normal limits.  Viral swabs are negative.  Repeat troponin within normal limits.  Patient does endorse improvement after DuoNebs and Solu-Medrol.    IV azithromycin ordered.  I believe this patient is at low risk for complication, and a disposition of discharge is acceptable.  Return to the Emergency Department if new or worsening symptoms including headache, fever, chills, chest pain, shortness of breath, syncope, near syncope, abdominal pain, nausea, vomiting,  diarrhea, or worsening pain.  Prescription for azithromycin and Tessalon Perles written.  Discussed with patient that she can use Tessalon Perles, Mucinex, Robitussin as needed for cough.  Patient is agreeable to a disposition of discharge and follow-up with primary care in the next several days and to take antibiotics to completion.    Portions of this note made with Dragon software, please be mindful of potential grammatical errors.        Medications   azithromycin 500 mg in dextrose 5% 250 mL IV (has no administration in time range)   ipratropium-albuteroL (Duo-Neb) 0.5-2.5 mg/3 mL nebulizer solution 3 mL (3 mL nebulization Given 11/22/24 1959)   methylPREDNISolone sod succinate (SOLU-Medrol)  injection 125 mg (125 mg intravenous Given 11/22/24 2033)   ipratropium-albuteroL (Duo-Neb) 0.5-2.5 mg/3 mL nebulizer solution 3 mL (3 mL nebulization Given 11/22/24 2027)         Labs Reviewed   CBC WITH AUTO DIFFERENTIAL - Abnormal       Result Value    WBC 12.1 (*)     nRBC 0.0      RBC 3.40 (*)     Hemoglobin 10.6 (*)     Hematocrit 33.1 (*)     MCV 97      MCH 31.2      MCHC 32.0      RDW 13.6      Platelets 290      Neutrophils % 79.0      Immature Granulocytes %, Automated 0.6      Lymphocytes % 8.4      Monocytes % 10.0      Eosinophils % 1.8      Basophils % 0.2      Neutrophils Absolute 9.52 (*)     Immature Granulocytes Absolute, Automated 0.07      Lymphocytes Absolute 1.01      Monocytes Absolute 1.21 (*)     Eosinophils Absolute 0.22      Basophils Absolute 0.03     COMPREHENSIVE METABOLIC PANEL - Abnormal    Glucose 109 (*)     Sodium 137      Potassium 4.0      Chloride 98      Bicarbonate 31      Anion Gap 12      Urea Nitrogen 21      Creatinine 1.11 (*)     eGFR 49 (*)     Calcium 9.3      Albumin 3.7      Alkaline Phosphatase 74      Total Protein 7.4      AST 7 (*)     Bilirubin, Total 0.3      ALT 6 (*)    B-TYPE NATRIURETIC PEPTIDE - Abnormal     (*)     Narrative:        <100 pg/mL - Heart failure unlikely  100-299 pg/mL - Intermediate probability of acute heart                  failure exacerbation. Correlate with clinical                  context and patient history.    >=300 pg/mL - Heart Failure likely. Correlate with clinical                  context and patient history.    BNP testing is performed using different testing methodology at [facility] than at other [facility] hospitals. Direct result comparisons should only be made within the same method.      SARS-COV-2 PCR - Normal    Coronavirus 2019, PCR Not Detected      Narrative:     This assay has received FDA Emergency Use Authorization (EUA) and is only authorized for the duration of time that circumstances exist  to justify the authorization of the emergency use of in vitro diagnostic tests for the detection of SARS-CoV-2 virus and/or diagnosis of COVID-19 infection under section 564(b)(1) of the Act, 21 U.S.C. 360bbb-3(b)(1). This assay is an in vitro diagnostic nucleic acid amplification test for the qualitative detection of SARS-CoV-2 from nasopharyngeal specimens and has been validated for use at [facility]. Negative results do not preclude COVID-19 infections and should not be used as the sole basis for diagnosis, treatment, or other management decisions.     INFLUENZA A AND B PCR - Normal    Flu A Result Not Detected      Flu B Result Not Detected      Narrative:     This assay is an in vitro diagnostic multiplex nucleic acid amplification test for the detection and discrimination of Influenza A & B from nasopharyngeal specimens, and has been validated for use at [facility]. Negative results do not preclude Influenza A/B infections, and should not be used as the sole basis for diagnosis, treatment, or other management decisions. If Influenza A/B and RSV PCR results are negative, testing for Parainfluenza virus, Adenovirus and Metapneumovirus is routinely performed for [facility] pediatric oncology and intensive care inpatients, and is available on other patients by placing an add-on request.   SERIAL TROPONIN-INITIAL - Normal    Troponin I, High Sensitivity 13      Narrative:     Less than 99th percentile of normal range cutoff-  Female and children under 18 years old <14 ng/L; Male <21 ng/L: Negative  Repeat testing should be performed if clinically indicated.     Female and children under 18 years old 14-50 ng/L; Male 21-50 ng/L:  Consistent with possible cardiac damage and possible increased clinical   risk. Serial measurements may help to assess extent of myocardial damage.     >50 ng/L: Consistent with cardiac damage, increased clinical risk and  myocardial infarction.  Serial measurements may help assess extent of   myocardial damage.      NOTE: Children less than 1 year old may have higher baseline troponin   levels and results should be interpreted in conjunction with the overall   clinical context.     NOTE: Troponin I testing is performed using a different   testing methodology at [facility] than at other   [facility]. Direct result comparisons should only   be made within the same method.   SERIAL TROPONIN, 1 HOUR - Normal    Troponin I, High Sensitivity 11      Narrative:     Less than 99th percentile of normal range cutoff-  Female and children under 18 years old <14 ng/L; Male <21 ng/L: Negative  Repeat testing should be performed if clinically indicated.     Female and children under 18 years old 14-50 ng/L; Male 21-50 ng/L:  Consistent with possible cardiac damage and possible increased clinical   risk. Serial measurements may help to assess extent of myocardial damage.     >50 ng/L: Consistent with cardiac damage, increased clinical risk and  myocardial infarction. Serial measurements may help assess extent of   myocardial damage.      NOTE: Children less than 1 year old may have higher baseline troponin   levels and results should be interpreted in conjunction with the overall   clinical context.     NOTE: Troponin I testing is performed using a different   testing methodology at [facility] than at other   [facility]. Direct result comparisons should only   be made within the same method.   TROPONIN SERIES- (INITIAL, 1 HR)    Narrative:     The following orders were created for panel order Troponin I Series, High Sensitivity (0, 1 HR).  Procedure                               Abnormality         Status                     ---------                               -----------         ------                     Troponin I, High Sensiti...[[id number]]  Normal              Final result               Troponin, High  Sensitivi...[[id number]]  Normal              Final result                 Please view results for these tests on the individual orders.         XR chest 2 views   Final Result   Increased diffuse prominence of the pulmonary interstitium concerning   for edema.        Asymmetric enlargement of the right hilum with surrounding opacity.   Given this is new from 10/14/2024 this is most likely   infectious/inflammatory due to surrounding airspace disease, such as   pneumonia or edema, and lymphadenopathy. However, Recommend follow-up   chest radiograph in 4-6 weeks and serially thereafter to ensure   complete resolution.        MACRO:   None.        Signed by: Reyes Condon 11/22/2024 9:50 PM   Dictation workstation:   [id number]            Procedure  Procedures     Ludwig Styles PA-C  11/22/24 9354

## 2024-11-23 NOTE — PROGRESS NOTES
Subjective   Patient ID: Juli Del Castillo is a 83 y.o. female. They present today with a chief complaint of Shortness of Breath, Cough, and chest congestion (For 5 days).    History of Present Illness  HPI    Past Medical History  Allergies as of 11/22/2024 - Reviewed 11/22/2024   Allergen Reaction Noted    Cortisone Swelling and Rash 09/29/2023    Other Swelling and Rash 09/29/2023    Acth Hives 01/11/2019    Haemoph b poly conj-tet tox-pf Other 11/22/2023    Codeine Nausea/vomiting, Nausea Only, and Rash 11/16/2015    Hydrochlorothiazide Rash and Swelling 11/16/2015       (Not in a hospital admission)       Past Medical History:   Diagnosis Date    Cancer (Multi)     CHF (congestive heart failure) (Multi)     COPD (chronic obstructive pulmonary disease) (Multi)     Depression     Myocardial infarct, old        Past Surgical History:   Procedure Laterality Date    CORONARY ARTERY BYPASS GRAFT      MR HEAD ANGIO WO IV CONTRAST  10/31/2015    MR HEAD ANGIO WO IV CONTRAST [facility]    MR HEAD ANGIO WO IV CONTRAST  02/15/2019    MR HEAD ANGIO WO IV CONTRAST [facility]    MR NECK ANGIO WO IV CONTRAST  10/31/2015    MR NECK ANGIO WO IV CONTRAST [facility]        reports that she has quit smoking. Her smoking use included cigarettes. She has never used smokeless tobacco. She reports that she does not drink alcohol and does not use drugs.    Review of Systems  Review of Systems   Constitutional:  Negative for chills and fever.   HENT:  Positive for congestion. Negative for sore throat.    Respiratory:  Positive for cough and shortness of breath.    Cardiovascular:  Positive for chest pain. Negative for palpitations and leg swelling.   Gastrointestinal:  Positive for nausea. Negative for abdominal pain and vomiting.   Genitourinary: Negative.    Musculoskeletal:  Negative for back pain, neck pain and neck stiffness.   Neurological:  Positive for dizziness and headaches. Negative for syncope,  weakness, light-headedness and numbness.   All other systems reviewed and are negative.                                 Objective    Vitals:    11/22/24 1930   BP: 119/61   BP Location: Left arm   Patient Position: Sitting   BP Cuff Size: Adult   Pulse: 104   Resp: 16   Temp: 36.9 °C (98.5 °F)   TempSrc: Oral   SpO2: 97%   Weight: 68 kg (150 lb)     No LMP recorded.    Physical Exam  Vitals and nursing note reviewed.   Constitutional:       General: She is not in acute distress.     Appearance: Normal appearance. She is normal weight. She is not ill-appearing, toxic-appearing or diaphoretic.   HENT:      Head: Normocephalic and atraumatic.      Nose: Nose normal. No congestion.      Mouth/Throat:      Mouth: Mucous membranes are moist.   Eyes:      Extraocular Movements: Extraocular movements intact.      Conjunctiva/sclera: Conjunctivae normal.      Pupils: Pupils are equal, round, and reactive to light.   Cardiovascular:      Rate and Rhythm: Normal rate and regular rhythm.      Pulses: Normal pulses.      Heart sounds: Normal heart sounds. No murmur heard.     No friction rub. No gallop.   Pulmonary:      Effort: Pulmonary effort is normal. No respiratory distress.      Breath sounds: Wheezing present. No rhonchi or rales.      Comments: Remote sternotomy well healed.  Very mild right upper wheeze.  Bibasilar rales very mild.  4L NC home O2 with normal spO2.    Abdominal:      General: Abdomen is flat.   Musculoskeletal:         General: Normal range of motion.      Cervical back: Normal range of motion and neck supple. No rigidity or tenderness.      Right lower leg: No edema.      Left lower leg: No edema.      Comments: No calf or popliteal tenderness.  No lower extremity edema. LE WWP, sensation intact capillary fill time less than 2 seconds    Lymphadenopathy:      Cervical: No cervical adenopathy.   Skin:     General: Skin is warm and dry.      Capillary Refill: Capillary refill takes less than 2 seconds.       Coloration: Skin is not jaundiced or pale.      Findings: No rash.   Neurological:      General: No focal deficit present.      Mental Status: She is alert.      Gait: Gait normal.   Psychiatric:         Mood and Affect: Mood normal.         Behavior: Behavior normal.         Procedures    Point of Care Test & Imaging Results from this visit  No results found for this visit on 11/22/24.   No results found.    Diagnostic study results (if any) were reviewed by [facility].    EKG with short NC interval, nonspecific ST-T wave changes, RAD, sinus tachycardia 102 BPM.   exam limited by significant artifact despite multiple attempts by nursing staff.       Assessment/Plan   Allergies, medications, history, and pertinent labs/EKGs/Imaging reviewed by Dorcas Padilla PA-C.     Medical Decision Making  83-year-old female with past medical history of COPD on daily 4 L nasal cannula requirement, CAD with history of MI, CHF presents with complaint of chest pain, shortness of breath.  Patient reports since Monday she has been sick with cough, congestion, nausea and headaches.  This evening she walked across kitchen to microwave dinner and felt very short of breath with dizziness and chest heaviness.  She sat down and shortness of breath and chest heaviness did not resolve with prompted visit to clinic.  In clinic chest heaviness has improved although she still has some pain.  She is feeling more short of breath than typical.  She has history of CHF and takes medications as prescribed.  She also uses inhalers and nebulizers at home for COPD.  Denies extremity swelling or pain.  No fevers or chills.  She does live alone.  Presents to clinic with son who lives next door to her.  EKG with nonspecific ST-T wave changes, RAD, sinus tachycardia 102 BPM.   exam limited by significant artifact despite multiple attempts by nursing staff.   Given multiple comorbidities, chest pain and shortness of breath will refer  to emergency department for higher level of care and further evaluation.  Declined EMS transport, patient son will take directly to [facility] ED.  I spoke with ED charge nurse for patient report.     Orders and Diagnoses  Diagnoses and all orders for this visit:  Chest pain, unspecified type  -     ECG 12 Lead  Shortness of breath      Medical Admin Record      Patient disposition: Home    Electronically signed by [facility]  7:44 PM

## 2024-11-23 NOTE — DISCHARGE INSTRUCTIONS
Please take antibiotics until completion.  Tessalon Perles, Mucinex, Robitussin as needed for anticough.  Follow with your primary care provider.    Be sure to take all medications, over the counter medications or prescription medications only as directed.    Be sure to follow up as directed in 1-2 days. All of the details of your follow up instructions are detailed in the follow up section of this packet.    If you are being discharged with any pains medications or muscle relaxers (norco, Vicodin, hydrocodone products, Percocet, oxycodone products, flexeril, cyclobenzaprine, robaxin, norflex, brand or generic, or any other pain controlling medications with the exception of Ibuprofen and regular Tylenol, do not drive or operate machinery, climb ladders or participate in any activity that could potentially put yourself or others at risk should you get dizzy, or be/feel impaired at all.    Return to emergency room without delay for ANY new or worsening pains or for any other symptoms or concerns. Return with worsening pains, nausea, vomiting, trouble breathing, palpitations, shortness of breath, inability to pass stool or urine, loss of control of stool or urine, any numbness or tingling (that is not normal for you), uncontrolled fevers, the passing of blood or other material in stool or urine, rashes, pains or for any other symptoms or concerns you may have. You are always welcome to return to the ER at any time for any reason or for any other concerns you may have.

## 2024-11-25 ENCOUNTER — TELEPHONE (OUTPATIENT)
Dept: PRIMARY CARE | Facility: CLINIC | Age: 84
End: 2024-11-25
Payer: MEDICARE

## 2024-11-25 DIAGNOSIS — I25.85 CHRONIC CORONARY MICROVASCULAR DYSFUNCTION: ICD-10-CM

## 2024-11-25 DIAGNOSIS — R07.9 CHEST PAIN: ICD-10-CM

## 2024-11-25 LAB
ATRIAL RATE: 92 BPM
P AXIS: 73 DEGREES
P OFFSET: 183 MS
P ONSET: 156 MS
PR INTERVAL: 108 MS
Q ONSET: 210 MS
QRS COUNT: 15 BEATS
QRS DURATION: 94 MS
QT INTERVAL: 346 MS
QTC CALCULATION(BAZETT): 427 MS
QTC FREDERICIA: 399 MS
R AXIS: 71 DEGREES
T AXIS: 96 DEGREES
T OFFSET: 383 MS
VENTRICULAR RATE: 92 BPM

## 2024-11-25 NOTE — TELEPHONE ENCOUNTER
Please send the attached prescription to the patient's pharmacy as soon as possible. Thank you!    Requested Prescriptions     Pending Prescriptions Disp Refills    carvedilol (Coreg) 6.25 mg tablet 270 tablet 3     Sig: Take one and a half tablets by mouth twice daily

## 2024-11-25 NOTE — TELEPHONE ENCOUNTER
Please call dtr re: this pts recent ED visit at [facility].  Ph:  [phone]

## 2024-11-25 NOTE — TELEPHONE ENCOUNTER
Pt's daughter Amanda states pt seen @  then sent to ER for pneumonia.  Pt's daughter is requesting a chest xray to verify if pneumonia is cleared up prior to pt leaving for vacation 12/5/24.  No openings available for ED fu @ this time.

## 2024-11-26 RX ORDER — CARVEDILOL 6.25 MG/1
TABLET ORAL
Qty: 270 TABLET | Refills: 3 | Status: SHIPPED | OUTPATIENT
Start: 2024-11-26

## 2024-12-26 ENCOUNTER — LAB (OUTPATIENT)
Dept: LAB | Facility: LAB | Age: 84
End: 2024-12-26
Payer: MEDICARE

## 2024-12-26 ENCOUNTER — OFFICE VISIT (OUTPATIENT)
Dept: PRIMARY CARE | Facility: CLINIC | Age: 84
End: 2024-12-26
Payer: MEDICARE

## 2024-12-26 VITALS
SYSTOLIC BLOOD PRESSURE: 128 MMHG | TEMPERATURE: 97.5 F | BODY MASS INDEX: 25.78 KG/M2 | OXYGEN SATURATION: 98 % | HEIGHT: 64 IN | DIASTOLIC BLOOD PRESSURE: 82 MMHG | HEART RATE: 77 BPM | WEIGHT: 151 LBS

## 2024-12-26 DIAGNOSIS — I10 DIASTOLIC HYPERTENSION: ICD-10-CM

## 2024-12-26 DIAGNOSIS — Z23 ENCOUNTER FOR IMMUNIZATION: ICD-10-CM

## 2024-12-26 DIAGNOSIS — I25.10 CORONARY ARTERY DISEASE INVOLVING NATIVE CORONARY ARTERY OF NATIVE HEART, UNSPECIFIED WHETHER ANGINA PRESENT: ICD-10-CM

## 2024-12-26 DIAGNOSIS — R73.9 HYPERGLYCEMIA: ICD-10-CM

## 2024-12-26 DIAGNOSIS — G25.0 ESSENTIAL TREMOR: ICD-10-CM

## 2024-12-26 DIAGNOSIS — Z85.118 HISTORY OF LUNG CANCER: ICD-10-CM

## 2024-12-26 DIAGNOSIS — N32.81 OVERACTIVE BLADDER: ICD-10-CM

## 2024-12-26 DIAGNOSIS — F41.1 GENERALIZED ANXIETY DISORDER: ICD-10-CM

## 2024-12-26 DIAGNOSIS — K21.00 GASTRO-ESOPHAGEAL REFLUX DISEASE WITH ESOPHAGITIS, WITHOUT BLEEDING: ICD-10-CM

## 2024-12-26 DIAGNOSIS — I50.32 CHRONIC DIASTOLIC HEART FAILURE: ICD-10-CM

## 2024-12-26 DIAGNOSIS — Z00.00 ENCOUNTER FOR ANNUAL WELLNESS EXAM IN MEDICARE PATIENT: Primary | ICD-10-CM

## 2024-12-26 DIAGNOSIS — E78.2 MIXED HYPERLIPIDEMIA: ICD-10-CM

## 2024-12-26 PROBLEM — M46.1 INFLAMMATION OF SACROILIAC JOINT (CMS-HCC): Status: RESOLVED | Noted: 2023-11-22 | Resolved: 2024-12-26

## 2024-12-26 PROBLEM — J44.1 ACUTE EXACERBATION OF CHRONIC OBSTRUCTIVE AIRWAYS DISEASE (MULTI): Status: RESOLVED | Noted: 2019-02-18 | Resolved: 2024-12-26
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
## 2024-12-26 PROBLEM — R09.02 HYPOXIA: Status: RESOLVED | Noted: 2023-11-22 | Resolved: 2024-12-26

## 2024-12-26 PROBLEM — K52.9 GASTROENTERITIS: Status: RESOLVED | Noted: 2023-11-22 | Resolved: 2024-12-26

## 2024-12-26 PROBLEM — S80.00XA CONTUSION OF KNEE: Status: RESOLVED | Noted: 2023-11-22 | Resolved: 2024-12-26

## 2024-12-26 PROBLEM — S09.90XA INJURY OF HEAD: Status: RESOLVED | Noted: 2023-11-22 | Resolved: 2024-12-26

## 2024-12-26 PROBLEM — R53.1 WEAKNESS: Status: RESOLVED | Noted: 2019-02-19 | Resolved: 2024-12-26

## 2024-12-26 PROBLEM — N18.9 ACUTE ON CHRONIC KIDNEY FAILURE (CMS-HCC): Status: RESOLVED | Noted: 2024-01-04 | Resolved: 2024-12-26

## 2024-12-26 PROBLEM — M25.559 HIP PAIN: Status: RESOLVED | Noted: 2023-11-22 | Resolved: 2024-12-26

## 2024-12-26 PROBLEM — S50.00XA CONTUSION OF ELBOW: Status: RESOLVED | Noted: 2023-11-22 | Resolved: 2024-12-26

## 2024-12-26 PROBLEM — I50.9 ACUTE CONGESTIVE HEART FAILURE: Status: RESOLVED | Noted: 2019-02-18 | Resolved: 2024-12-26

## 2024-12-26 PROBLEM — R42 DIZZINESS AND GIDDINESS: Status: RESOLVED | Noted: 2019-02-18 | Resolved: 2024-12-26

## 2024-12-26 PROBLEM — R07.9 CHEST PAIN: Status: RESOLVED | Noted: 2024-09-09 | Resolved: 2024-12-26
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
## 2024-12-26 PROBLEM — R55 SYNCOPE AND COLLAPSE: Status: RESOLVED | Noted: 2020-01-10 | Resolved: 2024-12-26

## 2024-12-26 PROBLEM — N17.9 ACUTE ON CHRONIC KIDNEY FAILURE (CMS-HCC): Status: RESOLVED | Noted: 2024-01-04 | Resolved: 2024-12-26

## 2024-12-26 LAB
ALBUMIN SERPL BCP-MCNC: 4.2 G/DL (ref 3.4–5)
ALP SERPL-CCNC: 68 U/L (ref 33–136)
ALT SERPL W P-5'-P-CCNC: 8 U/L (ref 7–45)
ANION GAP SERPL CALCULATED.3IONS-SCNC: 13 MMOL/L (ref 10–20)
AST SERPL W P-5'-P-CCNC: 14 U/L (ref 9–39)
BASOPHILS # BLD AUTO: 0.05 X10*3/UL (ref 0–0.1)
BASOPHILS NFR BLD AUTO: 0.5 %
BILIRUB SERPL-MCNC: 0.3 MG/DL (ref 0–1.2)
BUN SERPL-MCNC: 31 MG/DL (ref 6–23)
CALCIUM SERPL-MCNC: 9.2 MG/DL (ref 8.6–10.3)
CHLORIDE SERPL-SCNC: 100 MMOL/L (ref 98–107)
CHOLEST SERPL-MCNC: 244 MG/DL (ref 0–199)
CHOLEST/HDLC SERPL: 3.9 {RATIO}
CO2 SERPL-SCNC: 30 MMOL/L (ref 21–32)
CREAT SERPL-MCNC: 1.17 MG/DL (ref 0.5–1.05)
EGFRCR SERPLBLD CKD-EPI 2021: 46 ML/MIN/1.73M*2
EOSINOPHIL # BLD AUTO: 0.2 X10*3/UL (ref 0–0.4)
EOSINOPHIL NFR BLD AUTO: 2.1 %
ERYTHROCYTE [DISTWIDTH] IN BLOOD BY AUTOMATED COUNT: 14.8 % (ref 11.5–14.5)
GLUCOSE SERPL-MCNC: 88 MG/DL (ref 74–99)
HCT VFR BLD AUTO: 36.8 % (ref 36–46)
HDLC SERPL-MCNC: 62.7 MG/DL
HGB BLD-MCNC: 11.8 G/DL (ref 12–16)
IMM GRANULOCYTES # BLD AUTO: 0.04 X10*3/UL (ref 0–0.5)
IMM GRANULOCYTES NFR BLD AUTO: 0.4 % (ref 0–0.9)
LDLC SERPL CALC-MCNC: 148 MG/DL
LYMPHOCYTES # BLD AUTO: 1.63 X10*3/UL (ref 0.8–3)
LYMPHOCYTES NFR BLD AUTO: 17.3 %
MCH RBC QN AUTO: 32.1 PG (ref 26–34)
MCHC RBC AUTO-ENTMCNC: 32.1 G/DL (ref 32–36)
MCV RBC AUTO: 100 FL (ref 80–100)
MONOCYTES # BLD AUTO: 0.57 X10*3/UL (ref 0.05–0.8)
MONOCYTES NFR BLD AUTO: 6.1 %
NEUTROPHILS # BLD AUTO: 6.93 X10*3/UL (ref 1.6–5.5)
NEUTROPHILS NFR BLD AUTO: 73.6 %
NON HDL CHOLESTEROL: 181 MG/DL (ref 0–149)
NRBC BLD-RTO: 0 /100 WBCS (ref 0–0)
PLATELET # BLD AUTO: 208 X10*3/UL (ref 150–450)
POTASSIUM SERPL-SCNC: 5.3 MMOL/L (ref 3.5–5.3)
PROT SERPL-MCNC: 7.4 G/DL (ref 6.4–8.2)
RBC # BLD AUTO: 3.68 X10*6/UL (ref 4–5.2)
SODIUM SERPL-SCNC: 138 MMOL/L (ref 136–145)
TRIGL SERPL-MCNC: 168 MG/DL (ref 0–149)
VLDL: 34 MG/DL (ref 0–40)
WBC # BLD AUTO: 9.4 X10*3/UL (ref 4.4–11.3)

## 2024-12-26 PROCEDURE — 1158F ADVNC CARE PLAN TLK DOCD: CPT | Performed by: NURSE PRACTITIONER

## 2024-12-26 PROCEDURE — 1036F TOBACCO NON-USER: CPT | Performed by: NURSE PRACTITIONER

## 2024-12-26 PROCEDURE — 80061 LIPID PANEL: CPT

## 2024-12-26 PROCEDURE — G0439 PPPS, SUBSEQ VISIT: HCPCS | Performed by: NURSE PRACTITIONER

## 2024-12-26 PROCEDURE — 1126F AMNT PAIN NOTED NONE PRSNT: CPT | Performed by: NURSE PRACTITIONER

## 2024-12-26 PROCEDURE — 3074F SYST BP LT 130 MM HG: CPT | Performed by: NURSE PRACTITIONER

## 2024-12-26 PROCEDURE — 1157F ADVNC CARE PLAN IN RCRD: CPT | Performed by: NURSE PRACTITIONER

## 2024-12-26 PROCEDURE — 1123F ACP DISCUSS/DSCN MKR DOCD: CPT | Performed by: NURSE PRACTITIONER

## 2024-12-26 PROCEDURE — 85025 COMPLETE CBC W/AUTO DIFF WBC: CPT

## 2024-12-26 PROCEDURE — 83036 HEMOGLOBIN GLYCOSYLATED A1C: CPT

## 2024-12-26 PROCEDURE — 80053 COMPREHEN METABOLIC PANEL: CPT

## 2024-12-26 PROCEDURE — 90677 PCV20 VACCINE IM: CPT | Performed by: NURSE PRACTITIONER

## 2024-12-26 PROCEDURE — 1160F RVW MEDS BY RX/DR IN RCRD: CPT | Performed by: NURSE PRACTITIONER

## 2024-12-26 PROCEDURE — 3079F DIAST BP 80-89 MM HG: CPT | Performed by: NURSE PRACTITIONER

## 2024-12-26 PROCEDURE — 1159F MED LIST DOCD IN RCRD: CPT | Performed by: NURSE PRACTITIONER

## 2024-12-26 PROCEDURE — 99215 OFFICE O/P EST HI 40 MIN: CPT | Mod: 25 | Performed by: NURSE PRACTITIONER

## 2024-12-26 RX ORDER — DULOXETIN HYDROCHLORIDE 30 MG/1
30 CAPSULE, DELAYED RELEASE ORAL DAILY
COMMUNITY
End: 2024-12-26 | Stop reason: SDUPTHER

## 2024-12-26 RX ORDER — OXYBUTYNIN CHLORIDE 10 MG/1
10 TABLET, EXTENDED RELEASE ORAL DAILY
Qty: 30 TABLET | Refills: 11 | Status: SHIPPED | OUTPATIENT
Start: 2024-12-26 | End: 2025-12-26

## 2024-12-26 RX ORDER — CITALOPRAM 20 MG/1
20 TABLET, FILM COATED ORAL DAILY
Qty: 30 TABLET | Refills: 11 | Status: SHIPPED | OUTPATIENT
Start: 2024-12-26

## 2024-12-26 RX ORDER — DULOXETIN HYDROCHLORIDE 30 MG/1
30 CAPSULE, DELAYED RELEASE ORAL DAILY
Qty: 30 CAPSULE | Refills: 11 | Status: SHIPPED | OUTPATIENT
Start: 2024-12-26

## 2024-12-26 RX ORDER — OXYBUTYNIN CHLORIDE 5 MG/1
5 TABLET ORAL DAILY
COMMUNITY
End: 2024-12-26 | Stop reason: DRUGHIGH

## 2024-12-26 ASSESSMENT — ENCOUNTER SYMPTOMS
NAUSEA: 0
WOUND: 0
BRUISES/BLEEDS EASILY: 0
CONFUSION: 0
HEADACHES: 0
SHORTNESS OF BREATH: 0
NECK PAIN: 0
FACIAL ASYMMETRY: 0
BACK PAIN: 1
FATIGUE: 0
DIZZINESS: 0
FEVER: 0
CHEST TIGHTNESS: 0
LOSS OF SENSATION IN FEET: 0
DIAPHORESIS: 0
TREMORS: 1
COUGH: 0
FLANK PAIN: 0
BLOOD IN STOOL: 0
OCCASIONAL FEELINGS OF UNSTEADINESS: 1
PALPITATIONS: 0
AGITATION: 0
DYSURIA: 0
SPEECH DIFFICULTY: 0
ADENOPATHY: 0
CHILLS: 0
VOMITING: 0
POLYPHAGIA: 0
POLYDIPSIA: 0
SEIZURES: 0
ABDOMINAL PAIN: 0
DEPRESSION: 0
HEMATURIA: 0

## 2024-12-26 ASSESSMENT — LIFESTYLE VARIABLES
HOW OFTEN DO YOU HAVE SIX OR MORE DRINKS ON ONE OCCASION: NEVER
AUDIT TOTAL SCORE: 0
HOW OFTEN DURING THE LAST YEAR HAVE YOU FAILED TO DO WHAT WAS NORMALLY EXPECTED FROM YOU BECAUSE OF DRINKING: NEVER
HAVE YOU OR SOMEONE ELSE BEEN INJURED AS A RESULT OF YOUR DRINKING: NO
HOW OFTEN DURING THE LAST YEAR HAVE YOU FOUND THAT YOU WERE NOT ABLE TO STOP DRINKING ONCE YOU HAD STARTED: NEVER
HAS A RELATIVE, FRIEND, DOCTOR, OR ANOTHER HEALTH PROFESSIONAL EXPRESSED CONCERN ABOUT YOUR DRINKING OR SUGGESTED YOU CUT DOWN: NO
HOW MANY STANDARD DRINKS CONTAINING ALCOHOL DO YOU HAVE ON A TYPICAL DAY: PATIENT DOES NOT DRINK
HOW OFTEN DURING THE LAST YEAR HAVE YOU HAD A FEELING OF GUILT OR REMORSE AFTER DRINKING: NEVER
SKIP TO QUESTIONS 9-10: 1
AUDIT-C TOTAL SCORE: 0
HOW OFTEN DURING THE LAST YEAR HAVE YOU BEEN UNABLE TO REMEMBER WHAT HAPPENED THE NIGHT BEFORE BECAUSE YOU HAD BEEN DRINKING: NEVER
HOW OFTEN DO YOU HAVE A DRINK CONTAINING ALCOHOL: NEVER
HOW OFTEN DURING THE LAST YEAR HAVE YOU NEEDED AN ALCOHOLIC DRINK FIRST THING IN THE MORNING TO GET YOURSELF GOING AFTER A NIGHT OF HEAVY DRINKING: NEVER

## 2024-12-26 ASSESSMENT — PAIN SCALES - GENERAL: PAINLEVEL_OUTOF10: 0-NO PAIN

## 2024-12-26 NOTE — PROGRESS NOTES
[facility]: [facility]  MEDICARE WELLNESS EXAM      Juli Del Castillo is a 84 y.o. female that is presenting today for Annual Medicare Wellness Exam.    Encouraged Shingrix and pneumovax immunization from local pharmacy. Requesting pneumovax today.    Ms. Del Castillo is followed by Pulmonology, Dr. Diogenes Phan, for history of Lung Cancer. Upcoming appointment 01/27/24 with Kristy Blackburn CNP.    She is followed by Cardiology, Dr. Monge, for HTN, Hyperlipidemia, diastolic HTN, occlusion and stenosis of carotid artery, Heart Failure, CAD, chronic diastolic heart disease.  Last seen 09/23/24. She reports taking antihypertensives as directed. Trying to follow a low sodium and cholesterol diet. Denies CP, dizziness, syncope or HA. Reports tolerating statin. Denies statin related abdominal pain, myalgias or arthralgias. Upcoming appointment 03/25/25.    She is no longer followed by Pain Management, Dr. Wesley Jones, for spinal stenosis of lumbar region.  Last seen 10/11/23. She has not had her gabapentin or tramadol filled since 12/11/23 and 01/03/24 respectively.    GERD is managed with daily PPI. Denies epigastric pain, acid reflux, N/V or blood in stool.    Primidone for tremors. Followed by Neurology, Karyn Mejias CNP.    Depression and anxiety is managed with SSRI and alprazolam (rare use).    Insomnia managed with Temazepam. PDMP reviewed and last fill was for 28 days on 12/09/23.    OAB managed with Ditropan. She reports she can hold her urine but is interested in increasing dose for improved control.    Assessment/Plan      Diagnoses and all orders for this visit:    Encounter for annual wellness exam in Medicare patient    Diastolic hypertension  -     good control  -     amlodipine 5 mg daily  -     carvedilol 6.25 mg daily  -     CBC and Auto Differential; Future  -     Comprehensive Metabolic Panel; Future  -     Lipid Panel; Future    Mixed hyperlipidemia  -     tolerating statin  -      simvastatin 80 mg daily  -     Comprehensive Metabolic Panel; Future  -     Lipid Panel; Future    Coronary artery disease involving native coronary artery of native heart, unspecified whether angina present  -     asa 81 mg daily  -     Comprehensive Metabolic Panel; Future  -     Lipid Panel; Future    Chronic diastolic heart failure  -     furosemide 40 mg daily  -     Lipid Panel; Future    Generalized anxiety disorder  -     stable on current regimen  -     DULoxetine (Cymbalta) 30 mg DR capsule; Take 1 capsule (30 mg) by mouth once daily. Do not crush or chew.  -     citalopram (CeleXA) 20 mg tablet; Take 1 tablet (20 mg) by mouth once daily.    Gastro-esophageal reflux disease with esophagitis, without bleeding        -     stable on PPI        -     omeprazole 40 mg daily    Essential tremor        -     primidone 100 mg three times daily    History of lung cancer        -     stable on current medication regimen        -     continue established follow up with Pulmonology    Encounter for immunization  -     Pneumococcal conjugate vaccine, 20-valent (PREVNAR 20)    Overactive bladder  -     increase oxybutynin XL (Ditropan XL) 10 mg 24 hr tablet; Take 1 tablet (10 mg) by mouth once daily. Do not crush, chew, or split.    Hyperglycemia  -     Hemoglobin A1C; Future    Other orders  -     Follow Up In Primary Care - Medicare Annual  -     Follow Up In Primary Care - Established; Future    ADVANCED CARE PLANNING  Advanced Care Planning was discussed with patient:  The patient has an active surrogate decision-maker on file. The patient does not have an advanced care plan on file.  Encouraged the patient to confirm that Living Will and Healthcare Power of  (HCPoA) are accurate and up to date.  Encouraged the patient to confirm that our office be provided a copy of any documentation in the event that anything changes.    ACTIVITIES OF DAILY LIVING  Basic ADLs:  Bathing: Independent, Dressing: Independent,  Toileting: Independent, Transferring: Independent, Continence: Independent, Feeding: Independent.    Instrumental ADLs:  Ability to use phone: Independent, Shopping: Requires Assistance, Cooking: Independent, House-keeping: Requires Assistance, Laundry: Independent, Transportation: Completely Dependent, Medication Management: Independent, Finance Management: Independent.    Subjective   HPI  Review of Systems   Constitutional:  Negative for chills, diaphoresis, fatigue and fever.   HENT:  Negative for hearing loss and mouth sores.    Eyes:  Negative for visual disturbance.   Respiratory:  Negative for cough, chest tightness and shortness of breath.    Cardiovascular:  Negative for chest pain, palpitations and leg swelling.   Gastrointestinal:  Negative for abdominal pain, blood in stool, nausea and vomiting.   Endocrine: Negative for cold intolerance, heat intolerance, polydipsia, polyphagia and polyuria.   Genitourinary:  Negative for dysuria, flank pain and hematuria.   Musculoskeletal:  Positive for back pain. Negative for neck pain.   Skin:  Negative for rash and wound.   Allergic/Immunologic: Negative for environmental allergies, food allergies and immunocompromised state.   Neurological:  Positive for tremors. Negative for dizziness, seizures, syncope, facial asymmetry, speech difficulty and headaches.   Hematological:  Negative for adenopathy. Does not bruise/bleed easily.   Psychiatric/Behavioral:  Negative for agitation and confusion.      Objective   Vitals:    12/26/24 1026   BP: 128/82   Pulse: 77   Temp: 36.4 °C (97.5 °F)   SpO2: 98%      Body mass index is 25.92 kg/m².  Physical Exam  Vitals and nursing note reviewed.   Constitutional:       General: She is not in acute distress.     Appearance: Normal appearance. She is not ill-appearing.   HENT:      Head: Normocephalic and atraumatic.      Right Ear: Tympanic membrane, ear canal and external ear normal. There is no impacted cerumen.      Left Ear:  Tympanic membrane, ear canal and external ear normal. There is no impacted cerumen.      Nose: Nose normal.      Mouth/Throat:      Mouth: Mucous membranes are moist.      Pharynx: Oropharynx is clear. No oropharyngeal exudate or posterior oropharyngeal erythema.   Eyes:      General: No scleral icterus.        Right eye: No discharge.         Left eye: No discharge.      Extraocular Movements: Extraocular movements intact.      Conjunctiva/sclera: Conjunctivae normal.      Pupils: Pupils are equal, round, and reactive to light.   Neck:      Vascular: No carotid bruit.   Cardiovascular:      Rate and Rhythm: Normal rate and regular rhythm.      Pulses: Normal pulses.      Heart sounds: Normal heart sounds. No murmur heard.  Pulmonary:      Effort: Pulmonary effort is normal. No respiratory distress.      Breath sounds: Normal breath sounds.   Abdominal:      General: Abdomen is flat. Bowel sounds are normal. There is no distension.      Palpations: Abdomen is soft. There is no mass.      Tenderness: There is no abdominal tenderness. There is no right CVA tenderness or left CVA tenderness.      Hernia: No hernia is present.   Musculoskeletal:         General: No tenderness. Normal range of motion.      Cervical back: No tenderness.      Right lower leg: No edema.      Left lower leg: No edema.   Lymphadenopathy:      Cervical: No cervical adenopathy.   Skin:     General: Skin is warm and dry.      Coloration: Skin is not jaundiced.      Findings: No rash.   Neurological:      General: No focal deficit present.      Mental Status: She is alert and oriented to person, place, and time. Mental status is at baseline.   Psychiatric:         Mood and Affect: Mood normal.         Behavior: Behavior normal.       Diagnostic Results   Lab Results   Component Value Date    GLUCOSE 109 (H) 11/22/2024    CALCIUM 9.3 11/22/2024     11/22/2024    K 4.0 11/22/2024    CO2 31 11/22/2024    CL 98 11/22/2024    BUN 21 11/22/2024  "   CREATININE 1.11 (H) 11/22/2024     Lab Results   Component Value Date    ALT 6 (L) 11/22/2024    AST 7 (L) 11/22/2024    ALKPHOS 74 11/22/2024    BILITOT 0.3 11/22/2024     Lab Results   Component Value Date    WBC 12.1 (H) 11/22/2024    HGB 10.6 (L) 11/22/2024    HCT 33.1 (L) 11/22/2024    MCV 97 11/22/2024     11/22/2024     Lab Results   Component Value Date    CHOL 188 07/01/2024    CHOL 197 12/12/2023    CHOL 211 (H) 04/13/2023     Lab Results   Component Value Date    HDL 54.0 07/01/2024    HDL 70.0 12/12/2023    HDL 73 04/13/2023     Lab Results   Component Value Date    LDLCALC 100 07/01/2024    LDLCALC 106 12/12/2023    LDLCALC 113 04/13/2023     Lab Results   Component Value Date    TRIG 171 (H) 07/01/2024    TRIG 104 12/12/2023    TRIG 127 04/13/2023     No components found for: \"CHOLHDL\"  Lab Results   Component Value Date    HGBA1C 5.3 12/12/2023     Other labs not included in the list above reviewed either before or during this encounter.    History   Past Medical History:   Diagnosis Date    Cancer (Multi)     CHF (congestive heart failure)     COPD (chronic obstructive pulmonary disease) (Multi)     Depression     Myocardial infarct, old      Past Surgical History:   Procedure Laterality Date    CORONARY ARTERY BYPASS GRAFT      MR HEAD ANGIO WO IV CONTRAST  10/31/2015    MR HEAD ANGIO WO IV CONTRAST [facility]    MR HEAD ANGIO WO IV CONTRAST  02/15/2019    MR HEAD ANGIO WO IV CONTRAST [facility]    MR NECK ANGIO WO IV CONTRAST  10/31/2015    MR NECK ANGIO WO IV CONTRAST [facility]     No family history on file.  Social History     Socioeconomic History    Marital status:      Spouse name: Not on file    Number of children: Not on file    Years of education: Not on file    Highest education level: Not on file   Occupational History    Not on file   Tobacco Use    Smoking status: Former     Types: Cigarettes    Smokeless tobacco: Never   Vaping Use    " Vaping status: Never Used   Substance and Sexual Activity    Alcohol use: Never    Drug use: Never    Sexual activity: Defer   Other Topics Concern    Not on file   Social History Narrative    Not on file     Social Drivers of Health     Financial Resource Strain: Low Risk  (9/9/2024)    Overall Financial Resource Strain (CARDIA)     Difficulty of Paying Living Expenses: Not hard at all   Food Insecurity: No Food Insecurity (9/9/2024)    Hunger Vital Sign     Worried About Running Out of Food in the Last Year: Never true     Ran Out of Food in the Last Year: Never true   Transportation Needs: No Transportation Needs (9/9/2024)    PRAPARE - Transportation     Lack of Transportation (Medical): No     Lack of Transportation (Non-Medical): No   Physical Activity: Inactive (9/9/2024)    Exercise Vital Sign     Days of Exercise per Week: 0 days     Minutes of Exercise per Session: 0 min   Stress: Stress Concern Present (9/9/2024)    [employer] - Occupational Stress Questionnaire     Feeling of Stress : To some extent   Social Connections: Socially Isolated (9/9/2024)    Social Connection and Isolation Panel [NHANES]     Frequency of Communication with Friends and Family: More than three times a week     Frequency of Social Gatherings with Friends and Family: More than three times a week     Attends Bahai Services: Never     Active Member of Clubs or Organizations: No     Attends Club or Organization Meetings: Never     Marital Status:    Intimate Partner Violence: Not At Risk (9/9/2024)    Humiliation, Afraid, Rape, and Kick questionnaire     Fear of Current or Ex-Partner: No     Emotionally Abused: No     Physically Abused: No     Sexually Abused: No   Housing Stability: Low Risk  (9/9/2024)    Housing Stability Vital Sign     Unable to Pay for Housing in the Last Year: No     Number of Times Moved in the Last Year: 0     Homeless in the Last Year: No     Allergies   Allergen  Reactions    Cortisone Swelling and Rash    Other Swelling and Rash     ACTH    Acth Hives    Haemoph B Poly Conj-Tet Tox-Pf Other    Codeine Nausea/vomiting, Nausea Only and Rash    Hydrochlorothiazide Rash and Swelling     Current Outpatient Medications on File Prior to Visit   Medication Sig Dispense Refill    acetaminophen (Tylenol) 325 mg tablet Take 2 tablets (650 mg) by mouth every 4 hours if needed for fever (temp greater than 38.0 C) (greater than or equal to 38 C). 30 tablet 0    albuterol 2.5 mg /3 mL (0.083 %) nebulizer solution Take 3 mL (2.5 mg) by nebulization every 6 hours while awake. 180 mL 2    ALPRAZolam (Xanax) 0.25 mg tablet Take 1 tablet (0.25 mg) by mouth if needed.      amLODIPine (Norvasc) 5 mg tablet TAKE 1 TABLET BY MOUTH EVERY DAY 30 tablet 11    aspirin 81 mg chewable tablet Chew 1 tablet (81 mg) once daily. 30 tablet 3    carvedilol (Coreg) 6.25 mg tablet Take one and a half tablets by mouth twice daily 270 tablet 3    furosemide (Lasix) 40 mg tablet TAKE 1 TABLET BY MOUTH EVERY DAY 30 tablet 11    nebulizer and compressor device use as directed with nebulizer treatments 1 each 0    omeprazole (PriLOSEC) 40 mg DR capsule Take 1 capsule (40 mg) by mouth once daily. 90 capsule 3    oxygen (O2) gas therapy 3 lpm      patiromer calcium sorbitex (Veltassa) 8.4 gram powder in packet Take 8.4 g by mouth 3 times a week. Do not start before January 17, 2024. (Patient taking differently: Take 8.4 g by mouth 1 (one) time per week.)      polyethylene glycol (Glycolax, Miralax) 17 gram packet Take 17 g by mouth once daily as needed (Constipation).      primidone (Mysoline) 50 mg tablet Take 2 tablets (100 mg) by mouth 3 times a day.      simvastatin (Zocor) 80 mg tablet Take 1 tablet (80 mg) by mouth once daily at bedtime. 30 tablet 3    [DISCONTINUED] citalopram (CeleXA) 20 mg tablet Take 1 tablet (20 mg) by mouth once daily. 90 tablet 3    [DISCONTINUED] DULoxetine (Cymbalta) 30 mg DR capsule  Take 1 capsule (30 mg) by mouth once daily. Do not crush or chew.      [DISCONTINUED] oxybutynin (Ditropan) 5 mg tablet Take 1 tablet (5 mg) by mouth once daily.      benzonatate (Tessalon) 100 mg capsule Take 1 capsule (100 mg) by mouth 3 times a day as needed for cough. Do not crush or chew. (Patient not taking: Reported on 12/26/2024) 30 capsule 0    budesonide (Pulmicort) 0.5 mg/2 mL nebulizer solution Take 2 mL (0.5 mg) by nebulization 2 times a day. Rinse mouth with water after use to reduce aftertaste and incidence of candidiasis. Do not swallow. (Patient not taking: Reported on 12/26/2024)      [DISCONTINUED] DULoxetine (Cymbalta) 30 mg DR capsule Take 1 capsule (30 mg) by mouth once daily. 90 capsule 3    [DISCONTINUED] fluticasone furoate-vilanteroL (Breo Ellipta) 200-25 mcg/dose inhaler Inhale once every 24 hours. (Patient not taking: Reported on 12/26/2024)      [DISCONTINUED] oxyCODONE ER (OxyCONTIN) 10 mg 12 hr tablet Take by mouth.       No current facility-administered medications on file prior to visit.     Immunization History   Administered Date(s) Administered    Flu vaccine, quadrivalent, high-dose, preservative free, age 65y+ (FLUZONE) 12/03/2019, 11/06/2020, 11/06/2020, 12/01/2022, 12/12/2023    Flu vaccine, trivalent, preservative free, HIGH-DOSE, age 65y+ (Fluzone) 10/12/2017, 10/02/2018, 12/03/2019    Influenza, seasonal, injectable 11/13/2010, 10/03/2011, 10/02/2012, 10/01/2013, 10/01/2014, 10/01/2015    Moderna COVID-19 vaccine, bivalent, blue cap/gray label *Check age/dose* 12/01/2022    Moderna SARS-CoV-2 Vaccination 01/28/2021, 02/25/2021, 10/29/2021    PPD Test 01/11/2019, 01/11/2020, 01/18/2020, 01/16/2024    Pneumococcal conjugate vaccine, 13-valent (PREVNAR 13) 12/21/2015    Pneumococcal polysaccharide vaccine, 23-valent, age 2 years and older (PNEUMOVAX 23) 10/13/2006, 10/22/2018    Td vaccine, age 7 years and older (TDVAX) 07/13/2001    Tdap vaccine, age 7 year and older  (BOOSTRIX, ADACEL) 08/25/2015     Patient's medical history was reviewed and updated either before or during this encounter.     BRYANNA Ye-CNP

## 2024-12-27 LAB
EST. AVERAGE GLUCOSE BLD GHB EST-MCNC: 91 MG/DL
HBA1C MFR BLD: 4.8 %

## 2025-01-03 DIAGNOSIS — N32.81 OAB (OVERACTIVE BLADDER): ICD-10-CM

## 2025-01-06 ENCOUNTER — LAB (OUTPATIENT)
Dept: LAB | Facility: LAB | Age: 85
End: 2025-01-06
Payer: MEDICARE

## 2025-01-06 ENCOUNTER — PHARMACY VISIT (OUTPATIENT)
Dept: PHARMACY | Facility: CLINIC | Age: 85
End: 2025-01-06
Payer: COMMERCIAL

## 2025-01-06 DIAGNOSIS — R30.0 DYSURIA: Primary | ICD-10-CM

## 2025-01-06 DIAGNOSIS — R30.0 DYSURIA: ICD-10-CM

## 2025-01-06 LAB
APPEARANCE UR: CLEAR
BACTERIA #/AREA URNS AUTO: ABNORMAL /HPF
BILIRUB UR STRIP.AUTO-MCNC: NEGATIVE MG/DL
COLOR UR: COLORLESS
GLUCOSE UR STRIP.AUTO-MCNC: NORMAL MG/DL
KETONES UR STRIP.AUTO-MCNC: NEGATIVE MG/DL
LEUKOCYTE ESTERASE UR QL STRIP.AUTO: ABNORMAL
MUCOUS THREADS #/AREA URNS AUTO: ABNORMAL /LPF
NITRITE UR QL STRIP.AUTO: NEGATIVE
PH UR STRIP.AUTO: 5.5 [PH]
PROT UR STRIP.AUTO-MCNC: NEGATIVE MG/DL
RBC # UR STRIP.AUTO: NEGATIVE /UL
RBC #/AREA URNS AUTO: ABNORMAL /HPF
SP GR UR STRIP.AUTO: 1.01
SQUAMOUS #/AREA URNS AUTO: ABNORMAL /HPF
UROBILINOGEN UR STRIP.AUTO-MCNC: NORMAL MG/DL
WBC #/AREA URNS AUTO: ABNORMAL /HPF
WBC CLUMPS #/AREA URNS AUTO: ABNORMAL /HPF

## 2025-01-06 PROCEDURE — 87186 SC STD MICRODIL/AGAR DIL: CPT

## 2025-01-06 PROCEDURE — RXMED WILLOW AMBULATORY MEDICATION CHARGE

## 2025-01-06 PROCEDURE — 87086 URINE CULTURE/COLONY COUNT: CPT

## 2025-01-06 PROCEDURE — 81001 URINALYSIS AUTO W/SCOPE: CPT

## 2025-01-06 RX ORDER — NITROFURANTOIN 25; 75 MG/1; MG/1
100 CAPSULE ORAL 2 TIMES DAILY
Qty: 14 CAPSULE | Refills: 0 | Status: SHIPPED | OUTPATIENT
Start: 2025-01-06 | End: 2025-01-13

## 2025-01-06 RX ORDER — OXYBUTYNIN CHLORIDE 5 MG/1
5 TABLET, EXTENDED RELEASE ORAL DAILY
Qty: 90 TABLET | Refills: 3 | Status: SHIPPED | OUTPATIENT
Start: 2025-01-06

## 2025-01-07 LAB — HOLD SPECIMEN: NORMAL

## 2025-01-09 LAB — BACTERIA UR CULT: ABNORMAL

## 2025-03-06 DIAGNOSIS — J90 PLEURAL EFFUSION: ICD-10-CM

## 2025-03-06 RX ORDER — SIMVASTATIN 80 MG/1
80 TABLET, FILM COATED ORAL NIGHTLY
Qty: 90 TABLET | Refills: 3 | Status: SHIPPED | OUTPATIENT
Start: 2025-03-06 | End: 2026-03-06

## 2025-03-06 NOTE — TELEPHONE ENCOUNTER
Please send the attached prescription to the patient's pharmacy as soon as possible. Thank you!    Requested Prescriptions     Pending Prescriptions Disp Refills    simvastatin (Zocor) 80 mg tablet 90 tablet 3     Sig: Take 1 tablet (80 mg) by mouth once daily at bedtime.

## 2025-03-25 ENCOUNTER — APPOINTMENT (OUTPATIENT)
Dept: CARDIOLOGY | Facility: CLINIC | Age: 85
End: 2025-03-25
Payer: MEDICARE

## 2025-04-02 ENCOUNTER — APPOINTMENT (OUTPATIENT)
Dept: CARDIOLOGY | Facility: CLINIC | Age: 85
End: 2025-04-02
Payer: MEDICARE

## 2025-04-08 ENCOUNTER — APPOINTMENT (OUTPATIENT)
Dept: CARDIOLOGY | Facility: CLINIC | Age: 85
End: 2025-04-08
Payer: MEDICARE

## 2025-04-08 VITALS
HEIGHT: 64 IN | BODY MASS INDEX: 25.44 KG/M2 | OXYGEN SATURATION: 97 % | SYSTOLIC BLOOD PRESSURE: 140 MMHG | DIASTOLIC BLOOD PRESSURE: 65 MMHG | WEIGHT: 149 LBS | RESPIRATION RATE: 18 BRPM | HEART RATE: 66 BPM

## 2025-04-08 DIAGNOSIS — I25.10 CORONARY ARTERY DISEASE INVOLVING NATIVE CORONARY ARTERY OF NATIVE HEART, UNSPECIFIED WHETHER ANGINA PRESENT: ICD-10-CM

## 2025-04-08 DIAGNOSIS — I21.B MYOCARDIAL INFARCTION WITH CORONARY MICROVASCULAR DYSFUNCTION (MULTI): ICD-10-CM

## 2025-04-08 DIAGNOSIS — Z95.5 HISTORY OF CORONARY ARTERY STENT PLACEMENT: ICD-10-CM

## 2025-04-08 DIAGNOSIS — I10 DIASTOLIC HYPERTENSION: ICD-10-CM

## 2025-04-08 DIAGNOSIS — I50.32 CHRONIC DIASTOLIC HEART FAILURE: Primary | ICD-10-CM

## 2025-04-08 DIAGNOSIS — E78.2 MIXED HYPERLIPIDEMIA: ICD-10-CM

## 2025-04-08 PROCEDURE — 3078F DIAST BP <80 MM HG: CPT | Performed by: INTERNAL MEDICINE

## 2025-04-08 PROCEDURE — 1036F TOBACCO NON-USER: CPT | Performed by: INTERNAL MEDICINE

## 2025-04-08 PROCEDURE — 99214 OFFICE O/P EST MOD 30 MIN: CPT | Performed by: INTERNAL MEDICINE

## 2025-04-08 PROCEDURE — 1159F MED LIST DOCD IN RCRD: CPT | Performed by: INTERNAL MEDICINE

## 2025-04-08 PROCEDURE — G2211 COMPLEX E/M VISIT ADD ON: HCPCS | Performed by: INTERNAL MEDICINE

## 2025-04-08 PROCEDURE — 3077F SYST BP >= 140 MM HG: CPT | Performed by: INTERNAL MEDICINE

## 2025-04-08 PROCEDURE — 1160F RVW MEDS BY RX/DR IN RCRD: CPT | Performed by: INTERNAL MEDICINE

## 2025-04-08 PROCEDURE — 1126F AMNT PAIN NOTED NONE PRSNT: CPT | Performed by: INTERNAL MEDICINE

## 2025-04-08 PROCEDURE — 1157F ADVNC CARE PLAN IN RCRD: CPT | Performed by: INTERNAL MEDICINE

## 2025-04-08 PROCEDURE — 1123F ACP DISCUSS/DSCN MKR DOCD: CPT | Performed by: INTERNAL MEDICINE

## 2025-04-08 RX ORDER — ATORVASTATIN CALCIUM 80 MG/1
80 TABLET, FILM COATED ORAL DAILY
Qty: 90 TABLET | Refills: 3 | Status: SHIPPED | OUTPATIENT
Start: 2025-04-08 | End: 2026-04-08

## 2025-04-08 ASSESSMENT — LIFESTYLE VARIABLES
HOW OFTEN DO YOU HAVE A DRINK CONTAINING ALCOHOL: NEVER
HOW OFTEN DO YOU HAVE SIX OR MORE DRINKS ON ONE OCCASION: NEVER
HOW MANY STANDARD DRINKS CONTAINING ALCOHOL DO YOU HAVE ON A TYPICAL DAY: PATIENT DOES NOT DRINK
AUDIT TOTAL SCORE: 0
HAS A RELATIVE, FRIEND, DOCTOR, OR ANOTHER HEALTH PROFESSIONAL EXPRESSED CONCERN ABOUT YOUR DRINKING OR SUGGESTED YOU CUT DOWN: NO
AUDIT-C TOTAL SCORE: 0
SKIP TO QUESTIONS 9-10: 1
HAVE YOU OR SOMEONE ELSE BEEN INJURED AS A RESULT OF YOUR DRINKING: NO

## 2025-04-08 ASSESSMENT — ENCOUNTER SYMPTOMS
DEPRESSION: 0
OCCASIONAL FEELINGS OF UNSTEADINESS: 0
LOSS OF SENSATION IN FEET: 0

## 2025-04-08 ASSESSMENT — PAIN SCALES - GENERAL: PAINLEVEL_OUTOF10: 0-NO PAIN

## 2025-04-08 NOTE — PROGRESS NOTES
Subjective   Chief Complaint   Patient presents with    Follow-up     Mrs\Ms. Del Castillo is present for her 6 month Follow up with  Dr. Monge         84-year-old patient has currently history of COPD with a 4 L nasal current oxygen.  Patient was seen September last year history of CAD CABG in the past.  Also PCI in the past with a history of CKD and colon cancer.  Four-vessel bypass graft 2009.  No active chest pain tightness.  Nuclear stress test done last year essentially negative for ischemia fixed defect seen.  Patient had a electrocardiogram done about 3 months ago sinus rhythm with old inferolateral MI.  Hemoglobin A1c was done 3 months ago was 4.8%.  Lipid profile done 3 months ago as well cholesterol 244 with LDL is 148 and triglyceride 168.  Comprehensive murmur panel done 3 months ago was potassium is 5.3 with creatinine 1.1.    Past Medical History:   Diagnosis Date    Cancer (Multi)     CHF (congestive heart failure)     COPD (chronic obstructive pulmonary disease) (Multi)     Depression     Myocardial infarct, old      Past Surgical History:   Procedure Laterality Date    CORONARY ARTERY BYPASS GRAFT      MR HEAD ANGIO WO IV CONTRAST  10/31/2015    MR HEAD ANGIO WO IV CONTRAST [facility]    MR HEAD ANGIO WO IV CONTRAST  02/15/2019    MR HEAD ANGIO WO IV CONTRAST [facility]    MR NECK ANGIO WO IV CONTRAST  10/31/2015    MR NECK ANGIO WO IV CONTRAST [facility]     No relevant family history has been documented for this patient.  Current Outpatient Medications   Medication Sig Dispense Refill    acetaminophen (Tylenol) 325 mg tablet Take 2 tablets (650 mg) by mouth every 4 hours if needed for fever (temp greater than 38.0 C) (greater than or equal to 38 C). 30 tablet 0    albuterol 2.5 mg /3 mL (0.083 %) nebulizer solution Take 3 mL (2.5 mg) by nebulization every 6 hours while awake. 180 mL 2    ALPRAZolam (Xanax) 0.25 mg tablet Take 1 tablet (0.25 mg) by mouth if needed.       amLODIPine (Norvasc) 5 mg tablet TAKE 1 TABLET BY MOUTH EVERY DAY 30 tablet 11    aspirin 81 mg chewable tablet Chew 1 tablet (81 mg) once daily. 30 tablet 3    carvedilol (Coreg) 6.25 mg tablet Take one and a half tablets by mouth twice daily 270 tablet 3    citalopram (CeleXA) 20 mg tablet Take 1 tablet (20 mg) by mouth once daily. 30 tablet 11    DULoxetine (Cymbalta) 30 mg DR capsule Take 1 capsule (30 mg) by mouth once daily. Do not crush or chew. 30 capsule 11    furosemide (Lasix) 40 mg tablet TAKE 1 TABLET BY MOUTH EVERY DAY 30 tablet 11    nebulizer and compressor device use as directed with nebulizer treatments 1 each 0    omeprazole (PriLOSEC) 40 mg DR capsule Take 1 capsule (40 mg) by mouth once daily. 90 capsule 3    oxybutynin XL (Ditropan XL) 10 mg 24 hr tablet Take 1 tablet (10 mg) by mouth once daily. Do not crush, chew, or split. 30 tablet 11    oxygen (O2) gas therapy 3 lpm (Patient taking differently: 4 L/min continuously.)      primidone (Mysoline) 50 mg tablet Take 2 tablets (100 mg) by mouth 3 times a day. (Patient taking differently: Take 2 tablets (100 mg) by mouth 2 times a day.)      polyethylene glycol (Glycolax, Miralax) 17 gram packet Take 17 g by mouth once daily as needed (Constipation).       No current facility-administered medications for this visit.      reports that she quit smoking about 7 years ago. Her smoking use included cigarettes. She has never been exposed to tobacco smoke. She has never used smokeless tobacco. She reports that she does not drink alcohol and does not use drugs.  Allergies:  Cortisone, Other, Acth, Haemoph b poly conj-tet tox-pf, Codeine, and Hydrochlorothiazide    ROS: See HPI  CONSTITUTIONAL: Chills- none. Fever- none. Weight change appropriate for age.  HEENT: Headache- Negative.  Change in vision- none.  Ear pain- none. Nasal congestion- none. Post-nasal drip-none.  Sore throat-none.  CARDIOLOGY: Chest pain- none.  Leg edema-trace.  Murmurs-soft  "systolic.  Palpitation- none.  RESPIRATORY: Denies any shortness of breath.  GI: Abdominal pain- none.  Change in bowel habits- none.  Constipation- none.  Diarrhea- none.  Nausea- none.  Vomiting- none.  MUSCULOSKELETAL: Joint pain- none.  Muscle aches- none.  DERMATOLOGY: Rash- none.  NEUROLOGY: Dizziness- none.   Headache- none.  PSYCHIATRY: Denies any depression or anxiety     Vitals:    04/08/25 1431   BP: 140/65   Pulse: 66   Resp: 18   SpO2: 97%   Weight: 67.6 kg (149 lb)   Height: 1.626 m (5' 4\")   PainSc: 0-No pain      BMI:Body mass index is 25.58 kg/m².   General Cardiology:  General Appearance: Alert, oriented and in no acute distress.  HEENT: extra ocular movements intact (EOMI), pupils equal,  round, reactive to light and accommodation (PERRLA).  Carotid Upstroke: no bruit, normal.  Jugular Venous Distention (JVD): flat.  Chest: normal.  Lungs: Clear to auscultation,   Heart Sounds: no S3 or S4, normal S1, S2, regular rate.  Murmur, Click, Gallop: soft systolic murmur.  Abdomen: no hepatomegaly, no masses felt, soft.  Extremities: no leg edema.  Peripheral pulses: 2 plus bilateral.  NEUROLOGY Cranial nerves II-XII grossly intact.     Patient Active Problem List   Diagnosis    Lung mass    Pleural effusion    Fibromyalgia    History of coronary artery stent placement    History of transient ischemic attack    Hyperlipidemia    Major depressive disorder, single episode, unspecified    Vitamin D deficiency    Urinary incontinence    Trochanteric bursitis of right hip    Spinal stenosis of lumbar region    Piriformis syndrome    Diastolic hypertension    Overactive bladder    Other sequelae following unspecified cerebrovascular disease    Other peripheral vertigo, unspecified ear    Osteopenia    Occlusion and stenosis of unspecified carotid artery    Myocardial infarction (Multi)    Low back pain    Insomnia    History of lung cancer    History of heart failure    History of colon cancer    Essential " tremor    Difficulty walking    Degenerative spondylolisthesis    Coronary artery disease    Constipation    Compression fracture of lumbar vertebra (Multi)    Common bile duct dilatation    Cigarette smoker    Chronic respiratory failure    Chronic diastolic heart failure    Cervicogenic headache    Cervical spondylosis without myelopathy    Carotid artery disease    Carcinoma of colon    Anxiety disorder, unspecified    Amaurosis fugax    Acute ST segment elevation myocardial infarction (Multi)    Medical home patient    At moderate risk for fall    Hyperkalemia    Cerebral infarction due to unspecified occlusion or stenosis of unspecified carotid artery (Multi)    Chronic kidney disease, unspecified    Dyspnea    Gastro-esophageal reflux disease with esophagitis, without bleeding    Generalized pain    Pulmonary hypertension, unspecified (Multi)    Unspecified atrial fibrillation (Multi)     Problem List Items Addressed This Visit       History of coronary artery stent placement    Hyperlipidemia    Diastolic hypertension    Myocardial infarction (Multi)    Coronary artery disease    Chronic diastolic heart failure - Primary      84-year-old female patient currently wheelchair-bound.  Also on oxygen for history of CAD CABG appears hyperlipidemia.  1.  CAD: Continue current aspirin with beta-blocker.  2.  Hypertension: Continue amlodipine 5 mg tablet p.o. daily with Coreg 6.25 mg p.o. twice daily.  Also currently on Lasix 40 mg p.o. daily.  3.  Hyperlipidemia: Patient with elevated LDL and triglyceride level.  I will discontinue Zocor or simvastatin and change to atorvastatin 80 mg tablet p.o. daily.  Strict low-cholesterol diet advised.    Advised patient to avoid lunch meats, canned soups, pizzas, bread rolls, and sandwiches. Advised patient to limit salt intake 1,500 mg daily. Advised patient to exercise 30 mins/3 times a week including treadmill or aerobic type, Goal to achieve 65% target HR.    Diet and  exercise reviewed with patient..Cut back on salt, sugar and flour.    Pt. care time is spent includes review of diagnostic tests, labs, radiographs, EKGs, old echoes, cardiac work-up and coordination of care. Assessment, impression and plans are reflected in the note above as well as the orders.    Luan Monge MD

## 2025-05-14 ENCOUNTER — TELEPHONE (OUTPATIENT)
Dept: CARDIOLOGY | Facility: CLINIC | Age: 85
End: 2025-05-14
Payer: MEDICARE

## 2025-05-14 NOTE — TELEPHONE ENCOUNTER
Pt daughter called regarding pt increased weight gain (5 pounds in I week) Pt had persistent cough night prior. Caller advises of increased bilat feet swelling.  Pt currently on 40 mg daily. Pt tolerating 4 LPM O2, sitting up in recliner. No resp distress    Please advise.     Call back   Amanda  [phone]

## 2025-05-15 NOTE — TELEPHONE ENCOUNTER
Should patient continue furosemide as well? Daughter stated she did lose 1.5 lbs since yesterday.

## 2025-05-20 DIAGNOSIS — I25.85 CHRONIC CORONARY MICROVASCULAR DYSFUNCTION: ICD-10-CM

## 2025-05-20 DIAGNOSIS — R07.9 CHEST PAIN: ICD-10-CM

## 2025-05-23 RX ORDER — AMLODIPINE BESYLATE 5 MG/1
5 TABLET ORAL DAILY
Qty: 90 TABLET | Refills: 3 | Status: SHIPPED | OUTPATIENT
Start: 2025-05-23 | End: 2026-05-18

## 2025-06-16 DIAGNOSIS — R07.9 CHEST PAIN: ICD-10-CM

## 2025-06-16 DIAGNOSIS — I25.85 CHRONIC CORONARY MICROVASCULAR DYSFUNCTION: ICD-10-CM

## 2025-06-17 RX ORDER — FUROSEMIDE 40 MG/1
40 TABLET ORAL DAILY
Qty: 90 TABLET | Refills: 3 | Status: SHIPPED | OUTPATIENT
Start: 2025-06-17

## 2025-06-17 NOTE — TELEPHONE ENCOUNTER
Please send the attached prescription to the patient's pharmacy as soon as possible. Thank you!    Requested Prescriptions     Pending Prescriptions Disp Refills    furosemide (Lasix) 40 mg tablet [Pharmacy Med Name: FUROSEMIDE 40 MG TABLET] 90 tablet 3     Sig: TAKE 1 TABLET BY MOUTH EVERY DAY        none	n

## 2025-06-26 ENCOUNTER — OFFICE VISIT (OUTPATIENT)
Dept: PRIMARY CARE | Facility: CLINIC | Age: 85
End: 2025-06-26
Payer: MEDICARE

## 2025-06-26 ENCOUNTER — HOSPITAL ENCOUNTER (OUTPATIENT)
Dept: RADIOLOGY | Facility: CLINIC | Age: 85
Discharge: HOME | End: 2025-06-26
Payer: MEDICARE

## 2025-06-26 VITALS
OXYGEN SATURATION: 99 % | HEIGHT: 64 IN | WEIGHT: 145 LBS | HEART RATE: 64 BPM | SYSTOLIC BLOOD PRESSURE: 128 MMHG | TEMPERATURE: 97.4 F | BODY MASS INDEX: 24.75 KG/M2 | DIASTOLIC BLOOD PRESSURE: 64 MMHG

## 2025-06-26 DIAGNOSIS — N18.31 STAGE 3A CHRONIC KIDNEY DISEASE (MULTI): ICD-10-CM

## 2025-06-26 DIAGNOSIS — I10 DIASTOLIC HYPERTENSION: Primary | ICD-10-CM

## 2025-06-26 DIAGNOSIS — N95.2 VAGINAL ATROPHY: ICD-10-CM

## 2025-06-26 DIAGNOSIS — R09.89 RHONCHI: ICD-10-CM

## 2025-06-26 DIAGNOSIS — E78.2 MIXED HYPERLIPIDEMIA: ICD-10-CM

## 2025-06-26 PROCEDURE — 1159F MED LIST DOCD IN RCRD: CPT | Performed by: NURSE PRACTITIONER

## 2025-06-26 PROCEDURE — 1036F TOBACCO NON-USER: CPT | Performed by: NURSE PRACTITIONER

## 2025-06-26 PROCEDURE — 71046 X-RAY EXAM CHEST 2 VIEWS: CPT

## 2025-06-26 PROCEDURE — 3078F DIAST BP <80 MM HG: CPT | Performed by: NURSE PRACTITIONER

## 2025-06-26 PROCEDURE — 99214 OFFICE O/P EST MOD 30 MIN: CPT | Performed by: NURSE PRACTITIONER

## 2025-06-26 PROCEDURE — 3074F SYST BP LT 130 MM HG: CPT | Performed by: NURSE PRACTITIONER

## 2025-06-26 PROCEDURE — 1126F AMNT PAIN NOTED NONE PRSNT: CPT | Performed by: NURSE PRACTITIONER

## 2025-06-26 PROCEDURE — 1160F RVW MEDS BY RX/DR IN RCRD: CPT | Performed by: NURSE PRACTITIONER

## 2025-06-26 PROCEDURE — 71046 X-RAY EXAM CHEST 2 VIEWS: CPT | Performed by: RADIOLOGY

## 2025-06-26 RX ORDER — ESTRADIOL 0.1 MG/G
2 CREAM VAGINAL DAILY
Qty: 42.5 G | Refills: 5 | Status: SHIPPED | OUTPATIENT
Start: 2025-06-26 | End: 2026-06-26

## 2025-06-26 RX ORDER — PATIROMER 8.4 G/1
POWDER, FOR SUSPENSION ORAL
COMMUNITY

## 2025-06-26 RX ORDER — CHLORHEXIDINE GLUCONATE 4 %
12 LIQUID (ML) TOPICAL
COMMUNITY

## 2025-06-26 ASSESSMENT — LIFESTYLE VARIABLES
SKIP TO QUESTIONS 9-10: 1
HOW OFTEN DURING THE LAST YEAR HAVE YOU FOUND THAT YOU WERE NOT ABLE TO STOP DRINKING ONCE YOU HAD STARTED: NEVER
HAS A RELATIVE, FRIEND, DOCTOR, OR ANOTHER HEALTH PROFESSIONAL EXPRESSED CONCERN ABOUT YOUR DRINKING OR SUGGESTED YOU CUT DOWN: NO
HOW OFTEN DO YOU HAVE SIX OR MORE DRINKS ON ONE OCCASION: NEVER
HOW OFTEN DURING THE LAST YEAR HAVE YOU HAD A FEELING OF GUILT OR REMORSE AFTER DRINKING: NEVER
HOW OFTEN DURING THE LAST YEAR HAVE YOU BEEN UNABLE TO REMEMBER WHAT HAPPENED THE NIGHT BEFORE BECAUSE YOU HAD BEEN DRINKING: NEVER
HOW OFTEN DO YOU HAVE A DRINK CONTAINING ALCOHOL: NEVER
HOW MANY STANDARD DRINKS CONTAINING ALCOHOL DO YOU HAVE ON A TYPICAL DAY: PATIENT DOES NOT DRINK
AUDIT-C TOTAL SCORE: 0
HOW OFTEN DURING THE LAST YEAR HAVE YOU NEEDED AN ALCOHOLIC DRINK FIRST THING IN THE MORNING TO GET YOURSELF GOING AFTER A NIGHT OF HEAVY DRINKING: NEVER
AUDIT TOTAL SCORE: 0
HOW OFTEN DURING THE LAST YEAR HAVE YOU FAILED TO DO WHAT WAS NORMALLY EXPECTED FROM YOU BECAUSE OF DRINKING: NEVER
HAVE YOU OR SOMEONE ELSE BEEN INJURED AS A RESULT OF YOUR DRINKING: NO

## 2025-06-26 ASSESSMENT — ENCOUNTER SYMPTOMS
HEMATURIA: 0
FATIGUE: 1
NAUSEA: 0
FEVER: 0
FLANK PAIN: 0
VOMITING: 0
COUGH: 0
OCCASIONAL FEELINGS OF UNSTEADINESS: 1
FREQUENCY: 1
HEADACHES: 0
LOSS OF SENSATION IN FEET: 0
PALPITATIONS: 0
WHEEZING: 0
DIZZINESS: 0
SHORTNESS OF BREATH: 1
DEPRESSION: 0

## 2025-06-26 ASSESSMENT — PAIN SCALES - GENERAL: PAINLEVEL_OUTOF10: 0-NO PAIN

## 2025-06-26 NOTE — PROGRESS NOTES
[facility]: [facility]  PROGRESS NOTE      Juli Del Castillo is a 84 y.o. female that is presenting today for Follow-up.    Ms. Del Castillo continues to be followed by Pulmonology, Dr. Diogenes Phan, for hx of Lung Cancer. She continues supplemental O2 at 4Lnc with activity and at night. Admits to SOBE.    She continues to be followed by Cardiology, Dr. Monge, for HTN, Hyperlipidemia, diastolic HTN, occlusion and stenosis of carotid artery, HF, CAD and chronic diastolic heart disease. Last seen 04/08/25. Denies CP, dizziness, syncope or HA. Reports tolerating statin. Denies statin related abdominal pain, myalgias or arthralgias.    She is followed by Neurology, Karyn Mejias CNP for tremors. Primidone effective.    Depression and anxiety managed effectively with SSRI and very rare use of anxiolytic. Alprazolam last filled for 28 days on 12/09/23.    OAB managed with Oxybutynin.  Nocturia x3, daytime frequency every 4 hours or so.     She is followed by Nephrology, Dr. Cy Gamez. Follow up later this year.    She is c/o vaginal itching and dryness.    Assessment/Plan     Diagnoses and all orders for this visit:    Diastolic hypertension  -     Excellent BP control  -     Continue established follow up with Cardiology  -     Continue current medication regimen  -     CBC and Auto Differential; Future  -     Comprehensive Metabolic Panel; Future    Mixed hyperlipidemia  -     Tolerating statin  -     atorvastatin 80 mg daily  -     continue established follow up with Cardiology  -     Comprehensive Metabolic Panel; Future  -     Lipid Panel; Future    Stage 3a chronic kidney disease (Multi)  -     Albumin-Creatinine Ratio, Urine Random; Future  -     Comprehensive Metabolic Panel; Future  -     continue established follow up with Nephrology    Rhonchi  -     Patient reports mild discomfort at base of left posterior lung since yesterday.  Reports similar sensation to previous pleurisy.  Will obtain CXR  and advised to contact established Pulmonary provider.  -     XR chest 2 views; Future  -     continue current pulmonary medication regimen    Vaginal atrophy  -     estradiol (Estrace) 0.01 % (0.1 mg/gram) vaginal cream; Insert 0.5 Applicatorfuls (2 g) into the vagina once daily. Apply to vagina nightly for 1 week then every Monday/Wednesday/Friday.    Other orders  -     Follow Up In Primary Care - Established    Subjective   HPI  Review of Systems   Constitutional:  Positive for fatigue. Negative for fever.   Respiratory:  Positive for shortness of breath. Negative for cough and wheezing.    Cardiovascular:  Negative for chest pain and palpitations.   Gastrointestinal:  Negative for nausea and vomiting.   Genitourinary:  Positive for frequency and urgency. Negative for flank pain, genital sores, hematuria, vaginal bleeding and vaginal discharge.   Neurological:  Negative for dizziness, syncope and headaches.      Objective   Vitals:    06/26/25 1034   BP: 128/64   Pulse: 64   Temp: 36.3 °C (97.4 °F)   SpO2: 99%      Body mass index is 24.89 kg/m².  Physical Exam  Constitutional:       General: She is not in acute distress.     Appearance: She is not ill-appearing.   Cardiovascular:      Rate and Rhythm: Normal rate and regular rhythm.      Heart sounds: Normal heart sounds.   Pulmonary:      Breath sounds: Rhonchi present.      Comments: Wearing supplemental O2 at 4Lnc  Abdominal:      Palpations: Abdomen is soft.      Tenderness: There is no abdominal tenderness.   Skin:     General: Skin is warm and dry.   Neurological:      General: No focal deficit present.      Mental Status: She is alert. Mental status is at baseline.       Diagnostic Results   Lab Results   Component Value Date    GLUCOSE 88 12/26/2024    CALCIUM 9.2 12/26/2024     12/26/2024    K 5.3 12/26/2024    CO2 30 12/26/2024     12/26/2024    BUN 31 (H) 12/26/2024    CREATININE 1.17 (H) 12/26/2024     Lab Results   Component Value Date  "   ALT 8 2024    AST 14 2024    ALKPHOS 68 2024    BILITOT 0.3 2024     Lab Results   Component Value Date    WBC 9.4 2024    HGB 11.8 (L) 2024    HCT 36.8 2024     2024     2024     Lab Results   Component Value Date    CHOL 244 (H) 2024    CHOL 188 2024    CHOL 197 2023     Lab Results   Component Value Date    HDL 62.7 2024    HDL 54.0 2024    HDL 70.0 2023     Lab Results   Component Value Date    LDLCALC 148 (H) 2024    LDLCALC 100 2024    LDLCALC 106 2023     Lab Results   Component Value Date    TRIG 168 (H) 2024    TRIG 171 (H) 2024    TRIG 104 2023     No components found for: \"CHOLHDL\"  Lab Results   Component Value Date    HGBA1C 4.8 2024     Other labs not included in the list above were reviewed either before or during this encounter.    History    Medical History[1]  Surgical History[2]  Family History[3]  Social History     Socioeconomic History    Marital status:      Spouse name: Not on file    Number of children: Not on file    Years of education: Not on file    Highest education level: Not on file   Occupational History    Not on file   Tobacco Use    Smoking status: Former     Current packs/day: 0.00     Types: Cigarettes     Quit date:      Years since quittin.4     Passive exposure: Never    Smokeless tobacco: Never   Vaping Use    Vaping status: Never Used   Substance and Sexual Activity    Alcohol use: Never    Drug use: Never    Sexual activity: Defer   Other Topics Concern    Not on file   Social History Narrative    Not on file     Social Drivers of Health     Financial Resource Strain: Low Risk  (2024)    Overall Financial Resource Strain (CARDIA)     Difficulty of Paying Living Expenses: Not hard at all   Food Insecurity: No Food Insecurity (2024)    Hunger Vital Sign     Worried About Running Out of Food in the Last " Year: Never true     Ran Out of Food in the Last Year: Never true   Transportation Needs: No Transportation Needs (9/9/2024)    PRAPARE - Transportation     Lack of Transportation (Medical): No     Lack of Transportation (Non-Medical): No   Physical Activity: Inactive (9/9/2024)    Exercise Vital Sign     Days of Exercise per Week: 0 days     Minutes of Exercise per Session: 0 min   Stress: Stress Concern Present (9/9/2024)    [employer] [place] [employer] - Occupational Stress Questionnaire     Feeling of Stress : To some extent   Social Connections: Socially Isolated (9/9/2024)    Social Connection and Isolation Panel [NHANES]     Frequency of Communication with Friends and Family: More than three times a week     Frequency of Social Gatherings with Friends and Family: More than three times a week     Attends [employer] Services: Never     Active Member of Clubs or Organizations: No     Attends Club or Organization Meetings: Never     Marital Status:    Intimate Partner Violence: Not At Risk (9/9/2024)    Humiliation, Afraid, Rape, and Kick questionnaire     Fear of Current or Ex-Partner: No     Emotionally Abused: No     Physically Abused: No     Sexually Abused: No   Housing Stability: Low Risk  (9/9/2024)    Housing Stability Vital Sign     Unable to Pay for Housing in the Last Year: No     Number of Times Moved in the Last Year: 0     Homeless in the Last Year: No     Allergies[4]  Medications Ordered Prior to Encounter[5]  Immunization History   Administered Date(s) Administered    Flu vaccine, quadrivalent, high-dose, preservative free, age 65y+ (FLUZONE) 12/03/2019, 11/06/2020, 11/06/2020, 12/01/2022, 12/12/2023    Flu vaccine, trivalent, preservative free, HIGH-DOSE, age 65y+ (Fluzone) 10/12/2017, 10/02/2018, 12/03/2019    Influenza, seasonal, injectable 11/13/2010, 10/03/2011, 10/02/2012, 10/01/2013, 10/01/2014, 10/01/2015    Moderna COVID-19 vaccine, bivalent, blue cap/gray label *Check  age/dose* 12/01/2022    Moderna SARS-CoV-2 Vaccination 01/28/2021, 02/25/2021, 10/29/2021    PPD Test 01/11/2019, 01/11/2020, 01/18/2020, 01/16/2024    Pneumococcal conjugate vaccine, 13-valent (PREVNAR 13) 12/21/2015    Pneumococcal conjugate vaccine, 20-valent (PREVNAR 20) 12/26/2024    Pneumococcal polysaccharide vaccine, 23-valent, age 2 years and older (PNEUMOVAX 23) 10/13/2006, 10/22/2018    Td vaccine, age 7 years and older (TDVAX) 07/13/2001    Tdap vaccine, age 7 year and older (BOOSTRIX, ADACEL) 08/25/2015    Zoster vaccine, recombinant, adult (SHINGRIX) 04/02/2025     Patient's medical history was reviewed and updated either before or during this encounter.       Jennifer Reed, APRN-CNP       [1]   Past Medical History:  Diagnosis Date    Cancer (Multi)     CHF (congestive heart failure)     COPD (chronic obstructive pulmonary disease) (Multi)     Depression     Myocardial infarct, old    [2]   Past Surgical History:  Procedure Laterality Date    CORONARY ARTERY BYPASS GRAFT      MR HEAD ANGIO WO IV CONTRAST  10/31/2015    MR HEAD ANGIO WO IV CONTRAST [facility]    MR HEAD ANGIO WO IV CONTRAST  02/15/2019    MR HEAD ANGIO WO IV CONTRAST [facility]    MR NECK ANGIO WO IV CONTRAST  10/31/2015    MR NECK ANGIO WO IV CONTRAST [facility]   [3] No family history on file.  [4]   Allergies  Allergen Reactions    Cortisone Swelling and Rash    Other Swelling and Rash     ACTH    Acth Hives    Haemoph B Poly Conj-Tet Tox-Pf Other    Codeine Nausea/vomiting, Nausea Only and Rash    Hydrochlorothiazide Rash and Swelling   [5]   Current Outpatient Medications on File Prior to Visit   Medication Sig Dispense Refill    acetaminophen (Tylenol) 325 mg tablet Take 2 tablets (650 mg) by mouth every 4 hours if needed for fever (temp greater than 38.0 C) (greater than or equal to 38 C). 30 tablet 0    albuterol 2.5 mg /3 mL (0.083 %) nebulizer solution Take 3 mL (2.5 mg) by nebulization every  6 hours while awake. 180 mL 2    amLODIPine (Norvasc) 5 mg tablet Take 1 tablet (5 mg) by mouth once daily. 90 tablet 3    aspirin 81 mg chewable tablet Chew 1 tablet (81 mg) once daily. 30 tablet 3    atorvastatin (Lipitor) 80 mg tablet Take 1 tablet (80 mg) by mouth once daily. 90 tablet 3    carvedilol (Coreg) 6.25 mg tablet Take one and a half tablets by mouth twice daily 270 tablet 3    citalopram (CeleXA) 20 mg tablet Take 1 tablet (20 mg) by mouth once daily. 30 tablet 11    DULoxetine (Cymbalta) 30 mg DR capsule Take 1 capsule (30 mg) by mouth once daily. Do not crush or chew. 30 capsule 11    furosemide (Lasix) 40 mg tablet TAKE 1 TABLET BY MOUTH EVERY DAY 90 tablet 3    melatonin 12 mg tablet Take 12 mg by mouth. 1 tab Mon-Fri      nebulizer and compressor device use as directed with nebulizer treatments 1 each 0    omeprazole (PriLOSEC) 40 mg DR capsule Take 1 capsule (40 mg) by mouth once daily. 90 capsule 3    oxybutynin XL (Ditropan XL) 10 mg 24 hr tablet Take 1 tablet (10 mg) by mouth once daily. Do not crush, chew, or split. 30 tablet 11    oxygen (O2) gas therapy 3 lpm (Patient taking differently: 4 L/min at 240,000 mL/hr continuously.)      patiromer calcium sorbitex (Veltassa) 8.4 gram powder in packet Take by mouth 1 (one) time per week.      polyethylene glycol (Glycolax, Miralax) 17 gram packet Take 17 g by mouth once daily as needed (Constipation).      primidone (Mysoline) 50 mg tablet Take 2 tablets (100 mg) by mouth 3 times a day.      spironolactone (Aldactone) 25 mg tablet Take 0.5 tablets (12.5 mg) by mouth 2 times a day. 30 tablet 5    [DISCONTINUED] ALPRAZolam (Xanax) 0.25 mg tablet Take 1 tablet (0.25 mg) by mouth if needed.       No current facility-administered medications on file prior to visit.

## 2025-07-01 DIAGNOSIS — I10 DIASTOLIC HYPERTENSION: ICD-10-CM

## 2025-07-01 RX ORDER — SPIRONOLACTONE 25 MG/1
TABLET ORAL
Qty: 90 TABLET | Refills: 1 | Status: SHIPPED | OUTPATIENT
Start: 2025-07-01

## 2025-07-01 NOTE — TELEPHONE ENCOUNTER
LOV: 04/08/2025    Requested Prescriptions     Pending Prescriptions Disp Refills    spironolactone (Aldactone) 25 mg tablet [Pharmacy Med Name: SPIRONOLACTONE 25 MG TABLET] 90 tablet 1     Sig: TAKE 1/2 TABLETS (12.5 MG) BY MOUTH 2 TIMES A DAY.

## 2025-07-05 LAB
ALBUMIN SERPL-MCNC: 4.3 G/DL (ref 3.6–5.1)
ALBUMIN/CREAT UR: 12 MG/G CREAT
ALP SERPL-CCNC: 68 U/L (ref 37–153)
ALT SERPL-CCNC: 9 U/L (ref 6–29)
ANION GAP SERPL CALCULATED.4IONS-SCNC: 9 MMOL/L (CALC) (ref 7–17)
AST SERPL-CCNC: 13 U/L (ref 10–35)
BASOPHILS # BLD AUTO: 32 CELLS/UL (ref 0–200)
BASOPHILS NFR BLD AUTO: 0.5 %
BILIRUB SERPL-MCNC: 0.3 MG/DL (ref 0.2–1.2)
BUN SERPL-MCNC: 16 MG/DL (ref 7–25)
CALCIUM SERPL-MCNC: 9.6 MG/DL (ref 8.6–10.4)
CHLORIDE SERPL-SCNC: 98 MMOL/L (ref 98–110)
CHOLEST SERPL-MCNC: 193 MG/DL
CHOLEST/HDLC SERPL: 3.1 (CALC)
CO2 SERPL-SCNC: 28 MMOL/L (ref 20–32)
CREAT SERPL-MCNC: 1.09 MG/DL (ref 0.6–0.95)
CREAT UR-MCNC: 66 MG/DL (ref 20–275)
EGFRCR SERPLBLD CKD-EPI 2021: 50 ML/MIN/1.73M2
EOSINOPHIL # BLD AUTO: 192 CELLS/UL (ref 15–500)
EOSINOPHIL NFR BLD AUTO: 3 %
ERYTHROCYTE [DISTWIDTH] IN BLOOD BY AUTOMATED COUNT: 13.4 % (ref 11–15)
GLUCOSE SERPL-MCNC: 80 MG/DL (ref 65–99)
HCT VFR BLD AUTO: 39 % (ref 35–45)
HDLC SERPL-MCNC: 62 MG/DL
HGB BLD-MCNC: 12.6 G/DL (ref 11.7–15.5)
LDLC SERPL CALC-MCNC: 106 MG/DL (CALC)
LYMPHOCYTES # BLD AUTO: 1421 CELLS/UL (ref 850–3900)
LYMPHOCYTES NFR BLD AUTO: 22.2 %
MCH RBC QN AUTO: 32.3 PG (ref 27–33)
MCHC RBC AUTO-ENTMCNC: 32.3 G/DL (ref 32–36)
MCV RBC AUTO: 100 FL (ref 80–100)
MICROALBUMIN UR-MCNC: 0.8 MG/DL
MONOCYTES # BLD AUTO: 570 CELLS/UL (ref 200–950)
MONOCYTES NFR BLD AUTO: 8.9 %
NEUTROPHILS # BLD AUTO: 4186 CELLS/UL (ref 1500–7800)
NEUTROPHILS NFR BLD AUTO: 65.4 %
NONHDLC SERPL-MCNC: 131 MG/DL (CALC)
PLATELET # BLD AUTO: 254 THOUSAND/UL (ref 140–400)
PMV BLD REES-ECKER: 10.6 FL (ref 7.5–12.5)
POTASSIUM SERPL-SCNC: 4.7 MMOL/L (ref 3.5–5.3)
PROT SERPL-MCNC: 7 G/DL (ref 6.1–8.1)
RBC # BLD AUTO: 3.9 MILLION/UL (ref 3.8–5.1)
SODIUM SERPL-SCNC: 135 MMOL/L (ref 135–146)
TRIGL SERPL-MCNC: 134 MG/DL
WBC # BLD AUTO: 6.4 THOUSAND/UL (ref 3.8–10.8)

## 2025-07-12 DIAGNOSIS — K21.9 GASTROESOPHAGEAL REFLUX DISEASE WITHOUT ESOPHAGITIS: ICD-10-CM

## 2025-07-12 DIAGNOSIS — N32.81 OVERACTIVE BLADDER: ICD-10-CM

## 2025-07-14 RX ORDER — OMEPRAZOLE 40 MG/1
40 CAPSULE, DELAYED RELEASE ORAL DAILY
Qty: 90 CAPSULE | Refills: 3 | Status: SHIPPED | OUTPATIENT
Start: 2025-07-14

## 2025-07-14 RX ORDER — OXYBUTYNIN CHLORIDE 10 MG/1
10 TABLET, EXTENDED RELEASE ORAL DAILY
Qty: 90 TABLET | Refills: 3 | Status: SHIPPED | OUTPATIENT
Start: 2025-07-14 | End: 2026-07-14